# Patient Record
Sex: FEMALE | Race: BLACK OR AFRICAN AMERICAN | NOT HISPANIC OR LATINO | Employment: OTHER | URBAN - METROPOLITAN AREA
[De-identification: names, ages, dates, MRNs, and addresses within clinical notes are randomized per-mention and may not be internally consistent; named-entity substitution may affect disease eponyms.]

---

## 2017-01-30 ENCOUNTER — OFFICE VISIT (OUTPATIENT)
Dept: CARDIOLOGY | Facility: CLINIC | Age: 82
End: 2017-01-30
Payer: MEDICARE

## 2017-01-30 VITALS
SYSTOLIC BLOOD PRESSURE: 120 MMHG | BODY MASS INDEX: 25.34 KG/M2 | WEIGHT: 143 LBS | DIASTOLIC BLOOD PRESSURE: 51 MMHG | HEIGHT: 63 IN | HEART RATE: 45 BPM

## 2017-01-30 DIAGNOSIS — E78.2 MIXED HYPERLIPIDEMIA: Chronic | ICD-10-CM

## 2017-01-30 DIAGNOSIS — I50.42 CHRONIC COMBINED SYSTOLIC AND DIASTOLIC HEART FAILURE: Chronic | ICD-10-CM

## 2017-01-30 DIAGNOSIS — N18.9 CHRONIC KIDNEY DISEASE, UNSPECIFIED STAGE: Primary | ICD-10-CM

## 2017-01-30 DIAGNOSIS — I25.10 CORONARY ARTERY DISEASE, ANGINA PRESENCE UNSPECIFIED, UNSPECIFIED VESSEL OR LESION TYPE, UNSPECIFIED WHETHER NATIVE OR TRANSPLANTED HEART: Chronic | ICD-10-CM

## 2017-01-30 DIAGNOSIS — E11.21 CONTROLLED TYPE 2 DIABETES MELLITUS WITH DIABETIC NEPHROPATHY, WITHOUT LONG-TERM CURRENT USE OF INSULIN: Chronic | ICD-10-CM

## 2017-01-30 DIAGNOSIS — I10 ESSENTIAL HYPERTENSION: Chronic | ICD-10-CM

## 2017-01-30 PROCEDURE — 99999 PR PBB SHADOW E&M-EST. PATIENT-LVL IV: CPT | Mod: PBBFAC,GC,, | Performed by: STUDENT IN AN ORGANIZED HEALTH CARE EDUCATION/TRAINING PROGRAM

## 2017-01-30 PROCEDURE — 1160F RVW MEDS BY RX/DR IN RCRD: CPT | Mod: GC,S$GLB,, | Performed by: STUDENT IN AN ORGANIZED HEALTH CARE EDUCATION/TRAINING PROGRAM

## 2017-01-30 PROCEDURE — 99213 OFFICE O/P EST LOW 20 MIN: CPT | Mod: GC,S$GLB,, | Performed by: STUDENT IN AN ORGANIZED HEALTH CARE EDUCATION/TRAINING PROGRAM

## 2017-01-30 PROCEDURE — 3074F SYST BP LT 130 MM HG: CPT | Mod: GC,S$GLB,, | Performed by: STUDENT IN AN ORGANIZED HEALTH CARE EDUCATION/TRAINING PROGRAM

## 2017-01-30 PROCEDURE — 1157F ADVNC CARE PLAN IN RCRD: CPT | Mod: GC,S$GLB,, | Performed by: STUDENT IN AN ORGANIZED HEALTH CARE EDUCATION/TRAINING PROGRAM

## 2017-01-30 PROCEDURE — 3078F DIAST BP <80 MM HG: CPT | Mod: GC,S$GLB,, | Performed by: STUDENT IN AN ORGANIZED HEALTH CARE EDUCATION/TRAINING PROGRAM

## 2017-01-30 PROCEDURE — 1126F AMNT PAIN NOTED NONE PRSNT: CPT | Mod: GC,S$GLB,, | Performed by: STUDENT IN AN ORGANIZED HEALTH CARE EDUCATION/TRAINING PROGRAM

## 2017-01-30 PROCEDURE — 1159F MED LIST DOCD IN RCRD: CPT | Mod: GC,S$GLB,, | Performed by: STUDENT IN AN ORGANIZED HEALTH CARE EDUCATION/TRAINING PROGRAM

## 2017-01-30 PROCEDURE — 93000 ELECTROCARDIOGRAM COMPLETE: CPT | Mod: S$GLB,,, | Performed by: INTERNAL MEDICINE

## 2017-01-30 RX ORDER — ISOSORBIDE DINITRATE AND HYDRALAZINE HYDROCHLORIDE 37.5; 2 MG/1; MG/1
1 TABLET ORAL 3 TIMES DAILY
Qty: 90 TABLET | Refills: 11 | Status: ON HOLD | OUTPATIENT
Start: 2017-01-30 | End: 2018-07-25 | Stop reason: HOSPADM

## 2017-01-30 RX ORDER — AMLODIPINE BESYLATE 10 MG/1
10 TABLET ORAL DAILY
COMMUNITY

## 2017-01-30 RX ORDER — MIRTAZAPINE 7.5 MG/1
7.5 TABLET, FILM COATED ORAL NIGHTLY
COMMUNITY
End: 2018-04-30

## 2017-01-30 NOTE — PATIENT INSTRUCTIONS
Start Bidil 20-37.5 mg PO TID  C/w lasix, coreg at current doses  Check BMP to assess Cr level  Check daily weights  If Weight increases by 3lbs in 1 day or 5 lbs in 1 week, take double dose of lasix until baseline dry weight is achieved  RTC in 2-3 months

## 2017-01-30 NOTE — MR AVS SNAPSHOT
[facility] - Cardiology  [address]  [facility] [address]  Phone: [phone]                  Misty Yun   2017 1:00 PM   Office Visit    Description:  Female : 3/8/1934   Provider:  Cici Clemens MD   Department:  [facility]           Reason for Visit     Coronary Artery Disease           Diagnoses this Visit        Comments    Chronic kidney disease, unspecified stage    -  Primary            To Do List           Future Appointments        Provider Department Dept Phone    2017 2:00 PM Vashti Minor OD [facility] - Optometry [phone]    3/6/2017 1:00 PM STEVIE Patterson MD [facility] - Internal Medicine [phone]    2017 1:00 PM Cici Clemens MD [facility] - Cardiology [phone]      Goals (5 Years of Data)     None       These Medications        Disp Refills Start End    isosorbide-hydrALAZINE 20-37.5 mg (BIDIL) 20-37.5 mg Tab 90 tablet 11 2017    Take 1 tablet by mouth 3 (three) times daily. - Oral    Pharmacy: [facility]/pharmacy #[id number] - [place], [place] - [address] Ph #: [phone]         [facility]     [facility] - 24/7 Assistance  Registered nurses in the [facility] provide clinical advisement, health education, appointment booking, and other advisory services.  Call for this free service at [phone].             Medications           Message regarding Medications     Verify the changes and/or additions to your medication regime listed below are the same as discussed with your clinician today.  If any of these changes or additions are incorrect, please notify your healthcare provider.        START taking these NEW medications        Refills    isosorbide-hydrALAZINE 20-37.5 mg (BIDIL) 20-37.5 mg Tab 11    Sig: Take 1 tablet by mouth 3 (three) times daily.    Class: Normal    Route: Oral           Verify that the below list of medications is an accurate  representation of the medications you are currently taking.  If none reported, the list may be blank. If incorrect, please contact your healthcare provider. Carry this list with you in case of emergency.           Current Medications     alendronate (FOSAMAX) 70 MG tablet Take 70 mg by mouth every 7 days.    amlodipine (NORVASC) 10 MG tablet Take 10 mg by mouth once daily.    aspirin 81 MG Chew Take 1 tablet (81 mg total) by mouth once daily. RESUME ON 2/14/15    atorvastatin (LIPITOR) 40 MG tablet Take 1 tablet (40 mg total) by mouth once daily.    blood sugar diagnostic (BLOOD GLUCOSE TEST) Strp by Misc.(Non-Drug; Combo Route) route 3 (three) times a week.    calcium-vitamin D3 (CALCIUM 500 + D) 500 mg(1,250mg) -200 unit per tablet Take 1 tablet by mouth 2 (two) times daily with meals.    carvedilol (COREG) 3.125 MG tablet Take 2 tablets (6.25 mg total) by mouth 2 (two) times daily.    donepezil (ARICEPT) 5 MG tablet Take 5 mg by mouth every evening.    ergocalciferol (VITAMIN D2) 50,000 unit Cap Take 50,000 Units by mouth every 7 days.    furosemide (LASIX) 40 MG tablet Take 0.5 tablets (20 mg total) by mouth 2 (two) times daily.    insulin aspart (NOVOLOG) 100 unit/mL injection Inject into the skin as needed for High Blood Sugar.    LANCETS MISC by Misc.(Non-Drug; Combo Route) route 3 (three) times a week.    mirtazapine (REMERON) 7.5 MG Tab Take 7.5 mg by mouth every evening.    nitroGLYCERIN (NITROSTAT) 0.4 MG SL tablet Place 1 tablet (0.4 mg total) under the tongue every 5 (five) minutes as needed for Chest pain.    ofloxacin (OCUFLOX) 0.3 % ophthalmic solution Place 1 drop into the right eye 3 (three) times daily.    prednisoLONE acetate (PRED FORTE) 1 % DrpS Place 1 drop into the right eye 3 (three) times daily.    senna-docusate 8.6-50 mg (PERICOLACE) 8.6-50 mg per tablet Take 1 tablet by mouth 2 (two) times daily.    isosorbide-hydrALAZINE 20-37.5 mg (BIDIL) 20-37.5 mg Tab Take 1 tablet by mouth 3 (three)  "times daily.           Clinical Reference Information           Vital Signs - Last Recorded  Most recent update: 1/30/2017 12:46 PM by Miya Vaca MA    BP Pulse Ht Wt BMI    (!) 120/51 (BP Location: Left arm, Patient Position: Sitting, BP Method: Automatic) (!) 45 5' 3" (1.6 m) 64.9 kg (143 lb) 25.33 kg/m2      Blood Pressure          Most Recent Value    Right Arm BP - Sitting  114/56    Left Arm BP - Sitting  120/51    BP  (!)  120/51      Allergies as of 1/30/2017     No Known Allergies      Immunizations Administered on Date of Encounter - 1/30/2017     None      Orders Placed During Today's Visit     Future Labs/Procedures Expected by Expires    Basic metabolic panel  1/30/2017 3/31/2018      Instructions    Start Bidil 20-37.5 mg PO TID  C/w lasix, coreg at current doses  Check BMP to assess Cr level  Check daily weights  If Weight increases by 3lbs in 1 day or 5 lbs in 1 week, take double dose of lasix until baseline dry weight is achieved  RTC in 2-3 months       "

## 2017-01-30 NOTE — PROGRESS NOTES
I have personally taken the history and examined this patient and agree with the fellow's note as stated above. BUN up further .Will need to be cautious with any further diuresis.

## 2017-01-30 NOTE — PROGRESS NOTES
Cardiology Clinic Note  Reason for Visit: CHF    HPI:   Misty Yun is a 82 y.o. female with a PMH of CAD s/p CABG (2006), Dementia, T2DM, HTN, HLD who presented to the cardiology clinic for management of CHF.     She was admitted to the hospital for acute systolic decompensated CHF (10/31/16 to 11/07/16). EKG showed NSR with LVH, Tn peaked at 0.142. She was diuresed with lasix IVP/infusion and was unable to tolerate  infusion. Her course was complicated by LESLEY. Her TTE revealed an EF of 25% with inferior WMA (however there is no other TTE in our records to show if this is a new finding or old). During the course of the hospitalization it was discussed with the patient and her family the risks and benefits of LHC, however the family opted to not go for LHC given risk of renal failure and becoming HD dependent.      Since her discharge, she has been residing in a [facility] where she has been given a low salt diet and medications. Her weights have remained steady. Her weight on admission was 148 lbs, 139 lbs on the last office visit and currently it is 143 lbs in the office. She denies any chest pain, SOB, orthopnea, PND, abdominal or leg swelling. Feels comfortable, ambulates with assistance of SNF staff. On her last visit, I had recommended to increase her coreg to 6.25 mg BID, take lasix 20 mg BID. Her pulse on this visit was 45. EKG consistent with sinus bradycardia at 45 BPM with Q waves in the inferior leads. She denies any lightheadedness, LOC with ambulation.    ROS:    Constitution: Negative for fever, chills, weight loss or gain.   HENT: Negative for sore throat, rhinorrhea, or headache.  Eyes: Negative for blurred or double vision.   Cardiovascular: See above  Pulmonary: Negative for SOB   Gastrointestinal: Negative for abdominal pain, nausea, vomiting, or diarrhea.   : Negative for dysuria.   Neurological: Negative for focal weakness or sensory changes.  PMH:     Past Medical History    Diagnosis Date    CAD (coronary artery disease)     Cataract     Dementia     Fracture of humerus, proximal, left, closed 1/28/2015    Hyperlipidemia     Hypertension     Type 2 diabetes mellitus, controlled      Past Surgical History   Procedure Laterality Date    Coronary artery bypass graft  2006    Hysterectomy  2002    Orif humerus fracture Left 1/28/2015     Allergies:   Review of patient's allergies indicates:  No Known Allergies  Medications:     Current Outpatient Prescriptions on File Prior to Visit   Medication Sig Dispense Refill    alendronate (FOSAMAX) 70 MG tablet Take 70 mg by mouth every 7 days.      aspirin 81 MG Chew Take 1 tablet (81 mg total) by mouth once daily. RESUME ON 2/14/15      atorvastatin (LIPITOR) 40 MG tablet Take 1 tablet (40 mg total) by mouth once daily. 30 tablet 3    blood sugar diagnostic (BLOOD GLUCOSE TEST) Strp by Misc.(Non-Drug; Combo Route) route 3 (three) times a week.      calcium-vitamin D3 (CALCIUM 500 + D) 500 mg(1,250mg) -200 unit per tablet Take 1 tablet by mouth 2 (two) times daily with meals.      carvedilol (COREG) 3.125 MG tablet Take 2 tablets (6.25 mg total) by mouth 2 (two) times daily. 60 tablet 3    donepezil (ARICEPT) 5 MG tablet Take 5 mg by mouth every evening.      ergocalciferol (VITAMIN D2) 50,000 unit Cap Take 50,000 Units by mouth every 7 days.      furosemide (LASIX) 40 MG tablet Take 0.5 tablets (20 mg total) by mouth 2 (two) times daily. 30 tablet 3    insulin aspart (NOVOLOG) 100 unit/mL injection Inject into the skin as needed for High Blood Sugar.      LANCETS MISC by Misc.(Non-Drug; Combo Route) route 3 (three) times a week.      nitroGLYCERIN (NITROSTAT) 0.4 MG SL tablet Place 1 tablet (0.4 mg total) under the tongue every 5 (five) minutes as needed for Chest pain. 25 tablet 3    ofloxacin (OCUFLOX) 0.3 % ophthalmic solution Place 1 drop into the right eye 3 (three) times daily. 1 Bottle 4    prednisoLONE acetate  "(PRED FORTE) 1 % DrpS Place 1 drop into the right eye 3 (three) times daily. 10 mL 3    senna-docusate 8.6-50 mg (PERICOLACE) 8.6-50 mg per tablet Take 1 tablet by mouth 2 (two) times daily.       No current facility-administered medications on file prior to visit.      Social History:     Social History   Substance Use Topics    Smoking status: Former Smoker     Packs/day: 2.00     Years: 30.00     Quit date: 1/20/2007    Smokeless tobacco: Never Used    Alcohol use No     Family History:     Family History   Problem Relation Age of Onset    Adopted: Yes    Blindness Maternal Grandmother      Physical Exam:     Visit Vitals    BP (!) 120/51 (BP Location: Left arm, Patient Position: Sitting, BP Method: Automatic)    Pulse (!) 45    Ht 5' 3" (1.6 m)    Wt 64.9 kg (143 lb)    BMI 25.33 kg/m2        Wt Readings from Last 10 Encounters:   01/30/17 64.9 kg (143 lb)   12/05/16 63.5 kg (139 lb 15.9 oz)   11/21/16 63.3 kg (139 lb 8.8 oz)   11/07/16 59.8 kg (131 lb 13.4 oz)   05/04/15 72.1 kg (159 lb)   04/13/15 72.1 kg (159 lb)   03/16/15 72.1 kg (159 lb)   01/30/15 73 kg (161 lb)   01/28/15 61.7 kg (136 lb)   01/20/15 48.1 kg (106 lb)       Constitutional: NAD, conversant  HEENT: Sclera anicteric, PERRLA, EOMI  Neck: No JVD, no carotid bruits  CV: Regular rhythm, Bradycardic, no murmur, normal S1/S2, No Pericardial rub  Pulm: CTAB with no wheezes, rales, or rhonchi  GI: Abdomen soft, NTND, +BS  Extremities: No LE edema, warm and well perfused, No cyanosis, No clubbing  Skin: No ecchymosis, erythema, or ulcers  Psych: AOx3, appropriate affect  Neuro: CNII-XII intact, no focal deficits    Labs:     Lab Results   Component Value Date     01/30/2017    K 3.8 01/30/2017     01/30/2017    CO2 25 01/30/2017    BUN 57 (H) 01/30/2017    CREATININE 2.6 (H) 01/30/2017    ANIONGAP 15 01/30/2017     Lab Results   Component Value Date    HGBA1C 5.8 10/31/2016     Lab Results   Component Value Date     (H) " 11/23/2016    BNP 2475 (H) 10/31/2016    BNP 79 04/13/2015    Lab Results   Component Value Date    WBC 6.27 11/07/2016    HGB 10.1 (L) 11/07/2016    HCT 31.9 (L) 11/07/2016    HCT 28 (L) 01/28/2015     11/07/2016    GRAN 4.1 11/07/2016    GRAN 65.4 11/07/2016     Lab Results   Component Value Date    CHOL 142 11/01/2016    HDL 42 11/01/2016    LDLCALC 84.8 11/01/2016    TRIG 76 11/01/2016          Imaging:   TTE (11/23/16):  1 - Eccentric hypertrophy.     2 - Moderately depressed left ventricular systolic function (EF 30-35%).     3 - Mild left atrial enlargement.     4 - Left ventricular diastolic dysfunction.     5 - Mildly depressed right ventricular systolic function .     6 - The estimated PA systolic pressure is 28 mmHg.     7 - Mild mitral regurgitation.     8 - Mild tricuspid regurgitation.       EF   Date Value Ref Range Status   11/23/2016 30 (A) 55 - 65    10/31/2016 25 (A) 55 - 65        EKG: Sinus sumeet @ 45 BPM, Q waves in inferior leads  Assessment:   Misty Yun is a 82 y.o. female with a PMH of CAD s/p CABG (2006), Dementia, T2DM, HTN, HLD who presented to the cardiology clinic for management of CHF.     Plan:   1) Chronic systolic CHF/ Ischemic cardiomyopathy (EF 30-35%)/ CAD s/p CABG (2006)  - c/w ASA  - c/w coreg to 6.25mg TWICE DAILY; unable to increase due to bradycardia  - c/w lasix 20 mg TWICE DAILY   - start Bidil   - check BMP to assess baseline Cr  - check weight daily  - 2 gram sodium diet     2) Dementia - c/w aricept     3) T2DM - c/w insulin as needed; HbA1C: 5.8     4) HLD - c/w atorvastatin; LDL: 85; HDL: 42     5) CKD - check BMP.      RTC in 3 months. Case discussed with attending.    Signed:  iCci Clemens MD  Cardiology Fellow  Pager: [phone]  1/30/2017 4:43 PM

## 2017-01-31 ENCOUNTER — TELEPHONE (OUTPATIENT)
Dept: CARDIOLOGY | Facility: CLINIC | Age: 82
End: 2017-01-31

## 2017-01-31 DIAGNOSIS — I25.10 CORONARY ARTERY DISEASE, ANGINA PRESENCE UNSPECIFIED, UNSPECIFIED VESSEL OR LESION TYPE, UNSPECIFIED WHETHER NATIVE OR TRANSPLANTED HEART: Primary | ICD-10-CM

## 2017-02-06 ENCOUNTER — OFFICE VISIT (OUTPATIENT)
Dept: OPTOMETRY | Facility: CLINIC | Age: 82
End: 2017-02-06
Payer: MEDICARE

## 2017-02-06 DIAGNOSIS — H25.13 NUCLEAR SCLEROSIS, BILATERAL: ICD-10-CM

## 2017-02-06 DIAGNOSIS — E11.9 TYPE 2 DIABETES MELLITUS WITHOUT OPHTHALMIC MANIFESTATIONS: Primary | ICD-10-CM

## 2017-02-06 PROCEDURE — 99999 PR PBB SHADOW E&M-EST. PATIENT-LVL III: CPT | Mod: PBBFAC,,, | Performed by: OPTOMETRIST

## 2017-02-06 PROCEDURE — 92014 COMPRE OPH EXAM EST PT 1/>: CPT | Mod: S$GLB,,, | Performed by: OPTOMETRIST

## 2017-02-06 NOTE — PROGRESS NOTES
HPI     Last eye exam was 12/1/14 with Dr. Minor.  Patient states no vision changes since last exam. Wanted to get a new   glasses rx today. Was scheduled for cataract sx 2 years ago but cancelled   due to breaking arm.  Patient denies diplopia, headaches, flashes/floaters, and pain.    Hemoglobin A1C       Date                     Value               Ref Range             Status                10/31/2016               5.8                 4.5 - 6.2 %           Final                     Last edited by Leatha Kang on 2/6/2017  2:20 PM.     ROS     Negative for: Constitutional, Gastrointestinal, Neurological, Skin,   Genitourinary, Musculoskeletal, HENT, Endocrine, Cardiovascular, Eyes,   Respiratory, Psychiatric, Allergic/Imm, Heme/Lymph    Last edited by Vashti Minor, OD on 2/6/2017  3:31 PM. (History)        Assessment /Plan     For exam results, see Encounter Report.    Type 2 diabetes mellitus without ophthalmic manifestations    Nuclear sclerosis, bilateral            1.  No retinopathy--monitor yearly.  BS control.  2.  Educated on cataracts and affects on vision.  Consult Dr. Motta for cataract surgery.

## 2017-02-23 DIAGNOSIS — N18.9 CKD (CHRONIC KIDNEY DISEASE), UNSPECIFIED STAGE: Primary | ICD-10-CM

## 2017-02-23 NOTE — PROGRESS NOTES
Notified about abnormal result on BMP. Cr found to be 2.6 on 1/30/17 (up from 2.1 on 11/2016). Unclear whether this was an acute rise or a new baseline / progression of CKD 4.     Ordered another BMP to assess kidney function.  Currently scheduled to follow up with me in May. However, if BMP significantly abnormal, would arrange earlier follow up with me and a referral to nephrology.  Will notify patient.    Cici Clemens MD  Cardiology Fellow

## 2017-03-08 ENCOUNTER — OFFICE VISIT (OUTPATIENT)
Dept: OPHTHALMOLOGY | Facility: CLINIC | Age: 82
End: 2017-03-08
Payer: MEDICARE

## 2017-03-08 DIAGNOSIS — H25.13 NUCLEAR SCLEROSIS, BILATERAL: Primary | ICD-10-CM

## 2017-03-08 PROCEDURE — 92014 COMPRE OPH EXAM EST PT 1/>: CPT | Mod: S$GLB,,, | Performed by: OPHTHALMOLOGY

## 2017-03-08 PROCEDURE — 99999 PR PBB SHADOW E&M-EST. PATIENT-LVL II: CPT | Mod: PBBFAC,,, | Performed by: OPHTHALMOLOGY

## 2017-03-08 RX ORDER — MOXIFLOXACIN 5 MG/ML
1 SOLUTION/ DROPS OPHTHALMIC
Status: CANCELLED | OUTPATIENT
Start: 2017-03-08

## 2017-03-08 RX ORDER — PHENYLEPHRINE HYDROCHLORIDE 25 MG/ML
1 SOLUTION/ DROPS OPHTHALMIC
Status: CANCELLED | OUTPATIENT
Start: 2017-03-08

## 2017-03-08 RX ORDER — TETRACAINE HYDROCHLORIDE 5 MG/ML
1 SOLUTION OPHTHALMIC
Status: CANCELLED | OUTPATIENT
Start: 2017-03-08

## 2017-03-08 RX ORDER — TROPICAMIDE 10 MG/ML
1 SOLUTION/ DROPS OPHTHALMIC
Status: CANCELLED | OUTPATIENT
Start: 2017-03-08

## 2017-03-08 RX ORDER — LIDOCAINE HYDROCHLORIDE 10 MG/ML
1 INJECTION, SOLUTION EPIDURAL; INFILTRATION; INTRACAUDAL; PERINEURAL ONCE
Status: CANCELLED | OUTPATIENT
Start: 2017-03-08 | End: 2017-03-08

## 2017-03-08 NOTE — MR AVS SNAPSHOT
[facility] - Ophthalmology  [address]  [facility] [address]  Phone: [phone]  Fax: [phone]                  Misty Yun   3/8/2017 1:30 PM   Office Visit    Description:  Female : 3/8/1934   Provider:  Sidney Motta MD   Department:  [facility]           Reason for Visit     Cataract           Diagnoses this Visit        Comments    Nuclear sclerosis, bilateral    -  Primary            To Do List           Future Appointments        Provider Department Dept Phone    3/27/2017 4:00 PM MD [facility] Cardiology [phone]    2017 1:00 PM MD [facility] Cardiology [phone]      Goals (5 Years of Data)     None      [facility]     [facility] -  Assistance  Registered nurses in the [facility] provide clinical advisement, health education, appointment booking, and other advisory services.  Call for this free service at [phone].             Medications           Message regarding Medications     Verify the changes and/or additions to your medication regime listed below are the same as discussed with your clinician today.  If any of these changes or additions are incorrect, please notify your healthcare provider.             Verify that the below list of medications is an accurate representation of the medications you are currently taking.  If none reported, the list may be blank. If incorrect, please contact your healthcare provider. Carry this list with you in case of emergency.           Current Medications     alendronate (FOSAMAX) 70 MG tablet Take 70 mg by mouth every 7 days.    amlodipine (NORVASC) 10 MG tablet Take 10 mg by mouth once daily.    aspirin 81 MG Chew Take 1 tablet (81 mg total) by mouth once daily. RESUME ON 2/14/15    atorvastatin (LIPITOR) 40 MG tablet Take 1 tablet (40 mg total) by mouth once daily.    blood sugar diagnostic (BLOOD GLUCOSE TEST) Strp by  Misc.(Non-Drug; Combo Route) route 3 (three) times a week.    calcium-vitamin D3 (CALCIUM 500 + D) 500 mg(1,250mg) -200 unit per tablet Take 1 tablet by mouth 2 (two) times daily with meals.    carvedilol (COREG) 3.125 MG tablet Take 2 tablets (6.25 mg total) by mouth 2 (two) times daily.    donepezil (ARICEPT) 5 MG tablet Take 5 mg by mouth every evening.    ergocalciferol (VITAMIN D2) 50,000 unit Cap Take 50,000 Units by mouth every 7 days.    furosemide (LASIX) 40 MG tablet Take 0.5 tablets (20 mg total) by mouth 2 (two) times daily.    insulin aspart (NOVOLOG) 100 unit/mL injection Inject into the skin as needed for High Blood Sugar.    isosorbide-hydrALAZINE 20-37.5 mg (BIDIL) 20-37.5 mg Tab Take 1 tablet by mouth 3 (three) times daily.    LANCETS MISC by Misc.(Non-Drug; Combo Route) route 3 (three) times a week.    mirtazapine (REMERON) 7.5 MG Tab Take 7.5 mg by mouth every evening.    nitroGLYCERIN (NITROSTAT) 0.4 MG SL tablet Place 1 tablet (0.4 mg total) under the tongue every 5 (five) minutes as needed for Chest pain.    senna-docusate 8.6-50 mg (PERICOLACE) 8.6-50 mg per tablet Take 1 tablet by mouth 2 (two) times daily.           Clinical Reference Information           Allergies as of 3/8/2017     No Known Allergies      Immunizations Administered on Date of Encounter - 3/8/2017     None      Language Assistance Services     ATTENTION: Language assistance services are available, free of charge. Please call [phone].      ATENCIÓN: Si nidia bauer, tiene a loomis disposición servicios gratuitos de asistencia lingüística. Llcarroll al [phone].     NIGEL Ý: N?u b?n nói Ti?ng Vi?t, có các d?ch v? h? tr? ngôn ng? mi?n phí dành cho b?n. G?i s? [phone].         [facility] complies with applicable Federal civil rights laws and does not discriminate on the basis of race, color, national origin, age, disability, or sex.

## 2017-03-08 NOTE — PROGRESS NOTES
HPI     Referred by Dr. Minor    Patient here for cataract evaluation. Denies flashes, floaters, diplopia,   photophobia, glare, HA's, or pain.       Last edited by Sonia Ruiz on 3/8/2017  2:13 PM.         Assessment /Plan     For exam results, see Encounter Report.    Nuclear sclerosis, bilateral      Visually Significant Cataract: Patient reports decreased vision consistent with the clinical amount of lenticular opacity, which reaches the level of visual significance and affects activities of daily living. Risks, benefits, and alternatives to cataract surgery were discussed and the consent reviewed. IOL options were discussed, including ATIOLs and the associated side effects and additional patient cost associated with them.   IOL Selections:   Right eye  IOL: pcboo 19.0     Left eye  IOL: pcboo 19.0    Pt wishes to have left eye done first.

## 2017-03-11 ENCOUNTER — TELEPHONE (OUTPATIENT)
Dept: OPHTHALMOLOGY | Facility: CLINIC | Age: 82
End: 2017-03-11

## 2017-03-11 DIAGNOSIS — H25.12 NUCLEAR SCLEROTIC CATARACT OF LEFT EYE: Primary | ICD-10-CM

## 2017-03-16 NOTE — PROGRESS NOTES
[facility]   Findings similar to last BMP done in January. Cr is stable at 2.6. Likely the patient's new baseline.  Will have her follow up with me as scheduled in May.    Cici Clemens MD  Cardiology Fellow  Pager: [phone]  3/16/2017 5:55 PM

## 2017-03-27 ENCOUNTER — OFFICE VISIT (OUTPATIENT)
Dept: CARDIOLOGY | Facility: CLINIC | Age: 82
End: 2017-03-27
Payer: MEDICARE

## 2017-03-27 VITALS
DIASTOLIC BLOOD PRESSURE: 60 MMHG | HEIGHT: 63 IN | WEIGHT: 147.5 LBS | HEART RATE: 83 BPM | SYSTOLIC BLOOD PRESSURE: 113 MMHG | BODY MASS INDEX: 26.13 KG/M2

## 2017-03-27 DIAGNOSIS — E11.21 CONTROLLED TYPE 2 DIABETES MELLITUS WITH DIABETIC NEPHROPATHY, WITHOUT LONG-TERM CURRENT USE OF INSULIN: Chronic | ICD-10-CM

## 2017-03-27 DIAGNOSIS — E78.2 MIXED HYPERLIPIDEMIA: Chronic | ICD-10-CM

## 2017-03-27 DIAGNOSIS — N18.4 CHRONIC KIDNEY DISEASE, STAGE IV (SEVERE): Chronic | ICD-10-CM

## 2017-03-27 DIAGNOSIS — I50.42 CHRONIC COMBINED SYSTOLIC AND DIASTOLIC HEART FAILURE: Chronic | ICD-10-CM

## 2017-03-27 DIAGNOSIS — I50.42 CHRONIC COMBINED SYSTOLIC AND DIASTOLIC CONGESTIVE HEART FAILURE: Primary | ICD-10-CM

## 2017-03-27 DIAGNOSIS — I10 ESSENTIAL HYPERTENSION: Chronic | ICD-10-CM

## 2017-03-27 PROCEDURE — 3078F DIAST BP <80 MM HG: CPT | Mod: GC,S$GLB,, | Performed by: STUDENT IN AN ORGANIZED HEALTH CARE EDUCATION/TRAINING PROGRAM

## 2017-03-27 PROCEDURE — 1159F MED LIST DOCD IN RCRD: CPT | Mod: GC,S$GLB,, | Performed by: STUDENT IN AN ORGANIZED HEALTH CARE EDUCATION/TRAINING PROGRAM

## 2017-03-27 PROCEDURE — 1160F RVW MEDS BY RX/DR IN RCRD: CPT | Mod: GC,S$GLB,, | Performed by: STUDENT IN AN ORGANIZED HEALTH CARE EDUCATION/TRAINING PROGRAM

## 2017-03-27 PROCEDURE — 99999 PR PBB SHADOW E&M-EST. PATIENT-LVL IV: CPT | Mod: PBBFAC,GC,, | Performed by: STUDENT IN AN ORGANIZED HEALTH CARE EDUCATION/TRAINING PROGRAM

## 2017-03-27 PROCEDURE — 99214 OFFICE O/P EST MOD 30 MIN: CPT | Mod: GC,S$GLB,, | Performed by: STUDENT IN AN ORGANIZED HEALTH CARE EDUCATION/TRAINING PROGRAM

## 2017-03-27 PROCEDURE — 1126F AMNT PAIN NOTED NONE PRSNT: CPT | Mod: GC,S$GLB,, | Performed by: STUDENT IN AN ORGANIZED HEALTH CARE EDUCATION/TRAINING PROGRAM

## 2017-03-27 PROCEDURE — 1157F ADVNC CARE PLAN IN RCRD: CPT | Mod: GC,S$GLB,, | Performed by: STUDENT IN AN ORGANIZED HEALTH CARE EDUCATION/TRAINING PROGRAM

## 2017-03-27 PROCEDURE — 3074F SYST BP LT 130 MM HG: CPT | Mod: GC,S$GLB,, | Performed by: STUDENT IN AN ORGANIZED HEALTH CARE EDUCATION/TRAINING PROGRAM

## 2017-03-27 RX ORDER — FUROSEMIDE 20 MG/1
20 TABLET ORAL 2 TIMES DAILY
COMMUNITY
Start: 2017-03-13 | End: 2017-03-27 | Stop reason: SDUPTHER

## 2017-03-27 RX ORDER — FUROSEMIDE 20 MG/1
20 TABLET ORAL DAILY
Qty: 30 TABLET | Refills: 11 | Status: ON HOLD | OUTPATIENT
Start: 2017-03-27 | End: 2018-07-25 | Stop reason: HOSPADM

## 2017-03-27 NOTE — MR AVS SNAPSHOT
[facility]  [address]  [facility] [address]  Phone: [phone]                  Misty Yun   3/27/2017 4:00 PM   Office Visit    Description:  Female : 3/8/1934   Provider:  Cici Clemens MD   Department:  [facility]           Reason for Visit     Chronic Renal Failure           Diagnoses this Visit        Comments    Chronic combined systolic and diastolic congestive heart failure    -  Primary            To Do List           Your Future Surgeries/Procedures     2017   Surgery with Sidney Motta MD   [facility]    [address]  [facility] [address] [phone]              Goals (5 Years of Data)     None      Follow-Up and Disposition     Return in about 1 month (around 2017).       These Medications        Disp Refills Start End    furosemide (LASIX) 20 MG tablet 30 tablet 11 3/27/2017     Take 1 tablet (20 mg total) by mouth once daily. - Oral    Pharmacy: [facility] [id number] [place] [address] [facility] Ph #: [phone]         [facility]     [facility] -  Assistance  Registered nurses in the [facility] provide clinical advisement, health education, appointment booking, and other advisory services.  Call for this free service at [phone].             Medications           Message regarding Medications     Verify the changes and/or additions to your medication regime listed below are the same as discussed with your clinician today.  If any of these changes or additions are incorrect, please notify your healthcare provider.        START taking these NEW medications        Refills    furosemide (LASIX) 20 MG tablet 11    Sig: Take 1 tablet (20 mg total) by mouth once daily.    Class: Normal    Route: Oral           Verify that the below list of medications is an accurate representation of the medications you are  "currently taking.  If none reported, the list may be blank. If incorrect, please contact your healthcare provider. Carry this list with you in case of emergency.           Current Medications     alendronate (FOSAMAX) 70 MG tablet Take 70 mg by mouth every 7 days.    amlodipine (NORVASC) 10 MG tablet Take 10 mg by mouth once daily.    aspirin 81 MG Chew Take 1 tablet (81 mg total) by mouth once daily. RESUME ON 2/14/15    atorvastatin (LIPITOR) 40 MG tablet Take 1 tablet (40 mg total) by mouth once daily.    blood sugar diagnostic (BLOOD GLUCOSE TEST) Strp by Misc.(Non-Drug; Combo Route) route 3 (three) times a week.    calcium-vitamin D3 (CALCIUM 500 + D) 500 mg(1,250mg) -200 unit per tablet Take 1 tablet by mouth 2 (two) times daily with meals.    carvedilol (COREG) 3.125 MG tablet Take 2 tablets (6.25 mg total) by mouth 2 (two) times daily.    donepezil (ARICEPT) 5 MG tablet Take 5 mg by mouth every evening.    ergocalciferol (VITAMIN D2) 50,000 unit Cap Take 50,000 Units by mouth every 7 days.    furosemide (LASIX) 20 MG tablet Take 1 tablet (20 mg total) by mouth once daily.    insulin aspart (NOVOLOG) 100 unit/mL injection Inject into the skin as needed for High Blood Sugar.    isosorbide-hydrALAZINE 20-37.5 mg (BIDIL) 20-37.5 mg Tab Take 1 tablet by mouth 3 (three) times daily.    LANCETS MISC by Misc.(Non-Drug; Combo Route) route 3 (three) times a week.    mirtazapine (REMERON) 7.5 MG Tab Take 7.5 mg by mouth every evening.    nitroGLYCERIN (NITROSTAT) 0.4 MG SL tablet Place 1 tablet (0.4 mg total) under the tongue every 5 (five) minutes as needed for Chest pain.    senna-docusate 8.6-50 mg (PERICOLACE) 8.6-50 mg per tablet Take 1 tablet by mouth 2 (two) times daily.           Clinical Reference Information           Your Vitals Were     BP Pulse Height Weight BMI    113/60 83 5' 3" (1.6 m) 66.9 kg (147 lb 7.8 oz) 26.13 kg/m2      Blood Pressure          Most Recent Value    Right Arm BP - Sitting  119/58    " Left Arm BP - Sitting  113/60    BP  113/60      Allergies as of 3/27/2017     No Known Allergies      Immunizations Administered on Date of Encounter - 3/27/2017     None      Orders Placed During Today's Visit      Normal Orders This Visit    Ambulatory consult to Nephrology       Instructions    Decrease lasix to 20 mg once a day (from twice a day)  Take extra dose of lasix 20 mg if you notice weight gain > 3lbs in 1 day or 5 lbs in 1 week  Check CMP, Mg at the nursing home in 1 week  Nephrology evaluation  RTC in 1 month       Language Assistance Services     ATTENTION: Language assistance services are available, free of charge. Please call [phone].      ATENCIÓN: Si habla español, tiene a loomis disposición servicios gratuitos de asistencia lingüística. Llame al [phone].     CHÚ Ý: N?u b?n nói Ti?ng Vi?t, có các d?ch v? h? tr? ngôn ng? mi?n phí dành cho b?n. G?i s? [phone].         [facility] complies with applicable Federal civil rights laws and does not discriminate on the basis of race, color, national origin, age, disability, or sex.

## 2017-03-27 NOTE — PROGRESS NOTES
"    "Cardiology Clinic Note  Reason for Visit: CHF F/U    HPI:   Misty Yun is a 83 y.o. female with a PMH of CAD s/p CABG (2006), Dementia, T2DM, HTN, HLD who presented to the cardiology clinic for management of CHF.      She was admitted to the hospital for acute systolic decompensated CHF (10/31/16 to 11/07/16). EKG showed NSR with LVH, Tn peaked at 0.142. She was diuresed with lasix IVP/infusion and was unable to tolerate  infusion. Her course was complicated by LESLEY. Her TTE revealed an EF of 25% with inferior WMA (however there is no other TTE in our records to show if this is a new finding or old). During the course of the hospitalization it was discussed with the patient and her family the risks and benefits of LHC, however the family opted to not go for LHC given risk of renal failure and becoming HD dependent.       On her last office visit on 1/30/17, we had started her on bidil 20-37.5 mg TID and recommended to c/w her lasix 20 mg BID. Her blood work after her visit revealed a Cr bump from 2.1 on 11/2016 to 2.6 on 01/30/17. Due to this acute rise there was concern if this would be her new baseline vs an acute injury. I contact [facility] to redraw her BMP to assess if Cr has been getting worse or stable. At that time the nursing staff reported she was asymptomatic, maintaining good UOP. [facility] revealed BUN/Cr as follows: 61/2.2 (01/17/17) --> 63/2.62 (03/14/17) --> 58/2.98 (03/21/17). On the latest BMP K was 4.1, Na 144, GFR - 16, Ca 10.9, CO2 29. Other blood work obtained was a hemoglobin A1C of 6.2% (01/12/17).    Currently she feels well and denies any current complaints including chest pain, SOB, orthopnea, PND, nausea, lightheadedness, LOC, numbness, weakness. Her medication compliance is maintained by [facility] staff. However, her family has noted that she has not been drinking enough water. She needs to be told and coerced into drinking water. "     ROS:    Constitution: Negative for fever, chills, weight loss or gain.   HENT: Negative for sore throat, rhinorrhea, or headache.  Eyes: Negative for blurred or double vision.   Cardiovascular: See above  Pulmonary: Negative for SOB   Gastrointestinal: Negative for abdominal pain, nausea, vomiting, or diarrhea.   : Negative for dysuria.   Neurological: Negative for focal weakness or sensory changes.  PMH:     Past Medical History:   Diagnosis Date    CAD (coronary artery disease)     Cataract     Dementia     Fracture of humerus, proximal, left, closed 1/28/2015    Hyperlipidemia     Hypertension     Type 2 diabetes mellitus, controlled      Past Surgical History:   Procedure Laterality Date    CORONARY ARTERY BYPASS GRAFT  2006    HYSTERECTOMY  2002    ORIF HUMERUS FRACTURE Left 1/28/2015     Allergies:   Review of patient's allergies indicates:  No Known Allergies  Medications:     Current Outpatient Prescriptions on File Prior to Visit   Medication Sig Dispense Refill    alendronate (FOSAMAX) 70 MG tablet Take 70 mg by mouth every 7 days.      amlodipine (NORVASC) 10 MG tablet Take 10 mg by mouth once daily.      aspirin 81 MG Chew Take 1 tablet (81 mg total) by mouth once daily. RESUME ON 2/14/15      atorvastatin (LIPITOR) 40 MG tablet Take 1 tablet (40 mg total) by mouth once daily. 30 tablet 3    blood sugar diagnostic (BLOOD GLUCOSE TEST) Strp by Misc.(Non-Drug; Combo Route) route 3 (three) times a week.      calcium-vitamin D3 (CALCIUM 500 + D) 500 mg(1,250mg) -200 unit per tablet Take 1 tablet by mouth 2 (two) times daily with meals.      carvedilol (COREG) 3.125 MG tablet Take 2 tablets (6.25 mg total) by mouth 2 (two) times daily. 60 tablet 3    donepezil (ARICEPT) 5 MG tablet Take 5 mg by mouth every evening.      ergocalciferol (VITAMIN D2) 50,000 unit Cap Take 50,000 Units by mouth every 7 days.      insulin aspart (NOVOLOG) 100 unit/mL injection Inject into the skin as  "needed for High Blood Sugar.      isosorbide-hydrALAZINE 20-37.5 mg (BIDIL) 20-37.5 mg Tab Take 1 tablet by mouth 3 (three) times daily. 90 tablet 11    LANCETS MISC by Misc.(Non-Drug; Combo Route) route 3 (three) times a week.      mirtazapine (REMERON) 7.5 MG Tab Take 7.5 mg by mouth every evening.      nitroGLYCERIN (NITROSTAT) 0.4 MG SL tablet Place 1 tablet (0.4 mg total) under the tongue every 5 (five) minutes as needed for Chest pain. 25 tablet 3    senna-docusate 8.6-50 mg (PERICOLACE) 8.6-50 mg per tablet Take 1 tablet by mouth 2 (two) times daily.      [DISCONTINUED] furosemide (LASIX) 40 MG tablet Take 0.5 tablets (20 mg total) by mouth 2 (two) times daily. 30 tablet 3     No current facility-administered medications on file prior to visit.      Social History:     Social History   Substance Use Topics    Smoking status: Former Smoker     Packs/day: 2.00     Years: 30.00     Quit date: 1/20/2007    Smokeless tobacco: Never Used    Alcohol use No     Family History:     Family History   Problem Relation Age of Onset    Adopted: Yes    Blindness Maternal Grandmother      Physical Exam:   /60  Pulse 83  Ht 5' 3" (1.6 m)  Wt 66.9 kg (147 lb 7.8 oz)  BMI 26.13 kg/m2     Wt Readings from Last 10 Encounters:   03/27/17 66.9 kg (147 lb 7.8 oz)   01/30/17 64.9 kg (143 lb)   12/05/16 63.5 kg (139 lb 15.9 oz)   11/21/16 63.3 kg (139 lb 8.8 oz)   11/07/16 59.8 kg (131 lb 13.4 oz)   05/04/15 72.1 kg (159 lb)   04/13/15 72.1 kg (159 lb)   03/16/15 72.1 kg (159 lb)   01/30/15 73 kg (161 lb)   01/28/15 61.7 kg (136 lb)       Constitutional: NAD, conversant  HEENT: Sclera anicteric, PERRLA, EOMI  Neck: No JVD, no carotid bruits  CV: RRR, no murmur, normal S1/S2, No Pericardial rub  Pulm: CTAB with no wheezes, rales, or rhonchi  GI: Abdomen soft, NTND, +BS  Extremities: No LE edema, warm and well perfused, No cyanosis, No clubbing  Skin: No ecchymosis, erythema, or ulcers  Psych: AOx3, appropriate " affect  Neuro: CNII-XII intact, no focal deficits    Labs:     Lab Results   Component Value Date     01/30/2017    K 3.8 01/30/2017     01/30/2017    CO2 25 01/30/2017    BUN 57 (H) 01/30/2017    CREATININE 2.6 (H) 01/30/2017    ANIONGAP 15 01/30/2017     Lab Results   Component Value Date    HGBA1C 5.8 10/31/2016     Lab Results   Component Value Date     (H) 11/23/2016    BNP 2475 (H) 10/31/2016    BNP 79 04/13/2015    Lab Results   Component Value Date    WBC 6.27 11/07/2016    HGB 10.1 (L) 11/07/2016    HCT 31.9 (L) 11/07/2016    HCT 28 (L) 01/28/2015     11/07/2016    GRAN 4.1 11/07/2016    GRAN 65.4 11/07/2016     Lab Results   Component Value Date    CHOL 142 11/01/2016    HDL 42 11/01/2016    LDLCALC 84.8 11/01/2016    TRIG 76 11/01/2016          Imaging:   TTE (11/23/17):  1 - Eccentric hypertrophy.     2 - Moderately depressed left ventricular systolic function (EF 30-35%).     3 - Mild left atrial enlargement.     4 - Left ventricular diastolic dysfunction.     5 - Mildly depressed right ventricular systolic function .     6 - The estimated PA systolic pressure is 28 mmHg.     7 - Mild mitral regurgitation.     8 - Mild tricuspid regurgitation.       EF   Date Value Ref Range Status   11/23/2016 30 (A) 55 - 65    10/31/2016 25 (A) 55 - 65        Assessment:   Misty Yun is a 83 y.o. female with a PMH of CAD s/p CABG (2006), Dementia, T2DM, HTN, HLD who presented to the cardiology clinic for management of CHF.     Plan:   1) Chronic systolic CHF/ Ischemic cardiomyopathy (EF 30-35%)/ CAD s/p CABG (2006)  - c/w ASA  - c/w coreg to 6.25mg TWICE DAILY  - decrease lasix to 20 mg QD (from BID)   - c/w Bidil 20-37.5 TID  - check CMP, Mg in 1 week at NH and fax records over to clinic  - check weight dailys; if weight goes up by 3 lbs in 1 day or 5 lbs in 1 week can give extra dose of lasix until reaching baseline weight of 147lbs  - 2 gram sodium diet      2) Dementia - c/w  aricept      3) T2DM - c/w insulin as needed; HbA1C: 6.2 (01/12/17)      4) HLD - c/w atorvastatin; LDL: 85; HDL: 42      5) LESLEY on CKD 4 - suspect dehydration/overdiuresis  - Cr increased to 2.98 from 2.6  - decrease lasix to 20 mg QD (from BID)  - encourage PO fluid intake  - check CMP, Mg for 1 week   - nephrology evaluation      RTC in 1 month. Case discussed with attending.    Signed:  Cici Clemens MD  Cardiology Fellow  Pager: [phone]  3/27/2017 5:16 PM

## 2017-03-28 ENCOUNTER — TELEPHONE (OUTPATIENT)
Dept: CARDIOLOGY | Facility: CLINIC | Age: 82
End: 2017-03-28

## 2017-04-10 ENCOUNTER — OFFICE VISIT (OUTPATIENT)
Dept: NEPHROLOGY | Facility: CLINIC | Age: 82
DRG: 641 | End: 2017-04-10
Payer: MEDICARE

## 2017-04-10 ENCOUNTER — DOCUMENTATION ONLY (OUTPATIENT)
Dept: NEPHROLOGY | Facility: CLINIC | Age: 82
End: 2017-04-10

## 2017-04-10 VITALS
SYSTOLIC BLOOD PRESSURE: 148 MMHG | OXYGEN SATURATION: 94 % | DIASTOLIC BLOOD PRESSURE: 58 MMHG | HEART RATE: 56 BPM | WEIGHT: 148.56 LBS | HEIGHT: 63 IN | BODY MASS INDEX: 26.32 KG/M2

## 2017-04-10 DIAGNOSIS — R82.71 ASYMPTOMATIC BACTERIURIA: ICD-10-CM

## 2017-04-10 DIAGNOSIS — R80.9 PROTEINURIA, UNSPECIFIED TYPE: ICD-10-CM

## 2017-04-10 DIAGNOSIS — I10 ESSENTIAL HYPERTENSION: Chronic | ICD-10-CM

## 2017-04-10 DIAGNOSIS — N18.4 CHRONIC KIDNEY DISEASE, STAGE IV (SEVERE): Chronic | ICD-10-CM

## 2017-04-10 DIAGNOSIS — N25.81 SECONDARY HYPERPARATHYROIDISM: ICD-10-CM

## 2017-04-10 DIAGNOSIS — I25.10 CORONARY ARTERY DISEASE, ANGINA PRESENCE UNSPECIFIED, UNSPECIFIED VESSEL OR LESION TYPE, UNSPECIFIED WHETHER NATIVE OR TRANSPLANTED HEART: Chronic | ICD-10-CM

## 2017-04-10 DIAGNOSIS — D64.9 ANEMIA, UNSPECIFIED TYPE: ICD-10-CM

## 2017-04-10 DIAGNOSIS — I50.42 CHRONIC COMBINED SYSTOLIC AND DIASTOLIC HEART FAILURE: Chronic | ICD-10-CM

## 2017-04-10 DIAGNOSIS — E11.21 CONTROLLED TYPE 2 DIABETES MELLITUS WITH DIABETIC NEPHROPATHY, WITHOUT LONG-TERM CURRENT USE OF INSULIN: Chronic | ICD-10-CM

## 2017-04-10 DIAGNOSIS — E78.2 MIXED HYPERLIPIDEMIA: Chronic | ICD-10-CM

## 2017-04-10 DIAGNOSIS — F03.90 DEMENTIA WITHOUT BEHAVIORAL DISTURBANCE, UNSPECIFIED DEMENTIA TYPE: Primary | Chronic | ICD-10-CM

## 2017-04-10 PROCEDURE — 1126F AMNT PAIN NOTED NONE PRSNT: CPT | Mod: S$GLB,,, | Performed by: INTERNAL MEDICINE

## 2017-04-10 PROCEDURE — 1160F RVW MEDS BY RX/DR IN RCRD: CPT | Mod: S$GLB,,, | Performed by: INTERNAL MEDICINE

## 2017-04-10 PROCEDURE — 1159F MED LIST DOCD IN RCRD: CPT | Mod: S$GLB,,, | Performed by: INTERNAL MEDICINE

## 2017-04-10 PROCEDURE — 96361 HYDRATE IV INFUSION ADD-ON: CPT

## 2017-04-10 PROCEDURE — 99285 EMERGENCY DEPT VISIT HI MDM: CPT | Mod: ,,, | Performed by: EMERGENCY MEDICINE

## 2017-04-10 PROCEDURE — 93005 ELECTROCARDIOGRAM TRACING: CPT

## 2017-04-10 PROCEDURE — 99999 PR PBB SHADOW E&M-EST. PATIENT-LVL IV: CPT | Mod: PBBFAC,,, | Performed by: INTERNAL MEDICINE

## 2017-04-10 PROCEDURE — 3077F SYST BP >= 140 MM HG: CPT | Mod: S$GLB,,, | Performed by: INTERNAL MEDICINE

## 2017-04-10 PROCEDURE — 99285 EMERGENCY DEPT VISIT HI MDM: CPT | Mod: 25

## 2017-04-10 PROCEDURE — 96360 HYDRATION IV INFUSION INIT: CPT

## 2017-04-10 PROCEDURE — 99204 OFFICE O/P NEW MOD 45 MIN: CPT | Mod: S$GLB,,, | Performed by: INTERNAL MEDICINE

## 2017-04-10 PROCEDURE — 1157F ADVNC CARE PLAN IN RCRD: CPT | Mod: S$GLB,,, | Performed by: INTERNAL MEDICINE

## 2017-04-10 PROCEDURE — 3078F DIAST BP <80 MM HG: CPT | Mod: S$GLB,,, | Performed by: INTERNAL MEDICINE

## 2017-04-10 NOTE — MR AVS SNAPSHOT
[facility]  [address] [facility] [address]  Phone: [phone]  Fax: [phone]                  Misty Yun   4/10/2017 3:30 PM   Office Visit    Description:  Female : 3/8/1934   Provider:  Esmer Ahumada MD   Department:  [facility] - Nephrology           Diagnoses this Visit        Comments    Dementia without behavioral disturbance, unspecified dementia type    -  Primary     Chronic combined systolic and diastolic heart failure         Coronary artery disease, angina presence unspecified, unspecified vessel or lesion type, unspecified whether native or transplanted heart         Essential hypertension         Chronic kidney disease, stage IV (severe)         Asymptomatic bacteriuria         Controlled type 2 diabetes mellitus with diabetic nephropathy, without long-term current use of insulin         Mixed hyperlipidemia         Anemia, unspecified type         Proteinuria, unspecified type         Secondary hyperparathyroidism                To Do List           Future Appointments        Provider Department Dept Phone    2017 4:00 PM Cici Clemens MD [facility] Cardiology [phone]      Your Future Surgeries/Procedures     2017   Surgery with Sidney Motta MD   [facility]    [address] [facility] [address] [phone]              Goals (5 Years of Data)     None      Follow-Up and Disposition     Return in about 3 months (around 7/10/2017).      [facility]     [facility] Care [facility] - 24/ Assistance  Unless otherwise directed by your provider, please contact [facility], our nurse care line that is available for  assistance.     Registered nurses in the [facility] provide: appointment scheduling, clinical advisement, health education, and other advisory services.  Call: [phone] (toll free)               Medications           Message  regarding Medications     Verify the changes and/or additions to your medication regime listed below are the same as discussed with your clinician today.  If any of these changes or additions are incorrect, please notify your healthcare provider.             Verify that the below list of medications is an accurate representation of the medications you are currently taking.  If none reported, the list may be blank. If incorrect, please contact your healthcare provider. Carry this list with you in case of emergency.           Current Medications     alendronate (FOSAMAX) 70 MG tablet Take 70 mg by mouth every 7 days.    amlodipine (NORVASC) 10 MG tablet Take 10 mg by mouth once daily.    aspirin 81 MG Chew Take 1 tablet (81 mg total) by mouth once daily. RESUME ON 2/14/15    atorvastatin (LIPITOR) 40 MG tablet Take 1 tablet (40 mg total) by mouth once daily.    blood sugar diagnostic (BLOOD GLUCOSE TEST) Strp by Misc.(Non-Drug; Combo Route) route 3 (three) times a week.    calcium-vitamin D3 (CALCIUM 500 + D) 500 mg(1,250mg) -200 unit per tablet Take 1 tablet by mouth 2 (two) times daily with meals.    carvedilol (COREG) 3.125 MG tablet Take 2 tablets (6.25 mg total) by mouth 2 (two) times daily.    donepezil (ARICEPT) 5 MG tablet Take 5 mg by mouth every evening.    ergocalciferol (VITAMIN D2) 50,000 unit Cap Take 50,000 Units by mouth every 7 days.    furosemide (LASIX) 20 MG tablet Take 1 tablet (20 mg total) by mouth once daily.    insulin aspart (NOVOLOG) 100 unit/mL injection Inject into the skin as needed for High Blood Sugar.    isosorbide-hydrALAZINE 20-37.5 mg (BIDIL) 20-37.5 mg Tab Take 1 tablet by mouth 3 (three) times daily.    LANCETS MISC by Misc.(Non-Drug; Combo Route) route 3 (three) times a week.    mirtazapine (REMERON) 7.5 MG Tab Take 7.5 mg by mouth every evening.    nitroGLYCERIN (NITROSTAT) 0.4 MG SL tablet Place 1 tablet (0.4 mg total) under the tongue every 5 (five) minutes as needed for Chest  "pain.    senna-docusate 8.6-50 mg (PERICOLACE) 8.6-50 mg per tablet Take 1 tablet by mouth 2 (two) times daily.           Clinical Reference Information           Your Vitals Were     BP Pulse Height Weight SpO2 BMI    148/58 56 5' 3" (1.6 m) 67.4 kg (148 lb 9.4 oz) 94% 26.32 kg/m2      Blood Pressure          Most Recent Value    BP  (!)  148/58      Allergies as of 4/10/2017     No Known Allergies      Immunizations Administered on Date of Encounter - 4/10/2017     None      Orders Placed During Today's Visit     Future Labs/Procedures Expected by Expires    CBC auto differential  4/10/2017 6/9/2018    Ferritin  4/10/2017 6/9/2018    Hepatitis B surface antibody  4/10/2017 6/9/2018    Hepatitis B surface antigen  4/10/2017 6/9/2018    Hepatitis C antibody  4/10/2017 6/9/2018    Immunofixation electrophoresis  4/10/2017 6/9/2018    Iron and TIBC  4/10/2017 6/9/2018    Protein / creatinine ratio, urine  4/10/2017 4/10/2018    Protein electrophoresis, serum  4/10/2017 6/9/2018    PTH, intact  4/10/2017 6/9/2018    Renal function panel  4/10/2017 6/9/2018    Urinalysis  4/10/2017 4/10/2018    Vitamin D  4/10/2017 6/9/2018    Recurring Lab Work Interval Expires    CBC auto differential  Every 8 Weeks until 6/9/2018 6/9/2018    Renal function panel  Every 8 Weeks until 6/9/2018 6/9/2018      Instructions      1. Labs: rfpand iron studies and others today then rfp and cbc every 8 weeks, PTHAND VIT D EVERY 6 MONTHS    2.  Medications: reduce fosomax to once monthly,   reduce vit d 68664 u to once monthly    3. Referrals: IRON INFUSION IF IRONS ARE LOW TODAY I WILL ORDER THE INFUISON    4. Follow up with PCP regarding: ANEMIA W/U AS THEY FEEL INDICATED    5. BP:  Take BP and pulse  twice daily for one week, record              Bring results  to next visit.              Goal :   <140/90    6. Diet:  National Kidney Foundation:  www.kidney.org       Sodium: < 2000 milligrams daily including all food and drink      (one " teaspoon of table salt has 2300 milligrams of sodium)        Vaccines: please check with your PCP and be sure to have an annual flu vaccine and keep up to date with your pneumococcal vaccine for pneumonia      Please avoid or minimize all NSAIDS (ibuprofen, motrin, aleve, indocin, naprosyn, BC powder, mobic, relafen, alleve, and any others) to minimize the risk to your kidneys    Please avoid Proton pump inhibitors unless specifically necessary and speak with your PCP about alternatives such as H2 blockers     Return to clinic:  12 WEEKS, LABS EVERY 8 WEEKS AND TODAY       Language Assistance Services     ATTENTION: Language assistance services are available, free of charge. Please call [phone].      ATENCIÓN: Si habla juancarlos, tiene a loomis disposición servicios gratuitos de asistencia lingüística. Llame al [phone].     CHÚ Ý: N?u b?n nói Ti?ng Vi?t, có các d?ch v? h? tr? ngôn ng? mi?n phí dành cho b?n. G?i s? [phone].         [facility] complies with applicable Federal civil rights laws and does not discriminate on the basis of race, color, national origin, age, disability, or sex.

## 2017-04-10 NOTE — PROGRESS NOTES
Subjective:       Patient ID: Misty Yun is a 83 y.o. Black or  female who presents for new evaluation of renal fucntion. The pateitn is in a wheelchair, accompanied by her sister and an aide from the Nursing home where the patient resides.  HPI     84 yo AAF with IDDM type 2, HTN, HPL, serum crt 2.0-2.4 until recent episode of CHF complicated by LESLEY, with initial improvement in GFR but now at 2.6, with set gfr 16 cc/min, 2.7 gm protein on UPRCT, EF 30 % with diastolic dysfunction, no NSAIDs, nephrolithiasis, gout, SOB, Dysuria, hematuria, LUTS.         renal US 1/2016: RIGHT KIDNEY:  Normal in size measuring 10.4 cm. There is increased echogenicity and mild cortical thinning. There are no solid renal masses, nephrolithiasis, or hydronephrosis. Perfusion is decreased. Resistive index is elevated, measuring 0.85.   LEFT KIDNEY:  Normal in size measuring 10.6 cm.  There is increased echogenicity and cortical thinning. There are no solid renal masses, nephrolithiasis, or hydronephrosis. Perfusion is decreased. Resistive index is elevated, measuring 0.82.       Review of Systems   Constitutional: Negative for appetite change, chills, fatigue, fever and unexpected weight change.   HENT: Negative for congestion, facial swelling, hearing loss, nosebleeds and trouble swallowing.    Eyes: Negative for pain, discharge, redness and visual disturbance.   Respiratory: Negative for cough, chest tightness, shortness of breath and wheezing.    Cardiovascular: Negative for chest pain, palpitations and leg swelling.   Gastrointestinal: Negative for abdominal pain, constipation, diarrhea, nausea and vomiting.   Endocrine: Negative for cold intolerance, heat intolerance and polydipsia.   Genitourinary: Negative for decreased urine volume, difficulty urinating, dysuria, flank pain, hematuria and urgency.   Musculoskeletal: Negative for arthralgias, back pain, joint swelling and myalgias.   Skin: Negative for  "color change, pallor, rash and wound.   Neurological: Negative for dizziness, tremors, seizures, syncope, speech difficulty, weakness and headaches.   Hematological: Negative for adenopathy. Does not bruise/bleed easily.   Psychiatric/Behavioral: Negative for agitation, behavioral problems, dysphoric mood, self-injury and sleep disturbance.       Objective:     Blood pressure (!) 148/58, pulse (!) 56, height 5' 3" (1.6 m), weight 67.4 kg (148 lb 9.4 oz), SpO2 (!) 94 %.    Physical Exam   Constitutional: She appears well-developed and well-nourished. No distress.   Pleasant, low spoken, single words, at times appears to be comprehending discussion NAD   HENT:   Head: Normocephalic and atraumatic.   Mouth/Throat: No oropharyngeal exudate.   Eyes: Conjunctivae and EOM are normal. Pupils are equal, round, and reactive to light. Right eye exhibits no discharge. Left eye exhibits no discharge. No scleral icterus.   Neck: Normal range of motion. Neck supple. No JVD present. No tracheal deviation present. No thyromegaly present.   Cardiovascular: Normal rate, regular rhythm, normal heart sounds and intact distal pulses.  Exam reveals no gallop.    No murmur heard.  No murmur, no peripheral edema   Pulmonary/Chest: Effort normal and breath sounds normal. No stridor. No respiratory distress. She has no wheezes. She has no rales. She exhibits no tenderness.   Abdominal: Soft. Bowel sounds are normal. She exhibits no distension and no mass. There is no tenderness. There is no rebound and no guarding. No hernia.   Musculoskeletal: She exhibits no edema or tenderness.   Lymphadenopathy:     She has no cervical adenopathy.   Neurological: She is alert. She exhibits normal muscle tone.   uanble to assess orientation, not answering all questions   Skin: Skin is warm and dry. No rash noted. She is not diaphoretic. No erythema.   Psychiatric: She has a normal mood and affect. Judgment and thought content normal.   Nursing note and " vitals reviewed.      Assessment:       1. Dementia without behavioral disturbance, unspecified dementia type    2. Chronic combined systolic and diastolic heart failure    3. Coronary artery disease, angina presence unspecified, unspecified vessel or lesion type, unspecified whether native or transplanted heart    4. Essential hypertension    5. Chronic kidney disease, stage IV (severe)    6. Asymptomatic bacteriuria    7. Controlled type 2 diabetes mellitus with diabetic nephropathy, without long-term current use of insulin    8. Mixed hyperlipidemia    9. Anemia, unspecified type    10. Proteinuria, unspecified type    11. Secondary hyperparathyroidism        Plan:     82 yo AAF with IDDM type 2, HTN, HPL, serum crt 2.0-2.4 until recent episode of CHF complicated by LESLEY, with initial improvement in GFR but now at 2.6, with set gfr 16 cc/min, 2.7 gm protein on UPRCT, EF 30 % with diastolic dysfunction, etiology of CKd liekly related to nephrosclerosis, DM nephropathy,and cardiorenal syndrome.    Would complete a baseline evaluation of proteinuria with serologies.      1. CKD: serum crt now 2.6 est gfr 16cc/min stage 4 borderline stage 5. It appears to have fluctuated between 15-20 cc/min fro the last 6 months.  Currently she has no signs of fluid overload, metabolic acidosis, electrolyte  imbalance or uremic symptoms.    Repeat labs now  Would give iron infusion for anemia, and DANUTA as indicated after infusion.  I had a preliminary discussion with the sister and the patient about the option of RRT if and when necessary. I explained it may not prolong life and would be a change in lifestyle that may carry more burdens fort he patient. I have offered them the option of visiting a hemodialysis unit (patient would not be a PD nor a home hemo candidate), to learn more about dialysis. They will speak with the patients son and get back to me.  No immediate need for RRT at this time.      Lab Results   Component Value Date     CREATININE 2.6 (H) 01/30/2017         Proteinuria: serologies ordered, it will likely be difficult to collect 24 hr specimen, will check serologies and spot UPEP for now.  Prot/Creat Ratio, Ur   Date Value Ref Range Status   10/31/2016 2.79 (H) 0.00 - 0.20 Final     Peripheral edema: none     HTN: stable      Medication: no change      Monitor BP as directed in instructions    Metabolic acidosis:none       Hyponatremia:       Hyperkalemia:     Lab Results   Component Value Date     01/30/2017    K 3.8 01/30/2017    CO2 25 01/30/2017         Renal osteodystrophy secondary hyperparathyroidism:  On fosomax, would reduce to once monthly, vit d 70168w reduce to once monthly, check PTH and vit d level today  Lab Results   Component Value Date    CALCIUM 9.7 01/30/2017    PHOS 4.2 11/07/2016        Anemia: iron sat 8 ferrtin 83 2016, will; likeltyy need iron infusion, will check irons now and likely order iron infusion  Lab Results   Component Value Date    HGB 10.1 (L) 11/07/2016           DM:  Metformin max dose 1000mg daily with gfr <45, d/c for gfr < 30 ml/min               Avoid long acting sulfonylurea      Weight: Weight: 67.4 kg (148 lb 9.4 oz). she states that her daily weights are stable weighed at NH daily  Wt Readings from Last 3 Encounters:   04/10/17 67.4 kg (148 lb 9.4 oz)   03/27/17 66.9 kg (147 lb 7.8 oz)   01/30/17 64.9 kg (143 lb)          Hyperlipidemia: stable  Lab Results   Component Value Date    LDLCALC 84.8 11/01/2016         D iet:  Education/referral:       Sodium: 2gm      Potassium:      Phosphorus:      Protein:      Fluid:       ESRD planing: Family and patient want to discuss idea of RRT with her son  And then get back to me. i have expalined that it will not necessarily prolong life at her age and with her comorbidiites.       Education:       Access:     PCP followup:     Referrals: not at this tiem      Please avoid or minimize all NSAIDS (ibuprofen, motrin, aleve, indocin,  naprosyn) to minimize the risk to your kidneys.        Addie avoid and minimize use of all Proton Pump inhibitors (such as nexium prilosec, omeprazole, pantroprazole, protonix)and Please speak with your PCP about switching to H2 blocker such as Pepcid        Follow up in: 12 weeks standing labs for rfp an d cbc q 8 weeks,a dn labs today

## 2017-04-10 NOTE — LETTER
April 10, 2017      Cici Clemens MD  [address]  [place] [id number]           [facility]  [address]  [place] [address]  Phone: [phone]  Fax: [phone]          Patient: Misty Yun   MR Number: [id number]   YOB: 1934   Date of Visit: 4/10/2017       Dear Dr. Cici Clemens:    Thank you for referring Misty Yun to me for evaluation. Attached you will find relevant portions of my assessment and plan of care.    If you have questions, please do not hesitate to call me. I look forward to following Misty Yun along with you.    Sincerely,    Esmer Ahumada MD    Enclosure  CC:  No Recipients    If you would like to receive this communication electronically, please contact [email or web address] or [phone] to request more information on [email or web address] Link access.    For providers and/or their staff who would like to refer a patient to [facility], please contact us through our one-stop-shop provider referral line, [facility], at [phone].    If you feel you have received this communication in error or would no longer like to receive these types of communications, please e-mail [email or web address]

## 2017-04-10 NOTE — PATIENT INSTRUCTIONS
1. Labs: rfpand iron studies and others today then rfp and cbc every 8 weeks, PTHAND VIT D EVERY 6 MONTHS    2.  Medications: reduce fosomax to once monthly,   reduce vit d 36585 u to once monthly    3. Referrals: IRON INFUSION IF IRONS ARE LOW TODAY I WILL ORDER THE INFUISON    4. Follow up with PCP regarding: ANEMIA W/U AS THEY FEEL INDICATED    5. BP:  Take BP and pulse  twice daily for one week, record              Bring results  to next visit.              Goal :   <140/90    6. Diet:  [employer]:  www.kidney.org       Sodium: < 2000 milligrams daily including all food and drink      (one teaspoon of table salt has 2300 milligrams of sodium)        Vaccines: please check with your PCP and be sure to have an annual flu vaccine and keep up to date with your pneumococcal vaccine for pneumonia      Please avoid or minimize all NSAIDS (ibuprofen, motrin, aleve, indocin, naprosyn, BC powder, mobic, relafen, alleve, and any others) to minimize the risk to your kidneys    Please avoid Proton pump inhibitors unless specifically necessary and speak with your PCP about alternatives such as H2 blockers     Return to clinic:  12 WEEKS, LABS EVERY 8 WEEKS AND TODAY

## 2017-04-11 ENCOUNTER — PATIENT MESSAGE (OUTPATIENT)
Dept: OPHTHALMOLOGY | Facility: CLINIC | Age: 82
End: 2017-04-11

## 2017-04-11 ENCOUNTER — HOSPITAL ENCOUNTER (INPATIENT)
Facility: HOSPITAL | Age: 82
LOS: 3 days | Discharge: HOME | DRG: 641 | End: 2017-04-14
Attending: EMERGENCY MEDICINE | Admitting: HOSPITALIST
Payer: MEDICARE

## 2017-04-11 DIAGNOSIS — I50.9 CHF (CONGESTIVE HEART FAILURE): ICD-10-CM

## 2017-04-11 DIAGNOSIS — I50.42 CHRONIC COMBINED SYSTOLIC AND DIASTOLIC HEART FAILURE: Chronic | ICD-10-CM

## 2017-04-11 DIAGNOSIS — N18.4 CHRONIC KIDNEY DISEASE, STAGE IV (SEVERE): Chronic | ICD-10-CM

## 2017-04-11 DIAGNOSIS — R82.71 BACTERIA IN URINE: ICD-10-CM

## 2017-04-11 DIAGNOSIS — M81.8 OTHER OSTEOPOROSIS WITHOUT CURRENT PATHOLOGICAL FRACTURE: Chronic | ICD-10-CM

## 2017-04-11 DIAGNOSIS — R89.9 ABNORMAL LABORATORY TEST: ICD-10-CM

## 2017-04-11 DIAGNOSIS — I21.4 NSTEMI (NON-ST ELEVATED MYOCARDIAL INFARCTION): ICD-10-CM

## 2017-04-11 DIAGNOSIS — R80.9 PROTEINURIA, UNSPECIFIED TYPE: ICD-10-CM

## 2017-04-11 DIAGNOSIS — E83.52 HYPERCALCEMIA: Primary | ICD-10-CM

## 2017-04-11 DIAGNOSIS — R53.81 DEBILITY: ICD-10-CM

## 2017-04-11 DIAGNOSIS — F03.90 DEMENTIA WITHOUT BEHAVIORAL DISTURBANCE, UNSPECIFIED DEMENTIA TYPE: Chronic | ICD-10-CM

## 2017-04-11 PROBLEM — N39.0 UTI (URINARY TRACT INFECTION): Status: ACTIVE | Noted: 2017-04-11

## 2017-04-11 LAB
ALBUMIN SERPL BCP-MCNC: 2.9 G/DL
ALP SERPL-CCNC: 97 U/L
ALT SERPL W/O P-5'-P-CCNC: 11 U/L
ANION GAP SERPL CALC-SCNC: 11 MMOL/L
AST SERPL-CCNC: 17 U/L
BASOPHILS # BLD AUTO: 0.03 K/UL
BASOPHILS NFR BLD: 0.4 %
BILIRUB SERPL-MCNC: 0.3 MG/DL
BUN SERPL-MCNC: 48 MG/DL
CALCIUM SERPL-MCNC: 12.6 MG/DL
CHLORIDE SERPL-SCNC: 107 MMOL/L
CO2 SERPL-SCNC: 24 MMOL/L
CREAT SERPL-MCNC: 2.6 MG/DL
DIFFERENTIAL METHOD: ABNORMAL
EOSINOPHIL # BLD AUTO: 0.3 K/UL
EOSINOPHIL NFR BLD: 4 %
ERYTHROCYTE [DISTWIDTH] IN BLOOD BY AUTOMATED COUNT: 13.9 %
EST. GFR  (AFRICAN AMERICAN): 19 ML/MIN/1.73 M^2
EST. GFR  (NON AFRICAN AMERICAN): 16.5 ML/MIN/1.73 M^2
GLUCOSE SERPL-MCNC: 79 MG/DL
HCT VFR BLD AUTO: 31.2 %
HGB BLD-MCNC: 10.2 G/DL
LYMPHOCYTES # BLD AUTO: 1.4 K/UL
LYMPHOCYTES NFR BLD: 20.7 %
MCH RBC QN AUTO: 28.8 PG
MCHC RBC AUTO-ENTMCNC: 32.7 %
MCV RBC AUTO: 88 FL
MONOCYTES # BLD AUTO: 0.6 K/UL
MONOCYTES NFR BLD: 8.5 %
NEUTROPHILS # BLD AUTO: 4.5 K/UL
NEUTROPHILS NFR BLD: 66.1 %
PLATELET # BLD AUTO: 207 K/UL
PMV BLD AUTO: 9.5 FL
POCT GLUCOSE: 118 MG/DL (ref 70–110)
POCT GLUCOSE: 80 MG/DL (ref 70–110)
POCT GLUCOSE: 84 MG/DL (ref 70–110)
POTASSIUM SERPL-SCNC: 4 MMOL/L
PROT SERPL-MCNC: 7.2 G/DL
RBC # BLD AUTO: 3.54 M/UL
SODIUM SERPL-SCNC: 142 MMOL/L
WBC # BLD AUTO: 6.8 K/UL

## 2017-04-11 PROCEDURE — 82652 VIT D 1 25-DIHYDROXY: CPT

## 2017-04-11 PROCEDURE — 93010 ELECTROCARDIOGRAM REPORT: CPT | Mod: 76,,, | Performed by: INTERNAL MEDICINE

## 2017-04-11 PROCEDURE — 36415 COLL VENOUS BLD VENIPUNCTURE: CPT

## 2017-04-11 PROCEDURE — 99223 1ST HOSP IP/OBS HIGH 75: CPT | Mod: GC,,, | Performed by: INTERNAL MEDICINE

## 2017-04-11 PROCEDURE — 93005 ELECTROCARDIOGRAM TRACING: CPT

## 2017-04-11 PROCEDURE — 83520 IMMUNOASSAY QUANT NOS NONAB: CPT

## 2017-04-11 PROCEDURE — 25000003 PHARM REV CODE 250: Performed by: EMERGENCY MEDICINE

## 2017-04-11 PROCEDURE — 85025 COMPLETE CBC W/AUTO DIFF WBC: CPT

## 2017-04-11 PROCEDURE — 25000003 PHARM REV CODE 250: Performed by: HOSPITALIST

## 2017-04-11 PROCEDURE — 99223 1ST HOSP IP/OBS HIGH 75: CPT | Mod: ,,, | Performed by: HOSPITALIST

## 2017-04-11 PROCEDURE — 82397 CHEMILUMINESCENT ASSAY: CPT

## 2017-04-11 PROCEDURE — 80053 COMPREHEN METABOLIC PANEL: CPT

## 2017-04-11 PROCEDURE — 20600001 HC STEP DOWN PRIVATE ROOM

## 2017-04-11 PROCEDURE — 87040 BLOOD CULTURE FOR BACTERIA: CPT

## 2017-04-11 PROCEDURE — 93010 ELECTROCARDIOGRAM REPORT: CPT | Mod: ,,, | Performed by: INTERNAL MEDICINE

## 2017-04-11 PROCEDURE — 63600175 PHARM REV CODE 636 W HCPCS: Performed by: HOSPITALIST

## 2017-04-11 RX ORDER — SODIUM CHLORIDE 9 MG/ML
500 INJECTION, SOLUTION INTRAVENOUS
Status: COMPLETED | OUTPATIENT
Start: 2017-04-11 | End: 2017-04-11

## 2017-04-11 RX ORDER — CALCITONIN SALMON 200 [USP'U]/ML
50 INJECTION, SOLUTION INTRAMUSCULAR; SUBCUTANEOUS
Status: DISCONTINUED | OUTPATIENT
Start: 2017-04-11 | End: 2017-04-11 | Stop reason: SDUPTHER

## 2017-04-11 RX ORDER — AMOXICILLIN AND CLAVULANATE POTASSIUM 875; 125 MG/1; MG/1
1 TABLET, FILM COATED ORAL
Status: COMPLETED | OUTPATIENT
Start: 2017-04-11 | End: 2017-04-11

## 2017-04-11 RX ORDER — NITROGLYCERIN 0.4 MG/1
0.4 TABLET SUBLINGUAL EVERY 5 MIN PRN
Status: DISCONTINUED | OUTPATIENT
Start: 2017-04-11 | End: 2017-04-14 | Stop reason: HOSPADM

## 2017-04-11 RX ORDER — ATORVASTATIN CALCIUM 20 MG/1
40 TABLET, FILM COATED ORAL DAILY
Status: DISCONTINUED | OUTPATIENT
Start: 2017-04-11 | End: 2017-04-14 | Stop reason: HOSPADM

## 2017-04-11 RX ORDER — FUROSEMIDE 20 MG/1
20 TABLET ORAL DAILY
Status: DISCONTINUED | OUTPATIENT
Start: 2017-04-11 | End: 2017-04-14 | Stop reason: HOSPADM

## 2017-04-11 RX ORDER — AMOXICILLIN 250 MG
1 CAPSULE ORAL 2 TIMES DAILY
Status: DISCONTINUED | OUTPATIENT
Start: 2017-04-11 | End: 2017-04-14 | Stop reason: HOSPADM

## 2017-04-11 RX ORDER — ISOSORBIDE DINITRATE AND HYDRALAZINE HYDROCHLORIDE 37.5; 2 MG/1; MG/1
1 TABLET ORAL 3 TIMES DAILY
Status: DISCONTINUED | OUTPATIENT
Start: 2017-04-11 | End: 2017-04-14 | Stop reason: HOSPADM

## 2017-04-11 RX ORDER — CARVEDILOL 3.12 MG/1
6.25 TABLET ORAL 2 TIMES DAILY
Status: DISCONTINUED | OUTPATIENT
Start: 2017-04-11 | End: 2017-04-14 | Stop reason: HOSPADM

## 2017-04-11 RX ORDER — CALCITONIN SALMON 200 [USP'U]/ML
4 INJECTION, SOLUTION INTRAMUSCULAR; SUBCUTANEOUS ONCE
Status: COMPLETED | OUTPATIENT
Start: 2017-04-11 | End: 2017-04-11

## 2017-04-11 RX ORDER — IBUPROFEN 200 MG
16 TABLET ORAL
Status: DISCONTINUED | OUTPATIENT
Start: 2017-04-11 | End: 2017-04-14 | Stop reason: HOSPADM

## 2017-04-11 RX ORDER — GLUCAGON 1 MG
1 KIT INJECTION
Status: DISCONTINUED | OUTPATIENT
Start: 2017-04-11 | End: 2017-04-14 | Stop reason: HOSPADM

## 2017-04-11 RX ORDER — NAPROXEN SODIUM 220 MG/1
81 TABLET, FILM COATED ORAL DAILY
Status: DISCONTINUED | OUTPATIENT
Start: 2017-04-11 | End: 2017-04-14 | Stop reason: HOSPADM

## 2017-04-11 RX ORDER — MIRTAZAPINE 7.5 MG/1
7.5 TABLET, FILM COATED ORAL NIGHTLY
Status: DISCONTINUED | OUTPATIENT
Start: 2017-04-11 | End: 2017-04-14 | Stop reason: HOSPADM

## 2017-04-11 RX ORDER — IBUPROFEN 200 MG
24 TABLET ORAL
Status: DISCONTINUED | OUTPATIENT
Start: 2017-04-11 | End: 2017-04-14 | Stop reason: HOSPADM

## 2017-04-11 RX ORDER — SODIUM CHLORIDE 9 MG/ML
INJECTION, SOLUTION INTRAVENOUS CONTINUOUS
Status: DISCONTINUED | OUTPATIENT
Start: 2017-04-11 | End: 2017-04-11

## 2017-04-11 RX ORDER — DONEPEZIL HYDROCHLORIDE 5 MG/1
5 TABLET, FILM COATED ORAL NIGHTLY
Status: DISCONTINUED | OUTPATIENT
Start: 2017-04-11 | End: 2017-04-14 | Stop reason: HOSPADM

## 2017-04-11 RX ORDER — ACETAMINOPHEN 325 MG/1
650 TABLET ORAL EVERY 6 HOURS PRN
Status: DISCONTINUED | OUTPATIENT
Start: 2017-04-11 | End: 2017-04-14 | Stop reason: HOSPADM

## 2017-04-11 RX ORDER — ONDANSETRON 2 MG/ML
4 INJECTION INTRAMUSCULAR; INTRAVENOUS EVERY 12 HOURS PRN
Status: DISCONTINUED | OUTPATIENT
Start: 2017-04-11 | End: 2017-04-14 | Stop reason: HOSPADM

## 2017-04-11 RX ADMIN — MIRTAZAPINE 7.5 MG: 7.5 TABLET ORAL at 08:04

## 2017-04-11 RX ADMIN — CARVEDILOL 6.25 MG: 3.12 TABLET, FILM COATED ORAL at 09:04

## 2017-04-11 RX ADMIN — SODIUM CHLORIDE 500 ML: 0.9 INJECTION, SOLUTION INTRAVENOUS at 06:04

## 2017-04-11 RX ADMIN — ATORVASTATIN CALCIUM 40 MG: 20 TABLET, FILM COATED ORAL at 09:04

## 2017-04-11 RX ADMIN — DONEPEZIL HYDROCHLORIDE 5 MG: 5 TABLET, FILM COATED ORAL at 08:04

## 2017-04-11 RX ADMIN — AMOXICILLIN AND CLAVULANATE POTASSIUM 1 TABLET: 875; 125 TABLET, FILM COATED ORAL at 02:04

## 2017-04-11 RX ADMIN — HYDRALAZINE HYDROCHLORIDE AND ISOSORBIDE DINITRATE 1 TABLET: 37.5; 2 TABLET, FILM COATED ORAL at 04:04

## 2017-04-11 RX ADMIN — HYDRALAZINE HYDROCHLORIDE AND ISOSORBIDE DINITRATE 1 TABLET: 37.5; 2 TABLET, FILM COATED ORAL at 09:04

## 2017-04-11 RX ADMIN — ASPIRIN 81 MG CHEWABLE TABLET 81 MG: 81 TABLET CHEWABLE at 09:04

## 2017-04-11 RX ADMIN — CEFTRIAXONE 1 G: 1 INJECTION, SOLUTION INTRAVENOUS at 11:04

## 2017-04-11 RX ADMIN — FUROSEMIDE 20 MG: 20 TABLET ORAL at 09:04

## 2017-04-11 RX ADMIN — STANDARDIZED SENNA CONCENTRATE AND DOCUSATE SODIUM 1 TABLET: 8.6; 5 TABLET, FILM COATED ORAL at 09:04

## 2017-04-11 RX ADMIN — SODIUM CHLORIDE 250 ML: 0.9 INJECTION, SOLUTION INTRAVENOUS at 02:04

## 2017-04-11 RX ADMIN — CALCITONIN SALMON 288 UNITS: 200 INJECTION, SOLUTION INTRAMUSCULAR; SUBCUTANEOUS at 06:04

## 2017-04-11 RX ADMIN — STANDARDIZED SENNA CONCENTRATE AND DOCUSATE SODIUM 1 TABLET: 8.6; 5 TABLET, FILM COATED ORAL at 08:04

## 2017-04-11 RX ADMIN — CARVEDILOL 6.25 MG: 3.12 TABLET, FILM COATED ORAL at 08:04

## 2017-04-11 RX ADMIN — SODIUM CHLORIDE: 0.9 INJECTION, SOLUTION INTRAVENOUS at 12:04

## 2017-04-11 NOTE — IP AVS SNAPSHOT
[facility]  [address]  [facility] [address]  Phone: [phone]           Patient Discharge Instructions   Our goal is to set you up for success. This packet includes information on your condition, medications, and your home care.  It will help you care for yourself to prevent having to return to the hospital.     Please ask your nurse if you have any questions.      There are many details to remember when preparing to leave the hospital. Here is what you will need to do:    1. Take your medicine. If you are prescribed medications, review your Medication List on the following pages. You may have new medications to  at the pharmacy and others that you'll need to stop taking. Review the instructions for how and when to take your medications. Talk with your doctor or nurses if you are unsure of what to do.     2. Go to your follow-up appointments. Specific follow-up information is listed in the following pages. Your may be contacted by a nurse or clinical provider about future appointments. Be sure we have all of the phone numbers to reach you. Please contact your provider's office if you are unable to make an appointment.     3. Watch for warning signs. Your doctor or nurse will give you detailed warning signs to watch for and when to call for assistance. These instructions may also include educational information about your condition. If you experience any of warning signs to your health, call your doctor.           [facility]  Unless otherwise directed by your provider, please   contact [facility], our nurse care line   that is available for 24/7 assistance.     [phone] (toll-free)     Registered nurses in the [facility]   provide: appointment scheduling, clinical advisement, health education, and other advisory services.                  ** Verify the list of medication(s) below is accurate and up to date. Carry this with you in case of  emergency. If your medications have changed, please notify your healthcare provider.             Medication List      START taking these medications        Additional Info                      ascorbic acid (vitamin C) 250 MG tablet   Commonly known as:  VITAMIN C   Quantity:  30 tablet   Refills:  0   Dose:  250 mg    Instructions:  Take 1 tablet (250 mg total) by mouth every evening. Take with iron.     Begin Date    AM    Noon    PM    Bedtime       cholecalciferol (vitamin D3) 2,000 unit Cap   Commonly known as:  VITAMIN D3   Refills:  0   Dose:  1 capsule    Instructions:  Take 1 capsule (2,000 Units total) by mouth once daily.     Begin Date    AM    Noon    PM    Bedtime       ferrous gluconate 325 MG Tab   Commonly known as:  FERGON   Refills:  0   Dose:  325 mg    Instructions:  Take 1 tablet (325 mg total) by mouth every evening.     Begin Date    AM    Noon    PM    Bedtime       potassium chloride 10 MEQ Cpsr   Commonly known as:  MICRO-K   Refills:  0   Dose:  10 mEq    Instructions:  Take 1 capsule (10 mEq total) by mouth once daily.     Begin Date    AM    Noon    PM    Bedtime         CONTINUE taking these medications        Additional Info                      alendronate 70 MG tablet   Commonly known as:  FOSAMAX   Refills:  0   Dose:  70 mg    Instructions:  Take 70 mg by mouth every 30 days.     Begin Date    AM    Noon    PM    Bedtime       amlodipine 10 MG tablet   Commonly known as:  NORVASC   Refills:  0   Dose:  10 mg    Instructions:  Take 10 mg by mouth once daily.     Begin Date    AM    Noon    PM    Bedtime       aspirin 81 MG Chew   Refills:  0   Dose:  81 mg    Last time this was given:  81 mg on 4/14/2017  8:13 AM   Instructions:  Take 1 tablet (81 mg total) by mouth once daily. RESUME ON 2/14/15     Begin Date    AM    Noon    PM    Bedtime       atorvastatin 40 MG tablet   Commonly known as:  LIPITOR   Quantity:  30 tablet   Refills:  3   Dose:  40 mg    Last time this was given:   40 mg on 4/14/2017  8:12 AM   Instructions:  Take 1 tablet (40 mg total) by mouth once daily.     Begin Date    AM    Noon    PM    Bedtime       carvedilol 3.125 MG tablet   Commonly known as:  COREG   Quantity:  60 tablet   Refills:  3   Dose:  6.25 mg    Last time this was given:  6.25 mg on 4/14/2017  8:11 AM   Instructions:  Take 2 tablets (6.25 mg total) by mouth 2 (two) times daily.     Begin Date    AM    Noon    PM    Bedtime       donepezil 5 MG tablet   Commonly known as:  ARICEPT   Refills:  0   Dose:  5 mg    Last time this was given:  5 mg on 4/13/2017  8:57 PM   Instructions:  Take 5 mg by mouth every evening.     Begin Date    AM    Noon    PM    Bedtime       furosemide 20 MG tablet   Commonly known as:  LASIX   Quantity:  30 tablet   Refills:  11   Dose:  20 mg    Last time this was given:  20 mg on 4/14/2017  8:12 AM   Instructions:  Take 1 tablet (20 mg total) by mouth once daily.     Begin Date    AM    Noon    PM    Bedtime       isosorbide-hydrALAZINE 20-37.5 mg 20-37.5 mg Tab   Commonly known as:  BIDIL   Quantity:  90 tablet   Refills:  11   Dose:  1 tablet    Last time this was given:  1 tablet on 4/14/2017  5:23 AM   Instructions:  Take 1 tablet by mouth 3 (three) times daily.     Begin Date    AM    Noon    PM    Bedtime       mirtazapine 7.5 MG Tab   Commonly known as:  REMERON   Refills:  0   Dose:  7.5 mg    Last time this was given:  7.5 mg on 4/13/2017  8:57 PM   Instructions:  Take 7.5 mg by mouth every evening.     Begin Date    AM    Noon    PM    Bedtime       nitroGLYCERIN 0.4 MG SL tablet   Commonly known as:  NITROSTAT   Quantity:  25 tablet   Refills:  3   Dose:  0.4 mg    Instructions:  Place 1 tablet (0.4 mg total) under the tongue every 5 (five) minutes as needed for Chest pain.     Begin Date    AM    Noon    PM    Bedtime       senna-docusate 8.6-50 mg 8.6-50 mg per tablet   Commonly known as:  PERICOLACE   Refills:  0   Dose:  1 tablet    Last time this was given:  1  tablet on 4/14/2017  8:12 AM   Instructions:  Take 1 tablet by mouth 2 (two) times daily.     Begin Date    AM    Noon    PM    Bedtime         STOP taking these medications     BLOOD GLUCOSE TEST Strp   Generic drug:  blood sugar diagnostic       CALCIUM 500 + D 500 mg(1,250mg) -200 unit per tablet   Generic drug:  calcium-vitamin D3       LANCETS MISC       NOVOLOG 100 unit/mL injection   Generic drug:  insulin aspart       VITAMIN D2 50,000 unit Cap   Generic drug:  ergocalciferol            Where to Get Your Medications      You can get these medications from any pharmacy     You don't need a prescription for these medications     cholecalciferol (vitamin D3) 2,000 unit Cap    ferrous gluconate 325 MG Tab         Information about where to get these medications is not yet available     ! Ask your nurse or doctor about these medications     ascorbic acid (vitamin C) 250 MG tablet    potassium chloride 10 MEQ Cpsr                  Please bring to all follow up appointments:    1. A copy of your discharge instructions.  2. All medicines you are currently taking in their original bottles.  3. Identification and insurance card.    Please arrive 15 minutes ahead of scheduled appointment time.    Please call 24 hours in advance if you must reschedule your appointment and/or time.        Your Scheduled Appointments     May 08, 2017  4:00 PM CDT   Established Patient Visit with MD Gal Magdaleno [facility] ([facility] )    [address]  [place] [address] [phone]              Your Future Surgeries/Procedures     Apr 17, 2017   Surgery with Sidney Motta MD   [facility] ([facility])    [address]  [facility] [id number] [phone]                Discharge Instructions     Future Orders    Activity as tolerated     Call MD for:  difficulty breathing or increased cough     Call MD for:  increased confusion or weakness     Call  "MD for:  persistent dizziness, light-headedness, or visual disturbances     Call MD for:  persistent nausea and vomiting or diarrhea     Call MD for:  redness, tenderness, or signs of infection (pain, swelling, redness, odor or green/yellow discharge around incision site)     Call MD for:  severe persistent headache     Call MD for:  severe uncontrolled pain     Call MD for:  temperature >100.4     Call MD for:  worsening rash     Diet general     Questions:    Total calories:      Fat restriction, if any:      Protein restriction, if any:      Na restriction, if any:      Fluid restriction:      Additional restrictions:          Primary Diagnosis     Your primary diagnosis was:  Serum Calcium Elevated      Admission Information     Date & Time Provider Department CSN    4/11/2017  2:13 AM Ruchi Acuña MD [facility] [id number]      Care Providers     Provider Role Specialty Primary office phone    Ruchi Acuña MD Attending Provider Hospitalist [phone]    Ruchi Acuña MD Team Attending  Hospitalist [phone]    Flip Britt MD Team Attending  Hospitalist [phone]    Aubrie Burton DO Consulting Physician  Nephrology [phone]    Allyson Sarmiento MD Consulting Physician  Infectious Diseases [phone]    Manfred Forte MD Consulting Physician  Infectious Diseases [phone]    Katherine L. Baumgarten, MD Consulting Physician  Infectious Diseases [phone]    Laz Au MD Consulting Physician  Infectious Diseases [phone]    Rola Baker MD Consulting Physician  Infectious Diseases [phone]    Tramaine Beck MD Consulting Physician  Infectious Diseases [phone]    Moraima Marie MD Consulting Physician  Infectious Diseases [phone]      Your Vitals Were     BP Pulse Temp Resp Height Weight    138/63 (BP Location: Left arm, Patient Position: Lying, BP Method: Automatic) 59 98.1 °F (36.7 °C) (Oral) 20 5' 3" (1.6 m) " 73.3 kg (161 lb 9.6 oz)    SpO2 BMI             96% 28.63 kg/m2         Recent Lab Values        6/4/2010 2/2/2015 10/31/2016                    12:30 PM  6:31 AM 12:35 PM         A1C 5.4 6.5 (H) 5.8         Comment for A1C at 12:35 PM on 10/31/2016:  According to ADA guidelines, hemoglobin A1C <7.0% represents  optimal control in non-pregnant diabetic patients.  Different  metrics may apply to specific populations.   Standards of Medical Care in Diabetes - 2016.  For the purpose of screening for the presence of diabetes:  <5.7%     Consistent with the absence of diabetes  5.7-6.4%  Consistent with increasing risk for diabetes   (prediabetes)  >or=6.5%  Consistent with diabetes  Currently no consensus exists for use of hemoglobin A1C  for diagnosis of diabetes for children.        Pending Labs     Order Current Status    Comprehensive metabolic panel In process    Blood culture (site 1) Preliminary result    Blood culture (site 2) Preliminary result      Allergies as of 4/14/2017     No Known Allergies      Advance Directives     An advance directive is a document which, in the event you are no longer able to make decisions for yourself, tells your healthcare team what kind of treatment you do or do not want to receive, or who you would like to make those decisions for you.  If you do not currently have an advance directive, [facility] encourages you to create one.  For more information call:  [phone]), [phone] ([phone]),  or log on to [email or web address].        Smoking Cessation     If you would like to quit smoking:   You may be eligible for free services if you are a [place] resident and started smoking cigarettes before September 1, 1988.  Call the Smoking Cessation Trust (SCT) toll free at (427) 628-1204 or (601) 161-1453.   Call 1-800-QUIT-NOW if you do not meet the above criteria.   Contact us via email: [email or web address]   View our website for more information:  [email or web address]        Language Assistance Services     ATTENTION: Language assistance services are available, free of charge. Please call [phone].      ATENCIÓN: Si nidia bauer, tiene a loomis disposición servicios gratuitos de asistencia lingüística. Nav al [phone].     CHÚ Ý: N?u b?n nói Ti?ng Vi?t, có các d?ch v? h? tr? ngôn ng? mi?n phí dành cho b?n. G?i s? [phone].        Heart Failure Education       Heart Failure: Being Active  You have a condition called heart failure. Being active doesnt mean that you have to wear yourself out. Even a little movement each day helps to strengthen your heart. If you cant get out to exercise, you can do simple stretching and strengthening exercises at home. These are good ways to keep you well-conditioned and prevent you and your heart from becoming excessively weak.    Ideas to get you started  · Add a little movement to things you do now. Walk to mail letters. Park your car at the far end of the parking lot and walk to the store. Walk up a flight of stairs instead of taking the elevator.  · Choose activities you enjoy. You might walk, swim, or ride an exercise bike. Things like gardening and washing the car count, too. Other possibilities include: washing dishes, walking the dog, walking around the mall, and doing aerobic activities with friends.  · Join a group exercise program at a [facility] or [facility], a senior center, or a community center. Or look into a hospital cardiac rehabilitation program. Ask your doctor if you qualify.  Tips to keep you going  · Get up and get dressed each day. Go to a coffee shop and read a newspaper or go somewhere that you'll be in the presence of other active people. Youll feel more like being active.  · Make a plan. Choose one or more activities that you enjoy and that you can easily do. Then plan to do at least one each day. You might write your plan on a calendar.  · Go with a friend or a group if you like  company. This can help you feel supported and stay motivated, too.  · Plan social events that you enjoy. This will keep you mentally engaged as well as physically motivated to do things you find pleasure in.  For your safety  · Talk with your healthcare provider before starting an exercise program.  · Exercise indoors when its too hot or too cold outside, or when the air quality is poor. Try walking at a shopping mall.  · Wear socks and sturdy shoes to maintain your balance and prevent falls.  · Start slowly. Do a few minutes several times a day at first. Increase your time and speed little by little.  · Stop and rest whenever you feel tired or get short of breath.  · Dont push yourself on days when you dont feel well.  Date Last Reviewed: 3/20/2016  © 3589-2794 [employer] [address] [place] [address]. All rights reserved. This information is not intended as a substitute for professional medical care. Always follow your healthcare professional's instructions.              Heart Failure: Evaluating Your Heart  You have a condition called heart failure. To evaluate your condition, your doctor will examine you, ask questions, and do some tests. Along with looking for signs of heart failure, the doctor looks for any other health problems that may have led to heart failure. The results of your evaluation will help your doctor form a treatment plan.  Health history and physical exam  Your visit will start with a health history. Tell the doctor about any symptoms youve noticed and about all medicines you take. Then youll have a physical exam. This includes listening to your heartbeat and breathing. Youll also be checked for swelling (edema) in your legs and neck. When you have fluid buildup or fluid in the lungs, it may be called congestive heart failure.  Diagnosing heart failure     During an echocardiogram, sound waves bounce off the heart. These are converted into a picture on the  screen.   The following may be done to help your doctor form a diagnosis:  · X-rays show the size and shape of your heart. These pictures can also show fluid in your lungs.  · An electrocardiogram (ECG or EKG) shows the pattern of your heartbeat. Small pads (electrodes) are placed on your chest, arms, and legs. Wires connect the pads to the ECG machine, which records your hearts electrical signals. This can give the doctor information about heart function.  · An echocardiogram uses ultrasound waves to show the structure and movement of your heart muscle. This shows how well the heart pumps. It also shows the thickness of the heart walls, and if the heart is enlarged. It is one of the most useful, non-invasive tests as it provides information about the heart's general function. This helps your doctor make treatment decisions.  · Lab tests evaluate small amounts of blood or urine for signs of problems. A BNP lab test can help diagnose and evaluate heart failure. BNP stands for B-type natriuretic peptide. The ventricles secrete more BNP when heart failure worsens. Lab tests can also provide information about metabolic dysfunction or heart dysfunction.  Your treatment plan  Based on the results of your evaluation and tests, your doctor will develop a treatment plan. This plan is designed to relieve some of your heart failure symptoms and help make you more comfortable. Your treatment plan may include:  · Medicine to help your heart work better and improve your quality of life  · Changes in what you eat and drink to help prevent fluid from backing up in your body  · Daily monitoring of your weight and heart failure symptoms to see how well your treatment plan is working  · Exercise to help you stay healthy  · Help with quitting smoking  · Emotional and psychological support to help adjust to the changes  · Referrals to other specialists to make sure you are being treated comprehensively  Date Last Reviewed: 3/21/2016  ©  3203-4662 [employer]. [address] [place] [address], [place] [address]. All rights reserved. This information is not intended as a substitute for professional medical care. Always follow your healthcare professional's instructions.              Heart Failure: Making Changes to Your Diet  You have a condition called heart failure. When you have heart failure, excess fluid is more likely to build up in your body because your heart isn't working well. This makes the heart work harder to pump blood. Fluid buildup causes symptoms such as shortness of breath and swelling (edema). This is often referred to as congestive heart failure or CHF. Controlling the amount of salt (sodium) you eat may help stop fluid from building up. Your doctor may also tell you to reduce the amount of fluid you drink.  Reading food labels    Your healthcare provider will tell you how much sodium you can eat each day. Read food labels to keep track. Keep in mind that certain foods are high in salt. These include canned, frozen, and processed foods. Check the amount of sodium in each serving. Watch out for high-sodium ingredients. These include MSG (monosodium glutamate), baking soda, and sodium phosphate.   Eating less salt  Give yourself time to get used to eating less salt. It may take a little while. Here are some tips to help:  · Take the saltshaker off the table. Replace it with salt-free herb mixes and spices.  · Eat fresh or plain frozen vegetables. These have much less salt than canned vegetables.  · Choose low-sodium snacks like sodium-free pretzels, crackers, or air-popped popcorn.  · Dont add salt to your food when youre cooking. Instead, season your foods with pepper, lemon, garlic, or onion.  · When you eat out, ask that your food be cooked without added salt.  · Avoid eating fried foods as these often have a great deal of salt.  If youre told to limit fluids  You may need to limit how much fluid you have to help prevent  swelling. This includes anything that is liquid at room temperature, such as ice cream and soup. If your doctor tells you to limit fluid, try these tips:  · Measure drinks in a measuring cup before you drink them. This will help you meet daily goals.  · Chill drinks to make them more refreshing.  · Suck on frozen lemon wedges to quench thirst.  · Only drink when youre thirsty.  · Chew sugarless gum or suck on hard candy to keep your mouth moist.  · Weigh yourself daily to know if your body's fluid content is rising.  My sodium goal  Your healthcare provider may give you a sodium goal to meet each day. This includes sodium found in food as well as salt that you add. My goal is to eat no more than ___________ mg of sodium per day.     When to call your doctor  Call your doctor right away if you have any symptoms of worsening heart failure. These can include:  · Sudden weight gain  · Increased swelling of your legs or ankles  · Trouble breathing when youre resting or at night  · Increase in the number of pillows you have to sleep on  · Chest pain, pressure, discomfort, or pain in the jaw, neck, or back   Date Last Reviewed: 3/21/2016  © 0425-7659 [employer]. [address] [place] [address], [place] [address]. All rights reserved. This information is not intended as a substitute for professional medical care. Always follow your healthcare professional's instructions.              Heart Failure: Medicines to Help Your Heart    You have a condition called heart failure (also known as congestive heart failure, or CHF). Your doctor will likely prescribe medicines for heart failure and any underlying health problems you have. Most heart failure patients take one or more types of medicinen. Your healthcare provider will work to find the combination of medicines that works best for you.  Heart failure medicines  Here are the most common heart failure medicines:  · ACE inhibitors lower blood pressure and decrease strain  on the heart. This makes it easier for the heart to pump. Angiotensin receptor blockers have similar effects. These are prescribed for some patients instead of ACE inhibitors.  · Beta-blockers relieve stress on the heart. They also improve symptoms. They may also improve the heart's pumping action over time.  · Diuretics (also called water pills) help rid your body of excess water. This can help rid your body of swelling (edema). Having less fluid to pump means your heart doesnt have to work as hard. Some diuretics make your body lose a mineral called potassium. Your doctor will tell you if you need to take supplements or eat more foods high in potassium.  · Digoxin helps your heart pump with more strength. This helps your heart pump more blood with each beat. So, more oxygen-rich blood travels to the rest of the body.  · Aldosterone antagonists help alter hormones and decrease strain on the heart.  · Hydralazine and nitrates are two separate medicines used together to treat heart failure. They may come in one combination pill. They lower blood pressure and decrease how hard the heart has to pump.  Medicines for related conditions  Controlling other heart problems helps keep heart failure under control, too. Depending on other heart problems you have, medicines may be prescribed to:  · Lower blood pressure (antihypertensives).  · Lower cholesterol levels (statins).  · Prevent blood clots (anticoagulants or aspirin).  · Keep the heartbeat steady (antiarrhythmics).  Date Last Reviewed: 3/5/2016  © 5773-4822 [employer]. [address] [place] [address], [place] [address]. All rights reserved. This information is not intended as a substitute for professional medical care. Always follow your healthcare professional's instructions.              Heart Failure: Procedures That May Help    The heart is a muscle that pumps oxygen-rich blood to all parts of the body. When you have heart failure, the heart is not able  to pump as well as it should. Blood and fluid may back up into the lungs (congestive heart failure), and some parts of the body dont get enough oxygen-rich blood to work normally. These problems lead to the symptoms of heart failure.     Certain procedures may help the heart pump better in some cases of heart failure. Some procedures are done to treat health problems that may have caused the heart failure such as coronary artery disease or heart rhythm problems. For more serious heart failure, other options are available.  Treating artery and valve problems  If you have coronary artery disease or valve disease, procedures may be done to improve blood flow. This helps the heart pump better, which can improve heart failure symptoms. First, your doctor may do a cardiac catheterization to help detect clogged blood vessels or valve damage. During this procedure, a  thin tube (catheter) in inserted into a blood vessel and guided to the heart. There a dye is injected and a special type of X-ray (angiogram) is taken of the blood vessels. Procedures to open a blocked artery or fix damaged valves can also be done using catheterization.  · Angioplasty uses a balloon-tipped instrument at the end of the catheter. The balloon is inflated to widen the narrowed artery. In many cases, a stent is expanded to further support the narrowed artery. A stent is a metal mesh tube.  · Valve surgery repairs or replacement of faulty valves can also be done during catheterization so blood can flow properly through the chambers of the heart.  Bypass surgery is another option to help treat blocked arteries. It uses a healthy blood vessel from elsewhere in the body. The healthy blood vessel is attached above and below the blocked area so that blood can flow around the blocked artery.  Treating heart rhythm problems  A device may be placed in the chest to help a weak heart maintain a healthy, heartbeat so the heart can pump more  effectively:  · Pacemaker. A pacemaker is an implanted device that regulates your heartbeat electronically. It monitors your heart's rhythm and generates a painless electric impulse that helps the heart beat in a regular rhythm. A pacemaker is programmed to meet your specific heart rhythm needs.  · Biventricular pacing/cardiac resynchronization therapy. A type of pacemaker that paces both pumping chambers of the heart at the same time to coordinate contractions and to improve the heart's function. Some people with heart failure are candidates for this therapy.  · Implantable cardioverter defibrillator. A device similar to a pacemaker that senses when the heart is beating too fast and delivers an electrical shock to convert the fast rhythm to a normal rhythm. This can be a life saving device.  In severe cases  In more serious cases of heart failure when other treatments no longer work, other options may include:  · Ventricular assist devices (VADs). These are mechanical devices used to take over the pumping function for one or both of the heart's ventricles, or pumping chambers. A VAD may be necessary when heart failure progresses to the point that medicines and other treatments no longer help. In some cases, a VAD may be used as a bridge to transplant.  · Heart transplant. This is replacing the diseased heart with a healthy one from a donor. This is an option for a few people who are very sick. A heart transplant is very serious and not an option for all patients. Your doctor can tell you more.  Date Last Reviewed: 3/20/2016  © 2159-6914 [employer]. [address] [place] [address]. All rights reserved. This information is not intended as a substitute for professional medical care. Always follow your healthcare professional's instructions.              Heart Failure: Tracking Your Weight  You have a condition called heart failure. When you have heart failure, a sudden weight gain or a steady  rise in weight is a warning sign that your body is retaining too much water and salt. This could mean your heart failure is getting worse. If left untreated, it can cause problems for your lungs and result in shortness of breath. Weighing yourself each day is the best way to know if youre retaining water. If your weight goes up quickly, call your doctor. You will be given instructions on how to get rid of the excess water. You will likely need medicines and to avoid salt. This will help your heart work better.  Call your doctor if you gain more than 2 pounds in 1 day, more than 5 pounds in 1 week, or whatever weight gain you were told to report by your doctor. This is often a sign of worsening heart failure and needs to be evaluated and treated. Your doctor will tell you what to do next.   Tips for weighing yourself    · Weigh yourself at the same time each morning, wearing the same clothes. Weigh yourself after urinating and before eating.  · Use the same scale each day. Make sure the numbers are easy to read. Put the scale on a flat, hard surface -- not on a rug or carpet.  · Do not stop weighing yourself. If you forget one day, weigh again the next morning.  How to use your weight chart  · Keep your weight chart near the scale. Write your weight on the chart as soon as you get off the scale.  · Fill in the month and the start date on the chart. Then write down your weight each day. Your chart will look like this:    · If you miss a day, leave the space blank. Weigh yourself the next day and write your weight in the next space.  · Take your weight chart with you when you go to see your doctor.  Date Last Reviewed: 3/20/2016  © 8335-2701 [employer]. [address] [place] [address], [place] [address]. All rights reserved. This information is not intended as a substitute for professional medical care. Always follow your healthcare professional's instructions.              Heart Failure: Warning Signs of a  Flare-Up  You have a condition called heart failure. Once you have heart failure, flare-ups can happen. Below are signs that can mean your heart failure is getting worse. If you notice any of these warning signs, call your healthcare provider.  Swelling    · Your feet, ankles, or lower legs get puffier.  · You notice skin changes on your lower legs.  · Your shoes feel too tight.  · Your clothes are tighter in the waist.  · You have trouble getting rings on or off your fingers.  Shortness of breath  · You have to breathe harder even when youre doing your normal activities or when youre resting.  · You are short of breath walking up stairs or even short distances.  · You wake up at night short of breath or coughing.  · You need to use more pillows or sit up to sleep.  · You wake up tired or restless.  Other warning signs  · You feel weaker, dizzy, or more tired.  · You have chest pain or changes in your heartbeat.  · You have a cough that wont go away.  · You cant remember things or dont feel like eating.  Tracking your weight  Gaining weight is often the first warning sign that heart failure is getting worse. Gaining even a few pounds can be a sign that your body is retaining excess water and salt. Weighing yourself each day in the morning after you urinate and before you eat, is the best way to know if you're retaining water. Get a scale that is easy to read and make sure you wear the same clothes and use the same scale every time you weigh. Your healthcare provider will show you how to track your weight. Call your doctor if you gain more than 2 pounds in 1 day, 5 pounds in 1 week, or whatever weight gain you were told to report by your doctor. This is often a sign of worsening heart failure and needs to be evaluated and treated before it compromises your breathing. Your doctor will tell you what to do next.    Date Last Reviewed: 3/15/2016  © 8216-0018 [employer]. [address] [place] [address],  [place] [address]. All rights reserved. This information is not intended as a substitute for professional medical care. Always follow your healthcare professional's instructions.              Chronic Kind[place] Disease Education             Diabetes Discharge Instructions                                    [facility] complies with applicable Federal civil rights laws and does not discriminate on the basis of race, color, national origin, age, disability, or sex.

## 2017-04-11 NOTE — NURSING
Pt arrived to floor at this time via stretcher. Pt disoriented to time. Sister at bedside, Carlota Tejeda.

## 2017-04-11 NOTE — PRE ADMISSION SCREENING
Notified Jordy that patient was admitted to [facility] on 4/11 for UTI & hypercalcemia, per pt's sister.  No new orders.

## 2017-04-11 NOTE — PROGRESS NOTES
Notified about panic value of elevated calcium levels (13.2). Patient was called to the number provided on the record. Rossana Tejeda who identified as her sister was notified about patient laboratories and my recommendation to go to the nearest ER for further management with IV fluid hydration. Patient sister notified me that patient lives on a nursing home and she will notify them so that she could present to the ER in [facility].

## 2017-04-11 NOTE — H&P
[facility] Hospital Medicine  History & Physical    Patient Name: Misty Yun  MRN: [id number]  Admission Date: 4/11/2017  Attending Physician: Flip Britt MD  Primary Care Provider: Primary Doctor [facility] Medicine Team: [facility] MED D Flip Britt MD     Patient information was obtained from patient, past medical records and ER records.     Subjective:     Principal Problem:Hypercalcemia    Chief Complaint:   Chief Complaint   Patient presents with    Abnormal Lab     Pt sent from [facility] for elevated calcium.        HPI: Misty Torres is 83-year-old female with mentioned comorbidities admitted from [facility] for evaluation - Past medical history of CKD (not on dialysis), ovarian cancer (no treatment), DM type II, HLD, HTN, dementia, CAD, MI, CHF, and anemia and a PSHx of CABG who was sent to the ED by Nephrologist  for abnormal labs drawn this morning  Patient was seen in nephrology clinic on 04/10/17 a.m. and Blood work revealed hypercalcemia 13.2. Patient was called back to the emergency department for further evaluation.  Denies symptoms, complaints of constipation for the last one week.  Patient was evaluated by nephrology, initially started on IV hydration subsequently discontinued.  She was given calcitonin IM one dose    Currently lives in nursing home.  Ambulates with wheelchair, follows up with PCP, cardiology, nephrology at [facility]    Past Medical History:   Diagnosis Date    Anemia     CAD (coronary artery disease)     Cataract     CHF (congestive heart failure)     Dementia     Fracture of humerus, proximal, left, closed 1/28/2015    Hyperlipidemia     Hyperparathyroidism     Hypertension     MI (myocardial infarction) 10/2016    Osteoporosis     Ovarian cancer     in the past    Type 2 diabetes mellitus, controlled        Past Surgical History:   Procedure Laterality Date    CORONARY ARTERY BYPASS  GRAFT  2006    HYSTERECTOMY  2002    ORIF HUMERUS FRACTURE Left 1/28/2015       Review of patient's allergies indicates:  No Known Allergies    No current facility-administered medications on file prior to encounter.      Current Outpatient Prescriptions on File Prior to Encounter   Medication Sig    alendronate (FOSAMAX) 70 MG tablet Take 70 mg by mouth every 7 days.    amlodipine (NORVASC) 10 MG tablet Take 10 mg by mouth once daily.    aspirin 81 MG Chew Take 1 tablet (81 mg total) by mouth once daily. RESUME ON 2/14/15    atorvastatin (LIPITOR) 40 MG tablet Take 1 tablet (40 mg total) by mouth once daily.    blood sugar diagnostic (BLOOD GLUCOSE TEST) Strp by Misc.(Non-Drug; Combo Route) route 3 (three) times a week.    calcium-vitamin D3 (CALCIUM 500 + D) 500 mg(1,250mg) -200 unit per tablet Take 1 tablet by mouth 2 (two) times daily with meals.    carvedilol (COREG) 3.125 MG tablet Take 2 tablets (6.25 mg total) by mouth 2 (two) times daily.    donepezil (ARICEPT) 5 MG tablet Take 5 mg by mouth every evening.    ergocalciferol (VITAMIN D2) 50,000 unit Cap Take 50,000 Units by mouth every 7 days.    furosemide (LASIX) 20 MG tablet Take 1 tablet (20 mg total) by mouth once daily.    insulin aspart (NOVOLOG) 100 unit/mL injection Inject into the skin as needed for High Blood Sugar.    isosorbide-hydrALAZINE 20-37.5 mg (BIDIL) 20-37.5 mg Tab Take 1 tablet by mouth 3 (three) times daily.    LANCETS MISC by Misc.(Non-Drug; Combo Route) route 3 (three) times a week.    mirtazapine (REMERON) 7.5 MG Tab Take 7.5 mg by mouth every evening.    nitroGLYCERIN (NITROSTAT) 0.4 MG SL tablet Place 1 tablet (0.4 mg total) under the tongue every 5 (five) minutes as needed for Chest pain.    senna-docusate 8.6-50 mg (PERICOLACE) 8.6-50 mg per tablet Take 1 tablet by mouth 2 (two) times daily.     Family History     Problem Relation (Age of Onset)    Blindness Maternal Grandmother        Social History Main  Topics    Smoking status: Former Smoker     Packs/day: 2.00     Years: 30.00     Quit date: 1/20/2007    Smokeless tobacco: Never Used    Alcohol use No    Drug use: No    Sexual activity: Not on file     Review of Systems   Constitutional: Negative for activity change, appetite change, chills, diaphoresis, fatigue and unexpected weight change.   HENT: Negative for ear discharge, ear pain, facial swelling and sore throat.    Eyes: Negative for pain, discharge, redness and itching.   Respiratory: Negative for apnea, cough, choking, shortness of breath and wheezing.    Cardiovascular: Negative for chest pain, palpitations and leg swelling.   Gastrointestinal: Positive for constipation (for the last one week). Negative for abdominal distention, abdominal pain, anal bleeding, blood in stool, diarrhea, nausea, rectal pain and vomiting.   Endocrine: Negative for cold intolerance.   Genitourinary: Negative for difficulty urinating, dysuria, enuresis, flank pain, frequency, hematuria and urgency.   Musculoskeletal: Negative for arthralgias, back pain, gait problem, joint swelling, myalgias, neck pain and neck stiffness.   Skin: Negative for color change, pallor and rash.   Allergic/Immunologic: Negative for environmental allergies.   Neurological: Negative for dizziness, syncope, facial asymmetry, weakness, light-headedness, numbness and headaches.   Hematological: Negative for adenopathy.   Psychiatric/Behavioral: Negative for agitation and behavioral problems.     Objective:     Vital Signs (Most Recent):  Temp: 98.2 °F (36.8 °C) (04/10/17 2258)  Pulse: 67 (04/11/17 0547)  Resp: 18 (04/11/17 0547)  BP: (!) 150/68 (04/11/17 0547)  SpO2: 99 % (04/11/17 0547) Vital Signs (24h Range):  Temp:  [98.2 °F (36.8 °C)] 98.2 °F (36.8 °C)  Pulse:  [56-69] 67  Resp:  [15-19] 18  SpO2:  [94 %-99 %] 99 %  BP: (148-156)/(58-68) 150/68     Weight: 72.6 kg (160 lb)  Body mass index is 28.34 kg/(m^2).    Physical Exam   Constitutional:  She appears well-developed and well-nourished.   HENT:   Head: Normocephalic and atraumatic.   Mouth/Throat: Oropharynx is clear and moist. No oropharyngeal exudate.   Eyes: Conjunctivae and EOM are normal. Pupils are equal, round, and reactive to light. Right eye exhibits no discharge. Left eye exhibits no discharge. No scleral icterus.   bilateral cataracts   Neck: Normal range of motion. Neck supple. No JVD present. No tracheal deviation present. No thyromegaly present.   Cardiovascular: Normal rate, regular rhythm and normal heart sounds.  Exam reveals no gallop and no friction rub.    No murmur heard.  Pulmonary/Chest: Effort normal and breath sounds normal. No respiratory distress. She has no wheezes. She has no rales. She exhibits no tenderness.   Abdominal: Soft. Bowel sounds are normal. She exhibits no distension. There is no tenderness. There is no rebound and no guarding.   Musculoskeletal: Normal range of motion. She exhibits no edema.   Lymphadenopathy:     She has no cervical adenopathy.   Neurological:   AAO x 2 - orineted to person and place   able to  upper and lower extremities wihtout limitation   Skin: Skin is warm and dry. No rash noted. No erythema. No pallor.   Psychiatric: She has a normal mood and affect. Her behavior is normal.   Nursing note and vitals reviewed.      Significant Labs:   A1C:   Recent Labs  Lab 10/31/16  1235   HGBA1C 5.8     ABGs: No results for input(s): PH, PCO2, HCO3, POCSATURATED, BE in the last 48 hours.  Bilirubin:   Recent Labs  Lab 04/11/17  0427   BILITOT 0.3     Blood Culture: No results for input(s): LABBLOO in the last 48 hours.  BMP:   Recent Labs  Lab 04/11/17  0427   GLU 79      K 4.0      CO2 24   BUN 48*   CREATININE 2.6*   CALCIUM 12.6*     CBC:   Recent Labs  Lab 04/10/17  1646 04/11/17  1046   WBC 8.37 6.80   HGB 10.8* 10.2*   HCT 31.9* 31.2*    207     CMP:   Recent Labs  Lab 04/10/17  1646 04/11/17  0427    142   K 4.1  4.0    107   CO2 26 24   * 79   BUN 52* 48*   CREATININE 2.9* 2.6*   CALCIUM 13.2* 12.6*   PROT  --  7.2   ALBUMIN 3.2* 2.9*   BILITOT  --  0.3   ALKPHOS  --  97   AST  --  17   ALT  --  11   ANIONGAP 12 11   EGFRNONAA 14.4* 16.5*     Cardiac Markers: No results for input(s): CKMB, MYOGLOBIN, BNP, TROPISTAT in the last 48 hours.  Coagulation: No results for input(s): INR, APTT in the last 48 hours.    Invalid input(s): PT  Lactic Acid: No results for input(s): LACTATE in the last 48 hours.  Lipase: No results for input(s): LIPASE in the last 48 hours.  Lipid Panel: No results for input(s): CHOL, HDL, LDLCALC, TRIG, CHOLHDL in the last 48 hours.  POCT Glucose:   Recent Labs  Lab 04/11/17  1116   POCTGLUCOSE 84     Prealbumin: No results for input(s): PREALBUMIN in the last 48 hours.  Respiratory Culture: No results for input(s): GSRESP, RESPIRATORYC in the last 48 hours.  Troponin: No results for input(s): TROPONINI in the last 48 hours.  TSH:   Recent Labs  Lab 10/31/16  0846   TSH 1.643     Urine Culture: No results for input(s): LABURIN in the last 48 hours.  Urine Studies:   Recent Labs  Lab 04/10/17  1631   COLORU Yellow   APPEARANCEUA Cloudy*   PHUR 6.0   SPECGRAV 1.010   PROTEINUA 1+*   GLUCUA Negative   KETONESU Negative   BILIRUBINUA Negative   OCCULTUA Negative   NITRITE Positive*   UROBILINOGEN Negative   LEUKOCYTESUR 3+*   RBCUA 3   WBCUA >100*   BACTERIA Many*   HYALINECASTS 0     All pertinent labs within the past 24 hours have been reviewed.    Significant Imaging:   Imaging Results     None        EKG - Normal sinus rhythm @ 63bpm    Assessment/Plan:     Active Diagnoses:    Diagnosis Date Noted POA    PRINCIPAL PROBLEM:  Hypercalcemia [E83.52]Rule out multiple myeloma/metastases from prior ovarian carcinoma/exogenous supplementation.  One dose of Calcitonin IM, follow-up levels in AM. Vitamin D and PTH normal. bone scan requested  to rule out metastasis 04/11/2017 Yes    Abnormal  laboratory test [R89.9]as above 04/11/2017 Yes    UTI (urinary tract infection) [N39.0]Positive urinalysis.,  Denies urinary symptoms.  Started on IV ceftriaxone 04/11/2017 Yes    Anemia [D64.9]10.2.  Normocytic monitor 04/10/2017 Yes    Secondary hyperparathyroidism [N25.81] 04/10/2017 Yes    Proteinuria [R80.9]1+Work up as per nephrology 04/10/2017 Yes    Osteoporosis [M81.0]On calcium and vitamin D supplementation 11/21/2016 Yes     Chronic    Chronic kidney disease, stage IV (severe) [N18.4]Seems to be at baseline 11/04/2016 Yes     Chronic    Chronic combined systolic and diastolic heart failure [I50.42]Echocardiogram with ejection fraction of 30% and diastolic dysfunction four months back.   input-output monitor, daily weights.  Continued carvedilol 11/01/2016 Yes     Chronic    NSTEMI (non-ST elevated myocardial infarction)Continue with aspirin [I21.4] 10/31/2016 Yes    Cataract [H26.9] 01/31/2015 Yes    Debility [R53.81]Mostly wheelchair-bound supportive care 01/31/2015 Yes    Controlled type 2 diabetes mellitus with diabetic nephropathy, without long-term current use of insulin [E11.21]Blood sugars controlled on current regimen  Yes     Chronic    Coronary artery disease [I25.10]Status post CABG  Yes     Chronic    Dementia [F03.90]On donepezil.  Patient is oriented to person, place a ble to give relevant medical history  Yes     Chronic    Essential hypertension [I10]Blood pressure controlled on current regimen  Yes     Chronic    Hyperlipidemia [E78.5]On atorvastatin  Yes     Chronic      Problems Resolved During this Admission:    Diagnosis Date Noted Date Resolved POA     VTE Risk Mitigation         Ordered     Medium Risk of VTE  Once      04/11/17 0827     Place sequential compression device  Until discontinued      04/11/17 0827        Flip Britt MD  Department of Hospital Medicine   [facility]

## 2017-04-11 NOTE — CONSULTS
Consult Note  Nephrology    Consult Requested By: Flip Britt MD  Reason for Consult: LESLEY with hypercalcemia     SUBJECTIVE:     History of Present Illness:  Patient is a 83 y.o. female with a PMHx of CKD (not on dialysis), ovarian cancer, DM type II, HLD, HTN, dementia, CAD, MI, CHF, and anemia who was sent by me to ED for further evaluation of LESLEY and worsening renal function. Patient was called after receiving notification of a panic value for calcium levels of 13.2. Creatinine was mildly elevated compared to baseline. She was also found to have a UTI on the ED. Patient states she feels at baseline and is asymptomatic. She specifically denies abdominal pain, nausea, vomiting, decreased PO intake, and pain. Was started on IV fluids management.     Past Medical History:   Diagnosis Date    Anemia     CAD (coronary artery disease)     Cataract     CHF (congestive heart failure)     Dementia     Fracture of humerus, proximal, left, closed 1/28/2015    Hyperlipidemia     Hyperparathyroidism     Hypertension     MI (myocardial infarction) 10/2016    Osteoporosis     Ovarian cancer     in the past    Type 2 diabetes mellitus, controlled      Past Surgical History:   Procedure Laterality Date    CORONARY ARTERY BYPASS GRAFT  2006    HYSTERECTOMY  2002    ORIF HUMERUS FRACTURE Left 1/28/2015     Family History   Problem Relation Age of Onset    Adopted: Yes    Blindness Maternal Grandmother      Social History   Substance Use Topics    Smoking status: Former Smoker     Packs/day: 2.00     Years: 30.00     Quit date: 1/20/2007    Smokeless tobacco: Never Used    Alcohol use No       Review of patient's allergies indicates:  No Known Allergies     Review of Systems:  Constitutional: Negative for fever, appetite change and fatigue.  HENT: Negative for hearing loss, sore throat and mouth sores.  Eyes: Negative for photophobia, pain and visual disturbance.  Respiratory: Negative for cough, chest  tightness, shortness of breath and wheezing.  Cardiovascular: Negative for chest pain, palpitations and leg swelling.  Gastrointestinal: Negative for nausea, vomiting, abdominal pain, diarrhea, constipation, blood in stool and abdominal distention.  Genitourinary: Negative for dysuria, urgency, frequency, hematuria, decreased urine volume, difficulty urinating  Musculoskeletal: Negative for back pain, joint swelling, arthralgias and gait problem.  Skin: Negative for pallor, rash and wound.  Neurological: Negative for dizziness, tremors, syncope, weakness, light-headedness and headaches.  Hematological: Negative for adenopathy. Does not bruise/bleed easily.  Psychiatric/Behavioral: Negative for confusion, sleep disturbance and dysphoric mood. The patient is not nervous/anxious.    OBJECTIVE:     Vital Signs (Most Recent)  Temp: 98.7 °F (37.1 °C) (04/11/17 1115)  Pulse: 63 (04/11/17 1115)  Resp: 16 (04/11/17 1115)  BP: (!) 142/64 (04/11/17 1115)  SpO2: (!) 90 % (04/11/17 1115)    Vital Signs Range (Last 24H):  Temp:  [98.2 °F (36.8 °C)-98.7 °F (37.1 °C)]   Pulse:  [56-69]   Resp:  [15-19]   BP: (123-156)/(58-68)   SpO2:  [90 %-99 %]     Physical Exam:  General: No acute distress, well groomed, alert and oriented x 3  HEENT: Normocephalic, atraumatic, EOM's intact bilaterally, external inspection of ears and nose normal, moist mucous membranes, no oral ulcerations/lesions  Neck: Supple, symmetrical, trachea midline, no thyromegaly, no JVD  Respiratory: Clear to auscultation bilaterally, respirations unlabored, no rales/rhonchi/wheezing  Cardiovacular: Regular rate and rhythm, S1, S2 normal, no murmurs, rubs or gallops  Gastrointestinal: Soft, non-tender, bowel sounds normal, no hepatosplenomegaly  Musculoskeletal: No knee or ankle joint tenderness or swelling.   Extremities: No clubbing or cyanosis of bilateral upper extremities; no lower extremity edema bilaterally, radial pulses 2+ bilaterally, symmetric  Skin: warm  and dry; no rash on exposed skin  Neurologic: CN grossly intact    Laboratory:  CBC:   Recent Labs  Lab 04/11/17  1046   WBC 6.80   RBC 3.54*   HGB 10.2*   HCT 31.2*      MCV 88   MCH 28.8   MCHC 32.7     CMP:   Recent Labs  Lab 04/11/17  0427   GLU 79   CALCIUM 12.6*   ALBUMIN 2.9*   PROT 7.2      K 4.0   CO2 24      BUN 48*   CREATININE 2.6*   ALKPHOS 97   ALT 11   AST 17   BILITOT 0.3       Diagnostic Results:  Labs: Reviewed  X-Ray: Reviewed    ASSESSMENT/PLAN:     A 83 y.o. female with a PMHx of CKD (not on dialysis), ovarian cancer, DM type II, HLD, HTN, dementia, CAD, MI, CHF, and anemia who was sent by me to ED for further evaluation of LESLEY and worsening renal function. Patient was called after receiving notification of a panic value for calcium levels of 13.2.    1. Hypercalcemia. Diff diagnosis: 1. lytic lesions (remote ovarian Ca) / paraneoplas. 2. MM. 3. Exogenous vitD/Ca supplem 4. Immobilization.     Recs / Plan:    Therapeutic  - Discontinue IV fluids given LVEF 30% and unknown course of LESLEY  - Give 1 dose of calcitonin today (4 units/ kg)     Diagnostic  - Will order PTH- rp   - Will order 1,25 vitamin D levels   - Awaiting UPEP, SPEP, ANDREW and free light chains   - Based on patient history of previous malignancy (Ovarian cancer) consider skeletal survey to evaluate for possible lytic lesions   - Monitor strict I/Os, daily weights  - Will continue to monitor.    Arthur Ca   Nephrology fellow PGY- 5    ATTENDING PHYSICIAN ATTESTATION  I have personally interviewed and examined the patient. I thoroughly reviewed the demographic, clinical, laboratorial and imaging information available in medical records. I agree with the assessment and recommendations provided by the subspecialty resident. Dr. Ca was under my supervision.

## 2017-04-11 NOTE — PLAN OF CARE
Problem: Patient Care Overview  Goal: Plan of Care Review  Outcome: Ongoing (interventions implemented as appropriate)   Patient denies pain. Patient acknowledges understanding with pain scale. Appropriate pain medication can be dispensed. Non slip footwear in place. Bed lowered. Rails up x 2. Call bell in easy reach. Pt is afebrile. Hand washing per nursing guidelines. Pt is disoriented to time. Pt is assisted in turning every two hours, no pressure sore detected on this admit. Kurt hose are on. SCDs are on. Blood glucose is monitored, currently npo.

## 2017-04-11 NOTE — ED NOTES
Pt identifiers Misty Yun checked and correct  LOC: The patient is awake, alert, aware of environment with an appropriate affect. +dementia  APPEARANCE: Pt resting comfortably, in no acute distress, pt is clean and well groomed, clothing properly fastened  SKIN: Skin warm, dry and intact, normal skin turgor, moist mucus membranes  RESPIRATORY: Airway is open and patent, respirations are spontaneous, even and unlabored, normal effort and rate  CARDIAC: Normal rate and rhythm, no peripheral edema noted, capillary refill < 3 seconds, bilateral radial pulses 2+  ABDOMEN: Soft, nontender, nondistended. Bowel sounds present   NEUROLOGIC: PERRL, facial expression is symmetrical, patient moving all extremities spontaneously, normal sensation in all extremities when touched with a finger.  Follows all commands appropriately. Generalized weakness.   MUSCULOSKELETAL: No obvious deformities.

## 2017-04-11 NOTE — MEDICAL/APP STUDENT
[facility] Hospital Medicine  Consult Note    Patient Name: Misty Yun  MRN: [id number]  Admission Date: 4/11/2017  Hospital Length of Stay: 0 days  Attending Physician:   Primary Care Provider: [facility] Medicine Team: [facility] MED JESSE Ortega      Patient information was obtained from patient, relative(s) and ER records.     Consults  Subjective:     Principal Problem:Hypercalcemia    Chief Complaint:   Chief Complaint   Patient presents with    Abnormal Lab     Pt sent from [facility] for elevated calcium.        HPI: Patient is a 83 F with PMHx of DMII, HTN, HLD, CHF (last echo 11/23/16 EF 30% with DD), CKD IV, Dementia, CAD s/p CABG (2016) who presents from [facility] yesterday after labs from nephrology clinic showed elevated Ca of 13.2. She has no history of hypercalcemia noted on any previous labs. Patient feels at baseline currently. She denies abdominal pain, N/V, and SOB. She has not had an numbness or parestesia. She is eating well and has not changed her po intake. She has not noted a change in urine output or weight. She has felt constipated x 1 week, producing one BM per day when she states she normally has 2-3. Patient has no family history of calcium disorders ([facility]) or kidney disease. She does use a wheelchair for ambulation outside her nursing home but is able to walk and is relatively active while at NH.  She has a 60 pack-year smoking history and quit in 2007.      On admission to ED, Pt was noted to have an LESLEY with Cr of 2.9. Her baseline appears to be 2.0-2.4 for the past 6 months, with the decline first appearing following an NSTEMI in 11/2016. She has a history of proteinuria on previous urine studies. She also had a UTI on admission (3+ Leuks, Nitrite +, many bacteria). She was given Augmentin x 1 for UTI and 750 mL NS. She was started on  mL/hr NS this morning with small improvement in her calcium (down to 12.6  this AM, corrected to 13.5 with Alb of 2.9)     Past Medical History:   Diagnosis Date    Anemia     Anemia     CAD (coronary artery disease)     Cataract     CHF (congestive heart failure)     Chronic kidney disease (CKD), stage IV (severe) 03/27/2017    Sees  at [facility], no dialysis    Dementia     Encounter for blood transfusion 2006    at time of CABG    Fracture of humerus, proximal, left, closed 1/28/2015    Hypercalcemia 04/10/2017    Hyperlipidemia     Hyperparathyroidism     Hypertension     MI (myocardial infarction) 10/2016    Osteoporosis     Ovarian cancer     in the past    Type 2 diabetes mellitus, controlled     UTI (urinary tract infection) 04/11/2017       Past Surgical History:   Procedure Laterality Date    CORONARY ARTERY BYPASS GRAFT  2006    HYSTERECTOMY  2002    ORIF HUMERUS FRACTURE Left 1/28/2015       Review of patient's allergies indicates:  No Known Allergies    No current facility-administered medications on file prior to encounter.      Current Outpatient Prescriptions on File Prior to Encounter   Medication Sig    alendronate (FOSAMAX) 70 MG tablet Take 70 mg by mouth every 30 days.     amlodipine (NORVASC) 10 MG tablet Take 10 mg by mouth once daily.    aspirin 81 MG Chew Take 1 tablet (81 mg total) by mouth once daily. RESUME ON 2/14/15    atorvastatin (LIPITOR) 40 MG tablet Take 1 tablet (40 mg total) by mouth once daily.    blood sugar diagnostic (BLOOD GLUCOSE TEST) Strp by Misc.(Non-Drug; Combo Route) route 3 (three) times a week.    calcium-vitamin D3 (CALCIUM 500 + D) 500 mg(1,250mg) -200 unit per tablet Take 1 tablet by mouth 2 (two) times daily with meals.    carvedilol (COREG) 3.125 MG tablet Take 2 tablets (6.25 mg total) by mouth 2 (two) times daily.    donepezil (ARICEPT) 5 MG tablet Take 5 mg by mouth every evening.    ergocalciferol (VITAMIN D2) 50,000 unit Cap Take 50,000 Units by mouth every 7 days.    furosemide (LASIX) 20 MG tablet Take 1 tablet  (20 mg total) by mouth once daily.    insulin aspart (NOVOLOG) 100 unit/mL injection Inject into the skin as needed for High Blood Sugar.    isosorbide-hydrALAZINE 20-37.5 mg (BIDIL) 20-37.5 mg Tab Take 1 tablet by mouth 3 (three) times daily.    LANCETS MISC by Misc.(Non-Drug; Combo Route) route 3 (three) times a week.    mirtazapine (REMERON) 7.5 MG Tab Take 7.5 mg by mouth every evening.    nitroGLYCERIN (NITROSTAT) 0.4 MG SL tablet Place 1 tablet (0.4 mg total) under the tongue every 5 (five) minutes as needed for Chest pain.    senna-docusate 8.6-50 mg (PERICOLACE) 8.6-50 mg per tablet Take 1 tablet by mouth 2 (two) times daily.     Family History       Problem Relation (Age of Onset)      Blindness Maternal Grandmother          Social History Main Topics    Smoking status: Former Smoker     Packs/day: 2.00     Years: 30.00     Quit date: 1/20/2007    Smokeless tobacco: Never Used    Alcohol use No    Drug use: No    Sexual activity: Not on file     Review of Systems   Constitutional: Negative.  Negative for activity change, appetite change, chills, fatigue and unexpected weight change.   Eyes: Negative for pain and redness.   Respiratory: Negative for cough, chest tightness and shortness of breath.    Cardiovascular: Negative for chest pain, palpitations and leg swelling.   Gastrointestinal: Positive for constipation. Negative for abdominal distention, abdominal pain, diarrhea, nausea and vomiting.   Endocrine: Negative for polyuria.   Genitourinary: Negative for dysuria, frequency and urgency.   Skin: Negative for color change and pallor.     Objective:     Vital Signs (Most Recent):  Temp: 98.7 °F (37.1 °C) (04/11/17 1115)  Pulse: 63 (04/11/17 1115)  Resp: 16 (04/11/17 1115)  BP: (!) 142/64 (04/11/17 1115)  SpO2: (!) 90 % (04/11/17 1115) Vital Signs (24h Range):  Temp:  [98.2 °F (36.8 °C)-98.7 °F (37.1 °C)] 98.7 °F (37.1 °C)  Pulse:  [56-69] 63  Resp:  [15-19] 16  SpO2:  [90 %-99 %] 90 %  BP:  (123-156)/(58-68) 142/64     Weight: 72.2 kg (159 lb 2.8 oz)  Body mass index is 28.2 kg/(m^2).    Physical Exam   Constitutional: She appears well-developed and well-nourished. No distress.   HENT:   Head: Normocephalic and atraumatic.   Eyes: Conjunctivae are normal.   Neck: No JVD present.   Cardiovascular: Normal rate, regular rhythm and normal heart sounds.  Exam reveals no gallop and no friction rub.    No murmur heard.  Pulmonary/Chest: Effort normal and breath sounds normal. No stridor. No respiratory distress. She has no wheezes. She has no rales.   Abdominal: Soft. Bowel sounds are normal. She exhibits no distension. There is no tenderness.   Skin: Skin is warm and dry. No pallor.       Significant Labs:   CBC:   Recent Labs  Lab 04/10/17  1646 04/11/17  1046   WBC 8.37 6.80   HGB 10.8* 10.2*   HCT 31.9* 31.2*    207     CMP:   Recent Labs  Lab 04/10/17  1646 04/11/17  0427    142   K 4.1 4.0    107   CO2 26 24   * 79   BUN 52* 48*   CREATININE 2.9* 2.6*   CALCIUM 13.2* 12.6*   PROT  --  7.2   ALBUMIN 3.2* 2.9*   BILITOT  --  0.3   ALKPHOS  --  97   AST  --  17   ALT  --  11   ANIONGAP 12 11   EGFRNONAA 14.4* 16.5*     Urine Culture: No results for input(s): LABURIN in the last 48 hours.  Urine Studies:   Recent Labs  Lab 04/10/17  1631   COLORU Yellow   APPEARANCEUA Cloudy*   PHUR 6.0   SPECGRAV 1.010   PROTEINUA 1+*   GLUCUA Negative   KETONESU Negative   BILIRUBINUA Negative   OCCULTUA Negative   NITRITE Positive*   UROBILINOGEN Negative   LEUKOCYTESUR 3+*   RBCUA 3   WBCUA >100*   BACTERIA Many*   HYALINECASTS 0       Significant Imaging: I have reviewed all pertinent imaging results/findings within the past 24 hours.    Assessment/Plan:     Active Diagnoses:    Diagnosis Date Noted POA    PRINCIPAL PROBLEM:  Hypercalcemia [E83.52] 04/11/2017 Yes    Abnormal laboratory test [R89.9] 04/11/2017 Yes    UTI (urinary tract infection) [N39.0] 04/11/2017 Yes    Anemia [D64.9]  04/10/2017 Yes    Secondary hyperparathyroidism [N25.81] 04/10/2017 Yes    Proteinuria [R80.9] 04/10/2017 Yes    Osteoporosis [M81.0] 11/21/2016 Yes     Chronic    Chronic kidney disease, stage IV (severe) [N18.4] 11/04/2016 Yes     Chronic    Chronic combined systolic and diastolic heart failure [I50.42] 11/01/2016 Yes     Chronic    NSTEMI (non-ST elevated myocardial infarction) [I21.4] 10/31/2016 Yes    Cataract [H26.9] 01/31/2015 Yes    Debility [R53.81] 01/31/2015 Yes    Controlled type 2 diabetes mellitus with diabetic nephropathy, without long-term current use of insulin [E11.21]  Yes     Chronic    Coronary artery disease [I25.10]  Yes     Chronic    Dementia [F03.90]  Yes     Chronic    Essential hypertension [I10]  Yes     Chronic    Hyperlipidemia [E78.5]  Yes     Chronic      Problems Resolved During this Admission:    Diagnosis Date Noted Date Resolved POA     VTE Risk Mitigation           Ordered       Medium Risk of VTE  Once      04/11/17 0827     Place sequential compression device  Until discontinued      04/11/17 0827          Assessment: Patient is a 82 yo F with PMHx of CKD IV, CHF with systolic and diastolic dysfunction, HTN, HLD presents with asymptomatic hypercalcemia.      #Hypercalcemia  - Ca on presentation 13.2 (corrected to 13.9). Down to 12.6 (corrected to 13.2) this morning.  -Could be due to relative immobility vs. Calcium supplements vs. hyperparathyroidism  -Patient records state she is on Ca/Vit D supplements at NH but patient denies supplement use.   -Vit D levels obtained in ED were normal (23), less likely to be due to Vit D toxicosis.  -PTH levels were normal (35), less likely to be hyperparathyroidism.  -Could be due to malignancy vs MM vs. Granuloma / sarcoid.   -Malignancy: Patient has a significant smoking history, but quit >10 years ago. Will obtain PTHrP to r/o paraneoplastic hypercalcemia, and CXR/CT.  -Sarcoid: Will obtain activated Vit D level (1,25 OH2) to r/o  granuloma  -MM: Patient has protein gap of 4.3, anemia (Hb 10.8 this AM, baseline appears to be ~10), renal dysfunction. She has a history of humerus fracture in 2015. Do not know circumstances of fracture but not uncommon in her patient population. Will obtain SPEP, UPEP, ANDREW, potentially skeletal surveys.     #LESLEY  -Cr 2.9 on baseline 2.0-2.4  -Could be 2/2 hypercalcemia vs. Cardiorenal syndrome  -Patient is euvolemic on exam. Will not diurese. Will stop gentle hydration for now given CHF history and  Elevated Ca is asymptomatic.    -Will give 1 dose Calcitonin. Discontinue IVF. Monitor I/Os.        Thank you for your consult. I will follow-up with patient. Please contact us if you have any additional questions.    Nils Ortega  Department of Hospital Medicine   [facility]

## 2017-04-11 NOTE — PLAN OF CARE
04/11/17 1015   Discharge Assessment   Assessment Type Discharge Planning Assessment   Confirmed/corrected address and phone number on facesheet? Yes   Assessment information obtained from? Patient   Expected Length of Stay (days) 2   Communicated expected length of stay with patient/caregiver yes   Prior to hospitilization cognitive status: Alert/Oriented   Prior to hospitalization functional status: Assistive Equipment   Current cognitive status: Alert/Oriented   Current Functional Status: Needs Assistance   Arrived From nursing home  ([facility])   Lives With facility resident   Able to Return to Prior Arrangements yes   Is patient able to care for self after discharge? No   How many people do you have in your home that can help with your care after discharge? other (see comments)   Patient's perception of discharge disposition nursing home   Readmission Within The Last 30 Days no previous admission in last 30 days   Patient currently being followed by outpatient case management? No   Patient currently receives home health services? No   Does the patient currently use HME? Yes   Patient currently receives private duty nursing? No   Patient currently receives any other outside agency services? No   Equipment Currently Used at Home cane, quad;rollator;wheelchair   Do you have any problems affording any of your prescribed medications? No   Is the patient taking medications as prescribed? yes   Do you have any financial concerns preventing you from receiving the healthcare you need? No   Does the patient have transportation to healthcare appointments? No   On Dialysis? No   Does the patient receive services at the Coumadin Clinic? No   Are there any open cases? No   Discharge Plan A Return to nursing home   Discharge Plan B New Nursing Home placement - nursing home care facility   Patient/Family In Agreement With Plan yes     Patient resting quietly in bed when CM rounded. No family present. Patient was admitted with  hypercalemia (Ca 13.2, bun 51, creat 2.9 at admit). Orders noted for IVF & neph consult. Patient is a resident at [facility] & has equipment to assist with ambulation. Plan to discharge patient back to [facility] or new NH placement when medically stable. Patient will need  van transportation at time of discharge. Will continue to follow.

## 2017-04-11 NOTE — ED PROVIDER NOTES
Encounter Date: 4/10/2017    SCRIBE #1 NOTE: IAlda, am scribing for, and in the presence of, Dr. Wallis.       History     Chief Complaint   Patient presents with    Abnormal Lab     Pt sent from [facility] for elevated calcium.     Review of patient's allergies indicates:  No Known Allergies  HPI Comments:   Patient is a 83 year old female with a PMHx of CKD (not on dialysis), ovarian cancer (no treatment), DM type II, HLD, HTN, dementia, CAD, MI, CHF, and anemia and a PSHx of CABG who was sent to the ED by Dr. Ca (Nephrologist) for abnormal labs drawn this morning. Review of labs done today shows Calcium of 13.2. Creatinine was mildly elevated compared to baseline. She was also found to have a UTI. Patient states she feels at baseline and is asymptomatic. She specifically denies abdominal pain, nausea, vomiting, decreased PO intake, and pain. The relative at bedside denies a recent change in medication regimen. She reports this has not happened before.      The history is provided by the patient and a relative.     Past Medical History:   Diagnosis Date    Anemia     CAD (coronary artery disease)     Cataract     CHF (congestive heart failure)     Dementia     Fracture of humerus, proximal, left, closed 1/28/2015    Hyperlipidemia     Hyperparathyroidism     Hypertension     MI (myocardial infarction) 10/2016    Osteoporosis     Ovarian cancer     in the past    Type 2 diabetes mellitus, controlled      Past Surgical History:   Procedure Laterality Date    CORONARY ARTERY BYPASS GRAFT  2006    HYSTERECTOMY  2002    ORIF HUMERUS FRACTURE Left 1/28/2015     Family History   Problem Relation Age of Onset    Adopted: Yes    Blindness Maternal Grandmother      Social History   Substance Use Topics    Smoking status: Former Smoker     Packs/day: 2.00     Years: 30.00     Quit date: 1/20/2007    Smokeless tobacco: Never Used    Alcohol use No     Review of Systems    Constitutional: Negative for appetite change and fever.   HENT: Negative for nosebleeds.    Eyes: Negative for redness.   Respiratory: Negative for shortness of breath.    Cardiovascular: Negative for chest pain.   Gastrointestinal: Negative for abdominal pain, nausea and vomiting.   Genitourinary: Negative for hematuria.   Musculoskeletal: Negative for neck pain.   Skin: Negative for rash.   Neurological: Negative for speech difficulty.       Physical Exam   Initial Vitals   BP Pulse Resp Temp SpO2   04/10/17 2258 04/10/17 2258 04/10/17 2258 04/10/17 2258 04/10/17 2258   156/64 61 18 98.2 °F (36.8 °C) 96 %     Physical Exam    Nursing note and vitals reviewed.  Constitutional: She appears well-developed and well-nourished. She is not diaphoretic. No distress.   HENT:   Head: Normocephalic and atraumatic.   Eyes: EOM are normal.   Neck: Normal range of motion. Neck supple.   Cardiovascular: Normal rate, regular rhythm, normal heart sounds and intact distal pulses.   Pulmonary/Chest: Breath sounds normal. No stridor. No respiratory distress. She has no wheezes. She has no rhonchi. She has no rales.   Abdominal: Soft. There is no tenderness. There is no rebound and no guarding.   Musculoskeletal: Normal range of motion.   Neurological: She is alert and oriented to person, place, and time.   Skin: Skin is warm and dry.   Psychiatric: She has a normal mood and affect. Her behavior is normal.         ED Course   Procedures  Labs Reviewed   COMPREHENSIVE METABOLIC PANEL - Abnormal; Notable for the following:        Result Value    BUN, Bld 48 (*)     Creatinine 2.6 (*)     Calcium 12.6 (*)     Albumin 2.9 (*)     eGFR if  19.0 (*)     eGFR if non  16.5 (*)     All other components within normal limits   CULTURE, URINE   COMPREHENSIVE METABOLIC PANEL     EKG Readings: (Independently Interpreted)   Sinus rhythm at 63. No ST elevation or depression. QT and QRS intervals normal            Medical Decision Making:   History:   Old Medical Records: I decided to obtain old medical records.  Old Records Summarized: records from clinic visits.       <> Summary of Records: See HPI  Initial Assessment:   83 year old female presents with hypercalcemia. Despite this, she is asymptomatic. This diagnosis was made based off labs that were obtained in clinic today. She was also incidentally found to have a UTI from urinalysis at clinic. I will provide with IV fluids, recheck her Calcium, obtain urine culture, treat her UTI, and reassess.   Independently Interpreted Test(s):   I have ordered and independently interpreted EKG Reading(s) - see prior notes  Clinical Tests:   Lab Tests: Ordered and Reviewed  Medical Tests: Ordered and Reviewed  ED Management:  Patient's labs returned with persistent elevation in the Calcium level despite fluids. I will give her additional fluid bolus. I will treat with calcitonin per Medicine recommendations. I will admit. Again, patient has UTI based on labs obtained from clinic. I will have started initial treatment for this but will defer to Medicine team for continued treatment.            Scribe Attestation:   Scribe #1: I performed the above scribed service and the documentation accurately describes the services I performed. I attest to the accuracy of the note.    Attending Attestation:           Physician Attestation for Scribe:  Physician Attestation Statement for Scribe #1: I, Dr. Wallis, reviewed documentation, as scribed by Alda Norton in my presence, and it is both accurate and complete.                 ED Course     Clinical Impression:   The primary encounter diagnosis was Hypercalcemia. A diagnosis of Abnormal laboratory test was also pertinent to this visit.    Disposition:   Disposition: Admitted       Noe Wallis MD  04/11/17 0614

## 2017-04-11 NOTE — ED NOTES
Misty ARIAS Washington, a 83 y.o. female presents to the ED intake 4      Chief Complaint   Patient presents with    Abnormal Lab     Pt sent from [facility] for elevated calcium.     Review of patient's allergies indicates:  No Known Allergies  Past Medical History:   Diagnosis Date    CAD (coronary artery disease)     Cataract     Dementia     Fracture of humerus, proximal, left, closed 1/28/2015    Hyperlipidemia     Hypertension     MI (myocardial infarction) 10/2016    Type 2 diabetes mellitus, controlled

## 2017-04-12 LAB
ALBUMIN SERPL BCP-MCNC: 2.9 G/DL
ALP SERPL-CCNC: 101 U/L
ALT SERPL W/O P-5'-P-CCNC: 9 U/L
ANION GAP SERPL CALC-SCNC: 9 MMOL/L
AST SERPL-CCNC: 15 U/L
BASOPHILS # BLD AUTO: 0.03 K/UL
BASOPHILS NFR BLD: 0.3 %
BILIRUB SERPL-MCNC: 0.2 MG/DL
BUN SERPL-MCNC: 46 MG/DL
CALCIUM SERPL-MCNC: 11.3 MG/DL
CHLORIDE SERPL-SCNC: 107 MMOL/L
CO2 SERPL-SCNC: 25 MMOL/L
CREAT SERPL-MCNC: 2.4 MG/DL
DIFFERENTIAL METHOD: ABNORMAL
EOSINOPHIL # BLD AUTO: 0.2 K/UL
EOSINOPHIL NFR BLD: 2.3 %
ERYTHROCYTE [DISTWIDTH] IN BLOOD BY AUTOMATED COUNT: 13.8 %
EST. GFR  (AFRICAN AMERICAN): 20.9 ML/MIN/1.73 M^2
EST. GFR  (NON AFRICAN AMERICAN): 18.1 ML/MIN/1.73 M^2
GLUCOSE SERPL-MCNC: 79 MG/DL
HCT VFR BLD AUTO: 29.7 %
HGB BLD-MCNC: 9.6 G/DL
KAPPA LC SER QL IA: 8.81 MG/DL
KAPPA LC/LAMBDA SER IA: 2.41
LAMBDA LC SER QL IA: 3.66 MG/DL
LYMPHOCYTES # BLD AUTO: 1.3 K/UL
LYMPHOCYTES NFR BLD: 14.3 %
MAGNESIUM SERPL-MCNC: 2.1 MG/DL
MCH RBC QN AUTO: 28.2 PG
MCHC RBC AUTO-ENTMCNC: 32.3 %
MCV RBC AUTO: 87 FL
MONOCYTES # BLD AUTO: 0.8 K/UL
MONOCYTES NFR BLD: 8.8 %
NEUTROPHILS # BLD AUTO: 6.7 K/UL
NEUTROPHILS NFR BLD: 74 %
PHOSPHATE SERPL-MCNC: 3.9 MG/DL
PLATELET # BLD AUTO: 237 K/UL
PMV BLD AUTO: 9.8 FL
POTASSIUM SERPL-SCNC: 3.7 MMOL/L
PROT SERPL-MCNC: 7.1 G/DL
RBC # BLD AUTO: 3.4 M/UL
SODIUM SERPL-SCNC: 141 MMOL/L
TROPONIN I SERPL DL<=0.01 NG/ML-MCNC: 0.05 NG/ML
WBC # BLD AUTO: 9.01 K/UL

## 2017-04-12 PROCEDURE — 20600001 HC STEP DOWN PRIVATE ROOM

## 2017-04-12 PROCEDURE — 36415 COLL VENOUS BLD VENIPUNCTURE: CPT

## 2017-04-12 PROCEDURE — 99232 SBSQ HOSP IP/OBS MODERATE 35: CPT | Mod: GC,,, | Performed by: INTERNAL MEDICINE

## 2017-04-12 PROCEDURE — 25000003 PHARM REV CODE 250: Performed by: HOSPITALIST

## 2017-04-12 PROCEDURE — 84100 ASSAY OF PHOSPHORUS: CPT

## 2017-04-12 PROCEDURE — 99232 SBSQ HOSP IP/OBS MODERATE 35: CPT | Mod: ,,, | Performed by: INTERNAL MEDICINE

## 2017-04-12 PROCEDURE — 83735 ASSAY OF MAGNESIUM: CPT

## 2017-04-12 PROCEDURE — 63600175 PHARM REV CODE 636 W HCPCS: Performed by: HOSPITALIST

## 2017-04-12 PROCEDURE — 85025 COMPLETE CBC W/AUTO DIFF WBC: CPT

## 2017-04-12 PROCEDURE — 84484 ASSAY OF TROPONIN QUANT: CPT

## 2017-04-12 PROCEDURE — 80053 COMPREHEN METABOLIC PANEL: CPT

## 2017-04-12 RX ADMIN — ASPIRIN 81 MG CHEWABLE TABLET 81 MG: 81 TABLET CHEWABLE at 08:04

## 2017-04-12 RX ADMIN — DONEPEZIL HYDROCHLORIDE 5 MG: 5 TABLET, FILM COATED ORAL at 08:04

## 2017-04-12 RX ADMIN — ATORVASTATIN CALCIUM 40 MG: 20 TABLET, FILM COATED ORAL at 08:04

## 2017-04-12 RX ADMIN — STANDARDIZED SENNA CONCENTRATE AND DOCUSATE SODIUM 1 TABLET: 8.6; 5 TABLET, FILM COATED ORAL at 08:04

## 2017-04-12 RX ADMIN — HYDRALAZINE HYDROCHLORIDE AND ISOSORBIDE DINITRATE 1 TABLET: 37.5; 2 TABLET, FILM COATED ORAL at 02:04

## 2017-04-12 RX ADMIN — HYDRALAZINE HYDROCHLORIDE AND ISOSORBIDE DINITRATE 1 TABLET: 37.5; 2 TABLET, FILM COATED ORAL at 08:04

## 2017-04-12 RX ADMIN — MIRTAZAPINE 7.5 MG: 7.5 TABLET ORAL at 08:04

## 2017-04-12 RX ADMIN — CARVEDILOL 6.25 MG: 3.12 TABLET, FILM COATED ORAL at 08:04

## 2017-04-12 RX ADMIN — CEFTRIAXONE 1 G: 1 INJECTION, SOLUTION INTRAVENOUS at 11:04

## 2017-04-12 RX ADMIN — FUROSEMIDE 20 MG: 20 TABLET ORAL at 08:04

## 2017-04-12 NOTE — PLAN OF CARE
Problem: Patient Care Overview  Goal: Plan of Care Review  Outcome: Ongoing (interventions implemented as appropriate)  Safety:  call light in reach, patient oriented to room & instructed how to notify nurse if assistance is needed, questions & concerns addressed, bed in lowest position with wheels locked & side rails up X 2, fall precautions followed, patient free from fall & injury thus far this shift, bed alarm on at all times;  VTE/bleeding precautions maintained.  Activity:  patient up with assistance, weight shifted at least every other hour.  Neurological:  patient A&O X self only, follows commands, equal  strength & dorsi/plantarflexion, neuro checks performed as ordered.  Respiratory:  patient tolerates room air without distress.  Cardiac:  BP stable.  HR stable.  NSR on telemetry. Afebrile this shift.  GI:  Patient tolerates PO intake, encouraged PO fluids, denies nausea,  LBM 4/9/2017.  :  patient incontinent of yellow urine without foul odor, adequate occurrences for shift.  Skin:  CDI.  Devices:  PIV CDI, negative for s/sx of infection & infiltration.  Pain:  patient denies pain.  Will continue to monitor patient.

## 2017-04-12 NOTE — PLAN OF CARE
Problem: Patient Care Overview  Goal: Plan of Care Review  Outcome: Ongoing (interventions implemented as appropriate)  Patient disoriented to time and situation currently. Patient VSS. Patient calm and cooperative. Bone scan done today. Patient currently resting. Patient has bed alarm set in lowest position. Patient plan of care reviewed with the patient.

## 2017-04-13 ENCOUNTER — TELEPHONE (OUTPATIENT)
Dept: OPHTHALMOLOGY | Facility: CLINIC | Age: 82
End: 2017-04-13

## 2017-04-13 PROBLEM — I12.9 TYPE 2 DIABETES MELLITUS WITH STAGE 4 CHRONIC KIDNEY DISEASE AND HYPERTENSION: Status: ACTIVE | Noted: 2017-04-13

## 2017-04-13 PROBLEM — N18.4 TYPE 2 DIABETES MELLITUS WITH STAGE 4 CHRONIC KIDNEY DISEASE AND HYPERTENSION: Status: ACTIVE | Noted: 2017-04-13

## 2017-04-13 PROBLEM — E11.22 TYPE 2 DIABETES MELLITUS WITH STAGE 4 CHRONIC KIDNEY DISEASE AND HYPERTENSION: Status: ACTIVE | Noted: 2017-04-13

## 2017-04-13 PROBLEM — E11.69 TYPE 2 DIABETES MELLITUS WITH HYPERLIPIDEMIA: Status: ACTIVE | Noted: 2017-04-13

## 2017-04-13 PROBLEM — N39.0 UTI DUE TO EXTENDED-SPECTRUM BETA LACTAMASE (ESBL) PRODUCING ESCHERICHIA COLI: Status: ACTIVE | Noted: 2017-04-13

## 2017-04-13 PROBLEM — Z16.12 UTI DUE TO EXTENDED-SPECTRUM BETA LACTAMASE (ESBL) PRODUCING ESCHERICHIA COLI: Status: ACTIVE | Noted: 2017-04-13

## 2017-04-13 PROBLEM — B96.29 UTI DUE TO EXTENDED-SPECTRUM BETA LACTAMASE (ESBL) PRODUCING ESCHERICHIA COLI: Status: ACTIVE | Noted: 2017-04-13

## 2017-04-13 PROBLEM — N18.4 ANEMIA OF CHRONIC RENAL FAILURE, STAGE 4 (SEVERE): Status: ACTIVE | Noted: 2017-04-13

## 2017-04-13 PROBLEM — E78.5 TYPE 2 DIABETES MELLITUS WITH HYPERLIPIDEMIA: Status: ACTIVE | Noted: 2017-04-13

## 2017-04-13 PROBLEM — D63.1 ANEMIA OF CHRONIC RENAL FAILURE, STAGE 4 (SEVERE): Status: ACTIVE | Noted: 2017-04-13

## 2017-04-13 PROBLEM — D50.9 IRON DEFICIENCY ANEMIA: Status: ACTIVE | Noted: 2017-04-13

## 2017-04-13 LAB
1,25(OH)2D3 SERPL-MCNC: 70 PG/ML
ALBUMIN SERPL BCP-MCNC: 2.7 G/DL
ANION GAP SERPL CALC-SCNC: 9 MMOL/L
BASOPHILS # BLD AUTO: 0.04 K/UL
BASOPHILS NFR BLD: 0.6 %
BUN SERPL-MCNC: 43 MG/DL
CALCIUM SERPL-MCNC: 10.5 MG/DL
CHLORIDE SERPL-SCNC: 108 MMOL/L
CO2 SERPL-SCNC: 25 MMOL/L
CREAT SERPL-MCNC: 2.5 MG/DL
DIFFERENTIAL METHOD: ABNORMAL
EOSINOPHIL # BLD AUTO: 0.2 K/UL
EOSINOPHIL NFR BLD: 2.8 %
ERYTHROCYTE [DISTWIDTH] IN BLOOD BY AUTOMATED COUNT: 13.9 %
EST. GFR  (AFRICAN AMERICAN): 19.9 ML/MIN/1.73 M^2
EST. GFR  (NON AFRICAN AMERICAN): 17.2 ML/MIN/1.73 M^2
GLUCOSE SERPL-MCNC: 79 MG/DL
HCT VFR BLD AUTO: 26.6 %
HGB BLD-MCNC: 9.2 G/DL
LYMPHOCYTES # BLD AUTO: 1.3 K/UL
LYMPHOCYTES NFR BLD: 19.4 %
MAGNESIUM SERPL-MCNC: 2.1 MG/DL
MCH RBC QN AUTO: 29.3 PG
MCHC RBC AUTO-ENTMCNC: 34.6 %
MCV RBC AUTO: 85 FL
MONOCYTES # BLD AUTO: 0.7 K/UL
MONOCYTES NFR BLD: 10.3 %
NEUTROPHILS # BLD AUTO: 4.6 K/UL
NEUTROPHILS NFR BLD: 66.8 %
PHOSPHATE SERPL-MCNC: 3.2 MG/DL
PLATELET # BLD AUTO: 222 K/UL
PMV BLD AUTO: 9.8 FL
POTASSIUM SERPL-SCNC: 3.4 MMOL/L
PTH RELATED PROT SERPL-SCNC: 0.8 PMOL/L
RBC # BLD AUTO: 3.14 M/UL
SODIUM SERPL-SCNC: 142 MMOL/L
WBC # BLD AUTO: 6.9 K/UL

## 2017-04-13 PROCEDURE — 99232 SBSQ HOSP IP/OBS MODERATE 35: CPT | Mod: GC,,, | Performed by: INTERNAL MEDICINE

## 2017-04-13 PROCEDURE — 97161 PT EVAL LOW COMPLEX 20 MIN: CPT

## 2017-04-13 PROCEDURE — 83735 ASSAY OF MAGNESIUM: CPT

## 2017-04-13 PROCEDURE — 82652 VIT D 1 25-DIHYDROXY: CPT

## 2017-04-13 PROCEDURE — 20600001 HC STEP DOWN PRIVATE ROOM

## 2017-04-13 PROCEDURE — 99232 SBSQ HOSP IP/OBS MODERATE 35: CPT | Mod: ,,, | Performed by: INTERNAL MEDICINE

## 2017-04-13 PROCEDURE — 63600175 PHARM REV CODE 636 W HCPCS: Performed by: INTERNAL MEDICINE

## 2017-04-13 PROCEDURE — 25000003 PHARM REV CODE 250: Performed by: HOSPITALIST

## 2017-04-13 PROCEDURE — 85025 COMPLETE CBC W/AUTO DIFF WBC: CPT

## 2017-04-13 PROCEDURE — 97165 OT EVAL LOW COMPLEX 30 MIN: CPT

## 2017-04-13 PROCEDURE — 80069 RENAL FUNCTION PANEL: CPT

## 2017-04-13 RX ORDER — ENOXAPARIN SODIUM 100 MG/ML
30 INJECTION SUBCUTANEOUS EVERY 24 HOURS
Status: DISCONTINUED | OUTPATIENT
Start: 2017-04-13 | End: 2017-04-14 | Stop reason: HOSPADM

## 2017-04-13 RX ADMIN — HYDRALAZINE HYDROCHLORIDE AND ISOSORBIDE DINITRATE 1 TABLET: 37.5; 2 TABLET, FILM COATED ORAL at 08:04

## 2017-04-13 RX ADMIN — HYDRALAZINE HYDROCHLORIDE AND ISOSORBIDE DINITRATE 1 TABLET: 37.5; 2 TABLET, FILM COATED ORAL at 01:04

## 2017-04-13 RX ADMIN — MIRTAZAPINE 7.5 MG: 7.5 TABLET ORAL at 08:04

## 2017-04-13 RX ADMIN — ENOXAPARIN SODIUM 30 MG: 100 INJECTION SUBCUTANEOUS at 06:04

## 2017-04-13 RX ADMIN — DONEPEZIL HYDROCHLORIDE 5 MG: 5 TABLET, FILM COATED ORAL at 08:04

## 2017-04-13 RX ADMIN — ASPIRIN 81 MG CHEWABLE TABLET 81 MG: 81 TABLET CHEWABLE at 09:04

## 2017-04-13 RX ADMIN — CARVEDILOL 6.25 MG: 3.12 TABLET, FILM COATED ORAL at 08:04

## 2017-04-13 RX ADMIN — STANDARDIZED SENNA CONCENTRATE AND DOCUSATE SODIUM 1 TABLET: 8.6; 5 TABLET, FILM COATED ORAL at 08:04

## 2017-04-13 RX ADMIN — STANDARDIZED SENNA CONCENTRATE AND DOCUSATE SODIUM 1 TABLET: 8.6; 5 TABLET, FILM COATED ORAL at 09:04

## 2017-04-13 RX ADMIN — CARVEDILOL 6.25 MG: 3.12 TABLET, FILM COATED ORAL at 09:04

## 2017-04-13 RX ADMIN — HYDRALAZINE HYDROCHLORIDE AND ISOSORBIDE DINITRATE 1 TABLET: 37.5; 2 TABLET, FILM COATED ORAL at 05:04

## 2017-04-13 RX ADMIN — ATORVASTATIN CALCIUM 40 MG: 20 TABLET, FILM COATED ORAL at 09:04

## 2017-04-13 RX ADMIN — FUROSEMIDE 20 MG: 20 TABLET ORAL at 09:04

## 2017-04-13 NOTE — PLAN OF CARE
Problem: Fall Risk (Adult)  Goal: Identify Related Risk Factors and Signs and Symptoms  Related risk factors and signs and symptoms are identified upon initiation of Human Response Clinical Practice Guideline (CPG)   Outcome: Ongoing (interventions implemented as appropriate)  Bed in lowest position, side rails up x3, call light within reach. Pt instructed on call prior to getting up.    Problem: Patient Care Overview  Goal: Plan of Care Review  Outcome: Ongoing (interventions implemented as appropriate)  Patient is AAO to self and place, vital signs have been stable. Antibiotic given as ordered. Plan of care reviewed with patient, all questions and concerns were addressed. Will continue to monitor.    Problem: Pressure Ulcer Risk (Ovidio Scale) (Adult,Obstetrics,Pediatric)  Goal: Identify Related Risk Factors and Signs and Symptoms  Related risk factors and signs and symptoms are identified upon initiation of Human Response Clinical Practice Guideline (CPG)   Outcome: Ongoing (interventions implemented as appropriate)  Pt is able to position self in bed.

## 2017-04-13 NOTE — TELEPHONE ENCOUNTER
I spoke to kp.  Patient is in the hospital but will likely be discharged today. Does not wish to cancel surgery will play it by ear and call Monday should they need to cancel.

## 2017-04-13 NOTE — PROGRESS NOTES
Progress Note  Hospital Medicine      Admit Date: 4/11/2017    SUBJECTIVE:     Follow-up For:  Hypercalcemia    HPI/Interval history: No new complaints.     Review of Systems: Gen: no fever, no chills, Heart: no chest pain, palpitations; Resp: no SOB, no cough    OBJECTIVE:     Vital Signs Range (Last 24H):  Temp:  [97.9 °F (36.6 °C)-99 °F (37.2 °C)]   Pulse:  [62-78]   Resp:  [14-17]   BP: (106-151)/(53-72)   SpO2:  [95 %-100 %]     Physical Exam:  General appearance: NAD, conversant  Neck: FROM, supple   Lungs: Clear to auscultation, no accessory muscle use  CV: RRR, no heave  Abdomen: Soft, non-tender; no masses or HSM  Extremities: No peripheral edema or digital cyanosis  Skin: no rash, lesions or ulcers  Psych: Alert and oriented to person, place and time      Recent Labs  Lab 04/10/17  1646 04/11/17  0427 04/12/17  0701    142 141   K 4.1 4.0 3.7    107 107   CO2 26 24 25   BUN 52* 48* 46*   CREATININE 2.9* 2.6* 2.4*   * 79 79   CALCIUM 13.2* 12.6* 11.3*   MG  --   --  2.1   PHOS 5.2*  --  3.9       Recent Labs  Lab 04/10/17  1646 04/11/17  0427 04/12/17  0701   ALKPHOS  --  97 101   ALT  --  11 9*   AST  --  17 15   ALBUMIN 3.2* 2.9* 2.9*   PROT  --  7.2 7.1   BILITOT  --  0.3 0.2         Recent Labs  Lab 04/10/17  1646 04/11/17  1046 04/12/17  0701   WBC 8.37 6.80 9.01   HGB 10.8* 10.2* 9.6*   HCT 31.9* 31.2* 29.7*    207 237   GRAN 68.8  5.8 66.1  4.5 74.0*  6.7   LYMPH 19.0  1.6 20.7  1.4 14.3*  1.3   MONO 8.4  0.7 8.5  0.6 8.8  0.8         Recent Labs  Lab 04/11/17  1116 04/11/17  1635 04/11/17  2237   POCTGLUCOSE 84 80 118*     Imaging Results         NM Bone Scan Whole Body (Final result) Result time:  04/12/17 13:44:10    Final result by Douglas Carvajal MD (04/12/17 13:44:10)    Impression:       1.No evidence of metastatic disease to the skeleton.  2.Degenerative and benign changes in the shoulders, knees, and thoracic spine.        Electronically signed  by: Douglas Carvajal MD  Date:     04/12/17  Time:    13:44     Narrative:        Technique: Following intravenous administration of 26.3 mCi technetium 99m MDP, images of the whole body,  pelvis, and head  were obtained in the anterior and posterior projections.  Selected static images were also obtained as needed.    Result: Degenerative changes in the shoulders, knees, and lower thoracic spine. Injection sites in right hand. Urinary contamination is seen at the level of the ankles. The kidneys demonstrate normal excretion. Sinusitis type changes in the maxillary sinuses.            ASSESSMENT/PLAN:      Diagnosis Date Noted POA    PRINCIPAL PROBLEM: Hypercalcemia [E83.52]Rule out multiple myeloma/metastases from prior ovarian carcinoma/exogenous supplementation. One dose of Calcitonin 4/11/2017. Vitamin D and PTH Normal. Bone scan negative. Parathyroidism related peptide pending. Probable over supplementation + increased immobility 04/11/2017 Yes    E coli UTI (urinary tract infection) [N39.0]Positive urinalysis., Denies urinary symptoms. Started on IV ceftriaxone. Awaiting sensitivites 04/11/2017 Yes    Anemia [D64.9]10.2. Normocytic monitor 04/10/2017 Yes    Proteinuria [R80.9]1+Work up as per nephrology 04/10/2017 Yes    Osteoporosis [M81.0]On calcium and vitamin D supplementation 11/21/2016 Yes       Chronic    Chronic kidney disease, stage IV (severe) [N18.4]Seems to be at baseline 11/04/2016 Yes       Chronic    Chronic combined systolic and diastolic heart failure [I50.42]Echocardiogram with ejection fraction of 30% and diastolic dysfunction four months back. input-output monitor, daily weights. Continued carvedilol. Added bidil in lieu of ACEi due to LESLEY/CKD. 11/01/2016 Yes       Chronic    NSTEMI (non-ST elevated myocardial infarction)Continue with aspirin [I21.4] 10/31/2016 Yes    Cataract [H26.9] 01/31/2015 Yes    Debility [R53.81]Mostly wheelchair-bound supportive care 01/31/2015 Yes     Controlled type 2 diabetes mellitus with diabetic nephropathy, without long-term current use of insulin [E11.21]Blood sugars controlled on current regimen   Yes       Chronic    Coronary artery disease [I25.10]Status post CABG   Yes       Chronic    Dementia [F03.90]On donepezil. Patient is oriented to person, place a ble to give relevant medical history   Yes       Chronic    Essential hypertension [I10]Continue carvedilol. Started on bidil TID   Yes       Chronic    Hyperlipidemia [E78.5]On atorvastatin   Yes       Chronic       Problems Resolved During this Admission:     Diagnosis Date Noted Date Resolved POA      VTE Risk Mitigation           Ordered       Medium Risk of VTE Once      04/11/17 0827       Place sequential compression device Until discontinued      04/11/17 0827

## 2017-04-13 NOTE — PROGRESS NOTES
Consult Note  Nephrology    Consult Requested By: Ruchi Acuña MD  Reason for Consult: LESLEY with hypercalcemia     SUBJECTIVE:     History of Present Illness:  Patient is a 83 y.o. female with a PMHx of CKD (not on dialysis), ovarian cancer, DM type II, HLD, HTN, dementia, CAD, MI, CHF, and anemia who was sent by me to ED for further evaluation of LESLEY and worsening renal function. Patient was called after receiving notification of a panic value for calcium levels of 13.2. Creatinine was mildly elevated compared to baseline. She was also found to have a UTI on the ED. Patient states she feels at baseline and is asymptomatic. She specifically denies abdominal pain, nausea, vomiting, decreased PO intake, and pain. Was started on IV fluids management.     Interval History:  NAEON. Bone scan completed. Not offering complaints.    Past Medical History:   Diagnosis Date    Anemia     Anemia     CAD (coronary artery disease)     Cataract     CHF (congestive heart failure)     Chronic kidney disease (CKD), stage IV (severe) 03/27/2017    Sees  at [facility], no dialysis    Dementia     Encounter for blood transfusion 2006    at time of CABG    Fracture of humerus, proximal, left, closed 1/28/2015    Hypercalcemia 04/10/2017    Hyperlipidemia     Hyperparathyroidism     Hypertension     MI (myocardial infarction) 10/2016    Osteoporosis     Ovarian cancer     in the past    Type 2 diabetes mellitus, controlled     UTI (urinary tract infection) 04/11/2017     Past Surgical History:   Procedure Laterality Date    CORONARY ARTERY BYPASS GRAFT  2006    HYSTERECTOMY  2002    ORIF HUMERUS FRACTURE Left 1/28/2015     Family History   Problem Relation Age of Onset    Adopted: Yes    Blindness Maternal Grandmother      Social History   Substance Use Topics    Smoking status: Former Smoker     Packs/day: 2.00     Years: 30.00     Quit date: 1/20/2007    Smokeless tobacco: Never Used    Alcohol  use No       Review of patient's allergies indicates:  No Known Allergies     Review of Systems:  Constitutional: Negative for fever, appetite change and fatigue.  HENT: Negative for hearing loss, sore throat and mouth sores.  Eyes: Negative for photophobia, pain and visual disturbance.  Respiratory: Negative for cough, chest tightness, shortness of breath and wheezing.  Cardiovascular: Negative for chest pain, palpitations and leg swelling.  Gastrointestinal: Negative for nausea, vomiting, abdominal pain, diarrhea, constipation, blood in stool and abdominal distention.  Genitourinary: Negative for dysuria, urgency, frequency, hematuria, decreased urine volume, difficulty urinating  Musculoskeletal: Negative for back pain, joint swelling, arthralgias and gait problem.  Skin: Negative for pallor, rash and wound.  Neurological: Negative for dizziness, tremors, syncope, weakness, light-headedness and headaches.  Hematological: Negative for adenopathy. Does not bruise/bleed easily.  Psychiatric/Behavioral: Negative for confusion, sleep disturbance and dysphoric mood. The patient is not nervous/anxious.    OBJECTIVE:     Vital Signs (Most Recent)  Temp: 97.9 °F (36.6 °C) (04/12/17 1915)  Pulse: 74 (04/12/17 1915)  Resp: 16 (04/12/17 1915)  BP: 135/67 (04/12/17 1915)  SpO2: 100 % (04/12/17 1915)    Vital Signs Range (Last 24H):  Temp:  [97.9 °F (36.6 °C)-99 °F (37.2 °C)]   Pulse:  [62-79]   Resp:  [14-16]   BP: (106-157)/(53-72)   SpO2:  [93 %-100 %]     Physical Exam:  General: No acute distress, well groomed, alert and oriented x 3  HEENT: Normocephalic, atraumatic, EOM's intact bilaterally, external inspection of ears and nose normal, moist mucous membranes, no oral ulcerations/lesions  Neck: Supple, symmetrical, trachea midline, no thyromegaly, no JVD  Respiratory: Clear to auscultation bilaterally, respirations unlabored, no rales/rhonchi/wheezing  Cardiovacular: Regular rate and rhythm, S1, S2 normal, no murmurs,  rubs or gallops  Gastrointestinal: Soft, non-tender, bowel sounds normal, no hepatosplenomegaly  Musculoskeletal: No knee or ankle joint tenderness or swelling.   Extremities: No clubbing or cyanosis of bilateral upper extremities; no lower extremity edema bilaterally, radial pulses 2+ bilaterally, symmetric  Skin: warm and dry; no rash on exposed skin  Neurologic: CN grossly intact    Laboratory:  CBC:     Recent Labs  Lab 04/12/17  0701   WBC 9.01   RBC 3.40*   HGB 9.6*   HCT 29.7*      MCV 87   MCH 28.2   MCHC 32.3     CMP:     Recent Labs  Lab 04/12/17  0701   GLU 79   CALCIUM 11.3*   ALBUMIN 2.9*   PROT 7.1      K 3.7   CO2 25      BUN 46*   CREATININE 2.4*   ALKPHOS 101   ALT 9*   AST 15   BILITOT 0.2       Diagnostic Results:  Labs: Reviewed  X-Ray: Reviewed    ASSESSMENT/PLAN:     A 83 y.o. female with a PMHx of CKD (not on dialysis), ovarian cancer, DM type II, HLD, HTN, dementia, CAD, MI, CHF, and anemia who was sent by me to ED for further evaluation of LESLEY and worsening renal function. Patient was called after receiving notification of a panic value for calcium levels of 13.2.    1. Hypercalcemia. Diff diagnosis: 1. lytic lesions (remote ovarian Ca) / paraneoplas. 2. MM. 3. Exogenous vitD/Ca supplem 4. Immobilization.     Recs / Plan:    Therapeutic  - Give one additional dose of calcitonin today (4 units/ kg)     Diagnostic  - PTH- rp: in process  - 1,25 vitamin D levels: in process  - Awaiting UPEP, SPEP, ANDREW and free light chains   - Based on patient history of previous malignancy (Ovarian cancer) consider skeletal survey to evaluate for possible lytic lesions   - Monitor strict I/Os, daily weights  - Will continue to monitor.    Nel Botello MD    Nephrology PGY- 2    ATTENDING PHYSICIAN ATTESTATION  I have personally interviewed and examined the patient. I thoroughly reviewed the demographic, clinical, laboratorial and imaging information available in medical records. I agree  with the assessment and recommendations provided by the subspecialty resident. Dr. Botello and Dr. Ca were under my supervision.

## 2017-04-13 NOTE — TELEPHONE ENCOUNTER
----- Message from Mary Madden sent at 4/13/2017  2:03 PM CDT -----  Contact: Sister - Carlota Tejeda  Patient's sister states the patient is currently in the hospital and doesn't have a date for discharge.

## 2017-04-13 NOTE — PLAN OF CARE
Problem: Occupational Therapy Goal  Goal: Occupational Therapy Goal  Goals to be met by: 4/23/2017     Patient will increase functional independence with ADLs by performing:    Feeding with Supervision.  UE Dressing with Minimal Assistance.  Grooming while standing with Minimal Assistance.  Supine to sit with Minimal Assistance.  Stand pivot transfers with Stand-by Assistance.  Wheelchair transfers with Minimal Assistance.  Self propel wheelchair ~2 minutes with no RBs.  Outcome: Ongoing (interventions implemented as appropriate)  OT evaluation completed. OT goals and POC established.     CRYSTAL Williamson  4/13/2017

## 2017-04-13 NOTE — PLAN OF CARE
Problem: Physical Therapy Goal  Goal: Physical Therapy Goal  Goals to be met by: 2017     Patient will increase functional independence with mobility by performin. Supine to sit with Contact Guard Assistance  2. Sit to supine with Contact Guard Assistance  3. Sit to stand transfer with Stand-by Assistance  4. Bed to chair transfer with Stand-by Assistance using Rolling Walker  5. Gait x 20 feet with Stand-by Assistance using Rolling Walker.   6. Wheelchair propulsion x100 feet with Stand-by Assistance using bilateral uppper extremities  Outcome: Ongoing (interventions implemented as appropriate)  Pt evaluation complete.      PRINCE CARRASCO, PT  2017

## 2017-04-13 NOTE — PLAN OF CARE
Problem: Patient Care Overview  Goal: Plan of Care Review  Outcome: Ongoing (interventions implemented as appropriate)  Patient VSS. Patient calm and cooperative. Patient seen by PT today. Patient currently resting. Bed alarm set. No acute changes during shift.

## 2017-04-13 NOTE — PT/OT/SLP EVAL
Physical Therapy  Evaluation    Misty Yun   MRN: [id number]   Admitting Diagnosis: Hypercalcemia    PT Received On: 04/13/17  PT Start Time: 0800     PT Stop Time: 0819    PT Total Time (min): 19 min       Billable Minutes:  Evaluation 19    Diagnosis: Hypercalcemia      Past Medical History:   Diagnosis Date    Anemia     Anemia     CAD (coronary artery disease)     Cataract     CHF (congestive heart failure)     Chronic kidney disease (CKD), stage IV (severe) 03/27/2017    Sees  at [facility], no dialysis    Dementia     Encounter for blood transfusion 2006    at time of CABG    Fracture of humerus, proximal, left, closed 1/28/2015    Hypercalcemia 04/10/2017    Hyperlipidemia     Hyperparathyroidism     Hypertension     MI (myocardial infarction) 10/2016    Osteoporosis     Ovarian cancer     in the past    Type 2 diabetes mellitus, controlled     UTI (urinary tract infection) 04/11/2017      Past Surgical History:   Procedure Laterality Date    CORONARY ARTERY BYPASS GRAFT  2006    HYSTERECTOMY  2002    ORIF HUMERUS FRACTURE Left 1/28/2015       Referring physician: TAY Acuña  Date referred to PT: 04/12/2017    General Precautions: Standard, fall, aspiration  Orthopedic Precautions: N/A   Braces: N/A            Patient History:  Lives With: facility resident  Living Arrangements: residential facility  Home Layout: Able to live on 1st floor  Living Environment Comment: Pt sister present and able to assist with PLOF. Pt lives at [facility]. Pt reports assist with ADLs and short amb with w/c. Pt reports recieving therapy at NH.   Equipment Currently Used at Home: wheelchair, rollator  DME owned (not currently used): none    Previous Level of Function:  Ambulation Skills: needs device  Transfer Skills: needs assist  ADL Skills: needs assist    Subjective:  Communicated with RN prior to session.  Pt agreeable to therapy session.   Chief Complaint: NA  Patient goals: return  home    Pain Ratin/10               Pain Rating Post-Intervention: 0/10    Objective:   Patient found with: telemetry, peripheral IV     Cognitive Exam:  Oriented to: Person    Follows Commands/attention: Follows multistep  commands  Communication: clear/fluent  Safety awareness/insight to disability: impaired    Physical Exam:  Postural examination/scapula alignment: Rounded shoulder    Skin integrity: Visible skin intact  Edema: None noted B LE    Sensation:   Intact  light/touch B LE    Lower Extremity Range of Motion:  Right Lower Extremity: WFL  Left Lower Extremity: WFL    Lower Extremity Strength:  Right Lower Extremity: gross 3+/5  Left Lower Extremity: gross 3+/5     Gross motor coordination: WFL    Functional Mobility:  Bed Mobility:  Supine to Sit: Moderate Assistance    Transfers:  Sit <> Stand Assistance: Contact Guard Assistance (x2 trials)  Sit <> Stand Assistive Device: Rolling Walker    Gait:   Gait Distance: 3 side steps to bedside chair; no LOB and mild SOB  Assistance 1: Contact Guard Assistance  Gait Assistive Device: Rolling walker  Gait Pattern: swing-through gait  Gait Deviation(s): decreased maureen, decreased step length, decreased stride length    Balance:   Static Sit: GOOD-: Takes MODERATE challenges from all directions but inconsistently  Dynamic Sit: GOOD-: Maintains balance through MODERATE excursions of active trunk movement,     Static Stand: FAIR+: Takes MINIMAL challenges from all directions  Dynamic stand: FAIR: Needs CONTACT GUARD during gait    Therapeutic Activities and Exercises:  Pt educated on role of PT/POC.   Pt safe to perform transfers with RN staff.     AM-PAC 6 CLICK MOBILITY  How much help from another person does this patient currently need?   1 = Unable, Total/Dependent Assistance  2 = A lot, Maximum/Moderate Assistance  3 = A little, Minimum/Contact Guard/Supervision  4 = None, Modified Chicken/Independent    Turning over in bed (including adjusting  bedclothes, sheets and blankets)?: 4  Sitting down on and standing up from a chair with arms (e.g., wheelchair, bedside commode, etc.): 3  Moving from lying on back to sitting on the side of the bed?: 3  Moving to and from a bed to a chair (including a wheelchair)?: 3  Need to walk in hospital room?: 3  Climbing 3-5 steps with a railing?: 3  Total Score: 19     AM-PAC Raw Score CMS G-Code Modifier Level of Impairment Assistance   6 % Total / Unable   7 - 9 CM 80 - 100% Maximal Assist   10 - 14 CL 60 - 80% Moderate Assist   15 - 19 CK 40 - 60% Moderate Assist   20 - 22 CJ 20 - 40% Minimal Assist   23 CI 1-20% SBA / CGA   24 CH 0% Independent/ Mod I     Patient left up in chair with all lines intact, call button in reach, RN notified and sister present.    Assessment:   Misty Yun is a 83 y.o. female with a medical diagnosis of Hypercalcemia and presents with decreased strength, endurance, balance and overall functional mobility. Pt performed bed mobility mod A and transfers CGA. Pt took 3 side steps to bedside chair CGA with RW; no LOB and mild SOB.     Rehab identified problem list/impairments: Rehab identified problem list/impairments: impaired endurance, weakness, impaired functional mobilty, impaired balance    Rehab potential is good.    Activity tolerance: Good    Discharge recommendations: Discharge Facility/Level Of Care Needs: nursing facility, skilled (return to NH )     Barriers to discharge: Barriers to Discharge: None    Equipment recommendations: Equipment Needed After Discharge: none     GOALS:   Physical Therapy Goals        Problem: Physical Therapy Goal    Goal Priority Disciplines Outcome Goal Variances Interventions   Physical Therapy Goal     PT/OT, PT Ongoing (interventions implemented as appropriate)     Description:  Goals to be met by: 2017     Patient will increase functional independence with mobility by performin. Supine to sit with Contact Guard  Assistance  2. Sit to supine with Contact Guard Assistance  3. Sit to stand transfer with Stand-by Assistance  4. Bed to chair transfer with Stand-by Assistance using Rolling Walker  5. Gait  x 20 feet with Stand-by Assistance using Rolling Walker.   6. Wheelchair propulsion x100 feet with Stand-by Assistance using bilateral uppper extremities                PLAN:    Patient to be seen 3 x/week to address the above listed problems via gait training, therapeutic activities, therapeutic exercises, wheelchair management/training  Plan of Care expires: 05/13/17  Plan of Care reviewed with: patient, sibling          PRINCE CARRASCO, PT  04/13/2017

## 2017-04-13 NOTE — PT/OT/SLP EVAL
Occupational Therapy  Evaluation    Misty Yun   MRN: [id number]   Admitting Diagnosis: Hypercalcemia    OT Date of Treatment: 04/13/17   OT Start Time: 0801  OT Stop Time: 0819  OT Total Time (min): 18 min    Billable Minutes:  Evaluation 18    Diagnosis: Hypercalcemia       Past Medical History:   Diagnosis Date    Anemia     Anemia     CAD (coronary artery disease)     Cataract     CHF (congestive heart failure)     Chronic kidney disease (CKD), stage IV (severe) 03/27/2017    Sees  at [facility], no dialysis    Dementia     Encounter for blood transfusion 2006    at time of CABG    Fracture of humerus, proximal, left, closed 1/28/2015    Hypercalcemia 04/10/2017    Hyperlipidemia     Hyperparathyroidism     Hypertension     MI (myocardial infarction) 10/2016    Osteoporosis     Ovarian cancer     in the past    Type 2 diabetes mellitus, controlled     UTI (urinary tract infection) 04/11/2017      Past Surgical History:   Procedure Laterality Date    CORONARY ARTERY BYPASS GRAFT  2006    HYSTERECTOMY  2002    ORIF HUMERUS FRACTURE Left 1/28/2015       Referring physician: Ruchi Acuña MD  Date referred to OT: 4/12/2017    General Precautions: Standard, fall, aspiration  Orthopedic Precautions: N/A  Braces: N/A    Do you have any cultural, spiritual, Catholic conflicts, given your current situation?: none stated     Patient History:  Living Environment  Lives With: facility resident  Living Arrangements:  (nursing home)  Living Environment Comment: Pt's sister present during evaluation and able to provide PLOF. Pt lives in a NH. Stated that pt required assistance for bathing, dressing, and grooming but was incontinent with toileting. Pt's primary mobility is self-propelling a wheelchair. Requires assistance with bed mobility and wheelchair transfers.   Equipment Currently Used at Home: wheelchair, rollator    Prior level of function:   Bed Mobility/Transfers: needs  device and assist  Grooming: needs assist  Bathing: needs assist  Upper Body Dressing: needs assist  Lower Body Dressing: needs assist  Toileting: unable to perform        Dominant hand: right    Subjective:  Communicated with RN prior to session.    Pt agreeable to OT/PT evaluation    Chief Complaint: tired   Patient/Family stated goals: increase (I) in ADLs    Pain Ratin/10  Pain Rating Post-Intervention: 0/10    Objective:  Patient found with: peripheral IV, telemetry    Cognitive Exam:  Oriented to: Person  Follows Commands/attention: Easily distracted and Follows two-step commands  Communication: mumbled, soft speech  Memory:  Impaired STM, Impaired LTM and Poor immediate recall  Safety awareness/insight to disability: impaired  Coping skills/emotional control: Appropriate to situation    Visual/perceptual:  Intact    Physical Exam:  Postural examination/scapula alignment: Rounded shoulder, Head forward and Kyphosis  Skin integrity: Visible skin intact  Edema: None noted in BUE    Sensation:   Intact in BUE    Upper Extremity Range of Motion:  Right Upper Extremity: WFL  Left Upper Extremity: WFL except limited shoulder flexion, abduction due to previous injury    Upper Extremity Strength:  Right Upper Extremity: 3-/5 grossly in all generalized muscle groups  Left Upper Extremity: 3-/5 grossly in all generalized muscle groups   Strength: fair      Fine motor coordination:   Intact    Gross motor coordination: WFL    Functional Mobility:  Bed Mobility: HOB elevated  Scooting/Bridging: Maximum Assistance (to EOB)  Supine to Sit: Moderate Assistance, WIth side rail    Transfers: verbal cues for safety  Sit <> Stand Assistance: Contact Guard Assistance (2 trials)  Sit <> Stand Assistive Device: Rolling Walker  Bed <> Chair Technique: Stand Pivot  Bed <> Chair Transfer Assistance: Contact Guard Assistance  Bed <> Chair Assistive Device: Rolling Walker    Functional Ambulation: ~3 side steps towards HOB and  "~5 steps to chair with CGA using RW    Activities of Daily Living:    UE Dressing Level of Assistance: Moderate assistance (for donning gown like jacket from EOB)    Balance: Tolerated EOB ~5-8 minutes with ranging assistance of minimum assistance to SBA. Demonstrated L lateral lean.    Therapeutic Activities and Exercises:  Pt educated on OT role/POC, whiteboard updated, re-orientation to call light and safety UIC using call light. Verbalized understanding.       AM-PAC 6 CLICK ADL  How much help from another person does this patient currently need?  1 = Unable, Total/Dependent Assistance  2 = A lot, Maximum/Moderate Assistance  3 = A little, Minimum/Contact Guard/Supervision  4 = None, Modified Luce/Independent    Putting on and taking off regular lower body clothing? : 2  Bathing (including washing, rinsing, drying)?: 2  Toileting, which includes using toilet, bedpan, or urinal? : 1  Putting on and taking off regular upper body clothing?: 2  Taking care of personal grooming such as brushing teeth?: 2  Eating meals?: 3  Total Score: 12    AM-PAC Raw Score CMS "G-Code Modifier Level of Impairment Assistance   6 % Total / Unable   7 - 9 CM 80 - 100% Maximal Assist   10 - 14 CL 60 - 80% Moderate Assist   15 - 19 CK 40 - 60% Moderate Assist   20 - 22 CJ 20 - 40% Minimal Assist   23 CI 1-20% SBA / CGA   24 CH 0% Independent/ Mod I       Patient left up in chair with all lines intact, call button in reach, RN notified and sister present    Assessment:  Misty Yun is a 83 y.o. female with a medical diagnosis of Hypercalcemia. Pt presents with impaired cognition, decreased safety awareness and generalized weakness impacting performance on ADLs. Session tolerated well with no c/o pain. PTA, pt required assistance for all ADLs. Required moderate assistance for donning gown due to difficulty with sequencing task. Additionally, required assistance for bed mobility due to decreased BUE strength. " Overall, pt is presenting close to his PLOF.OT will continue to follow to assess remaining ADLs and wheelchair transfers. OT is recommending pt to be discharged to SNF in [place]. She will continue to benefit from skilled OT services to maximize safety and increase (I) in ADLs.     Pt presented with a low complexity OT evaluation.  Pt required an expanded an expanded review of medical history and occupational profile.  Pt demod 5+ performance deficits (physical, cognitive, or psychosocial) resulting in limitations and engagement restrictions.  Clinical decision making required low analytical complexity with limited treatment options.  Pt with possible comorbidities and required minimal to moderate modification of task/assistance with assessment.      Physical- skills refer to impairments of body structure or functions, balance, mobility; strength, endurance, FMC, GMC, sensation, dexterity, and posture.  Cognitive- skills refer to ability to attend, communicate, perceive, think, understand, problem solve, mentally sequence, learn, and remember resulting in ability to organize occupational performance in timely and safe manner.    Psychosocial- skills refer to interpersonal interactions, habits, routines, and behaviors, active use of coping strategies, and environmental adaptations to appropriately participate in everyday tasks and situations.       Rehab identified problem list/impairments: Rehab identified problem list/impairments: weakness, impaired endurance, impaired self care skills, impaired functional mobilty, impaired balance, impaired cognition, decreased ROM, decreased upper extremity function    Rehab potential is good.    Activity tolerance: Fair    Discharge recommendations: Discharge Facility/Level Of Care Needs: nursing facility, skilled (in NH)     Barriers to discharge: Barriers to Discharge: None    Equipment recommendations: none (pt has alll needed DME)     GOALS:   Occupational Therapy Goals         Problem: Occupational Therapy Goal    Goal Priority Disciplines Outcome Interventions   Occupational Therapy Goal     OT, PT/OT Ongoing (interventions implemented as appropriate)    Description:  Goals to be met by: 4/23/2017     Patient will increase functional independence with ADLs by performing:    Feeding with Supervision.  UE Dressing with Minimal Assistance.  Grooming while standing with Minimal Assistance.  Supine to sit with Minimal Assistance.  Stand pivot transfers with Stand-by Assistance.  Wheelchair transfers with Minimal Assistance.  Self propel wheelchair ~2 minutes with no  RBs.                PLAN:  Patient to be seen 3 x/week to address the above listed problems via self-care/home management, therapeutic exercises, therapeutic activities, neuromuscular re-education  Plan of Care expires: 05/13/17  Plan of Care reviewed with: patient         CRYSTAL Lopez  04/13/2017

## 2017-04-13 NOTE — PROGRESS NOTES
Progress Note  Hospital Medicine      Admit Date: 4/11/2017    SUBJECTIVE:     Follow-up For:  Hypercalcemia    HPI/Interval history: No new complaints.     Review of Systems: Gen: no fever, no chills, Heart: no chest pain, palpitations; Resp: no SOB, no cough    OBJECTIVE:     Vital Signs Range (Last 24H):  Temp:  [97.5 °F (36.4 °C)-98.4 °F (36.9 °C)]   Pulse:  [61-74]   Resp:  [15-18]   BP: (114-135)/(52-67)   SpO2:  [94 %-100 %]     Physical Exam:  General appearance: NAD, conversant  Neck: FROM, supple   Lungs: Clear to auscultation, no accessory muscle use  CV: RRR, no heave  Abdomen: Soft, non-tender; no masses or HSM  Extremities: No peripheral edema or digital cyanosis  Skin: no rash, lesions or ulcers  Psych: Alert and oriented to person, place and time      Recent Labs  Lab 04/10/17  1646 04/11/17  0427 04/12/17  0701 04/13/17  0443    142 141 142   K 4.1 4.0 3.7 3.4*    107 107 108   CO2 26 24 25 25   BUN 52* 48* 46* 43*   CREATININE 2.9* 2.6* 2.4* 2.5*   * 79 79 79   CALCIUM 13.2* 12.6* 11.3* 10.5   MG  --   --  2.1 2.1   PHOS 5.2*  --  3.9 3.2       Recent Labs  Lab 04/11/17  0427 04/12/17  0701 04/13/17  0443   ALKPHOS 97 101  --    ALT 11 9*  --    AST 17 15  --    ALBUMIN 2.9* 2.9* 2.7*   PROT 7.2 7.1  --    BILITOT 0.3 0.2  --          Recent Labs  Lab 04/11/17  1046 04/12/17  0701 04/13/17  0443   WBC 6.80 9.01 6.90   HGB 10.2* 9.6* 9.2*   HCT 31.2* 29.7* 26.6*    237 222   GRAN 66.1  4.5 74.0*  6.7 66.8  4.6   LYMPH 20.7  1.4 14.3*  1.3 19.4  1.3   MONO 8.5  0.6 8.8  0.8 10.3  0.7         Recent Labs  Lab 04/11/17  1116 04/11/17  1635 04/11/17  2237   POCTGLUCOSE 84 80 118*     Urine Culture, Routine ESCHERICHIA COLI ESBL  >100,000 cfu/ml    Resulting Agency [facility]   Susceptibility       Escherichia coli esbl       CULTURE, URINE       Amikacin 32  Intermediate       Amox/K Clav'ate 16/8  Intermediate       Amp/Sulbactam 16/8  Intermediate       Ampicillin >16   Resistant       Cefazolin >16  Resistant       Cefepime <=8  Resistant       Ceftriaxone >32  Resistant       Ciprofloxacin >2  Resistant       Ertapenem <=2  Sensitive       Gentamicin <=4  Sensitive       Meropenem <=4  Sensitive       Nitrofurantoin <=32  Sensitive       Piperacillin/Tazo 64  Intermediate       Tetracycline >8  Resistant       Tobramycin >8  Resistant       Trimeth/Sulfa >2/38  Resistant                 Narrative      urine culture add on per karlie higgins md 2 07:47 on 04/11/2017;   order# [id number]          Specimen Collected: 04/10/17  4:31 PM Last Resulted: 04/13/17 10:08 AM            ASSESSMENT/PLAN:     Hypercalcemia, resolving  Secondary hyperparathyroidism   Given one dose of calcitonin 4/11.2017. Vitamin D, calcitriol, PTH-related peptide and PTH normal levels. Bone scan negative. Likely due to over supplementation and increased mobility. Follow up with nephrology.    ESBL E coli UTI - given ceftriaxone, but was resistant. Patient improved despite in-correct treatment. ID consulted for second opinion as the need to be treated.     Anemia of CKD  Mild iron deficiency anemia  Will start iron sulfate + vitamin C 250 mg daily  Hgb drifting down since admission, may be hemodilution with correction of volume status - stool occult    Osteoporosis  Can stop calcium supplementation  Continue vitamin D 2000 unit once daily  Continue fosamax once weekly after discharge    CKD IV - SCr at baseline    NSTEMI  Chronic combined systolic and diastolic heart failure   EF 30%. Strict Is and Os, daily weights. Continue atrovastatin, ASA, carvedilol 6.25 mg BID and lasix 20 mg daily. Added bidil in lieu of ACEi/ARB due to LESLEY./CKD. Refer patient to cardiology as outpatient.    Debility - wheel-chair bound and NH resident. PT/OT eval.    DM II CKD IV and HTN  DM II with hyperlipidemia  Diet controlled  HgbA1c 5.8% in face of anemia  Continue SSI  Continue carvedilol and bidil  Continue  atorvastatin    Dementia. Continued donepezil. At baseline oriented to person and place. Ability to give relevant recent history is in question.

## 2017-04-14 VITALS
HEIGHT: 63 IN | RESPIRATION RATE: 20 BRPM | WEIGHT: 161.63 LBS | BODY MASS INDEX: 28.64 KG/M2 | OXYGEN SATURATION: 96 % | TEMPERATURE: 98 F | DIASTOLIC BLOOD PRESSURE: 63 MMHG | SYSTOLIC BLOOD PRESSURE: 138 MMHG | HEART RATE: 59 BPM

## 2017-04-14 PROBLEM — R82.71 BACTERIA IN URINE: Status: ACTIVE | Noted: 2017-04-13

## 2017-04-14 LAB
1,25(OH)2D3 SERPL-MCNC: 59 PG/ML
ALBUMIN SERPL BCP-MCNC: 2.6 G/DL
ANION GAP SERPL CALC-SCNC: 10 MMOL/L
BASOPHILS # BLD AUTO: 0.03 K/UL
BASOPHILS NFR BLD: 0.5 %
BUN SERPL-MCNC: 49 MG/DL
CALCIUM SERPL-MCNC: 10.1 MG/DL
CHLORIDE SERPL-SCNC: 107 MMOL/L
CO2 SERPL-SCNC: 23 MMOL/L
CREAT SERPL-MCNC: 2.8 MG/DL
DIFFERENTIAL METHOD: ABNORMAL
EOSINOPHIL # BLD AUTO: 0.2 K/UL
EOSINOPHIL NFR BLD: 3.3 %
ERYTHROCYTE [DISTWIDTH] IN BLOOD BY AUTOMATED COUNT: 13.8 %
EST. GFR  (AFRICAN AMERICAN): 17.3 ML/MIN/1.73 M^2
EST. GFR  (NON AFRICAN AMERICAN): 15 ML/MIN/1.73 M^2
GLUCOSE SERPL-MCNC: 106 MG/DL
HCT VFR BLD AUTO: 27.1 %
HGB BLD-MCNC: 9 G/DL
LYMPHOCYTES # BLD AUTO: 1.8 K/UL
LYMPHOCYTES NFR BLD: 26.4 %
MAGNESIUM SERPL-MCNC: 1.9 MG/DL
MCH RBC QN AUTO: 29 PG
MCHC RBC AUTO-ENTMCNC: 33.2 %
MCV RBC AUTO: 87 FL
MONOCYTES # BLD AUTO: 0.8 K/UL
MONOCYTES NFR BLD: 12.5 %
NEUTROPHILS # BLD AUTO: 3.8 K/UL
NEUTROPHILS NFR BLD: 56.8 %
PHOSPHATE SERPL-MCNC: 3.7 MG/DL
PLATELET # BLD AUTO: 224 K/UL
PMV BLD AUTO: 9.8 FL
POTASSIUM SERPL-SCNC: 3.4 MMOL/L
RBC # BLD AUTO: 3.1 M/UL
SODIUM SERPL-SCNC: 140 MMOL/L
WBC # BLD AUTO: 6.62 K/UL

## 2017-04-14 PROCEDURE — 99239 HOSP IP/OBS DSCHRG MGMT >30: CPT | Mod: ,,, | Performed by: INTERNAL MEDICINE

## 2017-04-14 PROCEDURE — 85025 COMPLETE CBC W/AUTO DIFF WBC: CPT

## 2017-04-14 PROCEDURE — 80069 RENAL FUNCTION PANEL: CPT

## 2017-04-14 PROCEDURE — 36415 COLL VENOUS BLD VENIPUNCTURE: CPT

## 2017-04-14 PROCEDURE — 25000003 PHARM REV CODE 250: Performed by: HOSPITALIST

## 2017-04-14 PROCEDURE — 83735 ASSAY OF MAGNESIUM: CPT

## 2017-04-14 PROCEDURE — 99223 1ST HOSP IP/OBS HIGH 75: CPT | Mod: GC,,, | Performed by: INTERNAL MEDICINE

## 2017-04-14 RX ORDER — ACETAMINOPHEN 500 MG
1 TABLET ORAL DAILY
Status: ON HOLD | COMMUNITY
Start: 2017-04-14 | End: 2017-04-17

## 2017-04-14 RX ORDER — POTASSIUM CHLORIDE 750 MG/1
10 CAPSULE, EXTENDED RELEASE ORAL DAILY
Status: ON HOLD
Start: 2017-04-14 | End: 2017-04-17

## 2017-04-14 RX ORDER — ASCORBIC ACID 250 MG
250 TABLET ORAL NIGHTLY
Qty: 30 TABLET | Refills: 0 | Status: ON HOLD
Start: 2017-04-14 | End: 2017-04-17

## 2017-04-14 RX ORDER — POTASSIUM CHLORIDE 750 MG/1
10 CAPSULE, EXTENDED RELEASE ORAL 2 TIMES DAILY WITH MEALS
Status: DISCONTINUED | OUTPATIENT
Start: 2017-04-14 | End: 2017-04-14 | Stop reason: HOSPADM

## 2017-04-14 RX ADMIN — ATORVASTATIN CALCIUM 40 MG: 20 TABLET, FILM COATED ORAL at 08:04

## 2017-04-14 RX ADMIN — HYDRALAZINE HYDROCHLORIDE AND ISOSORBIDE DINITRATE 1 TABLET: 37.5; 2 TABLET, FILM COATED ORAL at 05:04

## 2017-04-14 RX ADMIN — CARVEDILOL 6.25 MG: 3.12 TABLET, FILM COATED ORAL at 08:04

## 2017-04-14 RX ADMIN — ASPIRIN 81 MG CHEWABLE TABLET 81 MG: 81 TABLET CHEWABLE at 08:04

## 2017-04-14 RX ADMIN — FUROSEMIDE 20 MG: 20 TABLET ORAL at 08:04

## 2017-04-14 RX ADMIN — STANDARDIZED SENNA CONCENTRATE AND DOCUSATE SODIUM 1 TABLET: 8.6; 5 TABLET, FILM COATED ORAL at 08:04

## 2017-04-14 NOTE — SUBJECTIVE & OBJECTIVE
Past Medical History:   Diagnosis Date    Anemia     Anemia     CAD (coronary artery disease)     Cataract     CHF (congestive heart failure)     Chronic kidney disease (CKD), stage IV (severe) 03/27/2017    Sees  at [facility], no dialysis    Dementia     Encounter for blood transfusion 2006    at time of CABG    Fracture of humerus, proximal, left, closed 1/28/2015    Hypercalcemia 04/10/2017    Hyperlipidemia     Hyperparathyroidism     Hypertension     MI (myocardial infarction) 10/2016    Osteoporosis     Ovarian cancer     in the past    Type 2 diabetes mellitus, controlled     UTI (urinary tract infection) 04/11/2017       Past Surgical History:   Procedure Laterality Date    CORONARY ARTERY BYPASS GRAFT  2006    HYSTERECTOMY  2002    ORIF HUMERUS FRACTURE Left 1/28/2015       Review of patient's allergies indicates:  No Known Allergies    Medications:  Prescriptions Prior to Admission   Medication Sig    alendronate (FOSAMAX) 70 MG tablet Take 70 mg by mouth every 30 days.     amlodipine (NORVASC) 10 MG tablet Take 10 mg by mouth once daily.    aspirin 81 MG Chew Take 1 tablet (81 mg total) by mouth once daily. RESUME ON 2/14/15    atorvastatin (LIPITOR) 40 MG tablet Take 1 tablet (40 mg total) by mouth once daily.    carvedilol (COREG) 3.125 MG tablet Take 2 tablets (6.25 mg total) by mouth 2 (two) times daily.    donepezil (ARICEPT) 5 MG tablet Take 5 mg by mouth every evening.    ergocalciferol (VITAMIN D2) 50,000 unit Cap Take 50,000 Units by mouth every 7 days.    furosemide (LASIX) 20 MG tablet Take 1 tablet (20 mg total) by mouth once daily.    insulin aspart (NOVOLOG) 100 unit/mL injection Inject into the skin as needed for High Blood Sugar.    isosorbide-hydrALAZINE 20-37.5 mg (BIDIL) 20-37.5 mg Tab Take 1 tablet by mouth 3 (three) times daily.    LANCETS MISC by Misc.(Non-Drug; Combo Route) route 3 (three) times a week.    [DISCONTINUED] blood sugar  diagnostic (BLOOD GLUCOSE TEST) Strp by Misc.(Non-Drug; Combo Route) route 3 (three) times a week.    [DISCONTINUED] calcium-vitamin D3 (CALCIUM 500 + D) 500 mg(1,250mg) -200 unit per tablet Take 1 tablet by mouth 2 (two) times daily with meals.    mirtazapine (REMERON) 7.5 MG Tab Take 7.5 mg by mouth every evening.    nitroGLYCERIN (NITROSTAT) 0.4 MG SL tablet Place 1 tablet (0.4 mg total) under the tongue every 5 (five) minutes as needed for Chest pain.    senna-docusate 8.6-50 mg (PERICOLACE) 8.6-50 mg per tablet Take 1 tablet by mouth 2 (two) times daily.     Antibiotics     None        Antifungals     None        Antivirals     None           Immunization History   Administered Date(s) Administered    Influenza 10/06/2016    Pneumococcal Conjugate - 13 Valent 04/01/2016       Family History     Problem Relation (Age of Onset)    Blindness Maternal Grandmother        Social History     Social History    Marital status:      Spouse name: N/A    Number of children: N/A    Years of education: N/A     Social History Main Topics    Smoking status: Former Smoker     Packs/day: 2.00     Years: 30.00     Quit date: 1/20/2007    Smokeless tobacco: Never Used    Alcohol use No    Drug use: No    Sexual activity: Not Asked     Other Topics Concern    None     Social History Narrative     Review of Systems   Constitutional: Negative for chills and fever.   HENT: Negative for sore throat.    Respiratory: Negative for cough, chest tightness and shortness of breath.    Cardiovascular: Negative for chest pain, palpitations and leg swelling.   Gastrointestinal: Negative for abdominal distention, abdominal pain, diarrhea, nausea and vomiting.   Genitourinary: Negative for dysuria, flank pain, frequency, hematuria, pelvic pain and urgency.   Skin: Negative for rash.   Neurological: Negative for dizziness, light-headedness and numbness.   Psychiatric/Behavioral: Negative for confusion.     Objective:     Vital  Signs (Most Recent):  Temp: 98.1 °F (36.7 °C) (04/14/17 1140)  Pulse: (!) 59 (04/14/17 1140)  Resp: 20 (04/14/17 1140)  BP: 138/63 (04/14/17 1140)  SpO2: 96 % (04/14/17 1140) Vital Signs (24h Range):  Temp:  [98.1 °F (36.7 °C)-98.6 °F (37 °C)] 98.1 °F (36.7 °C)  Pulse:  [59-65] 59  Resp:  [15-20] 20  SpO2:  [96 %-98 %] 96 %  BP: (103-138)/(51-63) 138/63     Weight: 73.3 kg (161 lb 9.6 oz)  Body mass index is 28.63 kg/(m^2).    Estimated Creatinine Clearance: 14.6 mL/min (based on Cr of 2.8).    Physical Exam   Constitutional: She is oriented to person, place, and time. No distress.   HENT:   Head: Normocephalic.   Mouth/Throat: No oropharyngeal exudate.   Eyes: No scleral icterus.   Cardiovascular: Normal rate and regular rhythm.    Pulmonary/Chest: Effort normal. No respiratory distress. She has no wheezes. She has no rales.   Abdominal: Soft. Bowel sounds are normal. She exhibits no distension. There is no tenderness. There is no rebound and no guarding.   No pelvic pain   Musculoskeletal: Normal range of motion.   Lymphadenopathy:     She has no cervical adenopathy.   Neurological: She is alert and oriented to person, place, and time.   Skin: She is not diaphoretic.   Vitals reviewed.      Significant Labs:   Blood Culture:   Recent Labs  Lab 04/11/17  1357 04/11/17  1423   LABBLOO No Growth to date  No Growth to date  No Growth to date No Growth to date  No Growth to date  No Growth to date     BMP:   Recent Labs  Lab 04/14/17  0537         K 3.4*      CO2 23   BUN 49*   CREATININE 2.8*   CALCIUM 10.1   MG 1.9     CBC:   Recent Labs  Lab 04/13/17  0443 04/14/17  0537   WBC 6.90 6.62   HGB 9.2* 9.0*   HCT 26.6* 27.1*    224     CMP:   Recent Labs  Lab 04/13/17  0443 04/14/17  0537    140   K 3.4* 3.4*    107   CO2 25 23   GLU 79 106   BUN 43* 49*   CREATININE 2.5* 2.8*   CALCIUM 10.5 10.1   ALBUMIN 2.7* 2.6*   ANIONGAP 9 10   EGFRNONAA 17.2* 15.0*     Urine Culture:    Recent Labs  Lab 11/01/16  1905 04/10/17  1631   LABURIN ESCHERICHIA COLI ESBL>100,000 cfu/ml ESCHERICHIA COLI ESBL>100,000 cfu/ml     Urine Studies:   Recent Labs  Lab 11/01/16  1906 04/10/17  1631   COLORU Straw Yellow   APPEARANCEUA Clear Cloudy*   PHUR 7.0 6.0   SPECGRAV 1.005 1.010   PROTEINUA Trace* 1+*   GLUCUA Negative Negative   KETONESU Negative Negative   BILIRUBINUA Negative Negative   OCCULTUA Trace* Negative   NITRITE Negative Positive*   UROBILINOGEN Negative Negative   LEUKOCYTESUR 3+* 3+*   RBCUA 5* 3   WBCUA 39* >100*   BACTERIA Occasional Many*   SQUAMEPITHEL 1  --    HYALINECASTS  --  0       Significant Imaging: I have reviewed all pertinent imaging results/findings within the past 24 hours.

## 2017-04-14 NOTE — CONSULTS
[facility]  Infectious Disease  Consult Note    Patient Name: Misty Yun  MRN: [id number]  Admission Date: 4/11/2017  Hospital Length of Stay: 3 days  Attending Physician: Nichol Rosenberg MD  Primary Care Provider: [facility]     Isolation Status: No active isolations    Patient information was obtained from patient, past medical records and ER records.      Inpatient consult to Infectious Diseases  Consult performed by: NETO SMITH  Consult ordered by: NICHOL ROSENBERG  Reason for consult: Positive Urine Culture        Assessment/Plan:     Bacteriuria, asymptomatic  83-year-old female with history of CKD (not on dialysis), ovarian cancer (no treatment), DM type II, HLD, HTN, dementia, CAD, MI, CHF, and anemia and a PSHx of CABG admitted 4/11/17 after outpatient lab showed Calcium of 13.2.    ID called for positive U/C.  - U/C 4/10/17 positive for E coli ESBL  - patient with no dysuria, flank pain, hematuria, urgency, pelvic pain  - would not give antibiotic therapy at this moment as this represent asymptomatic bacteriuria    Thank you for your consult. I will sign off. Please contact us if you have any additional questions.    Neto Walls MD  Infectious Disease Fellow, PGY-4  Pager: [phone]  [facility]    Subjective:     Principal Problem: Hypercalcemia    HPI: 83-year-old female with mentioned comorbidities admitted from [facility] for evaluation - Past medical history of CKD (not on dialysis), ovarian cancer (no treatment), DM type II, HLD, HTN, dementia, CAD, MI, CHF, and anemia and a PSHx of CABG who was sent to the ED by Nephrologist for abnormal labs drawn this morning Patient was seen in nephrology clinic on 04/10/17 a.m. and Blood work revealed hypercalcemia 13.2. Patient was called back to the emergency department for further evaluation. Denies symptoms, complaints of constipation for the last one week.  Patient was evaluated by nephrology, initially started on IV hydration subsequently discontinued. She was given calcitonin IM one dose     Currently lives in nursing home. Ambulates with wheelchair, follows up with PCP, cardiology, nephrology at [facility]      Past Medical History:   Diagnosis Date    Anemia     Anemia     CAD (coronary artery disease)     Cataract     CHF (congestive heart failure)     Chronic kidney disease (CKD), stage IV (severe) 03/27/2017    Sees  at [facility], no dialysis    Dementia     Encounter for blood transfusion 2006    at time of CABG    Fracture of humerus, proximal, left, closed 1/28/2015    Hypercalcemia 04/10/2017    Hyperlipidemia     Hyperparathyroidism     Hypertension     MI (myocardial infarction) 10/2016    Osteoporosis     Ovarian cancer     in the past    Type 2 diabetes mellitus, controlled     UTI (urinary tract infection) 04/11/2017       Past Surgical History:   Procedure Laterality Date    CORONARY ARTERY BYPASS GRAFT  2006    HYSTERECTOMY  2002    ORIF HUMERUS FRACTURE Left 1/28/2015       Review of patient's allergies indicates:  No Known Allergies    Medications:  Prescriptions Prior to Admission   Medication Sig    alendronate (FOSAMAX) 70 MG tablet Take 70 mg by mouth every 30 days.     amlodipine (NORVASC) 10 MG tablet Take 10 mg by mouth once daily.    aspirin 81 MG Chew Take 1 tablet (81 mg total) by mouth once daily. RESUME ON 2/14/15    atorvastatin (LIPITOR) 40 MG tablet Take 1 tablet (40 mg total) by mouth once daily.    carvedilol (COREG) 3.125 MG tablet Take 2 tablets (6.25 mg total) by mouth 2 (two) times daily.    donepezil (ARICEPT) 5 MG tablet Take 5 mg by mouth every evening.    ergocalciferol (VITAMIN D2) 50,000 unit Cap Take 50,000 Units by mouth every 7 days.    furosemide (LASIX) 20 MG tablet Take 1 tablet (20 mg total) by mouth once daily.    insulin aspart (NOVOLOG) 100 unit/mL  injection Inject into the skin as needed for High Blood Sugar.    isosorbide-hydrALAZINE 20-37.5 mg (BIDIL) 20-37.5 mg Tab Take 1 tablet by mouth 3 (three) times daily.    LANCETS MISC by Misc.(Non-Drug; Combo Route) route 3 (three) times a week.    [DISCONTINUED] blood sugar diagnostic (BLOOD GLUCOSE TEST) Strp by Misc.(Non-Drug; Combo Route) route 3 (three) times a week.    [DISCONTINUED] calcium-vitamin D3 (CALCIUM 500 + D) 500 mg(1,250mg) -200 unit per tablet Take 1 tablet by mouth 2 (two) times daily with meals.    mirtazapine (REMERON) 7.5 MG Tab Take 7.5 mg by mouth every evening.    nitroGLYCERIN (NITROSTAT) 0.4 MG SL tablet Place 1 tablet (0.4 mg total) under the tongue every 5 (five) minutes as needed for Chest pain.    senna-docusate 8.6-50 mg (PERICOLACE) 8.6-50 mg per tablet Take 1 tablet by mouth 2 (two) times daily.     Antibiotics     None        Antifungals     None        Antivirals     None           Immunization History   Administered Date(s) Administered    Influenza 10/06/2016    Pneumococcal Conjugate - 13 Valent 04/01/2016       Family History     Problem Relation (Age of Onset)    Blindness Maternal Grandmother        Social History     Social History    Marital status:      Spouse name: N/A    Number of children: N/A    Years of education: N/A     Social History Main Topics    Smoking status: Former Smoker     Packs/day: 2.00     Years: 30.00     Quit date: 1/20/2007    Smokeless tobacco: Never Used    Alcohol use No    Drug use: No    Sexual activity: Not Asked     Other Topics Concern    None     Social History Narrative     Review of Systems   Constitutional: Negative for chills and fever.   HENT: Negative for sore throat.    Respiratory: Negative for cough, chest tightness and shortness of breath.    Cardiovascular: Negative for chest pain, palpitations and leg swelling.   Gastrointestinal: Negative for abdominal distention, abdominal pain, diarrhea, nausea and  vomiting.   Genitourinary: Negative for dysuria, flank pain, frequency, hematuria, pelvic pain and urgency.   Skin: Negative for rash.   Neurological: Negative for dizziness, light-headedness and numbness.   Psychiatric/Behavioral: Negative for confusion.     Objective:     Vital Signs (Most Recent):  Temp: 98.1 °F (36.7 °C) (04/14/17 1140)  Pulse: (!) 59 (04/14/17 1140)  Resp: 20 (04/14/17 1140)  BP: 138/63 (04/14/17 1140)  SpO2: 96 % (04/14/17 1140) Vital Signs (24h Range):  Temp:  [98.1 °F (36.7 °C)-98.6 °F (37 °C)] 98.1 °F (36.7 °C)  Pulse:  [59-65] 59  Resp:  [15-20] 20  SpO2:  [96 %-98 %] 96 %  BP: (103-138)/(51-63) 138/63     Weight: 73.3 kg (161 lb 9.6 oz)  Body mass index is 28.63 kg/(m^2).    Estimated Creatinine Clearance: 14.6 mL/min (based on Cr of 2.8).    Physical Exam   Constitutional: She is oriented to person, place, and time. No distress.   HENT:   Head: Normocephalic.   Mouth/Throat: No oropharyngeal exudate.   Eyes: No scleral icterus.   Cardiovascular: Normal rate and regular rhythm.    Pulmonary/Chest: Effort normal. No respiratory distress. She has no wheezes. She has no rales.   Abdominal: Soft. Bowel sounds are normal. She exhibits no distension. There is no tenderness. There is no rebound and no guarding.   No pelvic pain   Musculoskeletal: Normal range of motion.   Lymphadenopathy:     She has no cervical adenopathy.   Neurological: She is alert and oriented to person, place, and time.   Skin: She is not diaphoretic.   Vitals reviewed.      Significant Labs:   Blood Culture:   Recent Labs  Lab 04/11/17  1357 04/11/17  1423   LABBLOO No Growth to date  No Growth to date  No Growth to date No Growth to date  No Growth to date  No Growth to date     BMP:   Recent Labs  Lab 04/14/17  0537         K 3.4*      CO2 23   BUN 49*   CREATININE 2.8*   CALCIUM 10.1   MG 1.9     CBC:   Recent Labs  Lab 04/13/17  0443 04/14/17  0537   WBC 6.90 6.62   HGB 9.2* 9.0*   HCT 26.6*  27.1*    224     CMP:   Recent Labs  Lab 04/13/17  0443 04/14/17  0537    140   K 3.4* 3.4*    107   CO2 25 23   GLU 79 106   BUN 43* 49*   CREATININE 2.5* 2.8*   CALCIUM 10.5 10.1   ALBUMIN 2.7* 2.6*   ANIONGAP 9 10   EGFRNONAA 17.2* 15.0*     Urine Culture:   Recent Labs  Lab 11/01/16  1905 04/10/17  1631   LABURIN ESCHERICHIA COLI ESBL>100,000 cfu/ml ESCHERICHIA COLI ESBL>100,000 cfu/ml     Urine Studies:   Recent Labs  Lab 11/01/16  1906 04/10/17  1631   COLORU Straw Yellow   APPEARANCEUA Clear Cloudy*   PHUR 7.0 6.0   SPECGRAV 1.005 1.010   PROTEINUA Trace* 1+*   GLUCUA Negative Negative   KETONESU Negative Negative   BILIRUBINUA Negative Negative   OCCULTUA Trace* Negative   NITRITE Negative Positive*   UROBILINOGEN Negative Negative   LEUKOCYTESUR 3+* 3+*   RBCUA 5* 3   WBCUA 39* >100*   BACTERIA Occasional Many*   SQUAMEPITHEL 1  --    HYALINECASTS  --  0       Significant Imaging: I have reviewed all pertinent imaging results/findings within the past 24 hours.

## 2017-04-14 NOTE — ASSESSMENT & PLAN NOTE
83-year-old female with history of CKD (not on dialysis), ovarian cancer (no treatment), DM type II, HLD, HTN, dementia, CAD, MI, CHF, and anemia and a PSHx of CABG admitted 4/11/17 after outpatient lab showed Calcium of 13.2.    ID called for positive U/C.  - U/C 4/10/17 positive for E coli ESBL  - patient with no dysuria, flank pain, hematuria, urgency, pelvic pain  - would not give antibiotic therapy at this moment as this represent asymptomatic bacteriuria

## 2017-04-14 NOTE — PROGRESS NOTES
Consult Note  Nephrology    Consult Requested By: Ruchi Acuña MD  Reason for Consult: LESLEY with hypercalcemia     SUBJECTIVE:     History of Present Illness:  Patient is a 83 y.o. female with a PMHx of CKD (not on dialysis), ovarian cancer, DM type II, HLD, HTN, dementia, CAD, MI, CHF, and anemia who was sent by me to ED for further evaluation of LESLEY and worsening renal function. Patient was called after receiving notification of a panic value for calcium levels of 13.2. Creatinine was mildly elevated compared to baseline. She was also found to have a UTI on the ED. Patient states she feels at baseline and is asymptomatic. She specifically denies abdominal pain, nausea, vomiting, decreased PO intake, and pain. Was started on IV fluids management.     Interval History:  NAEON. Not offering complaints. Corrected Ca 12.2 --> 11.5    Past Medical History:   Diagnosis Date    Anemia     Anemia     CAD (coronary artery disease)     Cataract     CHF (congestive heart failure)     Chronic kidney disease (CKD), stage IV (severe) 03/27/2017    Sees  at [facility], no dialysis    Dementia     Encounter for blood transfusion 2006    at time of CABG    Fracture of humerus, proximal, left, closed 1/28/2015    Hypercalcemia 04/10/2017    Hyperlipidemia     Hyperparathyroidism     Hypertension     MI (myocardial infarction) 10/2016    Osteoporosis     Ovarian cancer     in the past    Type 2 diabetes mellitus, controlled     UTI (urinary tract infection) 04/11/2017     Past Surgical History:   Procedure Laterality Date    CORONARY ARTERY BYPASS GRAFT  2006    HYSTERECTOMY  2002    ORIF HUMERUS FRACTURE Left 1/28/2015     Family History   Problem Relation Age of Onset    Adopted: Yes    Blindness Maternal Grandmother      Social History   Substance Use Topics    Smoking status: Former Smoker     Packs/day: 2.00     Years: 30.00     Quit date: 1/20/2007    Smokeless tobacco: Never Used     Alcohol use No       Review of patient's allergies indicates:  No Known Allergies     Review of Systems:  Constitutional: Negative for fever, appetite change and fatigue.  HENT: Negative for hearing loss, sore throat and mouth sores.  Eyes: Negative for photophobia, pain and visual disturbance.  Respiratory: Negative for cough, chest tightness, shortness of breath and wheezing.  Cardiovascular: Negative for chest pain, palpitations and leg swelling.  Gastrointestinal: Negative for nausea, vomiting, abdominal pain, diarrhea, constipation, blood in stool and abdominal distention.  Genitourinary: Negative for dysuria, urgency, frequency, hematuria, decreased urine volume, difficulty urinating  Musculoskeletal: Negative for back pain, joint swelling, arthralgias and gait problem.  Skin: Negative for pallor, rash and wound.  Neurological: Negative for dizziness, tremors, syncope, weakness, light-headedness and headaches.  Hematological: Negative for adenopathy. Does not bruise/bleed easily.  Psychiatric/Behavioral: Negative for confusion, sleep disturbance and dysphoric mood. The patient is not nervous/anxious.    OBJECTIVE:     Vital Signs (Most Recent)  Temp: 98.6 °F (37 °C) (04/14/17 0328)  Pulse: 60 (04/14/17 0328)  Resp: 16 (04/14/17 0328)  BP: (!) 103/51 (04/14/17 0328)  SpO2: 96 % (04/14/17 0328)    Vital Signs Range (Last 24H):  Temp:  [97.5 °F (36.4 °C)-98.6 °F (37 °C)]   Pulse:  [59-71]   Resp:  [15-18]   BP: (103-134)/(51-61)   SpO2:  [95 %-99 %]     Physical Exam:  General: No acute distress, well groomed, alert and oriented x 3  HEENT: Normocephalic, atraumatic, EOM's intact bilaterally, external inspection of ears and nose normal, moist mucous membranes, no oral ulcerations/lesions  Neck: Supple, symmetrical, trachea midline, no thyromegaly, no JVD  Respiratory: Clear to auscultation bilaterally, respirations unlabored, no rales/rhonchi/wheezing  Cardiovacular: Regular rate and rhythm, S1, S2 normal, no  murmurs, rubs or gallops  Gastrointestinal: Soft, non-tender, bowel sounds normal, no hepatosplenomegaly  Musculoskeletal: No knee or ankle joint tenderness or swelling.   Extremities: No clubbing or cyanosis of bilateral upper extremities; no lower extremity edema bilaterally, radial pulses 2+ bilaterally, symmetric  Skin: warm and dry; no rash on exposed skin  Neurologic: CN grossly intact    Laboratory:  CBC:     Recent Labs  Lab 04/13/17  0443   WBC 6.90   RBC 3.14*   HGB 9.2*   HCT 26.6*      MCV 85   MCH 29.3   MCHC 34.6     CMP:     Recent Labs  Lab 04/12/17  0701 04/13/17  0443   GLU 79 79   CALCIUM 11.3* 10.5   ALBUMIN 2.9* 2.7*   PROT 7.1  --     142   K 3.7 3.4*   CO2 25 25    108   BUN 46* 43*   CREATININE 2.4* 2.5*   ALKPHOS 101  --    ALT 9*  --    AST 15  --    BILITOT 0.2  --        Diagnostic Results:  Labs: Reviewed  X-Ray: Reviewed    ASSESSMENT/PLAN:     A 83 y.o. female with a PMHx of CKD (not on dialysis), ovarian cancer, DM type II, HLD, HTN, dementia, CAD, MI, CHF, and anemia who was sent by me to ED for further evaluation of LESLEY and worsening renal function. Patient was called after receiving notification of a panic value for calcium levels of 13.2.    1. Hypercalcemia. Diff diagnosis: 1. lytic lesions (remote ovarian Ca) / paraneoplas. 2. MM. 3. Exogenous vitD/Ca supplem 4. Immobilization.     Recs / Plan:    Therapeutic  - Continue to monitor Ca levels. Hold further calcitonin.    Diagnostic  - PTH- rp: WNL  - 1,25 vitamin D levels: WNL  - UPEP, SPEP, ANDREW and free light chains: No monoclonal peaks identified  - Bone scan: no evidence of metastatic disease to the skeleton.  - Monitor strict I/Os, daily weights  - Will continue to monitor.    Nel Botello MD    Nephrology PGY- 2    ATTENDING PHYSICIAN ATTESTATION  I have personally interviewed and examined the patient. I thoroughly reviewed the demographic, clinical, laboratorial and imaging information available in  medical records. I agree with the assessment and recommendations provided by the subspecialty resident. Dr. Botello was under my supervision.

## 2017-04-14 NOTE — PLAN OF CARE
Pt is medically stable to return to [facility] today. SW informed admissions and faxed orders to [phone]. Mindy will continue to follow.    Divya Schaffer LMSW  [id number]

## 2017-04-14 NOTE — PLAN OF CARE
SW informed pt's nurse to call report to [phone]. LORRAINE arranged transportation with [facility] [phone] to come within the hour. Family informed.    Divya Schaffer LMSW

## 2017-04-14 NOTE — PLAN OF CARE
[facility]     Department of Hospital Medicine     [address] [place] [address]     [phone] after hours  [phone] fax       NURSING HOME ORDERS    04/14/2017    Admit to Nursing Home:  Regular Bed      Diagnoses:  Active Hospital Problems    Diagnosis  POA    *Hypercalcemia [E83.52]  Yes     Priority: 1 - High    Secondary hyperparathyroidism [N25.81]  Yes     Priority: 2     Bacteriuria, asymptomatic [R82.71]  Yes     Priority: 3     Iron deficiency anemia [D50.9]  Yes     Priority: 3     Anemia of chronic renal failure, stage 4 (severe) [N18.4, D63.1]  Yes     Priority: 4     Osteoporosis [M81.0]  Yes     Priority: 5      Chronic    Chronic kidney disease, stage IV (severe) [N18.4]  Yes     Priority: 6      Chronic    Chronic combined systolic and diastolic heart failure [I50.42]  Yes     Priority: 7      Chronic    NSTEMI (non-ST elevated myocardial infarction) [I21.4]  Yes     Priority: 7     Debility [R53.81]  Yes     Priority: 8     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       Patient is homebound due to:  Hypercalcemia    Allergies:Review of patient's allergies indicates:  No Known Allergies    Vitals:  Per routine    Diet: Diabetic 2200 kcal diet  Patient MUST drink at least 6 cups of fluid per day (1500 mL) and no more than 8 cups of fluid per day (2000 mL)    Acitivities:   - Up in A CHAIR three times a day with meals   - Ambulate with assistance to bathroom  - May use walker, cane, or self-propelled wheelchair    LABS:  Per facility protocol    Nursing Precautions:    - Aspiration precautions:             -  Total assistance with meals            -  Upright 90 degrees before during and after meals             -  Suction at bedside          - Fall precautions per nursing home protocol     - Decubitus precautions:        -  for positioning   - Pressure reducing foam mattress   - Turn patient every two hours. Use wedge pillows to anchor patient    MISCELLANEOUS CARE:     Routine Skin for Bedridden Patients:  Apply moisture barrier cream to all    skin folds and wet areas in perineal area daily and after baths and                           all bowel movements.           DIABETES CARE:      Check blood sugar:    Fingerstick blood sugar a.m and p.m.  Report CBG < 60 or > 400 to physician.      Medications: Discontinue all previous medication orders, if any. See new list below.     Misty Yun   Home Medication Instructions TRACEY:[id number]    Printed on:04/14/17 6163   Medication Information                      alendronate (FOSAMAX) 70 MG tablet  Take 70 mg by mouth every 30 days.    osteoporosis   amlodipine (NORVASC) 10 MG tablet  Take 10 mg by mouth once daily.   HTN   aspirin 81 MG Chew  Take 1 tablet (81 mg total) by mouth once daily.    CAD   atorvastatin (LIPITOR) 40 MG tablet  Take 1 tablet (40 mg total) by mouth once daily.   hyperlipidemia   carvedilol (COREG) 6.25 MG tablet  Take 1 tablets (6.25 mg total) by mouth 2 (two) times daily.   HTN   donepezil (ARICEPT) 5 MG tablet  Take 5 mg by mouth every evening.   dementia   furosemide (LASIX) 20 MG tablet  Take 1 tablet (20 mg total) by mouth once daily.   Ischemic cardiomyopathy   isosorbide-hydrALAZINE 20-37.5 mg (BIDIL) 20-37.5 mg Tab  Take 1 tablet by mouth 3 (three) times daily.   HTN   mirtazapine (REMERON) 7.5 MG Tab  Take 7.5 mg by mouth every evening.   depression   nitroGLYCERIN (NITROSTAT) 0.4 MG SL tablet  Place 1 tablet (0.4 mg total) under the tongue every 5 (five) minutes as needed for Chest pain.      potassium chloride (MICRO-K) 10  MEQ CpSR  Take 1 capsule (10 mEq total) by mouth once daily.   Hypokalemia with diuretic therapy   senna-docusate 8.6-50 mg (PERICOLACE) 8.6-50 mg per tablet  Take 1 tablet by mouth 2 (two) times daily.   Bowel regimen   Iron sulfate 325 mg orally qhs - mild iron deficiency anemia  Ascorbic acid 250 mg qhs orally - iron absorption  Vitamin D 2000 units orally daily - vitamin D insufficiency          _________________________________  Ruchi Acuña MD  04/14/2017

## 2017-04-15 NOTE — NURSING
Pt discharged and transported back to nursing home via wheelchair van. Pt AO to self and place. Report called to receiving nurse. VSS. All belongings accounted for. PIV removed.

## 2017-04-16 LAB
BACTERIA BLD CULT: NORMAL
BACTERIA BLD CULT: NORMAL

## 2017-04-17 ENCOUNTER — HOSPITAL ENCOUNTER (OUTPATIENT)
Facility: OTHER | Age: 82
Discharge: HOME | End: 2017-04-17
Attending: OPHTHALMOLOGY | Admitting: OPHTHALMOLOGY
Payer: MEDICARE

## 2017-04-17 ENCOUNTER — ANESTHESIA (OUTPATIENT)
Dept: SURGERY | Facility: OTHER | Age: 82
End: 2017-04-17
Payer: MEDICARE

## 2017-04-17 ENCOUNTER — ANESTHESIA EVENT (OUTPATIENT)
Dept: SURGERY | Facility: OTHER | Age: 82
End: 2017-04-17
Payer: MEDICARE

## 2017-04-17 VITALS
HEIGHT: 63 IN | SYSTOLIC BLOOD PRESSURE: 129 MMHG | RESPIRATION RATE: 18 BRPM | DIASTOLIC BLOOD PRESSURE: 63 MMHG | HEART RATE: 71 BPM | WEIGHT: 146 LBS | TEMPERATURE: 98 F | BODY MASS INDEX: 25.87 KG/M2 | OXYGEN SATURATION: 95 %

## 2017-04-17 DIAGNOSIS — H25.13 NUCLEAR SCLEROSIS, BILATERAL: ICD-10-CM

## 2017-04-17 DIAGNOSIS — H25.11 NUCLEAR SCLEROSIS, RIGHT: Primary | ICD-10-CM

## 2017-04-17 PROBLEM — H25.10 NUCLEAR SCLEROSIS: Status: ACTIVE | Noted: 2017-04-17

## 2017-04-17 LAB — POCT GLUCOSE: 150 MG/DL (ref 70–110)

## 2017-04-17 PROCEDURE — 25000003 PHARM REV CODE 250: Performed by: NURSE ANESTHETIST, CERTIFIED REGISTERED

## 2017-04-17 PROCEDURE — 36000707: Performed by: OPHTHALMOLOGY

## 2017-04-17 PROCEDURE — 66984 XCAPSL CTRC RMVL W/O ECP: CPT | Mod: LT,,, | Performed by: OPHTHALMOLOGY

## 2017-04-17 PROCEDURE — 25000003 PHARM REV CODE 250: Performed by: OPHTHALMOLOGY

## 2017-04-17 PROCEDURE — 37000009 HC ANESTHESIA EA ADD 15 MINS: Performed by: OPHTHALMOLOGY

## 2017-04-17 PROCEDURE — 71000015 HC POSTOP RECOV 1ST HR: Performed by: OPHTHALMOLOGY

## 2017-04-17 PROCEDURE — 63600175 PHARM REV CODE 636 W HCPCS: Performed by: NURSE ANESTHETIST, CERTIFIED REGISTERED

## 2017-04-17 PROCEDURE — 82962 GLUCOSE BLOOD TEST: CPT | Performed by: OPHTHALMOLOGY

## 2017-04-17 PROCEDURE — 36000706: Performed by: OPHTHALMOLOGY

## 2017-04-17 PROCEDURE — V2632 POST CHMBR INTRAOCULAR LENS: HCPCS | Performed by: OPHTHALMOLOGY

## 2017-04-17 PROCEDURE — 37000008 HC ANESTHESIA 1ST 15 MINUTES: Performed by: OPHTHALMOLOGY

## 2017-04-17 DEVICE — IMPLANTABLE DEVICE: Type: IMPLANTABLE DEVICE | Site: EYE | Status: FUNCTIONAL

## 2017-04-17 RX ORDER — MOXIFLOXACIN 5 MG/ML
SOLUTION/ DROPS OPHTHALMIC
Status: DISCONTINUED | OUTPATIENT
Start: 2017-04-17 | End: 2017-04-17 | Stop reason: HOSPADM

## 2017-04-17 RX ORDER — MOXIFLOXACIN 5 MG/ML
1 SOLUTION/ DROPS OPHTHALMIC
Status: COMPLETED | OUTPATIENT
Start: 2017-04-17 | End: 2017-04-17

## 2017-04-17 RX ORDER — MIDAZOLAM HYDROCHLORIDE 1 MG/ML
INJECTION INTRAMUSCULAR; INTRAVENOUS
Status: DISCONTINUED | OUTPATIENT
Start: 2017-04-17 | End: 2017-04-17

## 2017-04-17 RX ORDER — PHENYLEPHRINE HYDROCHLORIDE 25 MG/ML
1 SOLUTION/ DROPS OPHTHALMIC
Status: COMPLETED | OUTPATIENT
Start: 2017-04-17 | End: 2017-04-17

## 2017-04-17 RX ORDER — SODIUM CHLORIDE 9 MG/ML
INJECTION, SOLUTION INTRAVENOUS CONTINUOUS PRN
Status: DISCONTINUED | OUTPATIENT
Start: 2017-04-17 | End: 2017-04-17

## 2017-04-17 RX ORDER — PROPARACAINE HYDROCHLORIDE 5 MG/ML
1 SOLUTION/ DROPS OPHTHALMIC
Status: DISCONTINUED | OUTPATIENT
Start: 2017-04-17 | End: 2017-04-17 | Stop reason: HOSPADM

## 2017-04-17 RX ORDER — LIDOCAINE HYDROCHLORIDE 40 MG/ML
INJECTION, SOLUTION RETROBULBAR
Status: DISCONTINUED | OUTPATIENT
Start: 2017-04-17 | End: 2017-04-17 | Stop reason: HOSPADM

## 2017-04-17 RX ORDER — TETRACAINE HYDROCHLORIDE 5 MG/ML
SOLUTION OPHTHALMIC
Status: DISCONTINUED | OUTPATIENT
Start: 2017-04-17 | End: 2017-04-17 | Stop reason: HOSPADM

## 2017-04-17 RX ORDER — TROPICAMIDE 10 MG/ML
1 SOLUTION/ DROPS OPHTHALMIC
Status: COMPLETED | OUTPATIENT
Start: 2017-04-17 | End: 2017-04-17

## 2017-04-17 RX ORDER — TETRACAINE HYDROCHLORIDE 5 MG/ML
1 SOLUTION OPHTHALMIC
Status: COMPLETED | OUTPATIENT
Start: 2017-04-17 | End: 2017-04-17

## 2017-04-17 RX ORDER — LIDOCAINE HYDROCHLORIDE 10 MG/ML
1 INJECTION, SOLUTION EPIDURAL; INFILTRATION; INTRACAUDAL; PERINEURAL ONCE
Status: DISCONTINUED | OUTPATIENT
Start: 2017-04-17 | End: 2017-04-17 | Stop reason: HOSPADM

## 2017-04-17 RX ADMIN — MOXIFLOXACIN HYDROCHLORIDE 1 DROP: 5 SOLUTION/ DROPS OPHTHALMIC at 11:04

## 2017-04-17 RX ADMIN — SODIUM CHLORIDE: 0.9 INJECTION, SOLUTION INTRAVENOUS at 12:04

## 2017-04-17 RX ADMIN — TROPICAMIDE 1 DROP: 10 SOLUTION/ DROPS OPHTHALMIC at 11:04

## 2017-04-17 RX ADMIN — MOXIFLOXACIN HYDROCHLORIDE 1 DROP: 5 SOLUTION/ DROPS OPHTHALMIC at 12:04

## 2017-04-17 RX ADMIN — MOXIFLOXACIN HYDROCHLORIDE 1 DROP: 5 SOLUTION/ DROPS OPHTHALMIC at 01:04

## 2017-04-17 RX ADMIN — TETRACAINE HYDROCHLORIDE 1 DROP: 5 SOLUTION OPHTHALMIC at 11:04

## 2017-04-17 RX ADMIN — TROPICAMIDE 1 DROP: 10 SOLUTION/ DROPS OPHTHALMIC at 12:04

## 2017-04-17 RX ADMIN — PHENYLEPHRINE HYDROCHLORIDE 1 DROP: 25 SOLUTION/ DROPS OPHTHALMIC at 12:04

## 2017-04-17 RX ADMIN — PHENYLEPHRINE HYDROCHLORIDE 1 DROP: 25 SOLUTION/ DROPS OPHTHALMIC at 11:04

## 2017-04-17 RX ADMIN — TETRACAINE HYDROCHLORIDE 1 DROP: 5 SOLUTION OPHTHALMIC at 12:04

## 2017-04-17 RX ADMIN — MIDAZOLAM HYDROCHLORIDE 0.5 MG: 1 INJECTION, SOLUTION INTRAMUSCULAR; INTRAVENOUS at 01:04

## 2017-04-17 NOTE — PLAN OF CARE
Patient prefers to have Carlota (sister) present for discharge teaching. Please contact them [phone].

## 2017-04-17 NOTE — ANESTHESIA POSTPROCEDURE EVALUATION
"Anesthesia Post Evaluation    Patient: Misty Yun    Procedure(s) Performed: Procedure(s) (LRB):  PHACOEMULSIFICATION-ASPIRATION-CATARACT (Left)  INSERTION-INTRAOCULAR LENS (IOL) (Left)    Final Anesthesia Type: MAC  Patient location during evaluation: OPS  Patient participation: Yes- Able to Participate  Level of consciousness: awake and alert and oriented  Post-procedure vital signs: reviewed and stable  Pain management: adequate  Airway patency: patent  PONV status at discharge: No PONV  Anesthetic complications: no      Cardiovascular status: stable  Respiratory status: unassisted, spontaneous ventilation and room air  Hydration status: euvolemic  Follow-up not needed.        Visit Vitals    BP (!) 145/64 (BP Location: Left arm, Patient Position: Lying, BP Method: Automatic)    Pulse 68    Temp 36.5 °C (97.7 °F) (Oral)    Resp 16    Ht 5' 3" (1.6 m)    Wt 66.2 kg (146 lb)    SpO2 100%    BMI 28.63 kg/m2       Pain/Torsten Score: Pain Assessment Performed: Yes (4/17/2017 11:50 AM)  Presence of Pain: denies (4/17/2017 11:50 AM)      "

## 2017-04-17 NOTE — IP AVS SNAPSHOT
[facility] Location ([facility])  [address] [place], [place] [address]  Phone: [phone]           Patient Discharge Instructions   Our goal is to set you up for success. This packet includes information on your condition, medications, and your home care.  It will help you care for yourself to prevent having to return to the hospital.     Please ask your nurse if you have any questions.      There are many details to remember when preparing to leave the hospital. Here is what you will need to do:    1. Take your medicine. If you are prescribed medications, review your Medication List on the following pages. You may have new medications to  at the pharmacy and others that you'll need to stop taking. Review the instructions for how and when to take your medications. Talk with your doctor or nurses if you are unsure of what to do.     2. Go to your follow-up appointments. Specific follow-up information is listed in the following pages. Your may be contacted by a nurse or clinical provider about future appointments. Be sure we have all of the phone numbers to reach you. Please contact your provider's office if you are unable to make an appointment.     3. Watch for warning signs. Your doctor or nurse will give you detailed warning signs to watch for and when to call for assistance. These instructions may also include educational information about your condition. If you experience any of warning signs to your health, call your doctor.           [facility]  Unless otherwise directed by your provider, please   contact [facility], our nurse care line   that is available for 24/7 assistance.     [phone] (toll-free)     Registered nurses in the [facility]   provide: appointment scheduling, clinical advisement, health education, and other advisory services.                  ** Verify the list of medication(s) below is accurate and up to date. Carry this with you in case of  emergency. If your medications have changed, please notify your healthcare provider.             Medication List      ASK your doctor about these medications        Additional Info                      amlodipine 10 MG tablet   Commonly known as:  NORVASC   Refills:  0   Dose:  10 mg    Instructions:  Take 10 mg by mouth once daily.     Begin Date    AM    Noon    PM    Bedtime       aspirin 81 MG Chew   Refills:  0   Dose:  81 mg    Instructions:  Take 1 tablet (81 mg total) by mouth once daily. RESUME ON 2/14/15     Begin Date    AM    Noon    PM    Bedtime       atorvastatin 40 MG tablet   Commonly known as:  LIPITOR   Quantity:  30 tablet   Refills:  3   Dose:  40 mg    Instructions:  Take 1 tablet (40 mg total) by mouth once daily.     Begin Date    AM    Noon    PM    Bedtime       carvedilol 3.125 MG tablet   Commonly known as:  COREG   Quantity:  60 tablet   Refills:  3   Dose:  6.25 mg    Instructions:  Take 2 tablets (6.25 mg total) by mouth 2 (two) times daily.     Begin Date    AM    Noon    PM    Bedtime       donepezil 5 MG tablet   Commonly known as:  ARICEPT   Refills:  0   Dose:  5 mg    Instructions:  Take 5 mg by mouth every evening.     Begin Date    AM    Noon    PM    Bedtime       furosemide 20 MG tablet   Commonly known as:  LASIX   Quantity:  30 tablet   Refills:  11   Dose:  20 mg    Instructions:  Take 1 tablet (20 mg total) by mouth once daily.     Begin Date    AM    Noon    PM    Bedtime       isosorbide-hydrALAZINE 20-37.5 mg 20-37.5 mg Tab   Commonly known as:  BIDIL   Quantity:  90 tablet   Refills:  11   Dose:  1 tablet    Instructions:  Take 1 tablet by mouth 3 (three) times daily.     Begin Date    AM    Noon    PM    Bedtime       mirtazapine 7.5 MG Tab   Commonly known as:  REMERON   Refills:  0   Dose:  7.5 mg    Instructions:  Take 7.5 mg by mouth every evening.     Begin Date    AM    Noon    PM    Bedtime       nitroGLYCERIN 0.4 MG SL tablet   Commonly known as:  NITROSTAT    Quantity:  25 tablet   Refills:  3   Dose:  0.4 mg    Instructions:  Place 1 tablet (0.4 mg total) under the tongue every 5 (five) minutes as needed for Chest pain.     Begin Date    AM    Noon    PM    Bedtime       senna-docusate 8.6-50 mg 8.6-50 mg per tablet   Commonly known as:  PERICOLACE   Refills:  0   Dose:  1 tablet    Instructions:  Take 1 tablet by mouth 2 (two) times daily.     Begin Date    AM    Noon    PM    Bedtime                  Please bring to all follow up appointments:    1. A copy of your discharge instructions.  2. All medicines you are currently taking in their original bottles.  3. Identification and insurance card.    Please arrive 15 minutes ahead of scheduled appointment time.    Please call 24 hours in advance if you must reschedule your appointment and/or time.        Your Scheduled Appointments     May 08, 2017  4:00 PM CDT   Established Patient Visit with MD Gal Magdaleno [facility] ([facility] )    [address]  [place] [address] [phone]                  Discharge Instructions       Sidney Motta MD  [facility]  Department of Ophthalmology      AFTER: Cataract Surgery:  Relax at home and DO NOT exert yourself for the rest of the day.  Plan to see Dr. Motta tomorrow at the eye clinic:   [facility] [address] [address]    Refer to attached eye drop instruction sheet     Precautions:  DO NOT rub your eye.  You may resume moderate activity the day after surgery.  Wear protective sunglasses during the day and a shield at night for 1(one) week.  If you have pain, redness and decreased vision, call Dr. Motta (or the on-call doctor after hours) @[phone].    Home Care Instructions for Eye Surgeries    1. ACTIVITY:  Limit your activity today. Relax at home and DO NOT exert yourself for the rest of the day. Increase activity gradually. You may return to work or school as directed by your physician.    2.  "DIET:  Drink plenty of fluids. Resume your normal diet unless instructed otherwise.    3. PAIN:  Expect a moderate amount of pain. If a prescription for pain is not sent home with you, you may take your commonly used pain reliever as directed. If this is not sufficient, call your physician. You may resume any other prescription medication unless otherwise directed by your physician.     Discuss any problem with your physician as soon as it arises. Do not Delay.      EMERGENCY- If you are unable to contact your physician, please go to the nearest Emergency Room.       Anesthesia: Monitored Anesthesia Care (MAC)    Anesthesia Safety  · Have an adult family member or friend drive you home after the procedure.  · For the first 24 hours after your surgery:  · Do not drive or use heavy equipment.  · Do not make important decisions or sign documents.  · Avoid alcohol.  · Have someone stay with you, if possible. They can watch for problems and help keep you safe.      Admission Information     Date & Time Provider Department CSN    4/17/2017 10:52 AM Sidney Motta MD [facility] [id number]      Care Providers     Provider Role Specialty Primary office phone    Sidney Motta MD Attending Provider Ophthalmology [phone]      Your Vitals Were     Height Weight BMI          5' 3" (1.6 m) 66.2 kg (146 lb) 25.86 kg/m2        Recent Lab Values        6/4/2010 2/2/2015 10/31/2016                    12:30 PM  6:31 AM 12:35 PM         A1C 5.4 6.5 (H) 5.8         Comment for A1C at 12:35 PM on 10/31/2016:  According to ADA guidelines, hemoglobin A1C <7.0% represents  optimal control in non-pregnant diabetic patients.  Different  metrics may apply to specific populations.   Standards of Medical Care in Diabetes - 2016.  For the purpose of screening for the presence of diabetes:  <5.7%     Consistent with the absence of diabetes  5.7-6.4%  Consistent with increasing risk for diabetes   (prediabetes)  >or=6.5%  " Consistent with diabetes  Currently no consensus exists for use of hemoglobin A1C  for diagnosis of diabetes for children.        Allergies as of 4/17/2017     No Known Allergies      Advance Directives     An advance directive is a document which, in the event you are no longer able to make decisions for yourself, tells your healthcare team what kind of treatment you do or do not want to receive, or who you would like to make those decisions for you.  If you do not currently have an advance directive, [facility] encourages you to create one.  For more information call:  [phone]), [phone] ([phone]),  or log on to [email or web address].        Smoking Cessation     If you would like to quit smoking:   You may be eligible for free services if you are a [place] resident and started smoking cigarettes before September 1, 1988.  Call the Smoking Cessation Trust ([facility]) toll free at [phone] or [phone].   Call 6-730-QUIT-NOW if you do not meet the above criteria.   Contact us via email: [email or web address]   View our website for more information: [email or web address]        Language Assistance Services     ATTENTION: Language assistance services are available, free of charge. Please call [phone].      ATENCIÓN: Si habla español, tiene a loomis disposición servicios gratuitos de asistencia lingüística. Llame al [phone].     CHÚ Ý: N?u b?n nói Ti?ng Vi?t, có các d?ch v? h? tr? ngôn ng? mi?n phí dành cho b?n. G?i s? [phone].        Heart Failure Education       Heart Failure: Being Active  You have a condition called heart failure. Being active doesnt mean that you have to wear yourself out. Even a little movement each day helps to strengthen your heart. If you cant get out to exercise, you can do simple stretching and strengthening exercises at home. These are good ways to keep you well-conditioned and prevent you and your heart from becoming  excessively weak.    Ideas to get you started  · Add a little movement to things you do now. Walk to mail letters. Park your car at the far end of the parking lot and walk to the store. Walk up a flight of stairs instead of taking the elevator.  · Choose activities you enjoy. You might walk, swim, or ride an exercise bike. Things like gardening and washing the car count, too. Other possibilities include: washing dishes, walking the dog, walking around the mall, and doing aerobic activities with friends.  · Join a group exercise program at a [facility] or [facility], a senior center, or a community center. Or look into a hospital cardiac rehabilitation program. Ask your doctor if you qualify.  Tips to keep you going  · Get up and get dressed each day. Go to a coffee shop and read a newspaper or go somewhere that you'll be in the presence of other active people. Youll feel more like being active.  · Make a plan. Choose one or more activities that you enjoy and that you can easily do. Then plan to do at least one each day. You might write your plan on a calendar.  · Go with a friend or a group if you like company. This can help you feel supported and stay motivated, too.  · Plan social events that you enjoy. This will keep you mentally engaged as well as physically motivated to do things you find pleasure in.  For your safety  · Talk with your healthcare provider before starting an exercise program.  · Exercise indoors when its too hot or too cold outside, or when the air quality is poor. Try walking at a shopping mall.  · Wear socks and sturdy shoes to maintain your balance and prevent falls.  · Start slowly. Do a few minutes several times a day at first. Increase your time and speed little by little.  · Stop and rest whenever you feel tired or get short of breath.  · Dont push yourself on days when you dont feel well.  Date Last Reviewed: 3/20/2016  © 2097-8364 [employer]. [address] [place] [address], [facility] [place]  [address]. All rights reserved. This information is not intended as a substitute for professional medical care. Always follow your healthcare professional's instructions.              Heart Failure: Evaluating Your Heart  You have a condition called heart failure. To evaluate your condition, your doctor will examine you, ask questions, and do some tests. Along with looking for signs of heart failure, the doctor looks for any other health problems that may have led to heart failure. The results of your evaluation will help your doctor form a treatment plan.  Health history and physical exam  Your visit will start with a health history. Tell the doctor about any symptoms youve noticed and about all medicines you take. Then youll have a physical exam. This includes listening to your heartbeat and breathing. Youll also be checked for swelling (edema) in your legs and neck. When you have fluid buildup or fluid in the lungs, it may be called congestive heart failure.  Diagnosing heart failure     During an echocardiogram, sound waves bounce off the heart. These are converted into a picture on the screen.   The following may be done to help your doctor form a diagnosis:  · X-rays show the size and shape of your heart. These pictures can also show fluid in your lungs.  · An electrocardiogram (ECG or EKG) shows the pattern of your heartbeat. Small pads (electrodes) are placed on your chest, arms, and legs. Wires connect the pads to the ECG machine, which records your hearts electrical signals. This can give the doctor information about heart function.  · An echocardiogram uses ultrasound waves to show the structure and movement of your heart muscle. This shows how well the heart pumps. It also shows the thickness of the heart walls, and if the heart is enlarged. It is one of the most useful, non-invasive tests as it provides information about the heart's general function. This helps your doctor make treatment  decisions.  · Lab tests evaluate small amounts of blood or urine for signs of problems. A BNP lab test can help diagnose and evaluate heart failure. BNP stands for B-type natriuretic peptide. The ventricles secrete more BNP when heart failure worsens. Lab tests can also provide information about metabolic dysfunction or heart dysfunction.  Your treatment plan  Based on the results of your evaluation and tests, your doctor will develop a treatment plan. This plan is designed to relieve some of your heart failure symptoms and help make you more comfortable. Your treatment plan may include:  · Medicine to help your heart work better and improve your quality of life  · Changes in what you eat and drink to help prevent fluid from backing up in your body  · Daily monitoring of your weight and heart failure symptoms to see how well your treatment plan is working  · Exercise to help you stay healthy  · Help with quitting smoking  · Emotional and psychological support to help adjust to the changes  · Referrals to other specialists to make sure you are being treated comprehensively  Date Last Reviewed: 3/21/2016  © 9382-5949 [employer]. [address] [place] [address], [place] [address]. All rights reserved. This information is not intended as a substitute for professional medical care. Always follow your healthcare professional's instructions.              Heart Failure: Making Changes to Your Diet  You have a condition called heart failure. When you have heart failure, excess fluid is more likely to build up in your body because your heart isn't working well. This makes the heart work harder to pump blood. Fluid buildup causes symptoms such as shortness of breath and swelling (edema). This is often referred to as congestive heart failure or CHF. Controlling the amount of salt (sodium) you eat may help stop fluid from building up. Your doctor may also tell you to reduce the amount of fluid you drink.  Reading food  labels    Your healthcare provider will tell you how much sodium you can eat each day. Read food labels to keep track. Keep in mind that certain foods are high in salt. These include canned, frozen, and processed foods. Check the amount of sodium in each serving. Watch out for high-sodium ingredients. These include MSG (monosodium glutamate), baking soda, and sodium phosphate.   Eating less salt  Give yourself time to get used to eating less salt. It may take a little while. Here are some tips to help:  · Take the saltshaker off the table. Replace it with salt-free herb mixes and spices.  · Eat fresh or plain frozen vegetables. These have much less salt than canned vegetables.  · Choose low-sodium snacks like sodium-free pretzels, crackers, or air-popped popcorn.  · Dont add salt to your food when youre cooking. Instead, season your foods with pepper, lemon, garlic, or onion.  · When you eat out, ask that your food be cooked without added salt.  · Avoid eating fried foods as these often have a great deal of salt.  If youre told to limit fluids  You may need to limit how much fluid you have to help prevent swelling. This includes anything that is liquid at room temperature, such as ice cream and soup. If your doctor tells you to limit fluid, try these tips:  · Measure drinks in a measuring cup before you drink them. This will help you meet daily goals.  · Chill drinks to make them more refreshing.  · Suck on frozen lemon wedges to quench thirst.  · Only drink when youre thirsty.  · Chew sugarless gum or suck on hard candy to keep your mouth moist.  · Weigh yourself daily to know if your body's fluid content is rising.  My sodium goal  Your healthcare provider may give you a sodium goal to meet each day. This includes sodium found in food as well as salt that you add. My goal is to eat no more than ___________ mg of sodium per day.     When to call your doctor  Call your doctor right away if you have any symptoms  of worsening heart failure. These can include:  · Sudden weight gain  · Increased swelling of your legs or ankles  · Trouble breathing when youre resting or at night  · Increase in the number of pillows you have to sleep on  · Chest pain, pressure, discomfort, or pain in the jaw, neck, or back   Date Last Reviewed: 3/21/2016  © 9054-3168 "[employer] [address] [place] [address], [place] [address]. All rights reserved. This information is not intended as a substitute for professional medical care. Always follow your healthcare professional's instructions.              Heart Failure: Medicines to Help Your Heart    You have a condition called heart failure (also known as congestive heart failure, or CHF). Your doctor will likely prescribe medicines for heart failure and any underlying health problems you have. Most heart failure patients take one or more types of medicinen. Your healthcare provider will work to find the combination of medicines that works best for you.  Heart failure medicines  Here are the most common heart failure medicines:  · ACE inhibitors lower blood pressure and decrease strain on the heart. This makes it easier for the heart to pump. Angiotensin receptor blockers have similar effects. These are prescribed for some patients instead of ACE inhibitors.  · Beta-blockers relieve stress on the heart. They also improve symptoms. They may also improve the heart's pumping action over time.  · Diuretics (also called water pills) help rid your body of excess water. This can help rid your body of swelling (edema). Having less fluid to pump means your heart doesnt have to work as hard. Some diuretics make your body lose a mineral called potassium. Your doctor will tell you if you need to take supplements or eat more foods high in potassium.  · Digoxin helps your heart pump with more strength. This helps your heart pump more blood with each beat. So, more oxygen-rich blood travels to the rest  of the body.  · Aldosterone antagonists help alter hormones and decrease strain on the heart.  · Hydralazine and nitrates are two separate medicines used together to treat heart failure. They may come in one combination pill. They lower blood pressure and decrease how hard the heart has to pump.  Medicines for related conditions  Controlling other heart problems helps keep heart failure under control, too. Depending on other heart problems you have, medicines may be prescribed to:  · Lower blood pressure (antihypertensives).  · Lower cholesterol levels (statins).  · Prevent blood clots (anticoagulants or aspirin).  · Keep the heartbeat steady (antiarrhythmics).  Date Last Reviewed: 3/5/2016  © 5254-6735 [employer]. [address] [place] [address], [place] [address]. All rights reserved. This information is not intended as a substitute for professional medical care. Always follow your healthcare professional's instructions.              Heart Failure: Procedures That May Help    The heart is a muscle that pumps oxygen-rich blood to all parts of the body. When you have heart failure, the heart is not able to pump as well as it should. Blood and fluid may back up into the lungs (congestive heart failure), and some parts of the body dont get enough oxygen-rich blood to work normally. These problems lead to the symptoms of heart failure.     Certain procedures may help the heart pump better in some cases of heart failure. Some procedures are done to treat health problems that may have caused the heart failure such as coronary artery disease or heart rhythm problems. For more serious heart failure, other options are available.  Treating artery and valve problems  If you have coronary artery disease or valve disease, procedures may be done to improve blood flow. This helps the heart pump better, which can improve heart failure symptoms. First, your doctor may do a cardiac catheterization to help detect clogged  blood vessels or valve damage. During this procedure, a  thin tube (catheter) in inserted into a blood vessel and guided to the heart. There a dye is injected and a special type of X-ray (angiogram) is taken of the blood vessels. Procedures to open a blocked artery or fix damaged valves can also be done using catheterization.  · Angioplasty uses a balloon-tipped instrument at the end of the catheter. The balloon is inflated to widen the narrowed artery. In many cases, a stent is expanded to further support the narrowed artery. A stent is a metal mesh tube.  · Valve surgery repairs or replacement of faulty valves can also be done during catheterization so blood can flow properly through the chambers of the heart.  Bypass surgery is another option to help treat blocked arteries. It uses a healthy blood vessel from elsewhere in the body. The healthy blood vessel is attached above and below the blocked area so that blood can flow around the blocked artery.  Treating heart rhythm problems  A device may be placed in the chest to help a weak heart maintain a healthy, heartbeat so the heart can pump more effectively:  · Pacemaker. A pacemaker is an implanted device that regulates your heartbeat electronically. It monitors your heart's rhythm and generates a painless electric impulse that helps the heart beat in a regular rhythm. A pacemaker is programmed to meet your specific heart rhythm needs.  · Biventricular pacing/cardiac resynchronization therapy. A type of pacemaker that paces both pumping chambers of the heart at the same time to coordinate contractions and to improve the heart's function. Some people with heart failure are candidates for this therapy.  · Implantable cardioverter defibrillator. A device similar to a pacemaker that senses when the heart is beating too fast and delivers an electrical shock to convert the fast rhythm to a normal rhythm. This can be a life saving device.  In severe cases  In more  serious cases of heart failure when other treatments no longer work, other options may include:  · Ventricular assist devices (VADs). These are mechanical devices used to take over the pumping function for one or both of the heart's ventricles, or pumping chambers. A VAD may be necessary when heart failure progresses to the point that medicines and other treatments no longer help. In some cases, a VAD may be used as a bridge to transplant.  · Heart transplant. This is replacing the diseased heart with a healthy one from a donor. This is an option for a few people who are very sick. A heart transplant is very serious and not an option for all patients. Your doctor can tell you more.  Date Last Reviewed: 3/20/2016  © 5109-5099 [employer]. [address] [place] [address], [place] [address]. All rights reserved. This information is not intended as a substitute for professional medical care. Always follow your healthcare professional's instructions.              Heart Failure: Tracking Your Weight  You have a condition called heart failure. When you have heart failure, a sudden weight gain or a steady rise in weight is a warning sign that your body is retaining too much water and salt. This could mean your heart failure is getting worse. If left untreated, it can cause problems for your lungs and result in shortness of breath. Weighing yourself each day is the best way to know if youre retaining water. If your weight goes up quickly, call your doctor. You will be given instructions on how to get rid of the excess water. You will likely need medicines and to avoid salt. This will help your heart work better.  Call your doctor if you gain more than 2 pounds in 1 day, more than 5 pounds in 1 week, or whatever weight gain you were told to report by your doctor. This is often a sign of worsening heart failure and needs to be evaluated and treated. Your doctor will tell you what to do next.   Tips for weighing  yourself    · Weigh yourself at the same time each morning, wearing the same clothes. Weigh yourself after urinating and before eating.  · Use the same scale each day. Make sure the numbers are easy to read. Put the scale on a flat, hard surface -- not on a rug or carpet.  · Do not stop weighing yourself. If you forget one day, weigh again the next morning.  How to use your weight chart  · Keep your weight chart near the scale. Write your weight on the chart as soon as you get off the scale.  · Fill in the month and the start date on the chart. Then write down your weight each day. Your chart will look like this:    · If you miss a day, leave the space blank. Weigh yourself the next day and write your weight in the next space.  · Take your weight chart with you when you go to see your doctor.  Date Last Reviewed: 3/20/2016  © 8439-7634 [employer]. [address] [place] [address], [place] [address]. All rights reserved. This information is not intended as a substitute for professional medical care. Always follow your healthcare professional's instructions.              Heart Failure: Warning Signs of a Flare-Up  You have a condition called heart failure. Once you have heart failure, flare-ups can happen. Below are signs that can mean your heart failure is getting worse. If you notice any of these warning signs, call your healthcare provider.  Swelling    · Your feet, ankles, or lower legs get puffier.  · You notice skin changes on your lower legs.  · Your shoes feel too tight.  · Your clothes are tighter in the waist.  · You have trouble getting rings on or off your fingers.  Shortness of breath  · You have to breathe harder even when youre doing your normal activities or when youre resting.  · You are short of breath walking up stairs or even short distances.  · You wake up at night short of breath or coughing.  · You need to use more pillows or sit up to sleep.  · You wake up tired or restless.  Other  warning signs  · You feel weaker, dizzy, or more tired.  · You have chest pain or changes in your heartbeat.  · You have a cough that wont go away.  · You cant remember things or dont feel like eating.  Tracking your weight  Gaining weight is often the first warning sign that heart failure is getting worse. Gaining even a few pounds can be a sign that your body is retaining excess water and salt. Weighing yourself each day in the morning after you urinate and before you eat, is the best way to know if you're retaining water. Get a scale that is easy to read and make sure you wear the same clothes and use the same scale every time you weigh. Your healthcare provider will show you how to track your weight. Call your doctor if you gain more than 2 pounds in 1 day, 5 pounds in 1 week, or whatever weight gain you were told to report by your doctor. This is often a sign of worsening heart failure and needs to be evaluated and treated before it compromises your breathing. Your doctor will tell you what to do next.    Date Last Reviewed: 3/15/2016  © 8109-1548 [employer]. [address] [place] [address], [place] [address]. All rights reserved. This information is not intended as a substitute for professional medical care. Always follow your healthcare professional's instructions.              Chronic [place] Disease Education             Diabetes Discharge Instructions                                    [facility] complies with applicable Federal civil rights laws and does not discriminate on the basis of race, color, national origin, age, disability, or sex.

## 2017-04-17 NOTE — H&P
Pre-op Eye Surgery H&P    CC: Painless, progressive loss of vision  Present Illness: Nuclear sclerotic cataract  Allergies/Current Meds: see meds  Mental Status: A&O x3  Pertinent Medical History: n/a    Physical Exam  General: NAD  HEENT: Eye white/quiet  Lungs: Adequate respirations  Heart: + pulses  Abdomen: soft  Rectal/GI/: deferred    Impression: Cataract  Plan: Phacoemulsification with lens implant

## 2017-04-17 NOTE — DISCHARGE INSTRUCTIONS
Sidney Motta MD  [facility]  Department of Ophthalmology      AFTER: Cataract Surgery:  Relax at home and DO NOT exert yourself for the rest of the day.  Plan to see Dr. Motta tomorrow at the eye clinic:   [facility] [address]  [place] [address]    Refer to attached eye drop instruction sheet     Precautions:  DO NOT rub your eye.  You may resume moderate activity the day after surgery.  Wear protective sunglasses during the day and a shield at night for 1(one) week.  If you have pain, redness and decreased vision, call Dr. Motta (or the on-call doctor after hours) @[phone].    Home Care Instructions for Eye Surgeries    1. ACTIVITY:  Limit your activity today. Relax at home and DO NOT exert yourself for the rest of the day. Increase activity gradually. You may return to work or school as directed by your physician.    2. DIET:  Drink plenty of fluids. Resume your normal diet unless instructed otherwise.    3. PAIN:  Expect a moderate amount of pain. If a prescription for pain is not sent home with you, you may take your commonly used pain reliever as directed. If this is not sufficient, call your physician. You may resume any other prescription medication unless otherwise directed by your physician.     Discuss any problem with your physician as soon as it arises. Do not Delay.      EMERGENCY- If you are unable to contact your physician, please go to the nearest Emergency Room.       Anesthesia: Monitored Anesthesia Care (MAC)    Anesthesia Safety  · Have an adult family member or friend drive you home after the procedure.  · For the first 24 hours after your surgery:  · Do not drive or use heavy equipment.  · Do not make important decisions or sign documents.  · Avoid alcohol.  · Have someone stay with you, if possible. They can watch for problems and help keep you safe.

## 2017-04-17 NOTE — ANESTHESIA PREPROCEDURE EVALUATION
04/17/2017  Misty Yun is a 83 y.o., female.    Anesthesia Evaluation    I have reviewed the Patient Summary Reports.    I have reviewed the Nursing Notes.   I have reviewed the Medications.     Review of Systems  Anesthesia Hx:  Denies Family Hx of Anesthesia complications.   Denies Personal Hx of Anesthesia complications.   Hematology/Oncology:         -- Anemia: --  Cancer in past history (ovarian):    Cardiovascular:   Hypertension Past MI CAD asymptomatic CABG/stent (2007)  CHF (compensated)    Renal/:   Chronic Renal Disease (stage IV), CRI    Neurological:  Dementia moderate    Endocrine:   Diabetes, type 2        Physical Exam  General:  Well nourished      Dental:  Dental Findings: Upper Dentures, Lower Dentures        Mental Status:  Mental Status Findings:  Cooperative, Alert and Oriented         Anesthesia Plan  Type of Anesthesia, risks & benefits discussed:  Anesthesia Type:  MAC  Patient's Preference:   Intra-op Monitoring Plan: standard ASA monitors  Intra-op Monitoring Plan Comments:   Post Op Pain Control Plan:   Post Op Pain Control Plan Comments:   Induction:    Beta Blocker:         Informed Consent: Patient understands risks and agrees with Anesthesia plan.  Questions answered. Anesthesia consent signed with patient.  ASA Score: 3     Day of Surgery Review of History & Physical:    H&P update referred to the surgeon.         Ready For Surgery From Anesthesia Perspective.

## 2017-04-18 ENCOUNTER — OFFICE VISIT (OUTPATIENT)
Dept: OPHTHALMOLOGY | Facility: CLINIC | Age: 82
End: 2017-04-18
Attending: OPHTHALMOLOGY
Payer: MEDICARE

## 2017-04-18 DIAGNOSIS — H25.12 NUCLEAR SCLEROTIC CATARACT OF LEFT EYE: ICD-10-CM

## 2017-04-18 DIAGNOSIS — Z98.890 POST-OPERATIVE STATE: Primary | ICD-10-CM

## 2017-04-18 PROCEDURE — 99999 PR PBB SHADOW E&M-EST. PATIENT-LVL I: CPT | Mod: PBBFAC,,, | Performed by: OPHTHALMOLOGY

## 2017-04-18 PROCEDURE — 99024 POSTOP FOLLOW-UP VISIT: CPT | Mod: S$GLB,,, | Performed by: OPHTHALMOLOGY

## 2017-04-18 NOTE — PROGRESS NOTES
HPI     S/P Phaco w/ IOL OS 4/17/17 Dr. Motta    Pt states she is doing well. No pain.  VA seems clearer    Eye meds:  P/G/B  TID       Last edited by Abeba Calle on 4/18/2017  8:49 AM.         Assessment /Plan     For exam results, see Encounter Report.    Post-operative state    Nuclear sclerotic cataract of left eye      Slit lamp exam:  L/L: nl  K: clear, wound sealed  AC: 1+ cell  Lens: IOL centered and stable    POD1 s/p Phaco/IOL  Appropriate precautions and post op medications reviewed.  Patient instructed to call or come in if symptoms of redness, decreased vision, or pain are experienced.

## 2017-04-18 NOTE — MR AVS SNAPSHOT
[facility]  [address]  [place] [address]  Phone: [phone]  Fax: [phone]                  Misty Yun   2017 9:45 AM   Office Visit    Description:  Female : 3/8/1934   Provider:  Sidney Motta MD   Department:  [facility]           Reason for Visit     Post-op Evaluation           Diagnoses this Visit        Comments    Post-operative state    -  Primary     Nuclear sclerotic cataract of left eye                To Do List           Future Appointments        Provider Department Dept Phone    2017 9:45 AM Sidney Motta MD [facility] [phone]    2017 4:00 PM Cici Clemens MD [facility] - Cardiology [phone]      Goals (5 Years of Data)     None      [facility]     [facility] Nurse Care Line -  Assistance  Unless otherwise directed by your provider, please contact [facility], our nurse care line that is available for  assistance.     Registered nurses in the [facility] provide: appointment scheduling, clinical advisement, health education, and other advisory services.  Call: [phone] (toll free)               Medications           Message regarding Medications     Verify the changes and/or additions to your medication regime listed below are the same as discussed with your clinician today.  If any of these changes or additions are incorrect, please notify your healthcare provider.             Verify that the below list of medications is an accurate representation of the medications you are currently taking.  If none reported, the list may be blank. If incorrect, please contact your healthcare provider. Carry this list with you in case of emergency.           Current Medications     amlodipine (NORVASC) 10 MG tablet Take 10 mg by mouth once daily.    aspirin 81 MG Chew Take 1 tablet (81 mg total) by mouth once daily. RESUME ON 2/14/15    atorvastatin (LIPITOR) 40 MG tablet  Take 1 tablet (40 mg total) by mouth once daily.    carvedilol (COREG) 3.125 MG tablet Take 2 tablets (6.25 mg total) by mouth 2 (two) times daily.    donepezil (ARICEPT) 5 MG tablet Take 5 mg by mouth every evening.    furosemide (LASIX) 20 MG tablet Take 1 tablet (20 mg total) by mouth once daily.    isosorbide-hydrALAZINE 20-37.5 mg (BIDIL) 20-37.5 mg Tab Take 1 tablet by mouth 3 (three) times daily.    mirtazapine (REMERON) 7.5 MG Tab Take 7.5 mg by mouth every evening.    nitroGLYCERIN (NITROSTAT) 0.4 MG SL tablet Place 1 tablet (0.4 mg total) under the tongue every 5 (five) minutes as needed for Chest pain.    senna-docusate 8.6-50 mg (PERICOLACE) 8.6-50 mg per tablet Take 1 tablet by mouth 2 (two) times daily.           Clinical Reference Information           Allergies as of 4/18/2017     No Known Allergies      Immunizations Administered on Date of Encounter - 4/18/2017     None      Language Assistance Services     ATTENTION: Language assistance services are available, free of charge. Please call [phone].      ATENCIÓN: Si nidia bauer, tiene a loomis disposición servicios gratuitos de asistencia lingüística. Llame al [phone].     [facility] Ý: N?u b?n nói Ti?ng Vi?t, có các d?ch v? h? tr? ngôn ng? mi?n phí dành cho b?n. G?i s? [phone].         [facility] complies with applicable Federal civil rights laws and does not discriminate on the basis of race, color, national origin, age, disability, or sex.

## 2017-04-19 ENCOUNTER — PATIENT MESSAGE (OUTPATIENT)
Dept: NEPHROLOGY | Facility: CLINIC | Age: 82
End: 2017-04-19

## 2017-04-19 NOTE — DISCHARGE SUMMARY
[facility]  Discharge Summary  Hospital Medicine      Admit Date: 4/11/2017    Discharge Date and Time: 4/14/2017  3:45 PM    Discharge Provider: Ruchi Acuña    Reason for Admission: hypercalcemia    Hospital Course (synopsis of major diagnoses, care, treatment, and services provided during the course of the hospital stay):     Hypercalcemia, resolving  Secondary hyperparathyroidism   Given one dose of calcitonin 4/11.2017. Vitamin D, calcitriol, PTH-related peptide and PTH normal levels. Bone scan negative. Likely due to over supplementation and increased mobility. Follow up with nephrology. Her calcium levels drifted down over the following three days without further intervention. Patient asked to drink at least 6 cups a day.      ESBL E coli UTI - given ceftriaxone, but was resistant. Patient improved despite in-correct treatment. ID consulted and agreed that no further treatment was required.     Anemia of CKD  Mild iron deficiency anemia  Will start iron sulfate + vitamin C 250 mg daily  She would benefit from referral to nephrology for epogen injections.     Osteoporosis  Can stop calcium supplementation due to hypercalcemia  Continue vitamin D 2000 unit once daily  Continue fosamax once weekly after discharge     CKD IV - SCr at baseline. Patient will need to follow up with nephrology as outpatient.     NSTEMI  Chronic combined systolic and diastolic heart failure   EF 30%. Strict Is and Os, daily weights. Continue atrovastatin, ASA, carvedilol 6.25 mg BID and lasix 20 mg daily. Added bidil in lieu of ACEi/ARB due to LESLEY./CKD. Refer patient to cardiology as outpatient.     Debility - wheel-chair bound and NH resident. PT/OT eval.     DM II CKD IV and HTN  DM II with hyperlipidemia  Diet controlled  HgbA1c 5.8% in face of anemia  Continue SSI  Continue carvedilol and bidil  Continue atorvastatin     Dementia. Continued donepezil. At baseline oriented to person and place. Ability to give relevant  recent history is in question.    Consults: Nephrology and infectious disease    Final Diagnoses:    Principal Problem: Hypercalcemia   Secondary Diagnoses:   Active Hospital Problems    Diagnosis  POA    *Hypercalcemia [E83.52]  Yes     Priority: 1 - High    Secondary hyperparathyroidism [N25.81]  Yes     Priority: 2     Bacteria in urine [R82.71]  Yes     Priority: 3     Iron deficiency anemia [D50.9]  Yes     Priority: 3     Anemia of chronic renal failure, stage 4 (severe) [N18.4, D63.1]  Yes     Priority: 4     Osteoporosis [M81.0]  Yes     Priority: 5      Chronic    Chronic kidney disease, stage IV (severe) [N18.4]  Yes     Priority: 6      Chronic    Chronic combined systolic and diastolic heart failure [I50.42]  Yes     Priority: 7      Chronic    NSTEMI (non-ST elevated myocardial infarction) [I21.4]  Yes     Priority: 7     Debility [R53.81]  Yes     Priority: 8     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       Discharged Condition: stable    Disposition: Home or Self Care    Follow Up/Patient Instructions:     Medications:  Reconciled Home Medications:   Discharge Medication List as of 4/14/2017  1:53 PM      START taking these medications    Details   ascorbic acid, vitamin C, (VITAMIN C) 250 MG tablet Take 1 tablet (250 mg total) by mouth every evening. Take with iron., Starting 4/14/2017, Until Discontinued, No Print      cholecalciferol, vitamin D3, (VITAMIN D3) 2,000 unit Cap Take 1 capsule (2,000 Units total) by mouth once daily., Starting 4/14/2017, Until Discontinued, OTC      ferrous gluconate (FERGON) 325 MG Tab Take 1 tablet (325 mg total) by mouth every evening.,  Starting 4/14/2017, Until Discontinued, OTC      potassium chloride (MICRO-K) 10 MEQ CpSR Take 1 capsule (10 mEq total) by mouth once daily., Starting 4/14/2017, Until Discontinued, No Print         CONTINUE these medications which have NOT CHANGED    Details   amlodipine (NORVASC) 10 MG tablet Take 10 mg by mouth once daily., Until Discontinued, Historical Med      aspirin 81 MG Chew Take 1 tablet (81 mg total) by mouth once daily. RESUME ON 2/14/15, Starting 2/26/2015, Until Discontinued, OTC      atorvastatin (LIPITOR) 40 MG tablet Take 1 tablet (40 mg total) by mouth once daily., Starting 11/5/2016, Until Discontinued, Normal      carvedilol (COREG) 3.125 MG tablet Take 2 tablets (6.25 mg total) by mouth 2 (two) times daily., Starting 12/5/2016, Until Tue 12/5/17, Print      donepezil (ARICEPT) 5 MG tablet Take 5 mg by mouth every evening., Until Discontinued, Historical Med      furosemide (LASIX) 20 MG tablet Take 1 tablet (20 mg total) by mouth once daily., Starting 3/27/2017, Until Discontinued, Normal      isosorbide-hydrALAZINE 20-37.5 mg (BIDIL) 20-37.5 mg Tab Take 1 tablet by mouth 3 (three) times daily., Starting 1/30/2017, Until Tue 1/30/18, Normal      mirtazapine (REMERON) 7.5 MG Tab Take 7.5 mg by mouth every evening., Until Discontinued, Historical Med      nitroGLYCERIN (NITROSTAT) 0.4 MG SL tablet Place 1 tablet (0.4 mg total) under the tongue every 5 (five) minutes as needed for Chest pain., Starting 11/5/2016, Until Sun 11/5/17, Normal      senna-docusate 8.6-50 mg (PERICOLACE) 8.6-50 mg per tablet Take 1 tablet by mouth 2 (two) times daily., Starting 2/26/2015, Until Discontinued, OTC      alendronate (FOSAMAX) 70 MG tablet Take 70 mg by mouth every 30 days. , Until Discontinued, Historical Med         STOP taking these medications       blood sugar diagnostic (BLOOD GLUCOSE TEST) Strp Comments:   Reason for Stopping:         calcium-vitamin D3 (CALCIUM 500 + D) 500 mg(1,250mg) -200 unit per  tablet Comments:   Reason for Stopping:         ergocalciferol (VITAMIN D2) 50,000 unit Cap Comments:   Reason for Stopping:         insulin aspart (NOVOLOG) 100 unit/mL injection Comments:   Reason for Stopping:         LANCETS MISC Comments:   Reason for Stopping:               Discharge Procedure Orders  Diet general     Activity as tolerated     Call MD for:  temperature >100.4     Call MD for:  persistent nausea and vomiting or diarrhea     Call MD for:  severe uncontrolled pain     Call MD for:  redness, tenderness, or signs of infection (pain, swelling, redness, odor or green/yellow discharge around incision site)     Call MD for:  difficulty breathing or increased cough     Call MD for:  severe persistent headache     Call MD for:  worsening rash     Call MD for:  persistent dizziness, light-headedness, or visual disturbances     Call MD for:  increased confusion or weakness           I spent more than 30 minutes total on this discharge including seeing and examining patient, note writing, reconcilling medications, and discussion with other health providers on team.

## 2017-04-25 ENCOUNTER — OFFICE VISIT (OUTPATIENT)
Dept: OPHTHALMOLOGY | Facility: CLINIC | Age: 82
End: 2017-04-25
Attending: OPHTHALMOLOGY
Payer: MEDICARE

## 2017-04-25 DIAGNOSIS — Z98.890 POST-OPERATIVE STATE: Primary | ICD-10-CM

## 2017-04-25 DIAGNOSIS — H25.12 NUCLEAR SCLEROTIC CATARACT OF LEFT EYE: ICD-10-CM

## 2017-04-25 DIAGNOSIS — H25.11 NUCLEAR SCLEROSIS, RIGHT: ICD-10-CM

## 2017-04-25 PROCEDURE — 99999 PR PBB SHADOW E&M-EST. PATIENT-LVL II: CPT | Mod: PBBFAC,,, | Performed by: OPHTHALMOLOGY

## 2017-04-25 PROCEDURE — 99024 POSTOP FOLLOW-UP VISIT: CPT | Mod: S$GLB,,, | Performed by: OPHTHALMOLOGY

## 2017-04-25 RX ORDER — MOXIFLOXACIN 5 MG/ML
1 SOLUTION/ DROPS OPHTHALMIC
Status: CANCELLED | OUTPATIENT
Start: 2017-04-25

## 2017-04-25 RX ORDER — LIDOCAINE HYDROCHLORIDE 10 MG/ML
1 INJECTION, SOLUTION EPIDURAL; INFILTRATION; INTRACAUDAL; PERINEURAL ONCE
Status: CANCELLED | OUTPATIENT
Start: 2017-04-25 | End: 2017-04-25

## 2017-04-25 RX ORDER — TETRACAINE HYDROCHLORIDE 5 MG/ML
1 SOLUTION OPHTHALMIC
Status: CANCELLED | OUTPATIENT
Start: 2017-04-25

## 2017-04-25 RX ORDER — TROPICAMIDE 10 MG/ML
1 SOLUTION/ DROPS OPHTHALMIC
Status: CANCELLED | OUTPATIENT
Start: 2017-04-25

## 2017-04-25 RX ORDER — PHENYLEPHRINE HYDROCHLORIDE 25 MG/ML
1 SOLUTION/ DROPS OPHTHALMIC
Status: CANCELLED | OUTPATIENT
Start: 2017-04-25

## 2017-04-25 NOTE — OP NOTE
SURGEON:  Sidney Motta M.D.    PREOPERATIVE DIAGNOSIS:    Nuclear Sclerotic Cataract Left Eye    POSTOPERATIVE DIAGNOSIS:    Nuclear Sclerotic Cataract Left Eye    PROCEDURES:    Phacoemulsification with  intraocular lens, Left eye (62452)    DATE OF SURGERY: 04/25/2017    IMPLANT: [email or web address]    ANESTHESIA:  MAC with topical Lidocaine    COMPLICATIONS:  None    ESTIMATED BLOOD LOSS: None    SPECIMENS: None    INDICATIONS:    The patient has a history of painless progressive visual loss and  difficulty with activities of daily living secondary to cataract formation.  After a thorough discussion of the risks, benefits, and alternatives to cataract surgery, including, but not limited to, the rare risks of infection, retinal detachment, hemorrhage, need for additional surgery, loss of vision, and even loss of the eye, the patient voices understanding and desires to proceed.    DESCRIPTION OF PROCEDURE:    The patients IOL calculations were reviewed, and the lens selection confirmed.   After verification and marking of the proper eye in the preop holding area, the patient was brought to the operating room in supine position where the eye was prepped and draped in standard sterile fashion with 5% Betadine and a lid speculum placed in the eye.   Topical 4% Lidocaine was used in addition to the preoperative anesthesia and the procedure was begun by the creation of a paracentesis incision through which viscoelastic was used to fill the anterior chamber.  Next, a keratome blade was used to create a triplanar temporal clear corneal incision and a cystotome and Utrata forceps used to fashion a continuous curvilinear capsulorrhexis.  Hydrodissection was carried out using the Voss hydrodissection cannula and the nucleus was found to be mobile.  Phacoemulsification of the nucleus was carried out using a quick chop technique, and all remaining epinuclear and cortical material was removed.  The eye was then reformed with Viscoelastic  and the  intraocular lens was implanted into the capsular bag.  All remaining viscoelastics were removed from the eye and at the end of the case the pupil was round, the lens was well-centered within the capsular bag and all wounds were found to be water tight.  Drops of Vigamox and Pred Forte were instilled and a shield was placed over the eye. The patient will follow up with Dr. Motta in the morning.

## 2017-04-25 NOTE — MR AVS SNAPSHOT
[facility]  [address]  [place] [address]  Phone: [phone]  Fax: [phone]                  Misty Yun   2017 9:45 AM   Office Visit    Description:  Female : 3/8/1934   Provider:  Sidney Motta MD   Department:  [facility]           Reason for Visit     Post-op Evaluation           Diagnoses this Visit        Comments    Post-operative state    -  Primary     Nuclear sclerotic cataract of left eye                To Do List           Future Appointments        Provider Department Dept Phone    2017 4:00 PM Cici Clemens MD [facility] - Cardiology [phone]      Goals (5 Years of Data)     None      [facility]     [facility] On Call Nurse Care Line -  Assistance  Unless otherwise directed by your provider, please contact [facility], our nurse care line that is available for  assistance.     Registered nurses in the [facility] On Call Center provide: appointment scheduling, clinical advisement, health education, and other advisory services.  Call: [phone] (toll free)               Medications           Message regarding Medications     Verify the changes and/or additions to your medication regime listed below are the same as discussed with your clinician today.  If any of these changes or additions are incorrect, please notify your healthcare provider.             Verify that the below list of medications is an accurate representation of the medications you are currently taking.  If none reported, the list may be blank. If incorrect, please contact your healthcare provider. Carry this list with you in case of emergency.           Current Medications     amlodipine (NORVASC) 10 MG tablet Take 10 mg by mouth once daily.    aspirin 81 MG Chew Take 1 tablet (81 mg total) by mouth once daily. RESUME ON 2/14/15    atorvastatin (LIPITOR) 40 MG tablet Take 1 tablet (40 mg total) by mouth once daily.    carvedilol (COREG) 3.125  MG tablet Take 2 tablets (6.25 mg total) by mouth 2 (two) times daily.    donepezil (ARICEPT) 5 MG tablet Take 5 mg by mouth every evening.    furosemide (LASIX) 20 MG tablet Take 1 tablet (20 mg total) by mouth once daily.    isosorbide-hydrALAZINE 20-37.5 mg (BIDIL) 20-37.5 mg Tab Take 1 tablet by mouth 3 (three) times daily.    mirtazapine (REMERON) 7.5 MG Tab Take 7.5 mg by mouth every evening.    nitroGLYCERIN (NITROSTAT) 0.4 MG SL tablet Place 1 tablet (0.4 mg total) under the tongue every 5 (five) minutes as needed for Chest pain.    senna-docusate 8.6-50 mg (PERICOLACE) 8.6-50 mg per tablet Take 1 tablet by mouth 2 (two) times daily.           Clinical Reference Information           Allergies as of 4/25/2017     No Known Allergies      Immunizations Administered on Date of Encounter - 4/25/2017     None      Language Assistance Services     ATTENTION: Language assistance services are available, free of charge. Please call [phone].      ATENCIÓN: Si nidia bauer, tiene a loomis disposición servicios gratuitos de asistencia lingüística. Llame al [phone].     [facility] Ý: N?u b?n nói Ti?ng Vi?t, có các d?ch v? h? tr? ngôn ng? mi?n phí dành cho b?n. G?i s? [phone].         [facility] complies with applicable Federal civil rights laws and does not discriminate on the basis of race, color, national origin, age, disability, or sex.

## 2017-04-25 NOTE — PROGRESS NOTES
HPI     S/P Phaco w/ IOL OS 4/17/17 Dr. Motta    Pt states she is doing well. No pain.  Denies any vision probs    Eye meds:  P/G/B  TID       Last edited by Abeba Calle on 4/25/2017  9:42 AM.         Assessment /Plan     For exam results, see Encounter Report.    Post-operative state    Nuclear sclerotic cataract of left eye      Slit lamp exam:  L/L: nl  K: clear, wound sealed  AC: trace cell  Iris/Lens: IOL centered and stable    POW1 s/p phaco: Surgery healing well with no signs of infection or abnormal inflammation.    Patient wishes to proceed with surgery in the second eye. Risks, benefits, alternatives reviewed. IOL selection reviewed.     Right eye  IOL: pcboo 19.0

## 2017-05-08 ENCOUNTER — TELEPHONE (OUTPATIENT)
Dept: OPHTHALMOLOGY | Facility: CLINIC | Age: 82
End: 2017-05-08

## 2017-05-08 DIAGNOSIS — H25.11 NUCLEAR SCLEROTIC CATARACT OF RIGHT EYE: Primary | ICD-10-CM

## 2017-05-12 NOTE — PT/OT/SLP DISCHARGE
Occupational Therapy Discharge Summary    Misty Yun  MRN: [id number]   Hypercalcemia   Patient Discharged from acute Occupational Therapy on 4/14/2017.  Please refer to prior OT note dated on 4/13/2017 for functional status.     Assessment:   Patient appropriate for care in another setting. Patient has not met goals.  GOALS:   Occupational Therapy Goals     Not on file      Multidisciplinary Problems (Resolved)        Problem: Occupational Therapy Goal    Goal Priority Disciplines Outcome Interventions   Occupational Therapy Goal   (Resolved)     OT, PT/OT Outcome(s) achieved    Description:  Goals to be met by: 4/23/2017     Patient will increase functional independence with ADLs by performing:    Feeding with Supervision.  UE Dressing with Minimal Assistance.  Grooming while standing with Minimal Assistance.  Supine to sit with Minimal Assistance.  Stand pivot transfers with Stand-by Assistance.  Wheelchair transfers with Minimal Assistance.  Self propel wheelchair ~2 minutes with no  RBs.              Reasons for Discontinuation of Therapy Services  Transfer to alternate level of care.      Plan:  Patient Discharged to: [facility] .    CRYSTAL Williamson  5/12/2017

## 2017-05-22 ENCOUNTER — OFFICE VISIT (OUTPATIENT)
Dept: CARDIOLOGY | Facility: CLINIC | Age: 82
End: 2017-05-22
Payer: MEDICARE

## 2017-05-22 VITALS
WEIGHT: 153.25 LBS | HEIGHT: 63 IN | SYSTOLIC BLOOD PRESSURE: 130 MMHG | HEART RATE: 60 BPM | BODY MASS INDEX: 27.15 KG/M2 | DIASTOLIC BLOOD PRESSURE: 68 MMHG

## 2017-05-22 DIAGNOSIS — E11.22 TYPE 2 DIABETES MELLITUS WITH STAGE 4 CHRONIC KIDNEY DISEASE AND HYPERTENSION: ICD-10-CM

## 2017-05-22 DIAGNOSIS — N18.4 CHRONIC KIDNEY DISEASE, STAGE IV (SEVERE): Chronic | ICD-10-CM

## 2017-05-22 DIAGNOSIS — N18.4 TYPE 2 DIABETES MELLITUS WITH STAGE 4 CHRONIC KIDNEY DISEASE AND HYPERTENSION: ICD-10-CM

## 2017-05-22 DIAGNOSIS — E78.5 HYPERLIPIDEMIA, UNSPECIFIED HYPERLIPIDEMIA TYPE: Chronic | ICD-10-CM

## 2017-05-22 DIAGNOSIS — I12.9 TYPE 2 DIABETES MELLITUS WITH STAGE 4 CHRONIC KIDNEY DISEASE AND HYPERTENSION: ICD-10-CM

## 2017-05-22 DIAGNOSIS — I50.42 CHRONIC COMBINED SYSTOLIC AND DIASTOLIC HEART FAILURE: Chronic | ICD-10-CM

## 2017-05-22 DIAGNOSIS — I25.10 CORONARY ARTERY DISEASE INVOLVING NATIVE CORONARY ARTERY OF NATIVE HEART WITHOUT ANGINA PECTORIS: Chronic | ICD-10-CM

## 2017-05-22 DIAGNOSIS — I10 ESSENTIAL HYPERTENSION: Primary | Chronic | ICD-10-CM

## 2017-05-22 PROCEDURE — 1126F AMNT PAIN NOTED NONE PRSNT: CPT | Mod: GC,S$GLB,, | Performed by: STUDENT IN AN ORGANIZED HEALTH CARE EDUCATION/TRAINING PROGRAM

## 2017-05-22 PROCEDURE — 3075F SYST BP GE 130 - 139MM HG: CPT | Mod: GC,S$GLB,, | Performed by: STUDENT IN AN ORGANIZED HEALTH CARE EDUCATION/TRAINING PROGRAM

## 2017-05-22 PROCEDURE — 99999 PR PBB SHADOW E&M-EST. PATIENT-LVL IV: CPT | Mod: PBBFAC,GC,, | Performed by: STUDENT IN AN ORGANIZED HEALTH CARE EDUCATION/TRAINING PROGRAM

## 2017-05-22 PROCEDURE — 3078F DIAST BP <80 MM HG: CPT | Mod: GC,S$GLB,, | Performed by: STUDENT IN AN ORGANIZED HEALTH CARE EDUCATION/TRAINING PROGRAM

## 2017-05-22 PROCEDURE — 1157F ADVNC CARE PLAN IN RCRD: CPT | Mod: GC,S$GLB,, | Performed by: STUDENT IN AN ORGANIZED HEALTH CARE EDUCATION/TRAINING PROGRAM

## 2017-05-22 PROCEDURE — 99213 OFFICE O/P EST LOW 20 MIN: CPT | Mod: GC,S$GLB,, | Performed by: STUDENT IN AN ORGANIZED HEALTH CARE EDUCATION/TRAINING PROGRAM

## 2017-05-22 PROCEDURE — 1160F RVW MEDS BY RX/DR IN RCRD: CPT | Mod: GC,S$GLB,, | Performed by: STUDENT IN AN ORGANIZED HEALTH CARE EDUCATION/TRAINING PROGRAM

## 2017-05-22 PROCEDURE — 1159F MED LIST DOCD IN RCRD: CPT | Mod: GC,S$GLB,, | Performed by: STUDENT IN AN ORGANIZED HEALTH CARE EDUCATION/TRAINING PROGRAM

## 2017-05-22 NOTE — PROGRESS NOTES
Cardiology Clinic Note  Reason for Visit: CHF F/U    HPI:   Misty Yun is a 83 y.o. female with a PMH of CAD s/p CABG (2006), ICM (EF 30-35%), Dementia, CKD 4-5, T2DM, HTN, HLD who presented to the cardiology clinic for management of CHF.     On her last visit with me on 03/27/17, we had decreased her lasix to 20 mg QD from BID due to LESLEY/dehydration. Encouraged her to drink more water and repeat CMP/Mg levels. Her lab work over the last suggested progression of her CKD (BUN/Cr - 61/2.2 (01/17) --> 63/2.6 (03/14) --> 58/2.98 (3/21). She saw Dr. Ahumada (nephrology) who did not feel she was a candidate at this point for RRT. Also recommended IV iron therapy, decrease fosamax to once monthly. She set her up with some lab work which revealed evidence of hypercalcemia (13.2) so recommended for Ms. Yun to go to the ED. She was admitted from 4/11 to 4/14. She did not display any neurologic symptoms, palpitations, lightheadedness, abdominal pain but did not have constipation. She was transiently on IV fluids but then was given a calcitonin injection. Prior to discharge her Ca was 10.1, Phos 3.7,. Vit D 35, PTH 23, PTHrP 0.8 (WNLs). BMP revealed a Cr level 2.8, K 3.4 (4/14).       Since being discharged, she has undergone left eye cataract surgery w/o any complaints. She had repeat blood work done at [facility] which revealed Ca 9.6, Cr 2.423, BUN 48, K 3.6. She feels well and denies any symptoms of SOB, orthopnea, PND, chest pain, palpitations, numbness, weakness, cough, fever, chills, dysuria. Takes all medications as prescribed.     ROS:    Constitution: Negative for fever, chills, weight loss or gain.   HENT: Negative for sore throat, rhinorrhea, or headache.  Eyes: Negative for blurred or double vision.   Cardiovascular: See above  Pulmonary: Negative for SOB   Gastrointestinal: Negative for abdominal pain, nausea, vomiting, or diarrhea.   : Negative for dysuria.   Neurological: Negative for  focal weakness or sensory changes.  PMH:     Past Medical History:   Diagnosis Date    Anemia     Anemia     CAD (coronary artery disease)     Cataract     CHF (congestive heart failure)     Chronic kidney disease (CKD), stage IV (severe) 03/27/2017    Sees  at [facility], no dialysis    Dementia     Encounter for blood transfusion 2006    at time of CABG    Fracture of humerus, proximal, left, closed 1/28/2015    Hypercalcemia 04/10/2017    Hyperlipidemia     Hyperparathyroidism     Hypertension     MI (myocardial infarction) 10/2016    Osteoporosis     Ovarian cancer     in the past    Type 2 diabetes mellitus, controlled     UTI (urinary tract infection) 04/11/2017     Past Surgical History:   Procedure Laterality Date    CATARACT EXTRACTION W/  INTRAOCULAR LENS IMPLANT Left 04/17/2017    Dr. Motta    CORONARY ARTERY BYPASS GRAFT  2006    HYSTERECTOMY  2002    ORIF HUMERUS FRACTURE Left 1/28/2015     Allergies:   Review of patient's allergies indicates:  No Known Allergies  Medications:     Current Outpatient Prescriptions on File Prior to Visit   Medication Sig Dispense Refill    amlodipine (NORVASC) 10 MG tablet Take 10 mg by mouth once daily.      aspirin 81 MG Chew Take 1 tablet (81 mg total) by mouth once daily. RESUME ON 2/14/15      atorvastatin (LIPITOR) 40 MG tablet Take 1 tablet (40 mg total) by mouth once daily. 30 tablet 3    carvedilol (COREG) 3.125 MG tablet Take 2 tablets (6.25 mg total) by mouth 2 (two) times daily. 60 tablet 3    donepezil (ARICEPT) 5 MG tablet Take 5 mg by mouth every evening.      furosemide (LASIX) 20 MG tablet Take 1 tablet (20 mg total) by mouth once daily. 30 tablet 11    isosorbide-hydrALAZINE 20-37.5 mg (BIDIL) 20-37.5 mg Tab Take 1 tablet by mouth 3 (three) times daily. 90 tablet 11    mirtazapine (REMERON) 7.5 MG Tab Take 7.5 mg by mouth every evening.      nitroGLYCERIN (NITROSTAT) 0.4 MG SL tablet Place 1 tablet (0.4 mg  "total) under the tongue every 5 (five) minutes as needed for Chest pain. 25 tablet 3    senna-docusate 8.6-50 mg (PERICOLACE) 8.6-50 mg per tablet Take 1 tablet by mouth 2 (two) times daily.       No current facility-administered medications on file prior to visit.      Social History:     Social History   Substance Use Topics    Smoking status: Former Smoker     Packs/day: 2.00     Years: 30.00     Quit date: 1/20/2007    Smokeless tobacco: Never Used    Alcohol use No     Family History:     Family History   Problem Relation Age of Onset    Adopted: Yes    Blindness Maternal Grandmother      Physical Exam:   /68   Pulse 60   Ht 5' 3" (1.6 m)   Wt 69.5 kg (153 lb 3.5 oz)   BMI 27.14 kg/m²      Wt Readings from Last 10 Encounters:   05/22/17 69.5 kg (153 lb 3.5 oz)   04/11/17 66.2 kg (146 lb)   04/14/17 73.3 kg (161 lb 9.6 oz)   04/10/17 67.4 kg (148 lb 9.4 oz)   03/27/17 66.9 kg (147 lb 7.8 oz)   01/30/17 64.9 kg (143 lb)   12/05/16 63.5 kg (139 lb 15.9 oz)   11/21/16 63.3 kg (139 lb 8.8 oz)   11/07/16 59.8 kg (131 lb 13.4 oz)   05/04/15 72.1 kg (159 lb)       Constitutional: NAD, conversant  HEENT: Sclera anicteric, PERRLA, EOMI  Neck: No JVD, no carotid bruits  CV: RRR, no murmur, normal S1/S2, No Pericardial rub  Pulm: CTAB with no wheezes, rales, or rhonchi  GI: Abdomen soft, NTND, +BS  Extremities: No LE edema, warm and well perfused, No cyanosis, No clubbing  Skin: No ecchymosis, erythema, or ulcers  Psych: AOx3, appropriate affect  Neuro: CNII-XII intact, no focal deficits    Labs:     Lab Results   Component Value Date     04/14/2017    K 3.4 (L) 04/14/2017     04/14/2017    CO2 23 04/14/2017    BUN 49 (H) 04/14/2017    CREATININE 2.8 (H) 04/14/2017    ANIONGAP 10 04/14/2017     Lab Results   Component Value Date    HGBA1C 5.8 10/31/2016     Lab Results   Component Value Date     (H) 11/23/2016    BNP 2,475 (H) 10/31/2016    BNP 79 04/13/2015    Lab Results   Component " Value Date    WBC 6.62 04/14/2017    HGB 9.0 (L) 04/14/2017    HCT 27.1 (L) 04/14/2017    HCT 28 (L) 01/28/2015     04/14/2017    GRAN 3.8 04/14/2017    GRAN 56.8 04/14/2017     Lab Results   Component Value Date    CHOL 142 11/01/2016    HDL 42 11/01/2016    LDLCALC 84.8 11/01/2016    TRIG 76 11/01/2016          Imaging:   TTE (11/23/17):  1 - Eccentric hypertrophy.     2 - Moderately depressed left ventricular systolic function (EF 30-35%).     3 - Mild left atrial enlargement.     4 - Left ventricular diastolic dysfunction.     5 - Mildly depressed right ventricular systolic function .     6 - The estimated PA systolic pressure is 28 mmHg.     7 - Mild mitral regurgitation.     8 - Mild tricuspid regurgitation.         EF   Date Value Ref Range Status   11/23/2016 30 (A) 55 - 65    10/31/2016 25 (A) 55 - 65        Assessment:   Misty Yun is a 83 y.o. female with a PMH of CAD s/p CABG (2006), Dementia, T2DM, HTN, HLD who presented to the cardiology clinic for management of CHF.     Plan:   1) Chronic systolic CHF/ Ischemic cardiomyopathy (EF 30-35%)/ CAD s/p CABG (2006)  - c/w ASA  - c/w coreg to 6.25mg TWICE DAILY  - c/w lasix 20 mg QD  - c/w Bidil 20-37.5 TID  - check CMP, Mg in 3 months at NH prior to next clinic visit  - check weight dailys; if weight goes up by 3 lbs in 1 day or 5 lbs in 1 week can give extra dose of lasix  - 2 gram sodium diet     2) Dementia - c/w aricept     3) T2DM - c/w insulin as needed; HbA1C: 6.2 (01/12/17)      4) HLD - c/w atorvastatin; LDL: 85; HDL: 42     5) CKD 4-5  - Cr stable at 2.43 (5/16/17 at NH)  - c/w lasix 20 mg QD  - encourage PO fluid intake  - check CMP, Mg in 3 months prior to next clinic visit  - nephrology evaluation appreciated     RTC in 3 months. Case discussed with attending.    Signed:  Cici Clemens MD  Cardiology Fellow  Pager: [phone]  5/22/2017 4:51 PM

## 2017-05-22 NOTE — PATIENT INSTRUCTIONS
C/w current management  CMP/Mg in 3 months prior to next visit (can be done at [facility])  RTC in 3 months

## 2017-06-01 ENCOUNTER — TELEPHONE (OUTPATIENT)
Dept: OPTOMETRY | Facility: CLINIC | Age: 82
End: 2017-06-01

## 2017-06-01 ENCOUNTER — TELEPHONE (OUTPATIENT)
Dept: OPHTHALMOLOGY | Facility: CLINIC | Age: 82
End: 2017-06-01

## 2017-06-01 NOTE — TELEPHONE ENCOUNTER
----- Message from Clarissa Mcarthur sent at 6/1/2017 11:57 AM CDT -----  Contact: Misa Yun   Ms Davidsheela @ the nurse home will like to speak with  nurse about the pt eye drops she can be reached at [phone] please thanks.

## 2017-06-01 NOTE — TELEPHONE ENCOUNTER
I spoke to nurse's aid at nursing [place].  She is unaware of what drops to use preoperatively.  Informed her that they have a prescription on file at [facility] pharmacy for patient. That she should begin using Saturday.  Just as when patient had her left eye done.  She stated she will have patients daughter call and if they are unable to have drops shipped can have the three separate Rx's filled locally.

## 2017-06-05 ENCOUNTER — SURGERY (OUTPATIENT)
Age: 82
End: 2017-06-05

## 2017-06-05 ENCOUNTER — ANESTHESIA (OUTPATIENT)
Dept: SURGERY | Facility: OTHER | Age: 82
End: 2017-06-05
Payer: MEDICARE

## 2017-06-05 ENCOUNTER — ANESTHESIA EVENT (OUTPATIENT)
Dept: SURGERY | Facility: OTHER | Age: 82
End: 2017-06-05
Payer: MEDICARE

## 2017-06-05 ENCOUNTER — HOSPITAL ENCOUNTER (OUTPATIENT)
Facility: OTHER | Age: 82
Discharge: HOME | End: 2017-06-05
Attending: OPHTHALMOLOGY | Admitting: OPHTHALMOLOGY
Payer: MEDICARE

## 2017-06-05 VITALS
RESPIRATION RATE: 18 BRPM | HEIGHT: 63 IN | DIASTOLIC BLOOD PRESSURE: 58 MMHG | TEMPERATURE: 98 F | SYSTOLIC BLOOD PRESSURE: 117 MMHG | BODY MASS INDEX: 28.88 KG/M2 | HEART RATE: 64 BPM | OXYGEN SATURATION: 97 % | WEIGHT: 163 LBS

## 2017-06-05 DIAGNOSIS — H25.13 NUCLEAR SCLEROSIS, BILATERAL: ICD-10-CM

## 2017-06-05 DIAGNOSIS — H25.11 NUCLEAR SCLEROSIS, RIGHT: Primary | ICD-10-CM

## 2017-06-05 LAB — POCT GLUCOSE: 73 MG/DL (ref 70–110)

## 2017-06-05 PROCEDURE — 82962 GLUCOSE BLOOD TEST: CPT | Performed by: OPHTHALMOLOGY

## 2017-06-05 PROCEDURE — 25000003 PHARM REV CODE 250: Performed by: OPHTHALMOLOGY

## 2017-06-05 PROCEDURE — 36000706: Performed by: OPHTHALMOLOGY

## 2017-06-05 PROCEDURE — 71000015 HC POSTOP RECOV 1ST HR: Performed by: OPHTHALMOLOGY

## 2017-06-05 PROCEDURE — 37000009 HC ANESTHESIA EA ADD 15 MINS: Performed by: OPHTHALMOLOGY

## 2017-06-05 PROCEDURE — V2632 POST CHMBR INTRAOCULAR LENS: HCPCS | Performed by: OPHTHALMOLOGY

## 2017-06-05 PROCEDURE — 37000008 HC ANESTHESIA 1ST 15 MINUTES: Performed by: OPHTHALMOLOGY

## 2017-06-05 PROCEDURE — 36000707: Performed by: OPHTHALMOLOGY

## 2017-06-05 PROCEDURE — 63600175 PHARM REV CODE 636 W HCPCS: Performed by: NURSE ANESTHETIST, CERTIFIED REGISTERED

## 2017-06-05 PROCEDURE — 66984 XCAPSL CTRC RMVL W/O ECP: CPT | Mod: 79,RT,, | Performed by: OPHTHALMOLOGY

## 2017-06-05 DEVICE — IMPLANTABLE DEVICE: Type: IMPLANTABLE DEVICE | Site: EYE | Status: FUNCTIONAL

## 2017-06-05 RX ORDER — SODIUM CHLORIDE 9 MG/ML
INJECTION, SOLUTION INTRAVENOUS CONTINUOUS
Status: DISCONTINUED | OUTPATIENT
Start: 2017-06-05 | End: 2017-06-05 | Stop reason: HOSPADM

## 2017-06-05 RX ORDER — PHENYLEPHRINE HYDROCHLORIDE 25 MG/ML
1 SOLUTION/ DROPS OPHTHALMIC
Status: DISCONTINUED | OUTPATIENT
Start: 2017-06-05 | End: 2017-06-05 | Stop reason: HOSPADM

## 2017-06-05 RX ORDER — TROPICAMIDE 10 MG/ML
1 SOLUTION/ DROPS OPHTHALMIC
Status: DISCONTINUED | OUTPATIENT
Start: 2017-06-05 | End: 2017-06-05 | Stop reason: HOSPADM

## 2017-06-05 RX ORDER — MOXIFLOXACIN 5 MG/ML
1 SOLUTION/ DROPS OPHTHALMIC
Status: DISCONTINUED | OUTPATIENT
Start: 2017-06-05 | End: 2017-06-05 | Stop reason: HOSPADM

## 2017-06-05 RX ORDER — MOXIFLOXACIN 5 MG/ML
1 SOLUTION/ DROPS OPHTHALMIC
Status: COMPLETED | OUTPATIENT
Start: 2017-06-05 | End: 2017-06-05

## 2017-06-05 RX ORDER — ACETAMINOPHEN 325 MG/1
650 TABLET ORAL EVERY 4 HOURS PRN
Status: DISCONTINUED | OUTPATIENT
Start: 2017-06-05 | End: 2017-06-05 | Stop reason: HOSPADM

## 2017-06-05 RX ORDER — MIDAZOLAM HYDROCHLORIDE 1 MG/ML
INJECTION INTRAMUSCULAR; INTRAVENOUS
Status: DISCONTINUED | OUTPATIENT
Start: 2017-06-05 | End: 2017-06-05

## 2017-06-05 RX ORDER — MOXIFLOXACIN 5 MG/ML
SOLUTION/ DROPS OPHTHALMIC
Status: DISCONTINUED | OUTPATIENT
Start: 2017-06-05 | End: 2017-06-05 | Stop reason: HOSPADM

## 2017-06-05 RX ORDER — LIDOCAINE HYDROCHLORIDE 40 MG/ML
INJECTION, SOLUTION RETROBULBAR
Status: DISCONTINUED | OUTPATIENT
Start: 2017-06-05 | End: 2017-06-05 | Stop reason: HOSPADM

## 2017-06-05 RX ORDER — TETRACAINE HYDROCHLORIDE 5 MG/ML
1 SOLUTION OPHTHALMIC
Status: COMPLETED | OUTPATIENT
Start: 2017-06-05 | End: 2017-06-05

## 2017-06-05 RX ORDER — PROPARACAINE HYDROCHLORIDE 5 MG/ML
1 SOLUTION/ DROPS OPHTHALMIC
Status: DISCONTINUED | OUTPATIENT
Start: 2017-06-05 | End: 2017-06-05 | Stop reason: HOSPADM

## 2017-06-05 RX ORDER — TETRACAINE HYDROCHLORIDE 5 MG/ML
SOLUTION OPHTHALMIC
Status: DISCONTINUED | OUTPATIENT
Start: 2017-06-05 | End: 2017-06-05 | Stop reason: HOSPADM

## 2017-06-05 RX ADMIN — LIDOCAINE HYDROCHLORIDE 3 DROP: 40 INJECTION, SOLUTION RETROBULBAR; TOPICAL at 08:06

## 2017-06-05 RX ADMIN — MOXIFLOXACIN HYDROCHLORIDE 1 DROP: 5 SOLUTION/ DROPS OPHTHALMIC at 09:06

## 2017-06-05 RX ADMIN — TETRACAINE HYDROCHLORIDE 1 DROP: 5 SOLUTION OPHTHALMIC at 06:06

## 2017-06-05 RX ADMIN — TETRACAINE HYDROCHLORIDE 2 DROP: 5 SOLUTION OPHTHALMIC at 08:06

## 2017-06-05 RX ADMIN — MOXIFLOXACIN HYDROCHLORIDE 1 DROP: 5 SOLUTION/ DROPS OPHTHALMIC at 08:06

## 2017-06-05 RX ADMIN — SODIUM CHONDROITIN SULFATE / SODIUM HYALURONATE 2 ML: 0.55-0.5 INJECTION INTRAOCULAR at 08:06

## 2017-06-05 RX ADMIN — PHENYLEPHRINE HYDROCHLORIDE 1 DROP: 25 SOLUTION/ DROPS OPHTHALMIC at 06:06

## 2017-06-05 RX ADMIN — MOXIFLOXACIN HYDROCHLORIDE 1 DROP: 5 SOLUTION/ DROPS OPHTHALMIC at 06:06

## 2017-06-05 RX ADMIN — TROPICAMIDE 1 DROP: 10 SOLUTION/ DROPS OPHTHALMIC at 06:06

## 2017-06-05 RX ADMIN — MIDAZOLAM HYDROCHLORIDE 1 MG: 1 INJECTION, SOLUTION INTRAMUSCULAR; INTRAVENOUS at 08:06

## 2017-06-05 RX ADMIN — BALANCED SALT SOLUTION ENRICHED WITH BICARBONATE, DEXTROSE, AND GLUTATHIONE 515 ML: KIT at 08:06

## 2017-06-05 NOTE — ANESTHESIA POSTPROCEDURE EVALUATION
"Anesthesia Post Evaluation    Patient: Misty Yun    Procedure(s) Performed: Procedure(s) (LRB):  PHACOEMULSIFICATION-ASPIRATION-CATARACT (Right)  INSERTION-INTRAOCULAR LENS (IOL) (Right)    Final Anesthesia Type: MAC  Patient location during evaluation: [facility]  Patient participation: Yes- Able to Participate  Level of consciousness: awake and alert  Post-procedure vital signs: reviewed and stable  Pain management: adequate  Airway patency: patent  PONV status at discharge: No PONV  Anesthetic complications: no      Cardiovascular status: hemodynamically stable  Respiratory status: spontaneous ventilation and unassisted  Hydration status: euvolemic  Follow-up not needed.        Visit Vitals  BP (!) 154/66 (BP Location: Left arm, Patient Position: Lying, BP Method: Automatic)   Pulse 63   Temp 36.6 °C (97.9 °F) (Oral)   Resp 18   Ht 5' 3" (1.6 m)   Wt 73.9 kg (163 lb)   SpO2 99%   Breastfeeding? No   BMI 28.87 kg/m²       Pain/Torsten Score: Pain Assessment Performed: Yes (6/5/2017  6:07 AM)  Presence of Pain: denies (6/5/2017  6:07 AM)      "

## 2017-06-05 NOTE — DISCHARGE INSTRUCTIONS
Sidney Motta MD  [facility]  Department of Ophthalmology      AFTER: Cataract Surgery:  Relax at home and DO NOT exert yourself for the rest of the day.  Plan to see Dr. Motta tomorrow at the eye clinic:   [employer] [facility] [address]  [place] [address]    Refer to attached eye drop instruction sheet     Precautions:  DO NOT rub your eye.  You may resume moderate activity the day after surgery.  Wear protective sunglasses during the day and a shield at night for 1(one) week.  If you have pain, redness and decreased vision, call Dr. Motta (or the on-call doctor after hours) @[phone].            Home Care Instructions for Eye Surgeries    1. ACTIVITY:  Limit your activity today. Relax at home and DO NOT exert yourself for the rest of the day. Increase activity gradually. You may return to work or school as directed by your physician.    2. DIET:  Drink plenty of fluids. Resume your normal diet unless instructed otherwise.    3. PAIN:  Expect a moderate amount of pain. If a prescription for pain is not sent home with you, you may take your commonly used pain reliever as directed. If this is not sufficient, call your physician. You may resume any other prescription medication unless otherwise directed by your physician.     Discuss any problem with your physician as soon as it arises. Do not Delay.      EMERGENCY- If you are unable to contact your physician, please go to the nearest Emergency Room.       Anesthesia: Monitored Anesthesia Care (MAC)    Anesthesia Safety  · Have an adult family member or friend drive you home after the procedure.  · For the first 24 hours after your surgery:  · Do not drive or use heavy equipment.  · Do not make important decisions or sign documents.  · Avoid alcohol.  · Have someone stay with you, if possible. They can watch for problems and help keep you safe.

## 2017-06-05 NOTE — OR NURSING
The patient verbalized understanding of the surgical procedure as explained and documented on the consent form  by the physician and  has no further questions at this time. The surgical consent was signed by the patient and the patient's signature was witnessed by the RN.

## 2017-06-05 NOTE — OP NOTE
SURGEON:  Sidney Motta M.D.    PREOPERATIVE DIAGNOSIS:    Nuclear Sclerotic Cataract Right Eye    POSTOPERATIVE DIAGNOSIS:    Nuclear Sclerotic Cataract Right Eye    PROCEDURES:    Phacoemulsification with  intraocular lens, Right eye (77433)    DATE OF SURGERY: 06/05/2017    IMPLANT: pcboo 19.0    ANESTHESIA:  MAC with topical Lidocaine    COMPLICATIONS:  None    ESTIMATED BLOOD LOSS: None    SPECIMENS: None    INDICATIONS:    The patient has a history of painless progressive visual loss and  difficulty with activities of daily living secondary to cataract formation.  After a thorough discussion of the risks, benefits, and alternatives to cataract surgery, including, but not limited to, the rare risks of infection, retinal detachment, hemorrhage, need for additional surgery, loss of vision, and even loss of the eye, the patient voices understanding and desires to proceed.    DESCRIPTION OF PROCEDURE:    The patients IOL calculations were reviewed, and the lens selection confirmed.   After verification and marking of the proper eye in the preop holding area, the patient was brought to the operating room in supine position where the eye was prepped and draped in standard sterile fashion with 5% Betadine and a lid speculum placed in the eye.   Topical 4% Lidocaine was used in addition to the preoperative anesthesia and the procedure was begun by the creation of a paracentesis incision through which viscoelastic was used to fill the anterior chamber.  Next, a keratome blade was used to create a triplanar temporal clear corneal incision and a cystotome and Utrata forceps used to fashion a continuous curvilinear capsulorrhexis.  Hydrodissection was carried out using the Voss hydrodissection cannula and the nucleus was found to be mobile.  Phacoemulsification of the nucleus was carried out using a quick chop technique, and all remaining epinuclear and cortical material was removed.  The eye was then reformed with  Viscoelastic and the  intraocular lens was implanted into the capsular bag.  All remaining viscoelastics were removed from the eye and at the end of the case the pupil was round, the lens was well-centered within the capsular bag and all wounds were found to be water tight.  Drops of Vigamox and Pred Forte were instilled and a shield was placed over the eye. The patient will follow up with Dr. Motta in the morning.

## 2017-06-05 NOTE — ANESTHESIA PREPROCEDURE EVALUATION
06/05/2017  Misty Yun is a 83 y.o., female.    Pre-op Assessment    I have reviewed the Patient Summary Reports.     I have reviewed the Nursing Notes.   I have reviewed the Medications.     Review of Systems  Anesthesia Hx:  Denies Family Hx of Anesthesia complications.   Denies Personal Hx of Anesthesia complications.   Hematology/Oncology:         -- Anemia: Hematology Comments: Chronic anemia  --  Cancer in past history (ovarian):    Cardiovascular:   Hypertension Past MI CAD asymptomatic CABG/stent (2007)  CHF (compensated)    Renal/:   Chronic Renal Disease (stage IV), CRI    Neurological:  Dementia moderate    Endocrine:   Diabetes, type 2        Physical Exam  General:  Well nourished      Dental:  Dental Findings: Upper Dentures, Lower Dentures        Mental Status:  Mental Status Findings:  Cooperative, Alert and Oriented         Anesthesia Plan  Type of Anesthesia, risks & benefits discussed:  Anesthesia Type:  MAC  Patient's Preference:   Intra-op Monitoring Plan: standard ASA monitors  Intra-op Monitoring Plan Comments:   Post Op Pain Control Plan:   Post Op Pain Control Plan Comments:   Induction:    Beta Blocker:         Informed Consent: Patient understands risks and agrees with Anesthesia plan.  Questions answered. Anesthesia consent signed with patient.  ASA Score: 3     Day of Surgery Review of History & Physical:    H&P update referred to the surgeon.     Anesthesia Plan Notes: Tolerated previous cat ext last month well        Ready For Surgery From Anesthesia Perspective.

## 2017-06-05 NOTE — PLAN OF CARE
Patient prefers to have Carlota (sister) present for discharge teaching. Please contact them @[phone].

## 2017-06-05 NOTE — DISCHARGE SUMMARY
Outcome: Successful outpatient ophthalmic surgical procedure  Preprinted Instructions given to patient.  Regular diet.  Activity: No restrictions  Meds: see Med Rec  Condition: stable  Follow up: 1 day with Dr Motta  Disposition: Home  Diagnosis: s/p eye surgery

## 2017-06-06 ENCOUNTER — OFFICE VISIT (OUTPATIENT)
Dept: OPHTHALMOLOGY | Facility: CLINIC | Age: 82
End: 2017-06-06
Attending: OPHTHALMOLOGY
Payer: MEDICARE

## 2017-06-06 DIAGNOSIS — H25.13 NUCLEAR SCLEROSIS, BILATERAL: ICD-10-CM

## 2017-06-06 DIAGNOSIS — Z98.890 POST-OPERATIVE STATE: Primary | ICD-10-CM

## 2017-06-06 PROCEDURE — 99024 POSTOP FOLLOW-UP VISIT: CPT | Mod: S$GLB,,, | Performed by: OPHTHALMOLOGY

## 2017-06-06 PROCEDURE — 99999 PR PBB SHADOW E&M-EST. PATIENT-LVL II: CPT | Mod: PBBFAC,,, | Performed by: OPHTHALMOLOGY

## 2017-06-06 NOTE — PROGRESS NOTES
HPI     Post-op Evaluation    Additional comments: 1 day check.            Comments   POD 1 CE with IOL OD    Pt states doing well, no complaints.  Using drops as directed.    PMB tid OD        Last edited by Carina Urena on 6/6/2017  9:32 AM. (History)            Assessment /Plan     For exam results, see Encounter Report.    Post-operative state    Nuclear sclerosis, bilateral      Slit lamp exam:  L/L: nl  K: clear, wound sealed  AC: 1+ cell  Lens: IOL centered and stable    POD1 s/p Phaco/IOL  Appropriate precautions and post op medications reviewed.  Patient instructed to call or come in if symptoms of redness, decreased vision, or pain are experienced.

## 2017-06-13 ENCOUNTER — TELEPHONE (OUTPATIENT)
Dept: OPHTHALMOLOGY | Facility: CLINIC | Age: 82
End: 2017-06-13

## 2017-06-13 NOTE — TELEPHONE ENCOUNTER
I spoke to patient's sister.  They are out of Ms Yun's drops.  Advised her to contact [facility] to arrange payment/delivery of refill.  Use drops three times a day until 07/05/2017.  She voiced understanding and will call back should she have any further concern.

## 2017-06-13 NOTE — TELEPHONE ENCOUNTER
----- Message from Giovana Braga sent at 6/13/2017  2:28 PM CDT -----  Contact: Carlota Nikhil (sister)  Pt sister calling to find out whether or not she should still be using the post surgery drops.  Please contact her at [phone].

## 2017-06-21 ENCOUNTER — OFFICE VISIT (OUTPATIENT)
Dept: OPHTHALMOLOGY | Facility: CLINIC | Age: 82
End: 2017-06-21
Payer: MEDICARE

## 2017-06-21 DIAGNOSIS — B02.30 HERPES ZOSTER OPHTHALMICUS: Primary | ICD-10-CM

## 2017-06-21 PROCEDURE — 99999 PR PBB SHADOW E&M-EST. PATIENT-LVL II: CPT | Mod: PBBFAC,,, | Performed by: OPHTHALMOLOGY

## 2017-06-21 PROCEDURE — 92014 COMPRE OPH EXAM EST PT 1/>: CPT | Mod: 24,S$GLB,, | Performed by: OPHTHALMOLOGY

## 2017-06-21 NOTE — PROGRESS NOTES
HPI     Patient is accompanied by her sister, Carlota Tejeda. Ms. Van states the   nursing home noticed a rash in her face on Friday afternoon. It began to   spread on Sunday. The nursing home wants to make sure there is no eye   involvement. Patient had cataract surgery in the right eye 06/05/2017.   Patient states she had some swelling on the right side of the face. She   says she has pain in the right eye. 10/10    Eye meds:  PMB TID OD          Last edited by Sonia Ruiz on 6/21/2017  1:54 PM. (History)            Assessment /Plan     For exam results, see Encounter Report.    Herpes zoster ophthalmicus      Normal eye exam.  No ocular involvement at this time.

## 2017-07-12 ENCOUNTER — OFFICE VISIT (OUTPATIENT)
Dept: OPTOMETRY | Facility: CLINIC | Age: 82
End: 2017-07-12
Payer: MEDICARE

## 2017-07-12 DIAGNOSIS — B02.22 TRIGEMINAL HERPES ZOSTER: Primary | ICD-10-CM

## 2017-07-12 PROCEDURE — 99999 PR PBB SHADOW E&M-EST. PATIENT-LVL I: CPT | Mod: PBBFAC,,, | Performed by: OPTOMETRIST

## 2017-07-12 PROCEDURE — 92012 INTRM OPH EXAM EST PATIENT: CPT | Mod: S$GLB,,, | Performed by: OPTOMETRIST

## 2017-07-12 RX ORDER — INSULIN ASPART 100 [IU]/ML
100 INJECTION, SOLUTION INTRAVENOUS; SUBCUTANEOUS
Status: ON HOLD | COMMUNITY
End: 2018-07-16

## 2017-07-12 RX ORDER — DIPHENHYDRAMINE HCL 25 MG
25 TABLET ORAL NIGHTLY PRN
Status: ON HOLD | COMMUNITY
End: 2018-07-25 | Stop reason: HOSPADM

## 2017-07-12 RX ORDER — TRAMADOL HYDROCHLORIDE 50 MG/1
50 TABLET ORAL EVERY 6 HOURS PRN
Status: ON HOLD | COMMUNITY
End: 2018-07-25 | Stop reason: HOSPADM

## 2017-07-12 RX ORDER — ERGOCALCIFEROL 1.25 MG/1
50000 CAPSULE ORAL
COMMUNITY

## 2017-07-12 RX ORDER — ALENDRONATE SODIUM 70 MG/1
70 TABLET ORAL
COMMUNITY
End: 2018-04-30

## 2017-07-12 NOTE — PROGRESS NOTES
HPI     Last eye exam was 6/21/17 with Dr. Motta.  Patient's sister, Rossana, states the patient told her that her eye was red   and painful a few days ago. Patient states no complaints today. Saw Dr. Motta for shingles outbreak and was told everything was fine.    Tdex q6h OD (last used this am)      Last edited by Leatha Kang on 7/12/2017  2:02 PM. (History)        ROS     Positive for: Skin, Eyes    Negative for: Constitutional, Gastrointestinal, Neurological,   Genitourinary, Musculoskeletal, HENT, Endocrine, Cardiovascular,   Respiratory, Psychiatric, Allergic/Imm, Heme/Lymph    Last edited by Vashti Minor, OD on 7/12/2017  3:26 PM. (History)        Assessment /Plan     For exam results, see Encounter Report.    Trigeminal herpes zoster          1.  No signs of zoster in the right eye.  Educated sister on symptoms of HZO.  RTC 1 week for 1 month post-op.

## 2017-07-18 ENCOUNTER — OFFICE VISIT (OUTPATIENT)
Dept: OPTOMETRY | Facility: CLINIC | Age: 82
End: 2017-07-18
Payer: MEDICARE

## 2017-07-18 DIAGNOSIS — Z98.41 S/P BILATERAL CATARACT EXTRACTION: Primary | ICD-10-CM

## 2017-07-18 DIAGNOSIS — Z98.42 S/P BILATERAL CATARACT EXTRACTION: Primary | ICD-10-CM

## 2017-07-18 PROCEDURE — 99024 POSTOP FOLLOW-UP VISIT: CPT | Mod: S$GLB,,, | Performed by: OPTOMETRIST

## 2017-07-18 PROCEDURE — 99999 PR PBB SHADOW E&M-EST. PATIENT-LVL II: CPT | Mod: PBBFAC,,, | Performed by: OPTOMETRIST

## 2017-07-19 NOTE — PROGRESS NOTES
HPI     Post-op Evaluation    Additional comments: 6 wk phaco OD           Comments   Last eye exam was 7/12/17 with Dr. Minor.  Patient states vision is better since seen last week.   Patient denies diplopia, headaches, flashes/floaters, and pain.    06/05/2017 IMPLANT: pcboo 19.0 OD  04/25/2017 IMPLANT: pcboo OS       Last edited by Leatha Kang on 7/18/2017  2:08 PM. (History)            Assessment /Plan     For exam results, see Encounter Report.    S/P bilateral cataract extraction  -     OCT- Retina            1.  Pt doing well.  Bifocal rx given.  Eye health normal OU. RTC 1 year for dm exam.

## 2017-07-31 DIAGNOSIS — R05.8 NON-PRODUCTIVE COUGH: Primary | ICD-10-CM

## 2017-08-08 ENCOUNTER — HOSPITAL ENCOUNTER (OUTPATIENT)
Dept: RADIOLOGY | Facility: HOSPITAL | Age: 82
Discharge: HOME | End: 2017-08-08
Attending: FAMILY MEDICINE
Payer: MEDICARE

## 2017-08-08 DIAGNOSIS — R05.8 NON-PRODUCTIVE COUGH: ICD-10-CM

## 2017-08-08 PROCEDURE — 71250 CT THORAX DX C-: CPT | Mod: 26,,, | Performed by: RADIOLOGY

## 2017-08-08 PROCEDURE — 71250 CT THORAX DX C-: CPT | Mod: TC

## 2017-08-15 ENCOUNTER — PATIENT MESSAGE (OUTPATIENT)
Dept: RESEARCH | Facility: HOSPITAL | Age: 82
End: 2017-08-15

## 2017-08-18 ENCOUNTER — PATIENT MESSAGE (OUTPATIENT)
Dept: INTERNAL MEDICINE | Facility: CLINIC | Age: 82
End: 2017-08-18

## 2017-08-28 ENCOUNTER — TELEPHONE (OUTPATIENT)
Dept: PULMONOLOGY | Facility: CLINIC | Age: 82
End: 2017-08-28

## 2017-08-28 ENCOUNTER — DOCUMENTATION ONLY (OUTPATIENT)
Dept: PULMONOLOGY | Facility: CLINIC | Age: 82
End: 2017-08-28

## 2017-08-28 ENCOUNTER — OFFICE VISIT (OUTPATIENT)
Dept: NEPHROLOGY | Facility: CLINIC | Age: 82
End: 2017-08-28
Payer: MEDICARE

## 2017-08-28 ENCOUNTER — OFFICE VISIT (OUTPATIENT)
Dept: PULMONOLOGY | Facility: CLINIC | Age: 82
End: 2017-08-28
Payer: MEDICARE

## 2017-08-28 VITALS
DIASTOLIC BLOOD PRESSURE: 80 MMHG | OXYGEN SATURATION: 98 % | WEIGHT: 152.31 LBS | SYSTOLIC BLOOD PRESSURE: 140 MMHG | HEIGHT: 63 IN | HEART RATE: 59 BPM | BODY MASS INDEX: 26.99 KG/M2

## 2017-08-28 VITALS
WEIGHT: 153.25 LBS | SYSTOLIC BLOOD PRESSURE: 126 MMHG | OXYGEN SATURATION: 97 % | DIASTOLIC BLOOD PRESSURE: 72 MMHG | HEIGHT: 63 IN | BODY MASS INDEX: 27.15 KG/M2 | HEART RATE: 58 BPM

## 2017-08-28 DIAGNOSIS — N18.4 ANEMIA OF CHRONIC RENAL FAILURE, STAGE 4 (SEVERE): ICD-10-CM

## 2017-08-28 DIAGNOSIS — I10 ESSENTIAL HYPERTENSION: Chronic | ICD-10-CM

## 2017-08-28 DIAGNOSIS — R91.8 PULMONARY NODULES/LESIONS, MULTIPLE: Primary | ICD-10-CM

## 2017-08-28 DIAGNOSIS — E83.52 HYPERCALCEMIA: ICD-10-CM

## 2017-08-28 DIAGNOSIS — I50.42 CHRONIC COMBINED SYSTOLIC AND DIASTOLIC HEART FAILURE: Chronic | ICD-10-CM

## 2017-08-28 DIAGNOSIS — D63.1 ANEMIA OF CHRONIC RENAL FAILURE, STAGE 4 (SEVERE): ICD-10-CM

## 2017-08-28 DIAGNOSIS — N18.4 CHRONIC KIDNEY DISEASE, STAGE IV (SEVERE): Chronic | ICD-10-CM

## 2017-08-28 DIAGNOSIS — E11.22 TYPE 2 DIABETES MELLITUS WITH STAGE 4 CHRONIC KIDNEY DISEASE AND HYPERTENSION: Primary | ICD-10-CM

## 2017-08-28 DIAGNOSIS — R59.0 MEDIASTINAL ADENOPATHY: ICD-10-CM

## 2017-08-28 DIAGNOSIS — F03.90 DEMENTIA WITHOUT BEHAVIORAL DISTURBANCE, UNSPECIFIED DEMENTIA TYPE: Chronic | ICD-10-CM

## 2017-08-28 DIAGNOSIS — N18.4 TYPE 2 DIABETES MELLITUS WITH STAGE 4 CHRONIC KIDNEY DISEASE AND HYPERTENSION: Primary | ICD-10-CM

## 2017-08-28 DIAGNOSIS — D64.9 ANEMIA, UNSPECIFIED TYPE: ICD-10-CM

## 2017-08-28 DIAGNOSIS — I25.2 HISTORY OF MYOCARDIAL INFARCTION: ICD-10-CM

## 2017-08-28 DIAGNOSIS — I12.9 TYPE 2 DIABETES MELLITUS WITH STAGE 4 CHRONIC KIDNEY DISEASE AND HYPERTENSION: Primary | ICD-10-CM

## 2017-08-28 PROCEDURE — 3008F BODY MASS INDEX DOCD: CPT | Mod: S$GLB,,, | Performed by: NURSE PRACTITIONER

## 2017-08-28 PROCEDURE — 3074F SYST BP LT 130 MM HG: CPT | Mod: S$GLB,,, | Performed by: NURSE PRACTITIONER

## 2017-08-28 PROCEDURE — 1157F ADVNC CARE PLAN IN RCRD: CPT | Mod: S$GLB,,, | Performed by: NURSE PRACTITIONER

## 2017-08-28 PROCEDURE — 99204 OFFICE O/P NEW MOD 45 MIN: CPT | Mod: S$GLB,,, | Performed by: NURSE PRACTITIONER

## 2017-08-28 PROCEDURE — 3078F DIAST BP <80 MM HG: CPT | Mod: S$GLB,,, | Performed by: NURSE PRACTITIONER

## 2017-08-28 PROCEDURE — 1159F MED LIST DOCD IN RCRD: CPT | Mod: S$GLB,,, | Performed by: NURSE PRACTITIONER

## 2017-08-28 PROCEDURE — 3074F SYST BP LT 130 MM HG: CPT | Mod: S$GLB,,, | Performed by: INTERNAL MEDICINE

## 2017-08-28 PROCEDURE — 1126F AMNT PAIN NOTED NONE PRSNT: CPT | Mod: S$GLB,,, | Performed by: INTERNAL MEDICINE

## 2017-08-28 PROCEDURE — 99213 OFFICE O/P EST LOW 20 MIN: CPT | Mod: S$GLB,,, | Performed by: INTERNAL MEDICINE

## 2017-08-28 PROCEDURE — 1157F ADVNC CARE PLAN IN RCRD: CPT | Mod: S$GLB,,, | Performed by: INTERNAL MEDICINE

## 2017-08-28 PROCEDURE — 3079F DIAST BP 80-89 MM HG: CPT | Mod: S$GLB,,, | Performed by: INTERNAL MEDICINE

## 2017-08-28 PROCEDURE — 99999 PR PBB SHADOW E&M-EST. PATIENT-LVL IV: CPT | Mod: PBBFAC,,, | Performed by: INTERNAL MEDICINE

## 2017-08-28 PROCEDURE — 1159F MED LIST DOCD IN RCRD: CPT | Mod: S$GLB,,, | Performed by: INTERNAL MEDICINE

## 2017-08-28 PROCEDURE — 3008F BODY MASS INDEX DOCD: CPT | Mod: S$GLB,,, | Performed by: INTERNAL MEDICINE

## 2017-08-28 PROCEDURE — 99999 PR PBB SHADOW E&M-EST. PATIENT-LVL III: CPT | Mod: PBBFAC,,, | Performed by: NURSE PRACTITIONER

## 2017-08-28 NOTE — PATIENT INSTRUCTIONS
Labs in 4-6 weeks    Stop fosamax, vit d supplements and calcium supplements    Consider reducing lasix to 20 mg every other day for 1-2 weeks as serum creatinine has risen 1.9-->2.5 and she appears dry.    RT 3 months

## 2017-08-28 NOTE — PROGRESS NOTES
Subjective:       Patient ID: Misa Yun is a 83 y.o. Black or  female who presents for new evaluation of renal function. The pateitn is in a wheelchair, accompanied by her sister and an aide from the Nursing home where the patient resides.  HPI     83 o AAF with IDDM type 2, HTN, HPL, serum crt 2.0-2.4 until recent episode of CHF complicated by LESLEY, with initial improvement in GFR but now at 2.6, with set gfr 16 cc/min, 2.7 gm protein on UPRCT, EF 30 % with diastolic dysfunction, no NSAIDs, nephrolithiasis, gout, SOB, Dysuria, hematuria, LUTS.  renal US 1/2016: RIGHT KIDNEY:  Normal in size measuring 10.4 cm. There is increased echogenicity and mild cortical thinning. There are no solid renal masses, nephrolithiasis, or hydronephrosis. Perfusion is decreased. Resistive index is elevated, measuring 0.85.   LEFT KIDNEY:  Normal in size measuring 10.6 cm.  There is increased echogenicity and cortical thinning. There are no solid renal masses, nephrolithiasis, or hydronephrosis. Perfusion is decreased. Resistive index is elevated, measuring 0.82.     Interval Hx:    Admitted 4/2017 for hypercalcemia PTH 28, rPTH <0.8, Vit d 35, 1,25 vit d 70-59, calcium down from13.2  To 8.5 now. She is still on vit d and caclium supplements in NH and fosomax weekly. Serum crt was 1.9 las t montha nbd now 2.5. She ahs poor po intake, no n&V, dysuria.  New lung mass being evaluated by pulmonary.  Review of Systems   Constitutional: Negative for appetite change, chills, fatigue, fever and unexpected weight change.   HENT: Negative for congestion, facial swelling, hearing loss, nosebleeds and trouble swallowing.    Eyes: Negative for pain, discharge, redness and visual disturbance.   Respiratory: Negative for cough, chest tightness, shortness of breath and wheezing.    Cardiovascular: Negative for chest pain, palpitations and leg swelling.   Gastrointestinal: Negative for abdominal pain, constipation, diarrhea,  "nausea and vomiting.   Endocrine: Negative for cold intolerance, heat intolerance and polydipsia.   Genitourinary: Negative for decreased urine volume, difficulty urinating, dysuria, flank pain, hematuria and urgency.   Musculoskeletal: Negative for arthralgias, back pain, joint swelling and myalgias.   Skin: Negative for color change, pallor, rash and wound.   Neurological: Negative for dizziness, tremors, seizures, syncope, speech difficulty, weakness and headaches.   Hematological: Negative for adenopathy. Does not bruise/bleed easily.   Psychiatric/Behavioral: Negative for agitation, behavioral problems, dysphoric mood, self-injury and sleep disturbance.       Objective:     Blood pressure (!) 140/80, pulse (!) 59, height 5' 3" (1.6 m), weight 69.1 kg (152 lb 5.4 oz), SpO2 98 %.    Physical Exam   Constitutional: She appears well-developed and well-nourished. No distress.   Pleasant, low spoken, single words, at times appears to be comprehending discussion NAD   HENT:   Head: Normocephalic and atraumatic.   Mouth/Throat: No oropharyngeal exudate.   Eyes: Conjunctivae and EOM are normal. Pupils are equal, round, and reactive to light. Right eye exhibits no discharge. Left eye exhibits no discharge. No scleral icterus.   Neck: Normal range of motion. Neck supple. No JVD present. No tracheal deviation present. No thyromegaly present.   Cardiovascular: Normal rate, regular rhythm, normal heart sounds and intact distal pulses.  Exam reveals no gallop.    No murmur heard.  No murmur, no peripheral edema   Pulmonary/Chest: Effort normal and breath sounds normal. No stridor. No respiratory distress. She has no wheezes. She has no rales. She exhibits no tenderness.   Abdominal: Soft. Bowel sounds are normal. She exhibits no distension and no mass. There is no tenderness. There is no rebound and no guarding. No hernia.   Musculoskeletal: She exhibits no edema or tenderness.   Lymphadenopathy:     She has no cervical " adenopathy.   Neurological: She is alert. She exhibits normal muscle tone.   uanble to assess orientation, not answering all questions   Skin: Skin is warm and dry. No rash noted. She is not diaphoretic. No erythema.   Psychiatric: She has a normal mood and affect. Judgment and thought content normal.   Nursing note and vitals reviewed.      Assessment:       1. Type 2 diabetes mellitus with stage 4 chronic kidney disease and hypertension    2. Anemia of chronic renal failure, stage 4 (severe)    3. Chronic kidney disease, stage IV (severe)    4. Essential hypertension    5. Chronic combined systolic and diastolic heart failure    6. Hypercalcemia        Plan:     84 yo AAF with IDDM type 2, HTN, HPL, serum crt 2.0-2.4 until recent episode of CHF complicated by LESLEY, with initial improvement in GFR but now at 2.6, with set gfr 16 cc/min, 2.7 gm protein on UPRCT, EF 30 % with diastolic dysfunction, etiology of CKd liekly related to nephrosclerosis, DM nephropathy,and cardiorenal syndrome.    Would complete a baseline evaluation of proteinuria with serologies.  worsening renal function; appears dry  Suggest decrease lasix to 20 mg qod  Hold fosomax    Hypercalcemia:  D/c vit d and calcium supplements. Consdier dx of sarcoid with increased to high normlal 1,25 vit d inf ace of ckd 4, and low pth and rPTH. Pulmonary eval ongoing. Biopsy planned.    1. CKD: serum crt now 2.6 est gfr 16cc/min stage 4 borderline stage 5. It appears to have fluctuated between 15-20 cc/min fro the last 6 months.  Currently she has no signs of fluid overload, metabolic acidosis, electrolyte  imbalance or uremic symptoms.    Repeat labs now      Would give iron infusion for anemia, and DANUTA as indicated after infusion will check irons and cbc again       I had a preliminary discussion with the sister and the patient about the option of RRT if and when necessary. I explained it may not prolong life and would be a change in lifestyle that may  carry more burdens fort he patient. I have offered them the option of visiting a hemodialysis unit (patient would not be a PD nor a home hemo candidate), to learn more about dialysis. They will speak with the patients son and get back to me.  No immediate need for RRT at this time.      Lab Results   Component Value Date    CREATININE 2.8 (H) 04/14/2017         Proteinuria: serologies ordered, it will likely be difficult to collect 24 hr specimen, will check serologies and spot UPEP for now.  Prot/Creat Ratio, Ur   Date Value Ref Range Status   04/10/2017 0.69 (H) 0.00 - 0.20 Final   10/31/2016 2.79 (H) 0.00 - 0.20 Final     Peripheral edema: none     HTN: stable      Medication: no change      Monitor BP as directed in instructions    Metabolic acidosis:none       Hyponatremia:       Hyperkalemia:     Lab Results   Component Value Date     04/14/2017    K 3.4 (L) 04/14/2017    CO2 23 04/14/2017         Renal osteodystrophy secondary hyperparathyroidism:  On fosomax, would reduce to once monthly, vit d 18830p reduce to once monthly, check PTH and vit d level today  Lab Results   Component Value Date    PTH 23.0 04/10/2017    CALCIUM 10.1 04/14/2017    PHOS 3.7 04/14/2017        Anemia: iron sat 8 ferrtin 83 2016, will; likeltyy need iron infusion, will check irons now and likely order iron infusion  Lab Results   Component Value Date    HGB 9.0 (L) 04/14/2017           DM:  Metformin max dose 1000mg daily with gfr <45, d/c for gfr < 30 ml/min               Avoid long acting sulfonylurea      Weight: Weight: 69.1 kg (152 lb 5.4 oz). she states that her daily weights are stable weighed at NH daily  Wt Readings from Last 3 Encounters:   08/28/17 69.1 kg (152 lb 5.4 oz)   08/28/17 69.5 kg (153 lb 3.5 oz)   06/01/17 73.9 kg (163 lb)          Hyperlipidemia: stable  Lab Results   Component Value Date    LDLCALC 84.8 11/01/2016         D iet:  Education/referral:       Sodium: 2gm      Potassium:      Phosphorus:       Protein:      Fluid:       ESRD planing: Family and patient want to discuss idea of RRT with her son  And then get back to me. i have expalined that it will not necessarily prolong life at her age and with her comorbidiites.       Education:       Access:     PCP followup:     Referrals: not at this tiem      Please avoid or minimize all NSAIDS (ibuprofen, motrin, aleve, indocin, naprosyn) to minimize the risk to your kidneys.        Dryden avoid and minimize use of all Proton Pump inhibitors (such as nexium prilosec, omeprazole, pantroprazole, protonix)and Please speak with your PCP about switching to H2 blocker such as Pepcid        Follow up in: 12 weeks standing labs for rfp an d cbc q 8 weeks,a dn labs today

## 2017-08-28 NOTE — PROGRESS NOTES
Subjective:       Patient ID: Misa Yun is a 83 y.o. female.    Chief Complaint: RUL lung lesions    HPI  Misa Yun is a 83 y.o. female who presents today (with a caregiver and her son) as new patient for evaluation of lung mass on CT chest in RUL. Her medical history includes CAD, CHF, CKD, anemia, dementia, ovarian cancer (complete hysterectomy, no chemo or RTX), and previous MI. She lives at [facility]. The patient received a CT chest on 8/8/17 to further evaluate a non productive cough in which 2 large RUL lesions were found in RUL one 2.2cm in the apical segment and 4.7cm in the superior segment with mediastinal adenopathy in subcarinal node and right paratracheal node. She denies any previous respiratory issues (son acknowledged agreement). She is not on any respiratory medications. She has a 60 pk year smoking history which she quit in 2007 after her MI. Her caregiver reports she is on ASA daily. Patient denies cough, shortness of breath, hemoptysis, chest pain, fever or chills.    Review of patient's allergies indicates:  No Known Allergies  Past Medical History:   Diagnosis Date    Anemia     Anemia     CAD (coronary artery disease)     Cataract     CHF (congestive heart failure)     Chronic kidney disease (CKD), stage IV (severe) 03/27/2017    Sees  at [facility], no dialysis    Dementia     Encounter for blood transfusion 2006    at time of CABG    Fracture of humerus, proximal, left, closed 1/28/2015    Hypercalcemia 04/10/2017    Hyperlipidemia     Hyperparathyroidism     Hypertension     MI (myocardial infarction) 10/2016    Osteoporosis     Ovarian cancer     in the past    Type 2 diabetes mellitus, controlled     UTI (urinary tract infection) 04/11/2017     Past Surgical History:   Procedure Laterality Date    CATARACT EXTRACTION W/  INTRAOCULAR LENS IMPLANT Left 04/17/2017    Dr. Motta    CATARACT EXTRACTION W/  INTRAOCULAR LENS  "IMPLANT Right 05/06/2017    Dr Motta     CORONARY ARTERY BYPASS GRAFT  2006    EYE SURGERY      HYSTERECTOMY  2002    ORIF HUMERUS FRACTURE Left 1/28/2015     Family History     Problem Relation (Age of Onset)    Blindness Maternal Grandmother        Social History Main Topics    Smoking status: Former Smoker     Packs/day: 2.00     Years: 30.00     Quit date: 1/20/2007    Smokeless tobacco: Never Used    Alcohol use No    Drug use: No    Sexual activity: Not on file       Review of Systems   Constitutional: Negative for fever and chills.   HENT: Negative for trouble swallowing.    Respiratory: Negative for cough, hemoptysis, shortness of breath and wheezing.    Cardiovascular: Negative for chest pain and leg swelling.   Genitourinary: Negative for difficulty urinating.   Endocrine: Negative for polyphagia.    Musculoskeletal: Negative for gait problem.   Gastrointestinal: Negative for acid reflux.   Neurological: Negative for headaches.   Psychiatric/Behavioral:        Has Dementia       Objective:       Vitals:    08/28/17 0811   BP: 126/72   Pulse: (!) 58   SpO2: 97%   Weight: 69.5 kg (153 lb 3.5 oz)   Height: 5' 3" (1.6 m)     Physical Exam   HENT:   Head: Normocephalic.   Neck: Normal range of motion.   Cardiovascular: Normal rate and normal heart sounds.    Pulmonary/Chest: Normal expansion, symmetric chest wall expansion, effort normal and breath sounds normal. No respiratory distress. She has no wheezes. She has no rhonchi. She has no rales.   Abdominal: Soft. Bowel sounds are normal.   Musculoskeletal: She exhibits no edema.   Lymphadenopathy: No supraclavicular adenopathy is present.     She has no cervical adenopathy.   Neurological: She is alert.   Alert to person and place but not situation. (Has dementia)   Skin: Skin is warm and dry.   Vitals reviewed.    Personal Diagnostic Review    CT chest reviewed 8/8/17: 2 large lesions in RUL 2.2 in apical segment and a 4.7cm in superior segment. Also " mediastinal adenopathy right paratracheal node and subcarinal node.  Assessment:         Outpatient Encounter Prescriptions as of 8/28/2017   Medication Sig Dispense Refill    alendronate (FOSAMAX) 70 MG tablet Take 70 mg by mouth every 7 days.      amlodipine (NORVASC) 10 MG tablet Take 10 mg by mouth once daily.      aspirin 81 MG Chew Take 1 tablet (81 mg total) by mouth once daily. RESUME ON 2/14/15      atorvastatin (LIPITOR) 40 MG tablet Take 1 tablet (40 mg total) by mouth once daily. 30 tablet 3    carvedilol (COREG) 3.125 MG tablet Take 2 tablets (6.25 mg total) by mouth 2 (two) times daily. 60 tablet 3    diphenhydrAMINE (BENADRYL ALLERGY) 25 mg tablet Take 25 mg by mouth nightly as needed for Insomnia.      donepezil (ARICEPT) 5 MG tablet Take 5 mg by mouth every evening.      ergocalciferol (ERGOCALCIFEROL) 50,000 unit Cap Take 50,000 Units by mouth every 7 days.      furosemide (LASIX) 20 MG tablet Take 1 tablet (20 mg total) by mouth once daily. 30 tablet 11    insulin aspart (NOVOLOG) 100 unit/mL injection Inject 100 Units into the skin 3 (three) times daily before meals.      isosorbide-hydrALAZINE 20-37.5 mg (BIDIL) 20-37.5 mg Tab Take 1 tablet by mouth 3 (three) times daily. 90 tablet 11    mirtazapine (REMERON) 7.5 MG Tab Take 7.5 mg by mouth every evening.      nitroGLYCERIN (NITROSTAT) 0.4 MG SL tablet Place 1 tablet (0.4 mg total) under the tongue every 5 (five) minutes as needed for Chest pain. 25 tablet 3    senna-docusate 8.6-50 mg (PERICOLACE) 8.6-50 mg per tablet Take 1 tablet by mouth 2 (two) times daily.      tramadol (ULTRAM) 50 mg tablet Take 50 mg by mouth every 6 (six) hours as needed for Pain.       No facility-administered encounter medications on file as of 8/28/2017.      Problem List Items Addressed This Visit        Neuro    Dementia (Chronic)      Other Visit Diagnoses     Pulmonary nodules/lesions, multiple    -  Primary    Mediastinal adenopathy        History  of myocardial infarction        Anemia, unspecified type            Plan:       · Reviewed test results with patient, son, and caregiver.   · Will collaborate with pulmonologist to decide best possible option for biopsy. Likely bronchoscopy which has been discussed with patient, son, and caregiver.   · Further instruction to follow. Will call patient sibling, son, and nursing facility with further instruction (as per son request).

## 2017-09-06 ENCOUNTER — TELEPHONE (OUTPATIENT)
Dept: PULMONOLOGY | Facility: CLINIC | Age: 82
End: 2017-09-06

## 2017-09-07 DIAGNOSIS — R91.8 PULMONARY NODULES: Primary | ICD-10-CM

## 2017-09-11 ENCOUNTER — OFFICE VISIT (OUTPATIENT)
Dept: CARDIOLOGY | Facility: CLINIC | Age: 82
End: 2017-09-11
Payer: MEDICARE

## 2017-09-11 VITALS
DIASTOLIC BLOOD PRESSURE: 52 MMHG | BODY MASS INDEX: 28.08 KG/M2 | HEIGHT: 63 IN | WEIGHT: 158.5 LBS | SYSTOLIC BLOOD PRESSURE: 118 MMHG | HEART RATE: 63 BPM

## 2017-09-11 DIAGNOSIS — Z01.818 PRE-OP EVALUATION: ICD-10-CM

## 2017-09-11 DIAGNOSIS — I50.42 CHRONIC COMBINED SYSTOLIC AND DIASTOLIC HEART FAILURE: Chronic | ICD-10-CM

## 2017-09-11 DIAGNOSIS — I25.10 CORONARY ARTERY DISEASE INVOLVING NATIVE CORONARY ARTERY OF NATIVE HEART WITHOUT ANGINA PECTORIS: Chronic | ICD-10-CM

## 2017-09-11 DIAGNOSIS — E78.2 MIXED HYPERLIPIDEMIA: Chronic | ICD-10-CM

## 2017-09-11 DIAGNOSIS — I10 ESSENTIAL HYPERTENSION: Primary | Chronic | ICD-10-CM

## 2017-09-11 PROCEDURE — 1159F MED LIST DOCD IN RCRD: CPT | Mod: GC,S$GLB,, | Performed by: STUDENT IN AN ORGANIZED HEALTH CARE EDUCATION/TRAINING PROGRAM

## 2017-09-11 PROCEDURE — 3008F BODY MASS INDEX DOCD: CPT | Mod: GC,S$GLB,, | Performed by: STUDENT IN AN ORGANIZED HEALTH CARE EDUCATION/TRAINING PROGRAM

## 2017-09-11 PROCEDURE — 1157F ADVNC CARE PLAN IN RCRD: CPT | Mod: GC,S$GLB,, | Performed by: STUDENT IN AN ORGANIZED HEALTH CARE EDUCATION/TRAINING PROGRAM

## 2017-09-11 PROCEDURE — 99999 PR PBB SHADOW E&M-EST. PATIENT-LVL IV: CPT | Mod: PBBFAC,GC,, | Performed by: STUDENT IN AN ORGANIZED HEALTH CARE EDUCATION/TRAINING PROGRAM

## 2017-09-11 PROCEDURE — 99214 OFFICE O/P EST MOD 30 MIN: CPT | Mod: GC,S$GLB,, | Performed by: STUDENT IN AN ORGANIZED HEALTH CARE EDUCATION/TRAINING PROGRAM

## 2017-09-11 PROCEDURE — 1126F AMNT PAIN NOTED NONE PRSNT: CPT | Mod: GC,S$GLB,, | Performed by: STUDENT IN AN ORGANIZED HEALTH CARE EDUCATION/TRAINING PROGRAM

## 2017-09-11 PROCEDURE — 3074F SYST BP LT 130 MM HG: CPT | Mod: GC,S$GLB,, | Performed by: STUDENT IN AN ORGANIZED HEALTH CARE EDUCATION/TRAINING PROGRAM

## 2017-09-11 PROCEDURE — 3078F DIAST BP <80 MM HG: CPT | Mod: GC,S$GLB,, | Performed by: STUDENT IN AN ORGANIZED HEALTH CARE EDUCATION/TRAINING PROGRAM

## 2017-09-11 NOTE — PROGRESS NOTES
Subjective:   Patient ID:  Misa Yun is a 83 y.o. female who presents for evaluation of Hypertension and CHF.      HPI: Misty Yun is a 83 y.o. female with a PMH of CAD s/p CABG (2006), ICM (EF 30-35%), Dementia, CKD 4 (baseline Cr 2.4-2.6), T2DM, HTN, HLD who presented to the cardiology clinic for management of CHF/HTN. She is from [facility]. Being followed by nephrology for CKD. Being followed by pulmonary for newly diagnosed lung mass. S/P right eye cataract extraction (6/5/17) complicated by trigeminal herpes zoster (no eye involvement) which she is recovering from. Reports a non-productive cough for over 1 month for which she had a CT-chest on 8/8/17 which revealed 2 RUL masses (2.2cm, 4.7cm) with mediastinal LAD. She is scheduled for lung Bx by IR 9/14/17. Had a visit at the nephrology clinic 8/28/17 and on their assessment the patient looked dry so she was recommended to take lasix 20 QD, hold fosomax, d/c vit D and calcium supplements.     In terms of cardiac complaints, she feels well and denies any new complaints. Has chronic WILDER (NYHA Class II symptoms). Denies chest pain, lightheadedness, LOC, palpitations, NV, diaphoresis, orthopnea, PND, numbness, weakness. Feels safe at the NH. Has a good appetite eating 3 meals a day. She is able to walk independently, however is not very active at the NH.    Past Medical History:   Diagnosis Date    Anemia     Anemia     CAD (coronary artery disease)     Cataract     CHF (congestive heart failure)     Chronic kidney disease (CKD), stage IV (severe) 03/27/2017    Sees  at [facility], no dialysis    Dementia     Encounter for blood transfusion 2006    at time of CABG    Fracture of humerus, proximal, left, closed 1/28/2015    Hypercalcemia 04/10/2017    Hyperlipidemia     Hyperparathyroidism     Hypertension     MI (myocardial infarction) 10/2016    Osteoporosis     Ovarian cancer     in the past    Type 2 diabetes  mellitus, controlled     UTI (urinary tract infection) 04/11/2017       Past Surgical History:   Procedure Laterality Date    CATARACT EXTRACTION W/  INTRAOCULAR LENS IMPLANT Left 04/17/2017    Dr. Motta    CATARACT EXTRACTION W/  INTRAOCULAR LENS IMPLANT Right 05/06/2017    Dr Motta     CORONARY ARTERY BYPASS GRAFT  2006    EYE SURGERY      HYSTERECTOMY  2002    ORIF HUMERUS FRACTURE Left 1/28/2015       Social History     Social History    Marital status:      Spouse name: N/A    Number of children: N/A    Years of education: N/A     Social History Main Topics    Smoking status: Former Smoker     Packs/day: 2.00     Years: 30.00     Quit date: 1/20/2007    Smokeless tobacco: Never Used    Alcohol use No    Drug use: No    Sexual activity: Not Asked     Other Topics Concern    None     Social History Narrative    None       Family History   Problem Relation Age of Onset    Adopted: Yes    Blindness Maternal Grandmother        Patient's Medications   New Prescriptions    No medications on file   Previous Medications    ALENDRONATE (FOSAMAX) 70 MG TABLET    Take 70 mg by mouth every 7 days.    AMLODIPINE (NORVASC) 10 MG TABLET    Take 10 mg by mouth once daily.    ASPIRIN 81 MG CHEW    Take 1 tablet (81 mg total) by mouth once daily. RESUME ON 2/14/15    ATORVASTATIN (LIPITOR) 40 MG TABLET    Take 1 tablet (40 mg total) by mouth once daily.    CARVEDILOL (COREG) 3.125 MG TABLET    Take 2 tablets (6.25 mg total) by mouth 2 (two) times daily.    DIPHENHYDRAMINE (BENADRYL ALLERGY) 25 MG TABLET    Take 25 mg by mouth nightly as needed for Insomnia.    DONEPEZIL (ARICEPT) 5 MG TABLET    Take 5 mg by mouth every evening.    ERGOCALCIFEROL (ERGOCALCIFEROL) 50,000 UNIT CAP    Take 50,000 Units by mouth every 7 days.    FUROSEMIDE (LASIX) 20 MG TABLET    Take 1 tablet (20 mg total) by mouth once daily.    INSULIN ASPART (NOVOLOG) 100 UNIT/ML INJECTION    Inject 100 Units into the skin 3 (three) times  "daily before meals.    ISOSORBIDE-HYDRALAZINE 20-37.5 MG (BIDIL) 20-37.5 MG TAB    Take 1 tablet by mouth 3 (three) times daily.    MIRTAZAPINE (REMERON) 7.5 MG TAB    Take 7.5 mg by mouth every evening.    NITROGLYCERIN (NITROSTAT) 0.4 MG SL TABLET    Place 1 tablet (0.4 mg total) under the tongue every 5 (five) minutes as needed for Chest pain.    SENNA-DOCUSATE 8.6-50 MG (PERICOLACE) 8.6-50 MG PER TABLET    Take 1 tablet by mouth 2 (two) times daily.    TRAMADOL (ULTRAM) 50 MG TABLET    Take 50 mg by mouth every 6 (six) hours as needed for Pain.   Modified Medications    No medications on file   Discontinued Medications    No medications on file       ROS  Constitution: Negative for fever, chills, weight loss or gain.   HENT: Negative for sore throat, rhinorrhea, or headache.  Eyes: Negative for blurred or double vision.   Cardiovascular: See above  Pulmonary: Positive for SOB   Gastrointestinal: Negative for abdominal pain, nausea, vomiting, or diarrhea.   : Negative for dysuria.   Neurological: Negative for focal weakness or sensory changes.    BP (!) 118/52 (BP Location: Left arm, Patient Position: Sitting, BP Method: Medium (Automatic))   Pulse 63   Ht 5' 3" (1.6 m)   Wt 71.9 kg (158 lb 8.2 oz)   BMI 28.08 kg/m²     Objective:   Physical Exam  Constitutional: NAD, conversant  HEENT: Sclera anicteric, PERRLA, EOMI  Neck: No JVD, no carotid bruits  CV: RRR, no murmur, normal S1/S2, No Pericardial rub  Pulm: CTAB with no wheezes, rales, or rhonchi  GI: Abdomen soft, NTND, +BS  Extremities: No LE edema, warm and well perfused, No cyanosis, No clubbing  Skin: No ecchymosis, erythema, or ulcers  Psych: AOx3, appropriate affect  Neuro: CNII-XII intact, no focal deficits      Lab Results   Component Value Date     04/14/2017    K 3.4 (L) 04/14/2017     04/14/2017    CO2 23 04/14/2017    BUN 49 (H) 04/14/2017    CREATININE 2.8 (H) 04/14/2017     04/14/2017    HGBA1C 5.8 10/31/2016    MG 1.9 " 04/14/2017    AST 15 04/12/2017    ALT 9 (L) 04/12/2017    ALBUMIN 2.6 (L) 04/14/2017    PROT 7.1 04/12/2017    BILITOT 0.2 04/12/2017    WBC 6.62 04/14/2017    HGB 9.0 (L) 04/14/2017    HCT 27.1 (L) 04/14/2017    HCT 28 (L) 01/28/2015    MCV 87 04/14/2017     04/14/2017    INR 1.0 10/31/2016    TSH 1.643 10/31/2016    CHOL 142 11/01/2016    HDL 42 11/01/2016    LDLCALC 84.8 11/01/2016    TRIG 76 11/01/2016     (H) 11/23/2016     NH Labs reviewed:  Last CBC (7/11/17) - Hb 9.4, HCT 27.6, WBC 3.13  CMP (8/17/17): Na 137, K 4.2, Cl 105, CO2 25, BUN 31, Cr 2.48, Mg 2.2, AST 17, ALT 9, TIBILI 0.3, Ca 8.5      Assessment:     1. Essential hypertension    2. Coronary artery disease involving native coronary artery of native heart without angina pectoris    3. Mixed hyperlipidemia    4. Chronic combined systolic and diastolic heart failure    5. Pre-op evaluation        Plan:     Misa was seen today for hypertension and CHF.    Diagnoses and all orders for this visit:    Essential hypertension  - controlled  - c/w amlodipine 10 mg QD, coreg 6.25 mg BID, lasix 20 mg QD, Bidil 20-37.5 mg TID    Coronary artery disease s/p CABG (2006)  - c/w ASA, coreg, lipitor  - not on ACEI/ARB due to CKD stage 4    Mixed hyperlipidemia    Chronic combined systolic and diastolic heart failure/ Ischemic Cardiomyopathy (EF 30-35%)  - well compensated  - c/w ASA 81 mg QD  - c/w coreg to 6.25mg TWICE DAILY  - c/w lasix 20 mg QD  - c/w Bidil 20-37.5 TID  - check CMP, Mg in 6 months at NH prior to next clinic visit  - check weight dailys; if weight goes up by 3 lbs in 1 day or 5 lbs in 1 week can give extra dose of lasix  - 2 gram sodium diet    Pre-op evaluation  - IR to perform right lung biopsy  - may proceed with procedure and hold ASA if required by IR. Resume ASAP after procedure  - Continue with all other medications     Thank you for allowing me to participate in this patient's care. Please do not hesitate to contact me  with any questions or concerns. RTC in 6 months/PRN with labs drawn at NH. Check CBC/CMP/Mg/HbA1C/lipid profile.    Cici Clemens MD  Cardiology Fellow  Pager: [phone]  9/11/2017 2:19 PM

## 2017-09-11 NOTE — PROGRESS NOTES
I have personally seen and examined the patient. All labs and studies were reviewed. I agree with the fellows findings and plan as above.    She is well compensated for IR lung biopsy.

## 2017-09-11 NOTE — PATIENT INSTRUCTIONS
C/w current medications  Proceed with lung Biopsy  RTC in 6 months or earlier if needed  Advise daily walking/exercise/ physical therapy  Will fax progress note to [facility]  RTC in 6 months with blood work drawn - CBC/CMP/lipid panel/HbA1c

## 2017-09-13 ENCOUNTER — PATIENT MESSAGE (OUTPATIENT)
Dept: PULMONOLOGY | Facility: CLINIC | Age: 82
End: 2017-09-13

## 2017-09-13 ENCOUNTER — TELEPHONE (OUTPATIENT)
Dept: CARDIOLOGY | Facility: CLINIC | Age: 82
End: 2017-09-13

## 2017-09-13 DIAGNOSIS — R91.8 PULMONARY NODULES: Primary | ICD-10-CM

## 2017-09-13 NOTE — TELEPHONE ENCOUNTER
----- Message from Shira Miranda sent at 9/13/2017 10:22 AM CDT -----  Contact: [facility](Susanne)  [facility](Susanne) called, states pt is having a lung biopsy and is needing to discuss pt's aspirin. She states pt is having the procedure on tomorrow. Pt of Dr. Clemens and LOV 9/11/17.Ph for Susanne is [phone]. Thank you

## 2017-09-13 NOTE — TELEPHONE ENCOUNTER
Spoke with Dr. Bernstein, says that it is ok to hold ASA for 5 days. [facility]. Says that the pt's daughter wanted the pt to hold ASA for 5 days, was not instructed by MD to hold. Gave nurse number for [phone].

## 2017-09-13 NOTE — TELEPHONE ENCOUNTER
Dr Bae, I spoke with Susanne from [facility] [[phone]].  Says that the pt's daughter informed her that the pt was instructed to hold ASA for 5 days prior to her lung biopsy that is scheduled for tomorrow.  Susanne says that she received your note that was faxed to the facility and there is no mention of holding ASA - pt to continue ASA. Susanne says that she has a call into the pt's daughter - says that she was told that the daughter wants to cancel the procedure, and Susanne says she is going to call Interventional Radiology.   Please advise. Thanks, Nelly

## 2017-09-20 ENCOUNTER — TELEPHONE (OUTPATIENT)
Dept: INTERVENTIONAL RADIOLOGY/VASCULAR | Facility: HOSPITAL | Age: 82
End: 2017-09-20

## 2017-09-21 ENCOUNTER — HOSPITAL ENCOUNTER (OUTPATIENT)
Facility: HOSPITAL | Age: 82
Discharge: HOME | End: 2017-09-21
Attending: RADIOLOGY | Admitting: RADIOLOGY
Payer: MEDICARE

## 2017-09-21 VITALS
SYSTOLIC BLOOD PRESSURE: 135 MMHG | BODY MASS INDEX: 28.08 KG/M2 | RESPIRATION RATE: 18 BRPM | HEIGHT: 63 IN | DIASTOLIC BLOOD PRESSURE: 51 MMHG | HEART RATE: 71 BPM | TEMPERATURE: 98 F | WEIGHT: 158.5 LBS | OXYGEN SATURATION: 99 %

## 2017-09-21 DIAGNOSIS — R91.8 PULMONARY NODULES/LESIONS, MULTIPLE: ICD-10-CM

## 2017-09-21 LAB
BASOPHILS # BLD AUTO: 0.04 K/UL
BASOPHILS NFR BLD: 0.5 %
DIFFERENTIAL METHOD: ABNORMAL
EOSINOPHIL # BLD AUTO: 0.2 K/UL
EOSINOPHIL NFR BLD: 2.4 %
ERYTHROCYTE [DISTWIDTH] IN BLOOD BY AUTOMATED COUNT: 14.8 %
HCT VFR BLD AUTO: 29.7 %
HGB BLD-MCNC: 10 G/DL
INR PPP: 1.5
LYMPHOCYTES # BLD AUTO: 2.1 K/UL
LYMPHOCYTES NFR BLD: 24.3 %
MCH RBC QN AUTO: 28.1 PG
MCHC RBC AUTO-ENTMCNC: 33.7 G/DL
MCV RBC AUTO: 83 FL
MONOCYTES # BLD AUTO: 0.7 K/UL
MONOCYTES NFR BLD: 7.6 %
NEUTROPHILS # BLD AUTO: 5.7 K/UL
NEUTROPHILS NFR BLD: 65 %
PLATELET # BLD AUTO: 263 K/UL
PMV BLD AUTO: 9.3 FL
POCT GLUCOSE: 121 MG/DL (ref 70–110)
PROTHROMBIN TIME: 15.4 SEC
RBC # BLD AUTO: 3.56 M/UL
WBC # BLD AUTO: 8.78 K/UL

## 2017-09-21 PROCEDURE — 88342 IMHCHEM/IMCYTCHM 1ST ANTB: CPT | Mod: 26,,, | Performed by: PATHOLOGY

## 2017-09-21 PROCEDURE — 88333 PATH CONSLTJ SURG CYTO XM 1: CPT | Mod: 26,,, | Performed by: PATHOLOGY

## 2017-09-21 PROCEDURE — 25000003 PHARM REV CODE 250: Performed by: NURSE PRACTITIONER

## 2017-09-21 PROCEDURE — 88341 IMHCHEM/IMCYTCHM EA ADD ANTB: CPT | Mod: 26,,, | Performed by: PATHOLOGY

## 2017-09-21 PROCEDURE — 82962 GLUCOSE BLOOD TEST: CPT

## 2017-09-21 PROCEDURE — 88305 TISSUE EXAM BY PATHOLOGIST: CPT | Mod: 26,,, | Performed by: PATHOLOGY

## 2017-09-21 PROCEDURE — 63600175 PHARM REV CODE 636 W HCPCS: Performed by: RADIOLOGY

## 2017-09-21 PROCEDURE — 88305 TISSUE EXAM BY PATHOLOGIST: CPT | Performed by: PATHOLOGY

## 2017-09-21 PROCEDURE — 85610 PROTHROMBIN TIME: CPT

## 2017-09-21 PROCEDURE — 25000003 PHARM REV CODE 250: Performed by: FAMILY MEDICINE

## 2017-09-21 PROCEDURE — 85025 COMPLETE CBC W/AUTO DIFF WBC: CPT

## 2017-09-21 RX ORDER — MIDAZOLAM HYDROCHLORIDE 1 MG/ML
1 INJECTION INTRAMUSCULAR; INTRAVENOUS
Status: DISCONTINUED | OUTPATIENT
Start: 2017-09-21 | End: 2017-09-21 | Stop reason: HOSPADM

## 2017-09-21 RX ORDER — LIDOCAINE HYDROCHLORIDE 10 MG/ML
1 INJECTION, SOLUTION EPIDURAL; INFILTRATION; INTRACAUDAL; PERINEURAL ONCE
Status: COMPLETED | OUTPATIENT
Start: 2017-09-21 | End: 2017-09-21

## 2017-09-21 RX ORDER — SODIUM CHLORIDE 9 MG/ML
500 INJECTION, SOLUTION INTRAVENOUS ONCE
Status: COMPLETED | OUTPATIENT
Start: 2017-09-21 | End: 2017-09-21

## 2017-09-21 RX ORDER — FENTANYL CITRATE 50 UG/ML
50 INJECTION, SOLUTION INTRAMUSCULAR; INTRAVENOUS
Status: DISCONTINUED | OUTPATIENT
Start: 2017-09-21 | End: 2017-09-21 | Stop reason: HOSPADM

## 2017-09-21 RX ORDER — MIDAZOLAM HYDROCHLORIDE 1 MG/ML
INJECTION, SOLUTION INTRAMUSCULAR; INTRAVENOUS CODE/TRAUMA/SEDATION MEDICATION
Status: COMPLETED | OUTPATIENT
Start: 2017-09-21 | End: 2017-09-21

## 2017-09-21 RX ADMIN — SODIUM CHLORIDE 500 ML: 0.9 INJECTION, SOLUTION INTRAVENOUS at 08:09

## 2017-09-21 RX ADMIN — LIDOCAINE HYDROCHLORIDE 10 MG: 10 INJECTION, SOLUTION EPIDURAL; INFILTRATION; INTRACAUDAL; PERINEURAL at 08:09

## 2017-09-21 RX ADMIN — MIDAZOLAM HYDROCHLORIDE 0.5 MG: 1 INJECTION, SOLUTION INTRAMUSCULAR; INTRAVENOUS at 10:09

## 2017-09-21 NOTE — PROGRESS NOTES
Assume care of pt. Pt s/p lung Bx. Dressing to the right back is clean clean and intact. Vss. NAD. Will cont to monitor,.

## 2017-09-21 NOTE — PROGRESS NOTES
Lung biopsy complete. Patient tolerated without any acute events. Post procedure image taken following a rapid roll-over. Post procedure X-rays to be taken during recovery. Patient to be transferred to [facility]. Report to be given at the bedside.

## 2017-09-21 NOTE — PROGRESS NOTES
Lung Bx  complete. Pt tolerated well. Discharge instructions and handouts provided. Pt verbalized understanding. Pt escorted to lobby via wheelchair.

## 2017-09-21 NOTE — PLAN OF CARE
Patient arrived via wheelchair from preop area, sister at bedside. Requires assistance to dress, ambulate safety. Oriented to person and situation but poor historian. Aware of procedure today. Sister is POA and will be responsible for signing consents. VS stable. Patient safety maintained. Will continue to monitor.

## 2017-09-21 NOTE — PROCEDURES
Radiology Post-Procedure Note    Pre Op Diagnosis: Right lung mass, 60 pack year smoker    Post Op Diagnosis: Right lung mass    Procedure: right lung biopsy    Procedure performed by: Beth BLAND, Angelica Hobbs    Written Informed Consent Obtained: Yes    Specimen Removed: YES 3 cores    Estimated Blood Loss: Minimal    Findings:   Using CT guidance a 19g sheath needle was placed into a Right lung mass. 20g monopty biopsy gun used to take 3 core biopsy samples. Specimen sent to pathology.     Additional specimen sent to lab: No    Patient tolerated procedure well.    Anjel Campos MD  Staff Radiologist  Department of Radiology  Pager: [phone]

## 2017-09-21 NOTE — DISCHARGE SUMMARY
Radiology Discharge Summary      Hospital Course: No complications    Admit Date: 9/21/2017  Discharge Date: 09/21/2017     Instructions Given to Patient: Yes  Diet: Resume prior diet  Activity: activity as tolerated and no driving for today    Description of Condition on Discharge: Stable  Vital Signs (Most Recent): Temp: 97.5 °F (36.4 °C) (09/21/17 1045)  Pulse: 71 (09/21/17 1400)  Resp: 18 (09/21/17 1400)  BP: (!) 135/51 (09/21/17 1400)  SpO2: 99 % (09/21/17 1400)    Discharge Disposition: Home    Discharge Diagnosis: hx of smoking and ovarian cancer s/p right lung bx     Follow-up: with referring MD Anjel Campos MD  Staff Radiologist  Department of Radiology  Pager: [phone]

## 2017-09-21 NOTE — H&P
Radiology History & Physical      SUBJECTIVE:     Chief Complaint: Lung mass    History of Present Illness:  Misa Yun is a 83 y.o. female who presents for CT guided biopsy of RUL mass.     Past Medical History:   Diagnosis Date    Anemia     Anemia     CAD (coronary artery disease)     Cataract     CHF (congestive heart failure)     Chronic kidney disease (CKD), stage IV (severe) 03/27/2017    Sees  at [facility], no dialysis    Dementia     Encounter for blood transfusion 2006    at time of CABG    Fracture of humerus, proximal, left, closed 1/28/2015    Hypercalcemia 04/10/2017    Hyperlipidemia     Hyperparathyroidism     Hypertension     MI (myocardial infarction) 10/2016    Osteoporosis     Ovarian cancer     in the past    Type 2 diabetes mellitus, controlled     UTI (urinary tract infection) 04/11/2017     Past Surgical History:   Procedure Laterality Date    CATARACT EXTRACTION W/  INTRAOCULAR LENS IMPLANT Left 04/17/2017    Dr. Motta    CATARACT EXTRACTION W/  INTRAOCULAR LENS IMPLANT Right 05/06/2017    Dr Motta     CORONARY ARTERY BYPASS GRAFT  2006    EYE SURGERY      HYSTERECTOMY  2002    ORIF HUMERUS FRACTURE Left 1/28/2015       Home Meds:   Prior to Admission medications    Medication Sig Start Date End Date Taking? Authorizing Provider   alendronate (FOSAMAX) 70 MG tablet Take 70 mg by mouth every 7 days.   Yes Historical Provider, MD   amlodipine (NORVASC) 10 MG tablet Take 10 mg by mouth once daily.   Yes Historical Provider, MD   aspirin 81 MG Chew Take 1 tablet (81 mg total) by mouth once daily. RESUME ON 2/14/15 2/26/15  Yes Miguel Crisostomo NP   atorvastatin (LIPITOR) 40 MG tablet Take 1 tablet (40 mg total) by mouth once daily. 11/5/16  Yes Anton Brown MD   carvedilol (COREG) 3.125 MG tablet Take 2 tablets (6.25 mg total) by mouth 2 (two) times daily. 12/5/16 12/5/17 Yes Cici Clemens MD   diphenhydrAMINE (BENADRYL ALLERGY) 25  mg tablet Take 25 mg by mouth nightly as needed for Insomnia.   Yes Historical Provider, MD   donepezil (ARICEPT) 5 MG tablet Take 5 mg by mouth every evening.   Yes Historical Provider, MD   ergocalciferol (ERGOCALCIFEROL) 50,000 unit Cap Take 50,000 Units by mouth every 7 days.   Yes Historical Provider, MD   furosemide (LASIX) 20 MG tablet Take 1 tablet (20 mg total) by mouth once daily. 3/27/17  Yes Cici Clemens MD   insulin aspart (NOVOLOG) 100 unit/mL injection Inject 100 Units into the skin 3 (three) times daily before meals.   Yes Historical Provider, MD   isosorbide-hydrALAZINE 20-37.5 mg (BIDIL) 20-37.5 mg Tab Take 1 tablet by mouth 3 (three) times daily. 1/30/17 1/30/18 Yes Cici Clemens MD   mirtazapine (REMERON) 7.5 MG Tab Take 7.5 mg by mouth every evening.   Yes Historical Provider, MD   nitroGLYCERIN (NITROSTAT) 0.4 MG SL tablet Place 1 tablet (0.4 mg total) under the tongue every 5 (five) minutes as needed for Chest pain. 11/5/16 11/5/17 Yes Anton Brown MD   senna-docusate 8.6-50 mg (PERICOLACE) 8.6-50 mg per tablet Take 1 tablet by mouth 2 (two) times daily. 2/26/15  Yes Miguel Crisostomo NP   tramadol (ULTRAM) 50 mg tablet Take 50 mg by mouth every 6 (six) hours as needed for Pain.   Yes Historical Provider, MD     Anticoagulants/Antiplatelets: aspirin; d/c x 5 days    Allergies: Review of patient's allergies indicates:  No Known Allergies  Sedation History:  no adverse reactions    Review of Systems:   Hematological: no known coagulopathies  Respiratory: no shortness of breath  Cardiovascular: no chest pain  Gastrointestinal: no abdominal pain  Genito-Urinary: no dysuria  Musculoskeletal: negative  Neurological: no TIA or stroke symptoms         OBJECTIVE:     Vital Signs (Most Recent)  Temp: 98 °F (36.7 °C) (09/21/17 0753)  Pulse: 63 (09/21/17 0753)  Resp: 18 (09/21/17 0753)  BP: 138/63 (09/21/17 0753)  SpO2: 100 % (09/21/17 0753)    Physical Exam:  ASA: 2  Mallampati:  2    General: no acute distress  Mental Status: alert and oriented to person, place and time  HEENT: normocephalic, atraumatic  Chest: unlabored breathing  Heart: regular heart rate  Abdomen: nondistended  Extremity: moves all extremities    Laboratory  Lab Results   Component Value Date    INR 1.0 10/31/2016       Lab Results   Component Value Date    WBC 6.62 04/14/2017    HGB 9.0 (L) 04/14/2017    HCT 27.1 (L) 04/14/2017    MCV 87 04/14/2017     04/14/2017      Lab Results   Component Value Date     04/14/2017     04/14/2017    K 3.4 (L) 04/14/2017     04/14/2017    CO2 23 04/14/2017    BUN 49 (H) 04/14/2017    CREATININE 2.8 (H) 04/14/2017    CALCIUM 10.1 04/14/2017    MG 1.9 04/14/2017    ALT 9 (L) 04/12/2017    AST 15 04/12/2017    ALBUMIN 2.6 (L) 04/14/2017    BILITOT 0.2 04/12/2017       ASSESSMENT/PLAN:     Sedation Plan: moderate  Patient will undergo CT guided lung mass biopsy, pending lab work.    Jun Dominguez M.D. 8:36 AM 9/21/2017

## 2017-09-25 ENCOUNTER — PATIENT MESSAGE (OUTPATIENT)
Dept: PULMONOLOGY | Facility: CLINIC | Age: 82
End: 2017-09-25

## 2017-10-02 ENCOUNTER — TELEPHONE (OUTPATIENT)
Dept: HEMATOLOGY/ONCOLOGY | Facility: CLINIC | Age: 82
End: 2017-10-02

## 2017-10-02 ENCOUNTER — TELEPHONE (OUTPATIENT)
Dept: PULMONOLOGY | Facility: CLINIC | Age: 82
End: 2017-10-02

## 2017-10-02 DIAGNOSIS — C34.90 NON-SMALL CELL CARCINOMA OF LUNG: Primary | ICD-10-CM

## 2017-10-02 NOTE — TELEPHONE ENCOUNTER
Called and spoke with sibling of patient Carlota Tejeda. 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 Discussed patient's advanced degree dementia as well as age, co morbidities and risk/benefits to further treatment options. Ms. Tejeda stated she was worried patient would not do well given those circumstances but she will speak with her nephew (patient's son) as well as patient. She asked to please put in referral to Hem/Onc just in case they decide to pursue treatment option. She will call and cancel if not. Ms. Tejeda verbalized understanding of all information and recommendations provided and was satisfied with this encounter.

## 2017-10-03 ENCOUNTER — TELEPHONE (OUTPATIENT)
Dept: HEMATOLOGY/ONCOLOGY | Facility: CLINIC | Age: 82
End: 2017-10-03

## 2017-10-03 NOTE — TELEPHONE ENCOUNTER
----- Message from Nikia Hernandez sent at 10/3/2017 10:33 AM CDT -----  Contact: Carlota Van returning your call.     Carlota call back number [phone]

## 2017-10-03 NOTE — TELEPHONE ENCOUNTER
----- Message from Stephen Mckeon sent at 10/3/2017  9:58 AM CDT -----  Contact: Sister-Carlota  Will like to know is Monday 10/9 slot still open for 8:00A    Contact::[phone]

## 2017-10-09 ENCOUNTER — TELEPHONE (OUTPATIENT)
Dept: HEMATOLOGY/ONCOLOGY | Facility: CLINIC | Age: 82
End: 2017-10-09

## 2017-10-09 ENCOUNTER — LAB VISIT (OUTPATIENT)
Dept: LAB | Facility: HOSPITAL | Age: 82
End: 2017-10-09
Attending: INTERNAL MEDICINE
Payer: MEDICARE

## 2017-10-09 ENCOUNTER — INITIAL CONSULT (OUTPATIENT)
Dept: HEMATOLOGY/ONCOLOGY | Facility: CLINIC | Age: 82
End: 2017-10-09
Payer: MEDICARE

## 2017-10-09 VITALS
RESPIRATION RATE: 18 BRPM | SYSTOLIC BLOOD PRESSURE: 144 MMHG | TEMPERATURE: 98 F | WEIGHT: 157.44 LBS | DIASTOLIC BLOOD PRESSURE: 74 MMHG | OXYGEN SATURATION: 97 % | BODY MASS INDEX: 27.88 KG/M2 | HEART RATE: 67 BPM

## 2017-10-09 DIAGNOSIS — R59.0 MEDIASTINAL LYMPHADENOPATHY: ICD-10-CM

## 2017-10-09 DIAGNOSIS — N18.4 CHRONIC KIDNEY DISEASE, STAGE IV (SEVERE): Chronic | ICD-10-CM

## 2017-10-09 DIAGNOSIS — C34.91 PRIMARY LUNG CANCER, RIGHT: ICD-10-CM

## 2017-10-09 DIAGNOSIS — R91.8 PULMONARY NODULES/LESIONS, MULTIPLE: Primary | ICD-10-CM

## 2017-10-09 DIAGNOSIS — R91.8 PULMONARY NODULES/LESIONS, MULTIPLE: ICD-10-CM

## 2017-10-09 LAB
ALBUMIN SERPL BCP-MCNC: 2.4 G/DL
ALP SERPL-CCNC: 122 U/L
ALT SERPL W/O P-5'-P-CCNC: 8 U/L
ANION GAP SERPL CALC-SCNC: 11 MMOL/L
AST SERPL-CCNC: 14 U/L
BILIRUB SERPL-MCNC: 0.3 MG/DL
BUN SERPL-MCNC: 35 MG/DL
CALCIUM SERPL-MCNC: 8.7 MG/DL
CHLORIDE SERPL-SCNC: 109 MMOL/L
CO2 SERPL-SCNC: 22 MMOL/L
CREAT SERPL-MCNC: 2.6 MG/DL
ERYTHROCYTE [DISTWIDTH] IN BLOOD BY AUTOMATED COUNT: 14.4 %
EST. GFR  (AFRICAN AMERICAN): 19 ML/MIN/1.73 M^2
EST. GFR  (NON AFRICAN AMERICAN): 16.5 ML/MIN/1.73 M^2
GLUCOSE SERPL-MCNC: 197 MG/DL
HCT VFR BLD AUTO: 32.6 %
HGB BLD-MCNC: 10.4 G/DL
MCH RBC QN AUTO: 27.7 PG
MCHC RBC AUTO-ENTMCNC: 31.9 G/DL
MCV RBC AUTO: 87 FL
NEUTROPHILS # BLD AUTO: 6.8 K/UL
PLATELET # BLD AUTO: 273 K/UL
PMV BLD AUTO: 9 FL
POTASSIUM SERPL-SCNC: 3.4 MMOL/L
PROT SERPL-MCNC: 7.4 G/DL
RBC # BLD AUTO: 3.76 M/UL
SODIUM SERPL-SCNC: 142 MMOL/L
WBC # BLD AUTO: 9.38 K/UL

## 2017-10-09 PROCEDURE — 99999 PR PBB SHADOW E&M-EST. PATIENT-LVL III: CPT | Mod: PBBFAC,,, | Performed by: INTERNAL MEDICINE

## 2017-10-09 PROCEDURE — 99205 OFFICE O/P NEW HI 60 MIN: CPT | Mod: S$GLB,,, | Performed by: INTERNAL MEDICINE

## 2017-10-09 PROCEDURE — 85027 COMPLETE CBC AUTOMATED: CPT

## 2017-10-09 PROCEDURE — 80053 COMPREHEN METABOLIC PANEL: CPT

## 2017-10-09 PROCEDURE — 36415 COLL VENOUS BLD VENIPUNCTURE: CPT

## 2017-10-09 NOTE — Clinical Note
Labs today including gaurdant PET/CT Ask pathology for PD-L1 testing on her biopsy Follow-up in 2 weeks

## 2017-10-09 NOTE — PROGRESS NOTES
PATIENT: Misa Yun  MRN: [id number]  DATE: 10/9/2017      Diagnosis:   1. Pulmonary nodules/lesions, multiple    2. Primary lung cancer, right    3. Mediastinal lymphadenopathy    4. Chronic kidney disease, stage IV (severe)        Chief Complaint: Lung Cancer      Oncologic History:      Oncologic History Non-small cell lung cancer, right, diagnosed 9/2017     Oncologic Treatment     Pathology Squamous cell carcinoma, cT3, N2, Mx          Subjective:    Interval History: Ms. Yun is a 83 y.o. female who is seen as an initial visit for lung cancer.  Her history dates to 8/2017 when she underwent an x-ray for a cough which had been present for only a few weeks which had shown abnormalities.  Subsequently a CT was performed revealing 2 small bilateral pleural effusions along with large pulmonary lesions in the right upper lobe injuring 2.2 cm and also another lesion measuring 4.7 cm.  There were also multiple enlarged mediastinal lymph nodes.  In addition there were bilateral multiple pulmonary micronodules.  She underwent a CT-guided biopsy in 9/2017 with pathology revealing squamous cell carcinoma.    She is seen with family members today which give much of the history is a patient has dementia.  She is oriented to person.  She states that her cough has resolved.  She does complain of some ongoing shortness of breath.  Family members state that she is mainly in a wheelchair due to her shortness of breath which has been present for years and relatively unchanged and felt to be related to cardiac issues.  She denies any pain.  She has never had hemoptysis.  Denies any headaches or visual changes.  Her weight has been stable.  She is without other complaints.        Past Medical History:   Past Medical History:   Diagnosis Date    Anemia     Anemia     CAD (coronary artery disease)     Cataract     CHF (congestive heart failure)     Chronic kidney disease (CKD), stage IV (severe) 03/27/2017     [facility], no dialysis    Dementia     Encounter for blood transfusion 2006    at time of CABG    Fracture of humerus, proximal, left, closed 1/28/2015    Hypercalcemia 04/10/2017    Hyperlipidemia     Hyperparathyroidism     Hypertension     Mediastinal lymphadenopathy 10/9/2017    MI (myocardial infarction) 10/2016    Osteoporosis     Ovarian cancer     in the past    Primary lung cancer, right 10/9/2017    Type 2 diabetes mellitus, controlled     UTI (urinary tract infection) 04/11/2017       Past Surgical HIstory:   Past Surgical History:   Procedure Laterality Date    CATARACT EXTRACTION W/  INTRAOCULAR LENS IMPLANT Left 04/17/2017    Dr. Motta    CATARACT EXTRACTION W/  INTRAOCULAR LENS IMPLANT Right 05/06/2017    Dr Motta     CORONARY ARTERY BYPASS GRAFT  2006    EYE SURGERY      HYSTERECTOMY  2002    ORIF HUMERUS FRACTURE Left 1/28/2015       Family History:   Family History   Problem Relation Age of Onset    Adopted: Yes    Blindness Maternal Grandmother     Cancer Neg Hx        Social History:  reports that she quit smoking about 10 years ago. She has a 60.00 pack-year smoking history. She has never used smokeless tobacco. She reports that she does not drink alcohol or use drugs.    Allergies:  Review of patient's allergies indicates:  No Known Allergies    Medications:  Current Outpatient Prescriptions   Medication Sig Dispense Refill    alendronate (FOSAMAX) 70 MG tablet Take 70 mg by mouth every 7 days.      amlodipine (NORVASC) 10 MG tablet Take 10 mg by mouth once daily.      aspirin 81 MG Chew Take 1 tablet (81 mg total) by mouth once daily. RESUME ON 2/14/15      atorvastatin (LIPITOR) 40 MG tablet Take 1 tablet (40 mg total) by mouth once daily. 30 tablet 3    carvedilol (COREG) 3.125 MG tablet Take 2 tablets (6.25 mg total) by mouth 2 (two) times daily. 60 tablet 3    diphenhydrAMINE (BENADRYL ALLERGY) 25 mg tablet Take 25 mg by mouth nightly as  needed for Insomnia.      donepezil (ARICEPT) 5 MG tablet Take 5 mg by mouth every evening.      ergocalciferol (ERGOCALCIFEROL) 50,000 unit Cap Take 50,000 Units by mouth every 7 days.      furosemide (LASIX) 20 MG tablet Take 1 tablet (20 mg total) by mouth once daily. 30 tablet 11    insulin aspart (NOVOLOG) 100 unit/mL injection Inject 100 Units into the skin 3 (three) times daily before meals.      isosorbide-hydrALAZINE 20-37.5 mg (BIDIL) 20-37.5 mg Tab Take 1 tablet by mouth 3 (three) times daily. 90 tablet 11    mirtazapine (REMERON) 7.5 MG Tab Take 7.5 mg by mouth every evening.      nitroGLYCERIN (NITROSTAT) 0.4 MG SL tablet Place 1 tablet (0.4 mg total) under the tongue every 5 (five) minutes as needed for Chest pain. 25 tablet 3    senna-docusate 8.6-50 mg (PERICOLACE) 8.6-50 mg per tablet Take 1 tablet by mouth 2 (two) times daily.      tramadol (ULTRAM) 50 mg tablet Take 50 mg by mouth every 6 (six) hours as needed for Pain.       No current facility-administered medications for this visit.        Review of Systems   Constitutional: Negative for appetite change, chills, fever and unexpected weight change.   HENT: Negative for congestion, hearing loss and nosebleeds.    Eyes: Negative for visual disturbance.   Respiratory: Positive for shortness of breath. Negative for cough.    Cardiovascular: Negative for chest pain and palpitations.   Gastrointestinal: Negative for abdominal pain, blood in stool, constipation, diarrhea, nausea and vomiting.   Genitourinary: Negative for dysuria and frequency.   Musculoskeletal: Negative for back pain and gait problem.   Skin: Negative for color change and rash.   Neurological: Negative for dizziness, weakness and headaches.   Hematological: Negative for adenopathy. Does not bruise/bleed easily.   Psychiatric/Behavioral: Negative for confusion. The patient is not nervous/anxious.        ECOG Performance Status:   ECOG SCORE    3 - Capable of only limited  selfcare, confined to bed or chair more than 50% of waking hours         Objective:      Vitals:   Vitals:    10/09/17 0815   BP: (!) 144/74   BP Location: Right arm   Patient Position: Sitting   BP Method: Medium (Automatic)   Pulse: 67   Resp: 18   Temp: 98.4 °F (36.9 °C)   TempSrc: Oral   SpO2: 97%   Weight: 71.4 kg (157 lb 6.5 oz)     BMI: Body mass index is 27.88 kg/m².    Physical Exam   Constitutional: She appears well-developed and well-nourished. No distress.   Sitting in a wheelchair   HENT:   Head: Normocephalic.   Mouth/Throat: No oropharyngeal exudate.   Eyes: EOM are normal. No scleral icterus.   Neck: Neck supple. No tracheal deviation present. No thyromegaly present.   Cardiovascular: Normal rate and regular rhythm.    Pulmonary/Chest: Effort normal and breath sounds normal. No respiratory distress. She has no wheezes. She has no rales.   Abdominal: Soft. She exhibits no distension and no mass. There is no tenderness. There is no rebound and no guarding.   Musculoskeletal: Normal range of motion. She exhibits no edema.   Lymphadenopathy:     She has no cervical adenopathy.   Neurological: She is alert. No cranial nerve deficit.   Skin: Skin is warm and dry.   Psychiatric: She has a normal mood and affect.       Laboratory Data:  No visits with results within 1 Week(s) from this visit.   Latest known visit with results is:   Admission on 09/21/2017, Discharged on 09/21/2017   Component Date Value Ref Range Status    Prothrombin Time 09/21/2017 15.4* 9.0 - 12.5 sec Final    INR 09/21/2017 1.5* 0.8 - 1.2 Final    Comment: Coumadin Therapy:  2.0 - 3.0 for INR for all indicators except mechanical heart valves  and antiphospholipid syndromes which should use 2.5 - 3.5.      WBC 09/21/2017 8.78  3.90 - 12.70 K/uL Final    RBC 09/21/2017 3.56* 4.00 - 5.40 M/uL Final    Hemoglobin 09/21/2017 10.0* 12.0 - 16.0 g/dL Final    Hematocrit 09/21/2017 29.7* 37.0 - 48.5 % Final    MCV 09/21/2017 83  82 - 98 fL  Final    MCH 09/21/2017 28.1  27.0 - 31.0 pg Final    MCHC 09/21/2017 33.7  32.0 - 36.0 g/dL Final    RDW 09/21/2017 14.8* 11.5 - 14.5 % Final    Platelets 09/21/2017 263  150 - 350 K/uL Final    MPV 09/21/2017 9.3  9.2 - 12.9 fL Final    Gran # 09/21/2017 5.7  1.8 - 7.7 K/uL Final    Lymph # 09/21/2017 2.1  1.0 - 4.8 K/uL Final    Mono # 09/21/2017 0.7  0.3 - 1.0 K/uL Final    Eos # 09/21/2017 0.2  0.0 - 0.5 K/uL Final    Baso # 09/21/2017 0.04  0.00 - 0.20 K/uL Final    Gran% 09/21/2017 65.0  38.0 - 73.0 % Final    Lymph% 09/21/2017 24.3  18.0 - 48.0 % Final    Mono% 09/21/2017 7.6  4.0 - 15.0 % Final    Eosinophil% 09/21/2017 2.4  0.0 - 8.0 % Final    Basophil% 09/21/2017 0.5  0.0 - 1.9 % Final    Differential Method 09/21/2017 Automated   Final    POCT Glucose 09/21/2017 121* 70 - 110 mg/dL Final     1) Right upper lobe. (Rad)  Supplemental Diagnosis  Immunohistochemical stains are completed on the lung biopsy and revealed the tumor cells to stain positively for  cytokeratin 5/6 and p63. The tumor cells are negative for TTF-1, cytokeratin 7 and estrogen receptor. All stains  have satisfactory positive and negative controls. This staining profile together with the morphology of the tumor  support a diagnosis of non-small cell carcinoma consistent with squamous cell carcinoma.  ER  (Electronically Signed: 2017-09-25 09:32:02 )  Diagnosed by: Bebe Child  FINAL PATHOLOGIC DIAGNOSIS  Right lung mass, core biopsy:  Invasive carcinoma.       Imaging:   CT 8/8/17  CT CHEST WITHOUT CONTRAST  Time of Procedure: 08/08/17 14:25:00  Accession # [id number]    Technique: The chest was surveyed from the lung apices through the costophrenic angles without the use of intravenous contrast material.  Data was reformatted for contiguous 5 mm axial, sagittal, and coronal images. Data was post processed as contiguous 1.25 mm axial images for effective high-resolution CT imaging and 8-mm maximum intensity projection  (MIP) axial images at 2-mm increments.    Total exam DLP: 314.32 mGy-cm    INDICATION: Cough    COMPARISON: CXR portable 10/31/2016.    FINDINGS:    The vascular and soft tissue structure of the base of the neck are unremarkable.    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  The thoracic aorta maintains normal caliber, contour, and course with significant atherosclerotic calcification.      There is postoperative change of median sternotomy for CABG procedure with intact sternotomy wires.  The sternal fragments appear fused, however there is no identifiable union of the manubrial fragments.  There is mild cardiac enlargement, however there is no pleural effusion.  There is dense coronary atherosclerosis in a three-vessel distribution.  There is calcification of the aortic valve annulus as well as the mitral valve annulus.    The pulmonary arteries distribute normally.  There are four pulmonary veins.  Systemic and pulmonary venoatrial connections are concordant.    There are multiple prominent mediastinal nodes at the upper limits of normal as detailed below:     - Right paratracheal node, 1.1 cm (series 2, image 19)     - Subcarinal node 1.5 cm (axial series 2, image 24)    The hilar contours are unremarkable on this non-contrast examination.    The esophagus is normal in course and caliber.  There is a small hiatal hernia.    The osseous structures demonstrate age-appropriate degenerative change with compression deformity of the T8 and T9 vertebral bodies.  There is metallic plate and screw orthopedic hardware in the left humerus and humeral head.     Limited views of the abdomen demonstrate no significant abnormality.    The trachea is midline; the proximal airways are patent.    The lungs are  symmetrically expanded.  There are small bilateral pleural effusions.  2 adjacent, large pulmonary lesions occupy the right upper lobe measuring 2.2 cm in long axis measurement in the apical segment  (axial series 3, image 44) and 4.7 cm in long axis measurement in the posterior segment (axial series 3, image 60).  The larger lesion abuts the pleura of the right posterior hemithorax, however the right posterior ribs are intact and there is a small crescent of fluid  the lesion from the posterior chest wall.  No definite chest wall invasion is identified.  There are scattered pulmonary nodules bilaterally measuring up to 0.2 cm in greatest diameter.  There is no pneumothorax.   Impression         1.  2 large right upper lobe lesions measuring 2.2 cm in the apical segment and 4.7 cm in the posterior segment highly concerning for neoplasm.    2.  Multiple prominent mediastinal nodes with index nodes as detailed below:     - Right paratracheal node, 1.1 cm (series 2, image 19)     - Subcarinal node 1.5 cm (axial series 2, image 24)    3.   Scattered, bilateral pulmonary micronodules measuring up to 0.2 cm     4.  Abundant atherosclerosis including coronary atherosclerosis.    5.  Additional findings including cardiomegaly, postoperative change of CABG procedure, and T8/T9 compression deformities.    This report has been flagged in the [facility] medical record.    Findings first visualized at 14:50.  Impression discussed with Shara Callaway RN at 14:52 by Dr. Pham via telephone for staff radiologist Dae Mazariegos M.D.  ______________________________________             Assessment:       1. Pulmonary nodules/lesions, multiple    2. Primary lung cancer, right    3. Mediastinal lymphadenopathy    4. Chronic kidney disease, stage IV (severe)           Plan:     I have had a long conversation with Mrs. Yun and family members today concerning her disease.  I have reviewed the findings of her CT indicating the presence of 2 right sided lung lesions with mediastinal lymphadenopathy as well as multiple pulmonary subcentimeter nodules in both lungs.  The bulk of her symptoms are not related to her lung cancer  but rather her other comorbidities.  Review of her CT indicates that this appears to be at least stage III disease and possibly stage IV disease.  I have outlined the fact that stage III disease is generally treated with concurrent chemotherapy and radiation and because of her multiple comorbidities I do not think that this would be a feasible option.  Additionally, I have discussed options for treatment for stage IV disease including chemotherapy, immunotherapy or molecular targeted therapy.  Again, I do not think chemotherapy would be a very good option for her given her comorbidities including her severe chronic kidney disease.  Immunotherapy may be an option as well as molecular targeted therapy.  They do wish to proceed further with a workup and have recommended we check a PET/CT at this time.  She cannot have an MRI the brain with contrast due to her kidney disease.  I will send a cell free DNA analysis today as well as ask pathology to run PD-L1 testing on her biopsy specimen.  Plan to see back in another 2 weeks to discuss these findings and possible options for treatment.  We've also discussed no treatment which is also a very viable option but they do wish to move forward with staging.  Multiple questions were answered and they are agreeable with this plan.      Arnav Barnhart DO, FACP  Hematology & Oncology  [facility] [place] [address]  ph. [phone]  Fax. [phone]    60 minutes were spent in coordination of patient's care, record review and counseling.  More than 50% of the time was face-to-face.

## 2017-10-23 ENCOUNTER — LAB VISIT (OUTPATIENT)
Dept: LAB | Facility: HOSPITAL | Age: 82
End: 2017-10-23
Attending: INTERNAL MEDICINE
Payer: MEDICARE

## 2017-10-23 ENCOUNTER — OFFICE VISIT (OUTPATIENT)
Dept: HEMATOLOGY/ONCOLOGY | Facility: CLINIC | Age: 82
End: 2017-10-23
Payer: MEDICARE

## 2017-10-23 VITALS
DIASTOLIC BLOOD PRESSURE: 61 MMHG | SYSTOLIC BLOOD PRESSURE: 126 MMHG | RESPIRATION RATE: 18 BRPM | WEIGHT: 156.5 LBS | HEART RATE: 59 BPM | TEMPERATURE: 98 F | OXYGEN SATURATION: 96 % | BODY MASS INDEX: 27.73 KG/M2

## 2017-10-23 DIAGNOSIS — R59.0 MEDIASTINAL LYMPHADENOPATHY: ICD-10-CM

## 2017-10-23 DIAGNOSIS — C34.91 PRIMARY LUNG CANCER, RIGHT: Primary | ICD-10-CM

## 2017-10-23 DIAGNOSIS — R91.8 PULMONARY NODULES/LESIONS, MULTIPLE: ICD-10-CM

## 2017-10-23 DIAGNOSIS — N18.4 CHRONIC KIDNEY DISEASE, STAGE IV (SEVERE): Chronic | ICD-10-CM

## 2017-10-23 DIAGNOSIS — C34.91 PRIMARY LUNG CANCER, RIGHT: ICD-10-CM

## 2017-10-23 LAB
ALBUMIN SERPL BCP-MCNC: 2.3 G/DL
ALP SERPL-CCNC: 128 U/L
ALT SERPL W/O P-5'-P-CCNC: 9 U/L
ANION GAP SERPL CALC-SCNC: 9 MMOL/L
AST SERPL-CCNC: 14 U/L
BILIRUB SERPL-MCNC: 0.2 MG/DL
BUN SERPL-MCNC: 33 MG/DL
CALCIUM SERPL-MCNC: 8.9 MG/DL
CHLORIDE SERPL-SCNC: 112 MMOL/L
CO2 SERPL-SCNC: 23 MMOL/L
CREAT SERPL-MCNC: 2.4 MG/DL
ERYTHROCYTE [DISTWIDTH] IN BLOOD BY AUTOMATED COUNT: 14.3 %
EST. GFR  (AFRICAN AMERICAN): 20.9 ML/MIN/1.73 M^2
EST. GFR  (NON AFRICAN AMERICAN): 18.1 ML/MIN/1.73 M^2
GLUCOSE SERPL-MCNC: 91 MG/DL
HCT VFR BLD AUTO: 30 %
HGB BLD-MCNC: 9.8 G/DL
IMM GRANULOCYTES # BLD AUTO: 0.03 K/UL
MCH RBC QN AUTO: 27.2 PG
MCHC RBC AUTO-ENTMCNC: 32.7 G/DL
MCV RBC AUTO: 83 FL
NEUTROPHILS # BLD AUTO: 6 K/UL
PLATELET # BLD AUTO: 281 K/UL
PMV BLD AUTO: 9.6 FL
POTASSIUM SERPL-SCNC: 3.9 MMOL/L
PROT SERPL-MCNC: 7.5 G/DL
RBC # BLD AUTO: 3.6 M/UL
SODIUM SERPL-SCNC: 144 MMOL/L
TSH SERPL DL<=0.005 MIU/L-ACNC: 2.4 UIU/ML
WBC # BLD AUTO: 8.95 K/UL

## 2017-10-23 PROCEDURE — 99999 PR PBB SHADOW E&M-EST. PATIENT-LVL IV: CPT | Mod: PBBFAC,,, | Performed by: INTERNAL MEDICINE

## 2017-10-23 PROCEDURE — 84443 ASSAY THYROID STIM HORMONE: CPT

## 2017-10-23 PROCEDURE — 36415 COLL VENOUS BLD VENIPUNCTURE: CPT

## 2017-10-23 PROCEDURE — 80053 COMPREHEN METABOLIC PANEL: CPT

## 2017-10-23 PROCEDURE — 85027 COMPLETE CBC AUTOMATED: CPT

## 2017-10-23 PROCEDURE — 99214 OFFICE O/P EST MOD 30 MIN: CPT | Mod: S$GLB,,, | Performed by: INTERNAL MEDICINE

## 2017-10-23 RX ORDER — HEPARIN 100 UNIT/ML
500 SYRINGE INTRAVENOUS
Status: CANCELLED | OUTPATIENT
Start: 2017-10-30

## 2017-10-23 RX ORDER — SODIUM CHLORIDE 0.9 % (FLUSH) 0.9 %
10 SYRINGE (ML) INJECTION
Status: CANCELLED | OUTPATIENT
Start: 2017-10-30

## 2017-10-23 NOTE — PROGRESS NOTES
PATIENT: Misa Yun  MRN: [id number]  DATE: 10/23/2017      Diagnosis:   1. Primary lung cancer, right    2. Pulmonary nodules/lesions, multiple    3. Mediastinal lymphadenopathy        Chief Complaint: Lung Cancer      Oncologic History:      Oncologic History Non-small cell lung cancer, right, diagnosed 9/2017     Oncologic Treatment     Pathology Squamous cell carcinoma, cT3, N2, M1a          Subjective:    Interval History: Ms. Yun is a 83 y.o. female who is seen in follow-up for lung cancer.  She states that she still has some ongoing shortness of breath.  No other new complaints.    Her history dates to 8/2017 when she underwent an x-ray for a cough which had been present for only a few weeks which had shown abnormalities.  Subsequently a CT was performed revealing 2 small bilateral pleural effusions along with large pulmonary lesions in the right upper lobe injuring 2.2 cm and also another lesion measuring 4.7 cm.  There were also multiple enlarged mediastinal lymph nodes.  In addition there were bilateral multiple pulmonary micronodules.  She underwent a CT-guided biopsy in 9/2017 with pathology revealing squamous cell carcinoma.  PD-L1 tumor proportion score was 95%.          Past Medical History:   Past Medical History:   Diagnosis Date    Anemia     Anemia     CAD (coronary artery disease)     Cataract     CHF (congestive heart failure)     Chronic kidney disease (CKD), stage IV (severe) 03/27/2017    Sees  at [facility], no dialysis    Dementia     Encounter for blood transfusion 2006    at time of CABG    Fracture of humerus, proximal, left, closed 1/28/2015    Hypercalcemia 04/10/2017    Hyperlipidemia     Hyperparathyroidism     Hypertension     Mediastinal lymphadenopathy 10/9/2017    MI (myocardial infarction) 10/2016    Osteoporosis     Ovarian cancer     in the past    Primary lung cancer, right 10/9/2017    Type 2 diabetes mellitus, controlled      UTI (urinary tract infection) 04/11/2017       Past Surgical HIstory:   Past Surgical History:   Procedure Laterality Date    CATARACT EXTRACTION W/  INTRAOCULAR LENS IMPLANT Left 04/17/2017    Dr. Motta    CATARACT EXTRACTION W/  INTRAOCULAR LENS IMPLANT Right 05/06/2017    Dr Motta     CORONARY ARTERY BYPASS GRAFT  2006    EYE SURGERY      HYSTERECTOMY  2002    ORIF HUMERUS FRACTURE Left 1/28/2015       Family History:   Family History   Problem Relation Age of Onset    Adopted: Yes    Blindness Maternal Grandmother     Cancer Neg Hx        Social History:  reports that she quit smoking about 10 years ago. She has a 60.00 pack-year smoking history. She has never used smokeless tobacco. She reports that she does not drink alcohol or use drugs.    Allergies:  Review of patient's allergies indicates:  No Known Allergies    Medications:  Current Outpatient Prescriptions   Medication Sig Dispense Refill    alendronate (FOSAMAX) 70 MG tablet Take 70 mg by mouth every 7 days.      amlodipine (NORVASC) 10 MG tablet Take 10 mg by mouth once daily.      aspirin 81 MG Chew Take 1 tablet (81 mg total) by mouth once daily. RESUME ON 2/14/15      atorvastatin (LIPITOR) 40 MG tablet Take 1 tablet (40 mg total) by mouth once daily. 30 tablet 3    carvedilol (COREG) 3.125 MG tablet Take 2 tablets (6.25 mg total) by mouth 2 (two) times daily. 60 tablet 3    diphenhydrAMINE (BENADRYL ALLERGY) 25 mg tablet Take 25 mg by mouth nightly as needed for Insomnia.      donepezil (ARICEPT) 5 MG tablet Take 5 mg by mouth every evening.      ergocalciferol (ERGOCALCIFEROL) 50,000 unit Cap Take 50,000 Units by mouth every 7 days.      furosemide (LASIX) 20 MG tablet Take 1 tablet (20 mg total) by mouth once daily. 30 tablet 11    insulin aspart (NOVOLOG) 100 unit/mL injection Inject 100 Units into the skin 3 (three) times daily before meals.      isosorbide-hydrALAZINE 20-37.5 mg (BIDIL) 20-37.5 mg Tab Take 1 tablet by  mouth 3 (three) times daily. 90 tablet 11    mirtazapine (REMERON) 7.5 MG Tab Take 7.5 mg by mouth every evening.      nitroGLYCERIN (NITROSTAT) 0.4 MG SL tablet Place 1 tablet (0.4 mg total) under the tongue every 5 (five) minutes as needed for Chest pain. 25 tablet 3    senna-docusate 8.6-50 mg (PERICOLACE) 8.6-50 mg per tablet Take 1 tablet by mouth 2 (two) times daily.      tramadol (ULTRAM) 50 mg tablet Take 50 mg by mouth every 6 (six) hours as needed for Pain.       No current facility-administered medications for this visit.        Review of Systems   Constitutional: Negative for appetite change, chills, fever and unexpected weight change.   HENT: Negative for congestion, hearing loss and nosebleeds.    Eyes: Negative for visual disturbance.   Respiratory: Positive for shortness of breath. Negative for cough.    Cardiovascular: Negative for chest pain and palpitations.   Gastrointestinal: Negative for abdominal pain, blood in stool, constipation, diarrhea, nausea and vomiting.   Genitourinary: Negative for dysuria and frequency.   Musculoskeletal: Negative for back pain and gait problem.   Skin: Negative for color change and rash.   Neurological: Negative for dizziness, weakness and headaches.   Hematological: Negative for adenopathy. Does not bruise/bleed easily.   Psychiatric/Behavioral: Negative for confusion. The patient is not nervous/anxious.        ECOG Performance Status:   ECOG SCORE    1 - Restricted in strenuous activity-ambulatory and able to carry out work of a light nature         Objective:      Vitals:   Vitals:    10/23/17 1335   BP: 126/61   BP Location: Right arm   Patient Position: Sitting   BP Method: Medium (Automatic)   Pulse: (!) 59   Resp: 18   Temp: 98 °F (36.7 °C)   TempSrc: Oral   SpO2: 96%   Weight: 71 kg (156 lb 8.4 oz)     BMI: Body mass index is 27.73 kg/m².    Physical Exam   Constitutional: She appears well-developed and well-nourished. No distress.   Sitting in a  wheelchair   HENT:   Head: Normocephalic.   Mouth/Throat: No oropharyngeal exudate.   Eyes: EOM are normal. No scleral icterus.   Neck: Neck supple. No tracheal deviation present. No thyromegaly present.   Cardiovascular: Normal rate and regular rhythm.    Pulmonary/Chest: Effort normal and breath sounds normal. No respiratory distress. She has no wheezes. She has no rales.   Abdominal: Soft. She exhibits no distension and no mass. There is no tenderness. There is no rebound and no guarding.   Musculoskeletal: Normal range of motion. She exhibits no edema.   Lymphadenopathy:     She has no cervical adenopathy.   Neurological: She is alert. No cranial nerve deficit.   Skin: Skin is warm and dry.   Psychiatric: She has a normal mood and affect.       Laboratory Data:  No visits with results within 1 Week(s) from this visit.   Latest known visit with results is:   Lab Visit on 10/09/2017   Component Date Value Ref Range Status    WBC 10/09/2017 9.38  3.90 - 12.70 K/uL Final    RBC 10/09/2017 3.76* 4.00 - 5.40 M/uL Final    Hemoglobin 10/09/2017 10.4* 12.0 - 16.0 g/dL Final    Hematocrit 10/09/2017 32.6* 37.0 - 48.5 % Final    MCV 10/09/2017 87  82 - 98 fL Final    MCH 10/09/2017 27.7  27.0 - 31.0 pg Final    MCHC 10/09/2017 31.9* 32.0 - 36.0 g/dL Final    RDW 10/09/2017 14.4  11.5 - 14.5 % Final    Platelets 10/09/2017 273  150 - 350 K/uL Final    MPV 10/09/2017 9.0* 9.2 - 12.9 fL Final    Gran # 10/09/2017 6.8  1.8 - 7.7 K/uL Final    Comment: The ANC is based on a white cell differential from an   automated cell counter. It has not been microscopically   reviewed for the presence of abnormal cells. Clinical   correlation is required.      Sodium 10/09/2017 142  136 - 145 mmol/L Final    Potassium 10/09/2017 3.4* 3.5 - 5.1 mmol/L Final    Chloride 10/09/2017 109  95 - 110 mmol/L Final    CO2 10/09/2017 22* 23 - 29 mmol/L Final    Glucose 10/09/2017 197* 70 - 110 mg/dL Final    BUN, Bld 10/09/2017 35*  8 - 23 mg/dL Final    Creatinine 10/09/2017 2.6* 0.5 - 1.4 mg/dL Final    Calcium 10/09/2017 8.7  8.7 - 10.5 mg/dL Final    Total Protein 10/09/2017 7.4  6.0 - 8.4 g/dL Final    Albumin 10/09/2017 2.4* 3.5 - 5.2 g/dL Final    Total Bilirubin 10/09/2017 0.3  0.1 - 1.0 mg/dL Final    Comment: For infants and newborns, interpretation of results should be based  on gestational age, weight and in agreement with clinical  observations.  Premature Infant recommended reference ranges:  Up to 24 hours.............<8.0 mg/dL  Up to 48 hours............<12.0 mg/dL  3-5 days..................<15.0 mg/dL  6-29 days.................<15.0 mg/dL      Alkaline Phosphatase 10/09/2017 122  55 - 135 U/L Final    AST 10/09/2017 14  10 - 40 U/L Final    ALT 10/09/2017 8* 10 - 44 U/L Final    Anion Gap 10/09/2017 11  8 - 16 mmol/L Final    eGFR if  10/09/2017 19.0* >60 mL/min/1.73 m^2 Final    eGFR if non African American 10/09/2017 16.5* >60 mL/min/1.73 m^2 Final    Comment: Calculation used to obtain the estimated glomerular filtration  rate (eGFR) is the CKD-EPI equation. Since race is unknown   in our information system, the eGFR values for   -American and Non--American patients are given   for each creatinine result.       1) Right upper lobe. (Rad)  Supplemental Diagnosis  Immunohistochemical stains are completed on the lung biopsy and revealed the tumor cells to stain positively for  cytokeratin 5/6 and p63. The tumor cells are negative for TTF-1, cytokeratin 7 and estrogen receptor. All stains  have satisfactory positive and negative controls. This staining profile together with the morphology of the tumor  support a diagnosis of non-small cell carcinoma consistent with squamous cell carcinoma.  ER  (Electronically Signed: 2017-09-25 09:32:02 )  Diagnosed by: Bebe Child  FINAL PATHOLOGIC DIAGNOSIS  Right lung mass, core biopsy:  Invasive carcinoma.       Imaging:   CT 8/8/17  CT CHEST  WITHOUT CONTRAST  Time of Procedure: 08/08/17 14:25:00  Accession # [id number]    Technique: The chest was surveyed from the lung apices through the costophrenic angles without the use of intravenous contrast material.  Data was reformatted for contiguous 5 mm axial, sagittal, and coronal images. Data was post processed as contiguous 1.25 mm axial images for effective high-resolution CT imaging and 8-mm maximum intensity projection (MIP) axial images at 2-mm increments.    Total exam DLP: 314.32 mGy-cm    INDICATION: Cough    COMPARISON: CXR portable 10/31/2016.    FINDINGS:    The vascular and soft tissue structure of the base of the neck are unremarkable.    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  The thoracic aorta maintains normal caliber, contour, and course with significant atherosclerotic calcification.      There is postoperative change of median sternotomy for CABG procedure with intact sternotomy wires.  The sternal fragments appear fused, however there is no identifiable union of the manubrial fragments.  There is mild cardiac enlargement, however there is no pleural effusion.  There is dense coronary atherosclerosis in a three-vessel distribution.  There is calcification of the aortic valve annulus as well as the mitral valve annulus.    The pulmonary arteries distribute normally.  There are four pulmonary veins.  Systemic and pulmonary venoatrial connections are concordant.    There are multiple prominent mediastinal nodes at the upper limits of normal as detailed below:     - Right paratracheal node, 1.1 cm (series 2, image 19)     - Subcarinal node 1.5 cm (axial series 2, image 24)    The hilar contours are unremarkable on this non-contrast examination.    The esophagus is normal in course and caliber.  There is a small hiatal hernia.    The osseous structures demonstrate age-appropriate degenerative change with compression deformity of the T8 and T9 vertebral bodies.  There is  metallic plate and screw orthopedic hardware in the left humerus and humeral head.     Limited views of the abdomen demonstrate no significant abnormality.    The trachea is midline; the proximal airways are patent.    The lungs are  symmetrically expanded.  There are small bilateral pleural effusions.  2 adjacent, large pulmonary lesions occupy the right upper lobe measuring 2.2 cm in long axis measurement in the apical segment (axial series 3, image 44) and 4.7 cm in long axis measurement in the posterior segment (axial series 3, image 60).  The larger lesion abuts the pleura of the right posterior hemithorax, however the right posterior ribs are intact and there is a small crescent of fluid  the lesion from the posterior chest wall.  No definite chest wall invasion is identified.  There are scattered pulmonary nodules bilaterally measuring up to 0.2 cm in greatest diameter.  There is no pneumothorax.   Impression         1.  2 large right upper lobe lesions measuring 2.2 cm in the apical segment and 4.7 cm in the posterior segment highly concerning for neoplasm.    2.  Multiple prominent mediastinal nodes with index nodes as detailed below:     - Right paratracheal node, 1.1 cm (series 2, image 19)     - Subcarinal node 1.5 cm (axial series 2, image 24)    3.   Scattered, bilateral pulmonary micronodules measuring up to 0.2 cm     4.  Abundant atherosclerosis including coronary atherosclerosis.    5.  Additional findings including cardiomegaly, postoperative change of CABG procedure, and T8/T9 compression deformities.    This report has been flagged in the [facility] medical record.    Findings first visualized at 14:50.  Impression discussed with Shara Callaway RN at 14:52 by Dr. Pham via telephone for staff radiologist Dae Mazariegos M.D.  ______________________________________             Assessment:       1. Primary lung cancer, right    2. Pulmonary nodules/lesions, multiple    3. Mediastinal  lymphadenopathy           Plan:     I have reviewed the findings of her cell free DNA analysis which do not show the presence of any targetable mutations in the first-line setting.  She does have a HER-2 mutation, however, given her renal insufficiency she may not be the best candidate for HER-2 directed therapy.  She does, however, have a PD-L1 tumor proportion score of 95%.  Because of this I would recommend treatment with pembrolizumab.  I've discussed the rationale, alternatives and potential side effects of treatment with her and her family members including her sister.  I've given her written information on this medication.  She is agreeable to proceed and her sister has signed informed consent.  We'll check a PET/CT now.  See me with cycle 2.    Arnav Barnhart DO, FACP  Hematology & Oncology  [facility] [place] [address]  ph. [phone]  Fax. [phone]    25 minutes were spent in coordination of patient's care, record review and counseling.  More than 50% of the time was face-to-face.

## 2017-10-30 ENCOUNTER — HOSPITAL ENCOUNTER (OUTPATIENT)
Dept: RADIOLOGY | Facility: HOSPITAL | Age: 82
Discharge: HOME | End: 2017-10-30
Attending: INTERNAL MEDICINE
Payer: MEDICARE

## 2017-10-30 DIAGNOSIS — N18.4 CHRONIC KIDNEY DISEASE, STAGE IV (SEVERE): Chronic | ICD-10-CM

## 2017-10-30 DIAGNOSIS — C34.91 PRIMARY LUNG CANCER, RIGHT: ICD-10-CM

## 2017-10-30 DIAGNOSIS — R91.8 PULMONARY NODULES/LESIONS, MULTIPLE: ICD-10-CM

## 2017-10-30 DIAGNOSIS — R59.0 MEDIASTINAL LYMPHADENOPATHY: ICD-10-CM

## 2017-10-30 PROCEDURE — 78815 PET IMAGE W/CT SKULL-THIGH: CPT | Mod: 26,PI,, | Performed by: NUCLEAR MEDICINE

## 2017-10-30 PROCEDURE — A9552 F18 FDG: HCPCS

## 2017-10-31 LAB — POCT GLUCOSE: 114 MG/DL (ref 70–110)

## 2017-11-06 ENCOUNTER — INFUSION (OUTPATIENT)
Dept: INFUSION THERAPY | Facility: HOSPITAL | Age: 82
End: 2017-11-06
Attending: INTERNAL MEDICINE
Payer: MEDICARE

## 2017-11-06 VITALS — HEART RATE: 66 BPM | SYSTOLIC BLOOD PRESSURE: 137 MMHG | RESPIRATION RATE: 18 BRPM | DIASTOLIC BLOOD PRESSURE: 63 MMHG

## 2017-11-06 DIAGNOSIS — C34.91 PRIMARY LUNG CANCER, RIGHT: Primary | ICD-10-CM

## 2017-11-06 PROCEDURE — 25000003 PHARM REV CODE 250: Performed by: INTERNAL MEDICINE

## 2017-11-06 PROCEDURE — 96413 CHEMO IV INFUSION 1 HR: CPT

## 2017-11-06 PROCEDURE — 63600175 PHARM REV CODE 636 W HCPCS: Performed by: INTERNAL MEDICINE

## 2017-11-06 RX ORDER — SODIUM CHLORIDE 0.9 % (FLUSH) 0.9 %
10 SYRINGE (ML) INJECTION
Status: DISCONTINUED | OUTPATIENT
Start: 2017-11-06 | End: 2017-11-06 | Stop reason: HOSPADM

## 2017-11-06 RX ADMIN — SODIUM CHLORIDE: 9 INJECTION, SOLUTION INTRAVENOUS at 01:11

## 2017-11-06 RX ADMIN — SODIUM CHLORIDE 200 MG: 9 INJECTION, SOLUTION INTRAVENOUS at 02:11

## 2017-11-27 ENCOUNTER — OFFICE VISIT (OUTPATIENT)
Dept: HEMATOLOGY/ONCOLOGY | Facility: CLINIC | Age: 82
End: 2017-11-27
Payer: MEDICARE

## 2017-11-27 ENCOUNTER — INFUSION (OUTPATIENT)
Dept: INFUSION THERAPY | Facility: HOSPITAL | Age: 82
End: 2017-11-27
Attending: INTERNAL MEDICINE
Payer: MEDICARE

## 2017-11-27 VITALS
SYSTOLIC BLOOD PRESSURE: 137 MMHG | HEART RATE: 64 BPM | HEIGHT: 65 IN | RESPIRATION RATE: 14 BRPM | OXYGEN SATURATION: 98 % | TEMPERATURE: 98 F | BODY MASS INDEX: 25.97 KG/M2 | WEIGHT: 155.88 LBS | DIASTOLIC BLOOD PRESSURE: 63 MMHG

## 2017-11-27 VITALS
HEART RATE: 64 BPM | SYSTOLIC BLOOD PRESSURE: 131 MMHG | TEMPERATURE: 98 F | DIASTOLIC BLOOD PRESSURE: 60 MMHG | RESPIRATION RATE: 18 BRPM

## 2017-11-27 DIAGNOSIS — C34.91 PRIMARY LUNG CANCER, RIGHT: Primary | ICD-10-CM

## 2017-11-27 DIAGNOSIS — Z09 CHEMOTHERAPY FOLLOW-UP EXAMINATION: ICD-10-CM

## 2017-11-27 PROCEDURE — 63600175 PHARM REV CODE 636 W HCPCS: Performed by: INTERNAL MEDICINE

## 2017-11-27 PROCEDURE — 25000003 PHARM REV CODE 250: Performed by: INTERNAL MEDICINE

## 2017-11-27 PROCEDURE — 99214 OFFICE O/P EST MOD 30 MIN: CPT | Mod: S$GLB,,, | Performed by: INTERNAL MEDICINE

## 2017-11-27 PROCEDURE — 99999 PR PBB SHADOW E&M-EST. PATIENT-LVL IV: CPT | Mod: PBBFAC,,, | Performed by: INTERNAL MEDICINE

## 2017-11-27 PROCEDURE — 96413 CHEMO IV INFUSION 1 HR: CPT

## 2017-11-27 RX ORDER — HEPARIN 100 UNIT/ML
500 SYRINGE INTRAVENOUS
Status: DISCONTINUED | OUTPATIENT
Start: 2017-11-27 | End: 2017-11-27 | Stop reason: HOSPADM

## 2017-11-27 RX ORDER — HEPARIN 100 UNIT/ML
500 SYRINGE INTRAVENOUS
Status: CANCELLED | OUTPATIENT
Start: 2017-11-27

## 2017-11-27 RX ORDER — SODIUM CHLORIDE 0.9 % (FLUSH) 0.9 %
10 SYRINGE (ML) INJECTION
Status: CANCELLED | OUTPATIENT
Start: 2017-11-27

## 2017-11-27 RX ORDER — SODIUM CHLORIDE 0.9 % (FLUSH) 0.9 %
10 SYRINGE (ML) INJECTION
Status: DISCONTINUED | OUTPATIENT
Start: 2017-11-27 | End: 2017-11-27 | Stop reason: HOSPADM

## 2017-11-27 RX ADMIN — SODIUM CHLORIDE 200 MG: 9 INJECTION, SOLUTION INTRAVENOUS at 04:11

## 2017-11-27 RX ADMIN — SODIUM CHLORIDE: 9 INJECTION, SOLUTION INTRAVENOUS at 04:11

## 2017-11-27 NOTE — PROGRESS NOTES
PATIENT: Misa Yun  MRN: [id number]  DATE: 11/27/2017      Diagnosis:   1. Primary lung cancer, right    2. Chemotherapy follow-up examination        Chief Complaint: Follow-up      Oncologic History:      Oncologic History Non-small cell lung cancer, right, diagnosed 9/2017     Oncologic Treatment Pembrolizumab 11/6/17    Pathology Squamous cell carcinoma, cT3, N2, M1a, PD-L1 TPS 95%          Subjective:    Interval History: Ms. Yun is a 83 y.o. female who is seen in follow-up for lung cancer.  She states that her breathing has improved.  She tolerated cycle 1 of pembrolizumab well.  She has no new complaints.    Her history dates to 8/2017 when she underwent an x-ray for a cough which had been present for only a few weeks which had shown abnormalities.  Subsequently a CT was performed revealing 2 small bilateral pleural effusions along with large pulmonary lesions in the right upper lobe measuring 2.2 cm and also another lesion measuring 4.7 cm.  There were also multiple enlarged mediastinal lymph nodes.  In addition there were bilateral multiple pulmonary micronodules.  She underwent a CT-guided biopsy in 9/2017 with pathology revealing squamous cell carcinoma.  PD-L1 tumor proportion score was 95%.  She was started on pembrolizumab on 11/6/17.          Past Medical History:   Past Medical History:   Diagnosis Date    Anemia     Anemia     CAD (coronary artery disease)     Cataract     Chemotherapy follow-up examination 11/27/2017    CHF (congestive heart failure)     Chronic kidney disease (CKD), stage IV (severe) 03/27/2017    Sees  at [facility], no dialysis    Dementia     Encounter for blood transfusion 2006    at time of CABG    Fracture of humerus, proximal, left, closed 1/28/2015    Hypercalcemia 04/10/2017    Hyperlipidemia     Hyperparathyroidism     Hypertension     Mediastinal lymphadenopathy 10/9/2017    MI (myocardial infarction) 10/2016     Osteoporosis     Ovarian cancer     in the past    Primary lung cancer, right 10/9/2017    Type 2 diabetes mellitus, controlled     UTI (urinary tract infection) 04/11/2017       Past Surgical HIstory:   Past Surgical History:   Procedure Laterality Date    CATARACT EXTRACTION W/  INTRAOCULAR LENS IMPLANT Left 04/17/2017    Dr. Motta    CATARACT EXTRACTION W/  INTRAOCULAR LENS IMPLANT Right 05/06/2017    Dr Motta     CORONARY ARTERY BYPASS GRAFT  2006    EYE SURGERY      HYSTERECTOMY  2002    ORIF HUMERUS FRACTURE Left 1/28/2015       Family History:   Family History   Problem Relation Age of Onset    Adopted: Yes    Blindness Maternal Grandmother     Cancer Neg Hx        Social History:  reports that she quit smoking about 10 years ago. She has a 60.00 pack-year smoking history. She has never used smokeless tobacco. She reports that she does not drink alcohol or use drugs.    Allergies:  Review of patient's allergies indicates:  No Known Allergies    Medications:  Current Outpatient Prescriptions   Medication Sig Dispense Refill    alendronate (FOSAMAX) 70 MG tablet Take 70 mg by mouth every 7 days.      amlodipine (NORVASC) 10 MG tablet Take 10 mg by mouth once daily.      aspirin 81 MG Chew Take 1 tablet (81 mg total) by mouth once daily. RESUME ON 2/14/15      atorvastatin (LIPITOR) 40 MG tablet Take 1 tablet (40 mg total) by mouth once daily. 30 tablet 3    carvedilol (COREG) 3.125 MG tablet Take 2 tablets (6.25 mg total) by mouth 2 (two) times daily. 60 tablet 3    diphenhydrAMINE (BENADRYL ALLERGY) 25 mg tablet Take 25 mg by mouth nightly as needed for Insomnia.      donepezil (ARICEPT) 5 MG tablet Take 5 mg by mouth every evening.      ergocalciferol (ERGOCALCIFEROL) 50,000 unit Cap Take 50,000 Units by mouth every 7 days.      furosemide (LASIX) 20 MG tablet Take 1 tablet (20 mg total) by mouth once daily. 30 tablet 11    insulin aspart (NOVOLOG) 100 unit/mL injection Inject 100 Units  "into the skin 3 (three) times daily before meals.      isosorbide-hydrALAZINE 20-37.5 mg (BIDIL) 20-37.5 mg Tab Take 1 tablet by mouth 3 (three) times daily. 90 tablet 11    mirtazapine (REMERON) 7.5 MG Tab Take 7.5 mg by mouth every evening.      senna-docusate 8.6-50 mg (PERICOLACE) 8.6-50 mg per tablet Take 1 tablet by mouth 2 (two) times daily.      tramadol (ULTRAM) 50 mg tablet Take 50 mg by mouth every 6 (six) hours as needed for Pain.      nitroGLYCERIN (NITROSTAT) 0.4 MG SL tablet Place 1 tablet (0.4 mg total) under the tongue every 5 (five) minutes as needed for Chest pain. 25 tablet 3     No current facility-administered medications for this visit.        Review of Systems   Constitutional: Negative for appetite change, chills, fever and unexpected weight change.   HENT: Negative for congestion, hearing loss and nosebleeds.    Eyes: Negative for visual disturbance.   Respiratory: Positive for shortness of breath. Negative for cough.    Cardiovascular: Negative for chest pain and palpitations.   Gastrointestinal: Negative for abdominal pain, blood in stool, constipation, diarrhea, nausea and vomiting.   Genitourinary: Negative for dysuria and frequency.   Musculoskeletal: Negative for back pain and gait problem.   Skin: Negative for color change and rash.   Neurological: Negative for dizziness, weakness and headaches.   Hematological: Negative for adenopathy. Does not bruise/bleed easily.   Psychiatric/Behavioral: Negative for confusion. The patient is not nervous/anxious.        ECOG Performance Status:   ECOG SCORE           Objective:      Vitals:   Vitals:    11/27/17 1352   BP: 137/63   BP Location: Left arm   Patient Position: Sitting   BP Method: X-Large (Automatic)   Pulse: 64   Resp: 14   Temp: 97.8 °F (36.6 °C)   TempSrc: Oral   SpO2: 98%   Weight: 70.7 kg (155 lb 13.8 oz)   Height: 5' 5" (1.651 m)     BMI: Body mass index is 25.94 kg/m².    Physical Exam   Constitutional: She appears " well-developed and well-nourished. No distress.   Sitting in a wheelchair   HENT:   Head: Normocephalic.   Mouth/Throat: No oropharyngeal exudate.   Eyes: EOM are normal. No scleral icterus.   Neck: Neck supple. No tracheal deviation present. No thyromegaly present.   Cardiovascular: Normal rate and regular rhythm.    Pulmonary/Chest: Effort normal and breath sounds normal. No respiratory distress. She has no wheezes. She has no rales.   Abdominal: Soft. She exhibits no distension and no mass. There is no tenderness. There is no rebound and no guarding.   Musculoskeletal: Normal range of motion. She exhibits no edema.   Lymphadenopathy:     She has no cervical adenopathy.   Neurological: She is alert. No cranial nerve deficit.   Skin: Skin is warm and dry.   Psychiatric: She has a normal mood and affect.       Laboratory Data:  Lab Visit on 11/27/2017   Component Date Value Ref Range Status    WBC 11/27/2017 8.13  3.90 - 12.70 K/uL Final    RBC 11/27/2017 3.36* 4.00 - 5.40 M/uL Final    Hemoglobin 11/27/2017 8.8* 12.0 - 16.0 g/dL Final    Hematocrit 11/27/2017 28.0* 37.0 - 48.5 % Final    MCV 11/27/2017 83  82 - 98 fL Final    MCH 11/27/2017 26.2* 27.0 - 31.0 pg Final    MCHC 11/27/2017 31.4* 32.0 - 36.0 g/dL Final    RDW 11/27/2017 14.6* 11.5 - 14.5 % Final    Platelets 11/27/2017 247  150 - 350 K/uL Final    MPV 11/27/2017 9.0* 9.2 - 12.9 fL Final    Gran # 11/27/2017 5.4  1.8 - 7.7 K/uL Final    Comment: The ANC is based on a white cell differential from an   automated cell counter. It has not been microscopically   reviewed for the presence of abnormal cells. Clinical   correlation is required.      Immature Grans (Abs) 11/27/2017 0.03  0.00 - 0.04 K/uL Final     1) Right upper lobe. (Rad)  Supplemental Diagnosis  Immunohistochemical stains are completed on the lung biopsy and revealed the tumor cells to stain positively for  cytokeratin 5/6 and p63. The tumor cells are negative for TTF-1,  cytokeratin 7 and estrogen receptor. All stains  have satisfactory positive and negative controls. This staining profile together with the morphology of the tumor  support a diagnosis of non-small cell carcinoma consistent with squamous cell carcinoma.  ER  (Electronically Signed: 2017-09-25 09:32:02 )  Diagnosed by: Bebe Child  FINAL PATHOLOGIC DIAGNOSIS  Right lung mass, core biopsy:  Invasive carcinoma.       Imaging:   CT 8/8/17  CT CHEST WITHOUT CONTRAST  Time of Procedure: 08/08/17 14:25:00  Accession # [id number]    Technique: The chest was surveyed from the lung apices through the costophrenic angles without the use of intravenous contrast material.  Data was reformatted for contiguous 5 mm axial, sagittal, and coronal images. Data was post processed as contiguous 1.25 mm axial images for effective high-resolution CT imaging and 8-mm maximum intensity projection (MIP) axial images at 2-mm increments.    Total exam DLP: 314.32 mGy-cm    INDICATION: Cough    COMPARISON: CXR portable 10/31/2016.    FINDINGS:    The vascular and soft tissue structure of the base of the neck are unremarkable.    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  The thoracic aorta maintains normal caliber, contour, and course with significant atherosclerotic calcification.      There is postoperative change of median sternotomy for CABG procedure with intact sternotomy wires.  The sternal fragments appear fused, however there is no identifiable union of the manubrial fragments.  There is mild cardiac enlargement, however there is no pleural effusion.  There is dense coronary atherosclerosis in a three-vessel distribution.  There is calcification of the aortic valve annulus as well as the mitral valve annulus.    The pulmonary arteries distribute normally.  There are four pulmonary veins.  Systemic and pulmonary venoatrial connections are concordant.    There are multiple prominent mediastinal nodes at the upper limits  of normal as detailed below:     - Right paratracheal node, 1.1 cm (series 2, image 19)     - Subcarinal node 1.5 cm (axial series 2, image 24)    The hilar contours are unremarkable on this non-contrast examination.    The esophagus is normal in course and caliber.  There is a small hiatal hernia.    The osseous structures demonstrate age-appropriate degenerative change with compression deformity of the T8 and T9 vertebral bodies.  There is metallic plate and screw orthopedic hardware in the left humerus and humeral head.     Limited views of the abdomen demonstrate no significant abnormality.    The trachea is midline; the proximal airways are patent.    The lungs are  symmetrically expanded.  There are small bilateral pleural effusions.  2 adjacent, large pulmonary lesions occupy the right upper lobe measuring 2.2 cm in long axis measurement in the apical segment (axial series 3, image 44) and 4.7 cm in long axis measurement in the posterior segment (axial series 3, image 60).  The larger lesion abuts the pleura of the right posterior hemithorax, however the right posterior ribs are intact and there is a small crescent of fluid  the lesion from the posterior chest wall.  No definite chest wall invasion is identified.  There are scattered pulmonary nodules bilaterally measuring up to 0.2 cm in greatest diameter.  There is no pneumothorax.   Impression         1.  2 large right upper lobe lesions measuring 2.2 cm in the apical segment and 4.7 cm in the posterior segment highly concerning for neoplasm.    2.  Multiple prominent mediastinal nodes with index nodes as detailed below:     - Right paratracheal node, 1.1 cm (series 2, image 19)     - Subcarinal node 1.5 cm (axial series 2, image 24)    3.   Scattered, bilateral pulmonary micronodules measuring up to 0.2 cm     4.  Abundant atherosclerosis including coronary atherosclerosis.    5.  Additional findings including cardiomegaly, postoperative change  of CABG procedure, and T8/T9 compression deformities.    This report has been flagged in the Epic medical record.    Findings first visualized at 14:50.  Impression discussed with Shara Callaway RN at 14:52 by Dr. Pham via telephone for staff radiologist Dae Mazariegos M.D.  ______________________________________             Assessment:       1. Primary lung cancer, right    2. Chemotherapy follow-up examination           Plan:     Mrs. Yun is doing well from an oncologic standpoint.  She is tolerating treatment with pembrolizumab.  Review of her lab work shows no detrimental changes.  She will proceed onto cycle 2 today.  I will plan to see back in 3 weeks.    Arnav Barnhart DO, FACP  Hematology & Oncology  [facility] [place] [address]  ph. [phone]  Fax. [phone]    25 minutes were spent in coordination of patient's care, record review and counseling.  More than 50% of the time was face-to-face.

## 2017-11-27 NOTE — PLAN OF CARE
Problem: Patient Care Overview  Goal: Plan of Care Review  Outcome: Ongoing (interventions implemented as appropriate)  Pt tolerated Keytruda with no complications. Pt instructed to call MD with any problems. Pt discharged home with family at side.

## 2017-12-18 ENCOUNTER — INFUSION (OUTPATIENT)
Dept: INFUSION THERAPY | Facility: HOSPITAL | Age: 82
End: 2017-12-18
Attending: INTERNAL MEDICINE
Payer: MEDICARE

## 2017-12-18 ENCOUNTER — OFFICE VISIT (OUTPATIENT)
Dept: HEMATOLOGY/ONCOLOGY | Facility: CLINIC | Age: 82
End: 2017-12-18
Payer: MEDICARE

## 2017-12-18 VITALS
BODY MASS INDEX: 25.31 KG/M2 | RESPIRATION RATE: 17 BRPM | HEART RATE: 68 BPM | WEIGHT: 152.13 LBS | TEMPERATURE: 98 F | SYSTOLIC BLOOD PRESSURE: 122 MMHG | DIASTOLIC BLOOD PRESSURE: 57 MMHG

## 2017-12-18 VITALS
DIASTOLIC BLOOD PRESSURE: 65 MMHG | RESPIRATION RATE: 17 BRPM | TEMPERATURE: 98 F | HEART RATE: 65 BPM | SYSTOLIC BLOOD PRESSURE: 146 MMHG

## 2017-12-18 DIAGNOSIS — C34.91 PRIMARY LUNG CANCER, RIGHT: Primary | ICD-10-CM

## 2017-12-18 DIAGNOSIS — R91.8 PULMONARY NODULES/LESIONS, MULTIPLE: ICD-10-CM

## 2017-12-18 DIAGNOSIS — Z09 CHEMOTHERAPY FOLLOW-UP EXAMINATION: ICD-10-CM

## 2017-12-18 PROCEDURE — 96413 CHEMO IV INFUSION 1 HR: CPT

## 2017-12-18 PROCEDURE — 63600175 PHARM REV CODE 636 W HCPCS: Performed by: INTERNAL MEDICINE

## 2017-12-18 PROCEDURE — 99999 PR PBB SHADOW E&M-EST. PATIENT-LVL III: CPT | Mod: PBBFAC,,, | Performed by: INTERNAL MEDICINE

## 2017-12-18 PROCEDURE — 99214 OFFICE O/P EST MOD 30 MIN: CPT | Mod: S$GLB,,, | Performed by: INTERNAL MEDICINE

## 2017-12-18 PROCEDURE — 25000003 PHARM REV CODE 250: Performed by: INTERNAL MEDICINE

## 2017-12-18 RX ORDER — HEPARIN 100 UNIT/ML
500 SYRINGE INTRAVENOUS
Status: DISCONTINUED | OUTPATIENT
Start: 2017-12-18 | End: 2017-12-18 | Stop reason: HOSPADM

## 2017-12-18 RX ORDER — SODIUM CHLORIDE 0.9 % (FLUSH) 0.9 %
10 SYRINGE (ML) INJECTION
Status: CANCELLED | OUTPATIENT
Start: 2017-12-18

## 2017-12-18 RX ORDER — SODIUM CHLORIDE 0.9 % (FLUSH) 0.9 %
10 SYRINGE (ML) INJECTION
Status: DISCONTINUED | OUTPATIENT
Start: 2017-12-18 | End: 2017-12-18 | Stop reason: HOSPADM

## 2017-12-18 RX ORDER — HEPARIN 100 UNIT/ML
500 SYRINGE INTRAVENOUS
Status: CANCELLED | OUTPATIENT
Start: 2017-12-18

## 2017-12-18 RX ADMIN — SODIUM CHLORIDE: 900 INJECTION, SOLUTION INTRAVENOUS at 11:12

## 2017-12-18 RX ADMIN — SODIUM CHLORIDE 200 MG: 900 INJECTION, SOLUTION INTRAVENOUS at 11:12

## 2017-12-18 NOTE — PLAN OF CARE
Problem: Patient Care Overview  Goal: Plan of Care Review  Outcome: Ongoing (interventions implemented as appropriate)  Pt tolerated keytruda without issue, avs given, rtc 1/8/18, no distress noted upon d/c to home

## 2017-12-18 NOTE — LETTER
December 18, 2017        [facility]  [address]  [facility] [address]             [facility]  [address]  [facility] [address]  Phone: [phone]   Patient: Misa Yun   MR Number: [id number]   YOB: 1934   Date of Visit: 12/18/2017       Dear  Family:    Thank you for referring Misa Yun to me for evaluation. Attached you will find relevant portions of my assessment and plan of care.    If you have questions, please do not hesitate to call me. I look forward to following Misa Yun along with you.    Sincerely,      Arnav Barnhart, DO, FACP            CC  No Recipients    Enclosure

## 2017-12-18 NOTE — PROGRESS NOTES
PATIENT: Misa Yun  MRN: [id number]  DATE: 12/18/2017      Diagnosis:   1. Primary lung cancer, right    2. Chemotherapy follow-up examination    3. Pulmonary nodules/lesions, multiple        Chief Complaint: Chemotherapy      Oncologic History:      Oncologic History Non-small cell lung cancer, right, diagnosed 9/2017     Oncologic Treatment Pembrolizumab 11/6/17    Pathology Squamous cell carcinoma, cT3, N2, M1a, PD-L1 TPS 95%          Subjective:    Interval History: Ms. Yun is a 83 y.o. female who is seen in follow-up for lung cancer.  She states she has been feeling well.  She denies any shortness of breath.  She is without new complaints.    Her history dates to 8/2017 when she underwent an x-ray for a cough which had been present for only a few weeks which had shown abnormalities.  Subsequently a CT was performed revealing 2 small bilateral pleural effusions along with large pulmonary lesions in the right upper lobe measuring 2.2 cm and also another lesion measuring 4.7 cm.  There were also multiple enlarged mediastinal lymph nodes.  In addition there were bilateral multiple pulmonary micronodules.  She underwent a CT-guided biopsy in 9/2017 with pathology revealing squamous cell carcinoma.  PD-L1 tumor proportion score was 95%.  She was started on pembrolizumab on 11/6/17.          Past Medical History:   Past Medical History:   Diagnosis Date    Anemia     Anemia     CAD (coronary artery disease)     Cataract     Chemotherapy follow-up examination 11/27/2017    CHF (congestive heart failure)     Chronic kidney disease (CKD), stage IV (severe) 03/27/2017    Sees  at [facility], no dialysis    Dementia     Encounter for blood transfusion 2006    at time of CABG    Fracture of humerus, proximal, left, closed 1/28/2015    Hypercalcemia 04/10/2017    Hyperlipidemia     Hyperparathyroidism     Hypertension     Mediastinal lymphadenopathy 10/9/2017    MI  (myocardial infarction) 10/2016    Osteoporosis     Ovarian cancer     in the past    Primary lung cancer, right 10/9/2017    Type 2 diabetes mellitus, controlled     UTI (urinary tract infection) 04/11/2017       Past Surgical HIstory:   Past Surgical History:   Procedure Laterality Date    CATARACT EXTRACTION W/  INTRAOCULAR LENS IMPLANT Left 04/17/2017    Dr. Motta    CATARACT EXTRACTION W/  INTRAOCULAR LENS IMPLANT Right 05/06/2017    Dr Motta     CORONARY ARTERY BYPASS GRAFT  2006    EYE SURGERY      HYSTERECTOMY  2002    ORIF HUMERUS FRACTURE Left 1/28/2015       Family History:   Family History   Problem Relation Age of Onset    Adopted: Yes    Blindness Maternal Grandmother     Cancer Neg Hx        Social History:  reports that she quit smoking about 10 years ago. She has a 60.00 pack-year smoking history. She has never used smokeless tobacco. She reports that she does not drink alcohol or use drugs.    Allergies:  Review of patient's allergies indicates:  No Known Allergies    Medications:  Current Outpatient Prescriptions   Medication Sig Dispense Refill    alendronate (FOSAMAX) 70 MG tablet Take 70 mg by mouth every 7 days.      amlodipine (NORVASC) 10 MG tablet Take 10 mg by mouth once daily.      aspirin 81 MG Chew Take 1 tablet (81 mg total) by mouth once daily. RESUME ON 2/14/15      atorvastatin (LIPITOR) 40 MG tablet Take 1 tablet (40 mg total) by mouth once daily. 30 tablet 3    diphenhydrAMINE (BENADRYL ALLERGY) 25 mg tablet Take 25 mg by mouth nightly as needed for Insomnia.      donepezil (ARICEPT) 5 MG tablet Take 5 mg by mouth every evening.      ergocalciferol (ERGOCALCIFEROL) 50,000 unit Cap Take 50,000 Units by mouth every 7 days.      furosemide (LASIX) 20 MG tablet Take 1 tablet (20 mg total) by mouth once daily. 30 tablet 11    insulin aspart (NOVOLOG) 100 unit/mL injection Inject 100 Units into the skin 3 (three) times daily before meals.       isosorbide-hydrALAZINE 20-37.5 mg (BIDIL) 20-37.5 mg Tab Take 1 tablet by mouth 3 (three) times daily. 90 tablet 11    mirtazapine (REMERON) 7.5 MG Tab Take 7.5 mg by mouth every evening.      senna-docusate 8.6-50 mg (PERICOLACE) 8.6-50 mg per tablet Take 1 tablet by mouth 2 (two) times daily.      tramadol (ULTRAM) 50 mg tablet Take 50 mg by mouth every 6 (six) hours as needed for Pain.      carvedilol (COREG) 3.125 MG tablet Take 2 tablets (6.25 mg total) by mouth 2 (two) times daily. 60 tablet 3    nitroGLYCERIN (NITROSTAT) 0.4 MG SL tablet Place 1 tablet (0.4 mg total) under the tongue every 5 (five) minutes as needed for Chest pain. 25 tablet 3     No current facility-administered medications for this visit.        Review of Systems   Constitutional: Negative for appetite change, chills, fever and unexpected weight change.   HENT: Negative for congestion, hearing loss and nosebleeds.    Eyes: Negative for visual disturbance.   Respiratory: Negative for cough and shortness of breath.    Cardiovascular: Negative for chest pain and palpitations.   Gastrointestinal: Negative for abdominal pain, blood in stool, constipation, diarrhea, nausea and vomiting.   Genitourinary: Negative for dysuria and frequency.   Musculoskeletal: Negative for back pain and gait problem.   Skin: Negative for color change and rash.   Neurological: Negative for dizziness, weakness and headaches.   Hematological: Negative for adenopathy. Does not bruise/bleed easily.   Psychiatric/Behavioral: Negative for confusion. The patient is not nervous/anxious.        ECOG Performance Status:   ECOG SCORE    0 - Fully active-able to carry on all pre-disease performance without restriction         Objective:      Vitals:   Vitals:    12/18/17 0939   BP: (!) 122/57   Pulse: 68   Resp: 17   Temp: 97.9 °F (36.6 °C)   Weight: 69 kg (152 lb 1.9 oz)     BMI: Body mass index is 25.31 kg/m².    Physical Exam   Constitutional: She appears well-developed  and well-nourished. No distress.   Sitting in a wheelchair   HENT:   Head: Normocephalic.   Mouth/Throat: No oropharyngeal exudate.   Eyes: EOM are normal. No scleral icterus.   Neck: Neck supple. No tracheal deviation present. No thyromegaly present.   Cardiovascular: Normal rate and regular rhythm.    Pulmonary/Chest: Effort normal and breath sounds normal. No respiratory distress. She has no wheezes. She has no rales.   Abdominal: Soft. She exhibits no distension and no mass. There is no tenderness. There is no rebound and no guarding.   Musculoskeletal: Normal range of motion. She exhibits no edema.   Lymphadenopathy:     She has no cervical adenopathy.   Neurological: She is alert. No cranial nerve deficit.   Skin: Skin is warm and dry.   Psychiatric: She has a normal mood and affect.       Laboratory Data:  Lab Visit on 12/18/2017   Component Date Value Ref Range Status    WBC 12/18/2017 7.94  3.90 - 12.70 K/uL Final    RBC 12/18/2017 3.54* 4.00 - 5.40 M/uL Final    Hemoglobin 12/18/2017 9.4* 12.0 - 16.0 g/dL Final    Hematocrit 12/18/2017 29.4* 37.0 - 48.5 % Final    MCV 12/18/2017 83  82 - 98 fL Final    MCH 12/18/2017 26.6* 27.0 - 31.0 pg Final    MCHC 12/18/2017 32.0  32.0 - 36.0 g/dL Final    RDW 12/18/2017 14.8* 11.5 - 14.5 % Final    Platelets 12/18/2017 282  150 - 350 K/uL Final    MPV 12/18/2017 9.4  9.2 - 12.9 fL Final    Gran # 12/18/2017 5.5  1.8 - 7.7 K/uL Final    Comment: The ANC is based on a white cell differential from an   automated cell counter. It has not been microscopically   reviewed for the presence of abnormal cells. Clinical   correlation is required.      Immature Grans (Abs) 12/18/2017 0.03  0.00 - 0.04 K/uL Final     1) Right upper lobe. (Rad)  Supplemental Diagnosis  Immunohistochemical stains are completed on the lung biopsy and revealed the tumor cells to stain positively for  cytokeratin 5/6 and p63. The tumor cells are negative for TTF-1, cytokeratin 7 and  estrogen receptor. All stains  have satisfactory positive and negative controls. This staining profile together with the morphology of the tumor  support a diagnosis of non-small cell carcinoma consistent with squamous cell carcinoma.  ER  (Electronically Signed: 2017-09-25 09:32:02 )  Diagnosed by: Bebe Child  FINAL PATHOLOGIC DIAGNOSIS  Right lung mass, core biopsy:  Invasive carcinoma.       Imaging:   CT 8/8/17  CT CHEST WITHOUT CONTRAST  Time of Procedure: 08/08/17 14:25:00  Accession # [id number]    Technique: The chest was surveyed from the lung apices through the costophrenic angles without the use of intravenous contrast material.  Data was reformatted for contiguous 5 mm axial, sagittal, and coronal images. Data was post processed as contiguous 1.25 mm axial images for effective high-resolution CT imaging and 8-mm maximum intensity projection (MIP) axial images at 2-mm increments.    Total exam DLP: 314.32 mGy-cm    INDICATION: Cough    COMPARISON: CXR portable 10/31/2016.    FINDINGS:    The vascular and soft tissue structure of the base of the neck are unremarkable.    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  The thoracic aorta maintains normal caliber, contour, and course with significant atherosclerotic calcification.      There is postoperative change of median sternotomy for CABG procedure with intact sternotomy wires.  The sternal fragments appear fused, however there is no identifiable union of the manubrial fragments.  There is mild cardiac enlargement, however there is no pleural effusion.  There is dense coronary atherosclerosis in a three-vessel distribution.  There is calcification of the aortic valve annulus as well as the mitral valve annulus.    The pulmonary arteries distribute normally.  There are four pulmonary veins.  Systemic and pulmonary venoatrial connections are concordant.    There are multiple prominent mediastinal nodes at the upper limits of normal as  detailed below:     - Right paratracheal node, 1.1 cm (series 2, image 19)     - Subcarinal node 1.5 cm (axial series 2, image 24)    The hilar contours are unremarkable on this non-contrast examination.    The esophagus is normal in course and caliber.  There is a small hiatal hernia.    The osseous structures demonstrate age-appropriate degenerative change with compression deformity of the T8 and T9 vertebral bodies.  There is metallic plate and screw orthopedic hardware in the left humerus and humeral head.     Limited views of the abdomen demonstrate no significant abnormality.    The trachea is midline; the proximal airways are patent.    The lungs are  symmetrically expanded.  There are small bilateral pleural effusions.  2 adjacent, large pulmonary lesions occupy the right upper lobe measuring 2.2 cm in long axis measurement in the apical segment (axial series 3, image 44) and 4.7 cm in long axis measurement in the posterior segment (axial series 3, image 60).  The larger lesion abuts the pleura of the right posterior hemithorax, however the right posterior ribs are intact and there is a small crescent of fluid  the lesion from the posterior chest wall.  No definite chest wall invasion is identified.  There are scattered pulmonary nodules bilaterally measuring up to 0.2 cm in greatest diameter.  There is no pneumothorax.   Impression         1.  2 large right upper lobe lesions measuring 2.2 cm in the apical segment and 4.7 cm in the posterior segment highly concerning for neoplasm.    2.  Multiple prominent mediastinal nodes with index nodes as detailed below:     - Right paratracheal node, 1.1 cm (series 2, image 19)     - Subcarinal node 1.5 cm (axial series 2, image 24)    3.   Scattered, bilateral pulmonary micronodules measuring up to 0.2 cm     4.  Abundant atherosclerosis including coronary atherosclerosis.    5.  Additional findings including cardiomegaly, postoperative change of CABG  procedure, and T8/T9 compression deformities.    This report has been flagged in the Epic medical record.    Findings first visualized at 14:50.  Impression discussed with Shara Callaway RN at 14:52 by Dr. Pahm via telephone for staff radiologist Dae Mazariegos M.D.  ______________________________________             Assessment:       1. Primary lung cancer, right    2. Chemotherapy follow-up examination    3. Pulmonary nodules/lesions, multiple           Plan:     Mrs. Yun continues to do well from an oncologic standpoint and tolerating treatment with pembrolizumab.  I am awaiting a CMP today along with thyroid function tests and we'll follow-up on these.  She will proceed onto cycle 3 today.  Follow back up with me in another 3 weeks and will check a PET/CT prior.  All questions were answered and she is agreeable with this plan.    Arnav Barnhart DO, FACP  Hematology & Oncology  [facility] [place] [address]  ph. [phone]  Fax. [phone]    25 minutes were spent in coordination of patient's care, record review and counseling.  More than 50% of the time was face-to-face.

## 2017-12-18 NOTE — PLAN OF CARE
Problem: Chemotherapy Effects (Adult)  Goal: Signs and Symptoms of Listed Potential Problems Will be Absent, Minimized or Managed (Chemotherapy Effects)  Signs and symptoms of listed potential problems will be absent, minimized or managed by discharge/transition of care (reference Chemotherapy Effects (Adult) CPG).   Outcome: Ongoing (interventions implemented as appropriate)  Pt here for Keytruda infusion D1C3, labs, hx, meds, allergies reviewed, pt assisted to chair from wheelchair, blanket provided continue to monitor

## 2018-01-04 ENCOUNTER — HOSPITAL ENCOUNTER (OUTPATIENT)
Dept: RADIOLOGY | Facility: HOSPITAL | Age: 83
Discharge: HOME | End: 2018-01-04
Attending: INTERNAL MEDICINE
Payer: MEDICARE

## 2018-01-04 VITALS — WEIGHT: 147.69 LBS | BODY MASS INDEX: 24.61 KG/M2 | HEIGHT: 65 IN

## 2018-01-04 DIAGNOSIS — Z09 CHEMOTHERAPY FOLLOW-UP EXAMINATION: ICD-10-CM

## 2018-01-04 DIAGNOSIS — C34.91 PRIMARY LUNG CANCER, RIGHT: ICD-10-CM

## 2018-01-04 DIAGNOSIS — R91.8 PULMONARY NODULES/LESIONS, MULTIPLE: ICD-10-CM

## 2018-01-04 LAB — POCT GLUCOSE: 82 MG/DL (ref 70–110)

## 2018-01-04 PROCEDURE — A9552 F18 FDG: HCPCS

## 2018-01-04 PROCEDURE — 78815 PET IMAGE W/CT SKULL-THIGH: CPT | Mod: 26,PS,, | Performed by: RADIOLOGY

## 2018-01-04 PROCEDURE — 78815 PET IMAGE W/CT SKULL-THIGH: CPT | Mod: TC

## 2018-01-06 RX ORDER — SODIUM CHLORIDE 0.9 % (FLUSH) 0.9 %
10 SYRINGE (ML) INJECTION
Status: CANCELLED | OUTPATIENT
Start: 2018-01-08

## 2018-01-06 RX ORDER — HEPARIN 100 UNIT/ML
500 SYRINGE INTRAVENOUS
Status: CANCELLED | OUTPATIENT
Start: 2018-01-08

## 2018-01-08 ENCOUNTER — INFUSION (OUTPATIENT)
Dept: INFUSION THERAPY | Facility: HOSPITAL | Age: 83
End: 2018-01-08
Attending: INTERNAL MEDICINE
Payer: MEDICARE

## 2018-01-08 ENCOUNTER — OFFICE VISIT (OUTPATIENT)
Dept: HEMATOLOGY/ONCOLOGY | Facility: CLINIC | Age: 83
End: 2018-01-08
Payer: MEDICARE

## 2018-01-08 VITALS
TEMPERATURE: 98 F | HEART RATE: 62 BPM | DIASTOLIC BLOOD PRESSURE: 83 MMHG | SYSTOLIC BLOOD PRESSURE: 181 MMHG | RESPIRATION RATE: 16 BRPM

## 2018-01-08 VITALS
TEMPERATURE: 98 F | DIASTOLIC BLOOD PRESSURE: 74 MMHG | WEIGHT: 149.06 LBS | RESPIRATION RATE: 14 BRPM | HEART RATE: 63 BPM | SYSTOLIC BLOOD PRESSURE: 173 MMHG | OXYGEN SATURATION: 94 % | HEIGHT: 63 IN | BODY MASS INDEX: 26.41 KG/M2

## 2018-01-08 DIAGNOSIS — C34.91 PRIMARY LUNG CANCER, RIGHT: Primary | ICD-10-CM

## 2018-01-08 DIAGNOSIS — Z09 CHEMOTHERAPY FOLLOW-UP EXAMINATION: ICD-10-CM

## 2018-01-08 PROCEDURE — 99214 OFFICE O/P EST MOD 30 MIN: CPT | Mod: S$GLB,,, | Performed by: INTERNAL MEDICINE

## 2018-01-08 PROCEDURE — 63600175 PHARM REV CODE 636 W HCPCS: Mod: JG | Performed by: INTERNAL MEDICINE

## 2018-01-08 PROCEDURE — 96413 CHEMO IV INFUSION 1 HR: CPT

## 2018-01-08 PROCEDURE — 99999 PR PBB SHADOW E&M-EST. PATIENT-LVL IV: CPT | Mod: PBBFAC,,, | Performed by: INTERNAL MEDICINE

## 2018-01-08 PROCEDURE — 25000003 PHARM REV CODE 250: Performed by: INTERNAL MEDICINE

## 2018-01-08 RX ADMIN — SODIUM CHLORIDE 200 MG: 9 INJECTION, SOLUTION INTRAVENOUS at 01:01

## 2018-01-08 RX ADMIN — SODIUM CHLORIDE: 0.9 INJECTION, SOLUTION INTRAVENOUS at 01:01

## 2018-01-08 NOTE — LETTER
January 8, 2018        [facility]  [address]  [facility] [address]             [facility]  [address]  [facility] [address]  Phone: [phone]   Patient: Misa Yun   MR Number: [id number]   YOB: 1934   Date of Visit: 1/8/2018       Dear  Family:    Thank you for referring Misa Yun to me for evaluation. Attached you will find relevant portions of my assessment and plan of care.    If you have questions, please do not hesitate to call me. I look forward to following Misa Yun along with you.    Sincerely,      Arnav Barnhart, DO, FACP            CC  No Recipients    Enclosure

## 2018-01-08 NOTE — PROGRESS NOTES
PATIENT: Misa Yun  MRN: [id number]  DATE: 1/8/2018      Diagnosis:   1. Primary lung cancer, right    2. Chemotherapy follow-up examination        Chief Complaint: Follow-up      Oncologic History:      Oncologic History Non-small cell lung cancer, right, diagnosed 9/2017   Metastatic to contralateral lung at presentation     Oncologic Treatment Pembrolizumab 11/6/17    Pathology Squamous cell carcinoma, cT3, N2, M1a, PD-L1 TPS 95%          Subjective:    Interval History: Ms. Yun is a 83 y.o. female who is seen in follow-up for lung cancer.  She states she has been feeling well.  She denies any shortness of breath.  She is without new complaints.    Her history dates to 8/2017 when she underwent an x-ray for a cough which had been present for only a few weeks which had shown abnormalities.  Subsequently a CT was performed revealing 2 small bilateral pleural effusions along with large pulmonary lesions in the right upper lobe measuring 2.2 cm and also another lesion measuring 4.7 cm.  There were also multiple enlarged mediastinal lymph nodes.  In addition there were bilateral multiple pulmonary micronodules.  She underwent a CT-guided biopsy in 9/2017 with pathology revealing squamous cell carcinoma.  PD-L1 tumor proportion score was 95%.  She was started on pembrolizumab on 11/6/17.  PET/CT in 1/18 had shown a partial response.          Past Medical History:   Past Medical History:   Diagnosis Date    Anemia     Anemia     CAD (coronary artery disease)     Cataract     Chemotherapy follow-up examination 11/27/2017    CHF (congestive heart failure)     Chronic kidney disease (CKD), stage IV (severe) 03/27/2017    Sees  at [facility], no dialysis    Dementia     Encounter for blood transfusion 2006    at time of CABG    Fracture of humerus, proximal, left, closed 1/28/2015    Hypercalcemia 04/10/2017    Hyperlipidemia     Hyperparathyroidism     Hypertension      Mediastinal lymphadenopathy 10/9/2017    MI (myocardial infarction) 10/2016    Osteoporosis     Ovarian cancer     in the past    Primary lung cancer, right 10/9/2017    Type 2 diabetes mellitus, controlled     UTI (urinary tract infection) 04/11/2017       Past Surgical HIstory:   Past Surgical History:   Procedure Laterality Date    CATARACT EXTRACTION W/  INTRAOCULAR LENS IMPLANT Left 04/17/2017    Dr. Motta    CATARACT EXTRACTION W/  INTRAOCULAR LENS IMPLANT Right 05/06/2017    Dr Motta     CORONARY ARTERY BYPASS GRAFT  2006    EYE SURGERY      HYSTERECTOMY  2002    ORIF HUMERUS FRACTURE Left 1/28/2015       Family History:   Family History   Problem Relation Age of Onset    Adopted: Yes    Blindness Maternal Grandmother     Cancer Neg Hx        Social History:  reports that she quit smoking about 10 years ago. She has a 60.00 pack-year smoking history. She has never used smokeless tobacco. She reports that she does not drink alcohol or use drugs.    Allergies:  Review of patient's allergies indicates:  No Known Allergies    Medications:  Current Outpatient Prescriptions   Medication Sig Dispense Refill    alendronate (FOSAMAX) 70 MG tablet Take 70 mg by mouth every 7 days.      amlodipine (NORVASC) 10 MG tablet Take 10 mg by mouth once daily.      aspirin 81 MG Chew Take 1 tablet (81 mg total) by mouth once daily. RESUME ON 2/14/15      atorvastatin (LIPITOR) 40 MG tablet Take 1 tablet (40 mg total) by mouth once daily. 30 tablet 3    diphenhydrAMINE (BENADRYL ALLERGY) 25 mg tablet Take 25 mg by mouth nightly as needed for Insomnia.      donepezil (ARICEPT) 5 MG tablet Take 5 mg by mouth every evening.      ergocalciferol (ERGOCALCIFEROL) 50,000 unit Cap Take 50,000 Units by mouth every 7 days.      furosemide (LASIX) 20 MG tablet Take 1 tablet (20 mg total) by mouth once daily. 30 tablet 11    insulin aspart (NOVOLOG) 100 unit/mL injection Inject 100 Units into the skin 3 (three) times  daily before meals.      isosorbide-hydrALAZINE 20-37.5 mg (BIDIL) 20-37.5 mg Tab Take 1 tablet by mouth 3 (three) times daily. 90 tablet 11    mirtazapine (REMERON) 7.5 MG Tab Take 7.5 mg by mouth every evening.      senna-docusate 8.6-50 mg (PERICOLACE) 8.6-50 mg per tablet Take 1 tablet by mouth 2 (two) times daily.      tramadol (ULTRAM) 50 mg tablet Take 50 mg by mouth every 6 (six) hours as needed for Pain.      carvedilol (COREG) 3.125 MG tablet Take 2 tablets (6.25 mg total) by mouth 2 (two) times daily. 60 tablet 3    nitroGLYCERIN (NITROSTAT) 0.4 MG SL tablet Place 1 tablet (0.4 mg total) under the tongue every 5 (five) minutes as needed for Chest pain. 25 tablet 3     No current facility-administered medications for this visit.        Review of Systems   Constitutional: Negative for appetite change, chills, fever and unexpected weight change.   HENT: Negative for congestion, hearing loss and nosebleeds.    Eyes: Negative for visual disturbance.   Respiratory: Negative for cough and shortness of breath.    Cardiovascular: Negative for chest pain and palpitations.   Gastrointestinal: Negative for abdominal pain, blood in stool, constipation, diarrhea, nausea and vomiting.   Genitourinary: Negative for dysuria and frequency.   Musculoskeletal: Negative for back pain and gait problem.   Skin: Negative for color change and rash.   Neurological: Negative for dizziness, weakness and headaches.   Hematological: Negative for adenopathy. Does not bruise/bleed easily.   Psychiatric/Behavioral: Negative for confusion. The patient is not nervous/anxious.        ECOG Performance Status:   ECOG SCORE    0 - Fully active-able to carry on all pre-disease performance without restriction         Objective:      Vitals:   Vitals:    01/08/18 1156   BP: (!) 173/74   BP Location: Left arm   Patient Position: Sitting   BP Method: X-Large (Automatic)   Pulse: 63   Resp: 14   Temp: 98.2 °F (36.8 °C)   TempSrc: Oral   SpO2:  "(!) 94%   Weight: 67.6 kg (149 lb 0.5 oz)   Height: 5' 3" (1.6 m)     BMI: Body mass index is 26.4 kg/m².    Physical Exam   Constitutional: She appears well-developed and well-nourished. No distress.   Sitting in a wheelchair   HENT:   Head: Normocephalic.   Mouth/Throat: No oropharyngeal exudate.   Eyes: EOM are normal. No scleral icterus.   Neck: Neck supple. No tracheal deviation present. No thyromegaly present.   Cardiovascular: Normal rate and regular rhythm.    Pulmonary/Chest: Effort normal and breath sounds normal. No respiratory distress. She has no wheezes. She has no rales.   Abdominal: Soft. She exhibits no distension and no mass. There is no tenderness. There is no rebound and no guarding.   Musculoskeletal: Normal range of motion. She exhibits no edema.   Lymphadenopathy:     She has no cervical adenopathy.   Neurological: She is alert. No cranial nerve deficit.   Skin: Skin is warm and dry.   Psychiatric: She has a normal mood and affect.       Laboratory Data:  Lab Visit on 01/08/2018   Component Date Value Ref Range Status    WBC 01/08/2018 8.45  3.90 - 12.70 K/uL Final    RBC 01/08/2018 3.65* 4.00 - 5.40 M/uL Final    Hemoglobin 01/08/2018 9.7* 12.0 - 16.0 g/dL Final    Hematocrit 01/08/2018 30.2* 37.0 - 48.5 % Final    MCV 01/08/2018 83  82 - 98 fL Final    MCH 01/08/2018 26.6* 27.0 - 31.0 pg Final    MCHC 01/08/2018 32.1  32.0 - 36.0 g/dL Final    RDW 01/08/2018 15.9* 11.5 - 14.5 % Final    Platelets 01/08/2018 246  150 - 350 K/uL Final    MPV 01/08/2018 9.3  9.2 - 12.9 fL Final    Gran # 01/08/2018 5.7  1.8 - 7.7 K/uL Final    Comment: The ANC is based on a white cell differential from an   automated cell counter. It has not been microscopically   reviewed for the presence of abnormal cells. Clinical   correlation is required.      Immature Grans (Abs) 01/08/2018 0.02  0.00 - 0.04 K/uL Final    Sodium 01/08/2018 144  136 - 145 mmol/L Final    Potassium 01/08/2018 3.4* 3.5 - 5.1 " mmol/L Final    Chloride 01/08/2018 111* 95 - 110 mmol/L Final    CO2 01/08/2018 24  23 - 29 mmol/L Final    Glucose 01/08/2018 146* 70 - 110 mg/dL Final    BUN, Bld 01/08/2018 34* 8 - 23 mg/dL Final    Creatinine 01/08/2018 3.2* 0.5 - 1.4 mg/dL Final    Calcium 01/08/2018 9.3  8.7 - 10.5 mg/dL Final    Total Protein 01/08/2018 7.8  6.0 - 8.4 g/dL Final    Albumin 01/08/2018 2.5* 3.5 - 5.2 g/dL Final    Total Bilirubin 01/08/2018 0.3  0.1 - 1.0 mg/dL Final    Comment: For infants and newborns, interpretation of results should be based  on gestational age, weight and in agreement with clinical  observations.  Premature Infant recommended reference ranges:  Up to 24 hours.............<8.0 mg/dL  Up to 48 hours............<12.0 mg/dL  3-5 days..................<15.0 mg/dL  6-29 days.................<15.0 mg/dL      Alkaline Phosphatase 01/08/2018 125  55 - 135 U/L Final    AST 01/08/2018 12  10 - 40 U/L Final    ALT 01/08/2018 7* 10 - 44 U/L Final    Anion Gap 01/08/2018 9  8 - 16 mmol/L Final    eGFR if  01/08/2018 14.8* >60 mL/min/1.73 m^2 Final    eGFR if non  01/08/2018 12.8* >60 mL/min/1.73 m^2 Final    Comment: Calculation used to obtain the estimated glomerular filtration  rate (eGFR) is the CKD-EPI equation.      Hospital Outpatient Visit on 01/04/2018   Component Date Value Ref Range Status    POCT Glucose 01/04/2018 82  70 - 110 mg/dL Final     1) Right upper lobe. (Rad)  Supplemental Diagnosis  Immunohistochemical stains are completed on the lung biopsy and revealed the tumor cells to stain positively for  cytokeratin 5/6 and p63. The tumor cells are negative for TTF-1, cytokeratin 7 and estrogen receptor. All stains  have satisfactory positive and negative controls. This staining profile together with the morphology of the tumor  support a diagnosis of non-small cell carcinoma consistent with squamous cell carcinoma.  ER  (Electronically Signed: 2017-09-25  09:32:02 )  Diagnosed by: Bebe Child  FINAL PATHOLOGIC DIAGNOSIS  Right lung mass, core biopsy:  Invasive carcinoma.       Imaging:   PET/CT 1/4/18  Findings: Comparison is 10/30/2017. The patient was administered 11.70 mCi of FDG intravenously.    Right upper lobe lung mass smaller SUV max 12.21, previously 17.87. There are low-grade hilar and mediastinal lymph nodes. Precarinal SUV max 4.14, previously 2.69.    There is physiologic intracranial head, neck activity. There are artery calcifications. There is physiologic adrenal, GI, and  activity. Bones showed nothing focal. Left lung is clear.   Impression      Right upper lobe mass has improved significantly.    Hilar and mediastinal lymph nodes are slightly increased and could be metastatic disease progressing.                  Assessment:       1. Primary lung cancer, right    2. Chemotherapy follow-up examination           Plan:     Mrs. Yun continues to do well from an oncologic standpoint.  Review of her PET/CT shows a partial response.  Labs are appropriate to continue treatment.  Continue on to cycle 4 of pembrolizumab today and follow-up for her next cycle in 3 weeks.  Report any new symptoms in the interim.  All questions were answered and she is agreeable with this plan.    Arnav Barnhart DO, FACP  Hematology & Oncology  [facility] [place] [address]  ph. [phone]  Fax. [phone]    25 minutes were spent in coordination of patient's care, record review and counseling.  More than 50% of the time was face-to-face.

## 2018-01-29 ENCOUNTER — OFFICE VISIT (OUTPATIENT)
Dept: HEMATOLOGY/ONCOLOGY | Facility: CLINIC | Age: 83
End: 2018-01-29
Payer: MEDICARE

## 2018-01-29 ENCOUNTER — INFUSION (OUTPATIENT)
Dept: INFUSION THERAPY | Facility: HOSPITAL | Age: 83
End: 2018-01-29
Attending: INTERNAL MEDICINE
Payer: MEDICARE

## 2018-01-29 VITALS
HEART RATE: 65 BPM | WEIGHT: 149 LBS | DIASTOLIC BLOOD PRESSURE: 68 MMHG | BODY MASS INDEX: 24.83 KG/M2 | RESPIRATION RATE: 18 BRPM | SYSTOLIC BLOOD PRESSURE: 160 MMHG | HEIGHT: 65 IN | OXYGEN SATURATION: 99 % | TEMPERATURE: 98 F

## 2018-01-29 VITALS
DIASTOLIC BLOOD PRESSURE: 56 MMHG | HEART RATE: 62 BPM | TEMPERATURE: 98 F | SYSTOLIC BLOOD PRESSURE: 115 MMHG | RESPIRATION RATE: 17 BRPM

## 2018-01-29 DIAGNOSIS — C34.91 PRIMARY LUNG CANCER, RIGHT: Primary | ICD-10-CM

## 2018-01-29 DIAGNOSIS — Z09 CHEMOTHERAPY FOLLOW-UP EXAMINATION: ICD-10-CM

## 2018-01-29 PROCEDURE — 63600175 PHARM REV CODE 636 W HCPCS: Mod: JG | Performed by: INTERNAL MEDICINE

## 2018-01-29 PROCEDURE — 25000003 PHARM REV CODE 250: Performed by: INTERNAL MEDICINE

## 2018-01-29 PROCEDURE — 99999 PR PBB SHADOW E&M-EST. PATIENT-LVL III: CPT | Mod: PBBFAC,,, | Performed by: INTERNAL MEDICINE

## 2018-01-29 PROCEDURE — 96413 CHEMO IV INFUSION 1 HR: CPT

## 2018-01-29 PROCEDURE — 99214 OFFICE O/P EST MOD 30 MIN: CPT | Mod: S$GLB,,, | Performed by: INTERNAL MEDICINE

## 2018-01-29 RX ORDER — HEPARIN 100 UNIT/ML
500 SYRINGE INTRAVENOUS
Status: DISCONTINUED | OUTPATIENT
Start: 2018-01-29 | End: 2018-01-29 | Stop reason: HOSPADM

## 2018-01-29 RX ORDER — SODIUM CHLORIDE 0.9 % (FLUSH) 0.9 %
10 SYRINGE (ML) INJECTION
Status: DISCONTINUED | OUTPATIENT
Start: 2018-01-29 | End: 2018-01-29 | Stop reason: HOSPADM

## 2018-01-29 RX ORDER — SODIUM CHLORIDE 0.9 % (FLUSH) 0.9 %
10 SYRINGE (ML) INJECTION
Status: CANCELLED | OUTPATIENT
Start: 2018-01-29

## 2018-01-29 RX ORDER — HEPARIN 100 UNIT/ML
500 SYRINGE INTRAVENOUS
Status: CANCELLED | OUTPATIENT
Start: 2018-01-29

## 2018-01-29 RX ADMIN — SODIUM CHLORIDE: 0.9 INJECTION, SOLUTION INTRAVENOUS at 02:01

## 2018-01-29 RX ADMIN — SODIUM CHLORIDE 200 MG: 9 INJECTION, SOLUTION INTRAVENOUS at 02:01

## 2018-01-29 NOTE — LETTER
January 29, 2018        [facility]  [address]  [facility] [address]             [facility]  [address]  [facility] [address]  Phone: [phone]   Patient: Misa Yun   MR Number: [id number]   YOB: 1934   Date of Visit: 1/29/2018       Dear  Family:    Thank you for referring Misa Yun to me for evaluation. Attached you will find relevant portions of my assessment and plan of care.    If you have questions, please do not hesitate to call me. I look forward to following Misa Yun along with you.    Sincerely,      Arnav Barnhart, DO, FACP            CC  No Recipients    Enclosure

## 2018-01-29 NOTE — PLAN OF CARE
Problem: Patient Care Overview  Goal: Plan of Care Review  Outcome: Ongoing (interventions implemented as appropriate)  Pt tolerated keytruda infusion without issue, avs given, rtc 2/19/18, no distress noted upon d/c to home with daughter

## 2018-01-29 NOTE — PLAN OF CARE
Problem: Chemotherapy Effects (Adult)  Goal: Signs and Symptoms of Listed Potential Problems Will be Absent, Minimized or Managed (Chemotherapy Effects)  Signs and symptoms of listed potential problems will be absent, minimized or managed by discharge/transition of care (reference Chemotherapy Effects (Adult) CPG).   Outcome: Ongoing (interventions implemented as appropriate)  Pt here for Keytruda infusion , labs, hx, meds, allergies reviewed. Pt with no complaints or concerns at this time. Reclined in chair, warm blanket provided, continue to monitor

## 2018-01-29 NOTE — PROGRESS NOTES
PATIENT: Misa Yun  MRN: [id number]  DATE: 1/29/2018      Diagnosis:   1. Primary lung cancer, right    2. Chemotherapy follow-up examination        Chief Complaint: Lung Cancer      Oncologic History:      Oncologic History Non-small cell lung cancer, right, diagnosed 9/2017   Metastatic to contralateral lung at presentation     Oncologic Treatment Pembrolizumab 11/6/17    Pathology Squamous cell carcinoma, cT3, N2, M1a, PD-L1 TPS 95%          Subjective:    Interval History: Ms. Yun is a 83 y.o. female who is seen in follow-up for lung cancer.  She states she has been feeling well.  She denies any shortness of breath.  She is without new complaints.    Her history dates to 8/2017 when she underwent an x-ray for a cough which had been present for only a few weeks which had shown abnormalities.  Subsequently a CT was performed revealing 2 small bilateral pleural effusions along with large pulmonary lesions in the right upper lobe measuring 2.2 cm and also another lesion measuring 4.7 cm.  There were also multiple enlarged mediastinal lymph nodes.  In addition there were bilateral multiple pulmonary micronodules.  She underwent a CT-guided biopsy in 9/2017 with pathology revealing squamous cell carcinoma.  PD-L1 tumor proportion score was 95%.  She was started on pembrolizumab on 11/6/17.  PET/CT in 1/18 had shown a partial response.          Past Medical History:   Past Medical History:   Diagnosis Date    Anemia     Anemia     CAD (coronary artery disease)     Cataract     Chemotherapy follow-up examination 11/27/2017    CHF (congestive heart failure)     Chronic kidney disease (CKD), stage IV (severe) 03/27/2017    Sees  at [facility], no dialysis    Dementia     Encounter for blood transfusion 2006    at time of CABG    Fracture of humerus, proximal, left, closed 1/28/2015    Hypercalcemia 04/10/2017    Hyperlipidemia     Hyperparathyroidism     Hypertension      Mediastinal lymphadenopathy 10/9/2017    MI (myocardial infarction) 10/2016    Osteoporosis     Ovarian cancer     in the past    Primary lung cancer, right 10/9/2017    Type 2 diabetes mellitus, controlled     UTI (urinary tract infection) 04/11/2017       Past Surgical HIstory:   Past Surgical History:   Procedure Laterality Date    CATARACT EXTRACTION W/  INTRAOCULAR LENS IMPLANT Left 04/17/2017    Dr. Motta    CATARACT EXTRACTION W/  INTRAOCULAR LENS IMPLANT Right 05/06/2017    Dr Motta     CORONARY ARTERY BYPASS GRAFT  2006    EYE SURGERY      HYSTERECTOMY  2002    ORIF HUMERUS FRACTURE Left 1/28/2015       Family History:   Family History   Problem Relation Age of Onset    Adopted: Yes    Blindness Maternal Grandmother     Cancer Neg Hx        Social History:  reports that she quit smoking about 11 years ago. She has a 60.00 pack-year smoking history. She has never used smokeless tobacco. She reports that she does not drink alcohol or use drugs.    Allergies:  Review of patient's allergies indicates:  No Known Allergies    Medications:  Current Outpatient Prescriptions   Medication Sig Dispense Refill    alendronate (FOSAMAX) 70 MG tablet Take 70 mg by mouth every 7 days.      amlodipine (NORVASC) 10 MG tablet Take 10 mg by mouth once daily.      aspirin 81 MG Chew Take 1 tablet (81 mg total) by mouth once daily. RESUME ON 2/14/15      atorvastatin (LIPITOR) 40 MG tablet Take 1 tablet (40 mg total) by mouth once daily. 30 tablet 3    carvedilol (COREG) 3.125 MG tablet Take 2 tablets (6.25 mg total) by mouth 2 (two) times daily. 60 tablet 3    diphenhydrAMINE (BENADRYL ALLERGY) 25 mg tablet Take 25 mg by mouth nightly as needed for Insomnia.      donepezil (ARICEPT) 5 MG tablet Take 5 mg by mouth every evening.      ergocalciferol (ERGOCALCIFEROL) 50,000 unit Cap Take 50,000 Units by mouth every 7 days.      furosemide (LASIX) 20 MG tablet Take 1 tablet (20 mg total) by mouth once daily.  "30 tablet 11    insulin aspart (NOVOLOG) 100 unit/mL injection Inject 100 Units into the skin 3 (three) times daily before meals.      isosorbide-hydrALAZINE 20-37.5 mg (BIDIL) 20-37.5 mg Tab Take 1 tablet by mouth 3 (three) times daily. 90 tablet 11    mirtazapine (REMERON) 7.5 MG Tab Take 7.5 mg by mouth every evening.      nitroGLYCERIN (NITROSTAT) 0.4 MG SL tablet Place 1 tablet (0.4 mg total) under the tongue every 5 (five) minutes as needed for Chest pain. 25 tablet 3    senna-docusate 8.6-50 mg (PERICOLACE) 8.6-50 mg per tablet Take 1 tablet by mouth 2 (two) times daily.      tramadol (ULTRAM) 50 mg tablet Take 50 mg by mouth every 6 (six) hours as needed for Pain.       No current facility-administered medications for this visit.        Review of Systems   Constitutional: Negative for appetite change, chills, fever and unexpected weight change.   HENT: Negative for congestion, hearing loss and nosebleeds.    Eyes: Negative for visual disturbance.   Respiratory: Negative for cough and shortness of breath.    Cardiovascular: Negative for chest pain and palpitations.   Gastrointestinal: Negative for abdominal pain, blood in stool, constipation, diarrhea, nausea and vomiting.   Genitourinary: Negative for dysuria and frequency.   Musculoskeletal: Negative for back pain and gait problem.   Skin: Negative for color change and rash.   Neurological: Negative for dizziness, weakness and headaches.   Hematological: Negative for adenopathy. Does not bruise/bleed easily.   Psychiatric/Behavioral: Negative for confusion. The patient is not nervous/anxious.        ECOG Performance Status:   ECOG SCORE    0 - Fully active-able to carry on all pre-disease performance without restriction         Objective:      Vitals:   Vitals:    01/29/18 1033   BP: (!) 160/68   Pulse: 65   Resp: 18   Temp: 98 °F (36.7 °C)   TempSrc: Oral   SpO2: 99%   Weight: 67.6 kg (149 lb)   Height: 5' 5" (1.651 m)     BMI: Body mass index is " 24.79 kg/m².    Physical Exam   Constitutional: She appears well-developed and well-nourished. No distress.   Sitting in a wheelchair   HENT:   Head: Normocephalic.   Mouth/Throat: No oropharyngeal exudate.   Eyes: EOM are normal. No scleral icterus.   Neck: Neck supple. No tracheal deviation present. No thyromegaly present.   Cardiovascular: Normal rate and regular rhythm.    Pulmonary/Chest: Effort normal and breath sounds normal. No respiratory distress. She has no wheezes. She has no rales.   Abdominal: Soft. She exhibits no distension and no mass. There is no tenderness. There is no rebound and no guarding.   Musculoskeletal: Normal range of motion. She exhibits no edema.   Lymphadenopathy:     She has no cervical adenopathy.   Neurological: She is alert. No cranial nerve deficit.   Skin: Skin is warm and dry.   Psychiatric: She has a normal mood and affect.       Laboratory Data:  Lab Visit on 01/29/2018   Component Date Value Ref Range Status    TSH 01/29/2018 1.691  0.400 - 4.000 uIU/mL Final    WBC 01/29/2018 7.30  3.90 - 12.70 K/uL Final    RBC 01/29/2018 3.57* 4.00 - 5.40 M/uL Final    Hemoglobin 01/29/2018 9.5* 12.0 - 16.0 g/dL Final    Hematocrit 01/29/2018 29.8* 37.0 - 48.5 % Final    MCV 01/29/2018 84  82 - 98 fL Final    MCH 01/29/2018 26.6* 27.0 - 31.0 pg Final    MCHC 01/29/2018 31.9* 32.0 - 36.0 g/dL Final    RDW 01/29/2018 17.1* 11.5 - 14.5 % Final    Platelets 01/29/2018 226  150 - 350 K/uL Final    MPV 01/29/2018 9.4  9.2 - 12.9 fL Final    Gran # (ANC) 01/29/2018 4.8  1.8 - 7.7 K/uL Final    Comment: The ANC is based on a white cell differential from an   automated cell counter. It has not been microscopically   reviewed for the presence of abnormal cells. Clinical   correlation is required.      Immature Grans (Abs) 01/29/2018 0.02  0.00 - 0.04 K/uL Final    Sodium 01/29/2018 146* 136 - 145 mmol/L Final    Potassium 01/29/2018 4.0  3.5 - 5.1 mmol/L Final    Chloride 01/29/2018  115* 95 - 110 mmol/L Final    CO2 01/29/2018 22* 23 - 29 mmol/L Final    Glucose 01/29/2018 153* 70 - 110 mg/dL Final    BUN, Bld 01/29/2018 31* 8 - 23 mg/dL Final    Creatinine 01/29/2018 3.1* 0.5 - 1.4 mg/dL Final    Calcium 01/29/2018 8.7  8.7 - 10.5 mg/dL Final    Total Protein 01/29/2018 7.4  6.0 - 8.4 g/dL Final    Albumin 01/29/2018 2.4* 3.5 - 5.2 g/dL Final    Total Bilirubin 01/29/2018 0.3  0.1 - 1.0 mg/dL Final    Comment: For infants and newborns, interpretation of results should be based  on gestational age, weight and in agreement with clinical  observations.  Premature Infant recommended reference ranges:  Up to 24 hours.............<8.0 mg/dL  Up to 48 hours............<12.0 mg/dL  3-5 days..................<15.0 mg/dL  6-29 days.................<15.0 mg/dL      Alkaline Phosphatase 01/29/2018 117  55 - 135 U/L Final    AST 01/29/2018 13  10 - 40 U/L Final    ALT 01/29/2018 7* 10 - 44 U/L Final    Anion Gap 01/29/2018 9  8 - 16 mmol/L Final    eGFR if  01/29/2018 15.3* >60 mL/min/1.73 m^2 Final    eGFR if non African American 01/29/2018 13.3* >60 mL/min/1.73 m^2 Final    Comment: Calculation used to obtain the estimated glomerular filtration  rate (eGFR) is the CKD-EPI equation.        1) Right upper lobe. (Rad)  Supplemental Diagnosis  Immunohistochemical stains are completed on the lung biopsy and revealed the tumor cells to stain positively for  cytokeratin 5/6 and p63. The tumor cells are negative for TTF-1, cytokeratin 7 and estrogen receptor. All stains  have satisfactory positive and negative controls. This staining profile together with the morphology of the tumor  support a diagnosis of non-small cell carcinoma consistent with squamous cell carcinoma.  ER  (Electronically Signed: 2017-09-25 09:32:02 )  Diagnosed by: Bebe Child  FINAL PATHOLOGIC DIAGNOSIS  Right lung mass, core biopsy:  Invasive carcinoma.       Imaging:   PET/CT 1/4/18  Findings: Comparison is  10/30/2017. The patient was administered 11.70 mCi of FDG intravenously.    Right upper lobe lung mass smaller SUV max 12.21, previously 17.87. There are low-grade hilar and mediastinal lymph nodes. Precarinal SUV max 4.14, previously 2.69.    There is physiologic intracranial head, neck activity. There are artery calcifications. There is physiologic adrenal, GI, and  activity. Bones showed nothing focal. Left lung is clear.   Impression      Right upper lobe mass has improved significantly.    Hilar and mediastinal lymph nodes are slightly increased and could be metastatic disease progressing.                  Assessment:       1. Primary lung cancer, right    2. Chemotherapy follow-up examination           Plan:     Mrs. Yun continues to do well and tolerating treatment with pembrolizumab.  Labs are appropriate to continue to cycle 5.  See me again in 3 weeks.  All questions were answered.    Arnav Barnhart DO, FACP  Hematology & Oncology  [facility] [place] [address]  ph. [phone]  Fax. [phone]    25 minutes were spent in coordination of patient's care, record review and counseling.  More than 50% of the time was face-to-face.

## 2018-02-19 ENCOUNTER — OFFICE VISIT (OUTPATIENT)
Dept: HEMATOLOGY/ONCOLOGY | Facility: CLINIC | Age: 83
End: 2018-02-19
Payer: MEDICARE

## 2018-02-19 ENCOUNTER — INFUSION (OUTPATIENT)
Dept: INFUSION THERAPY | Facility: HOSPITAL | Age: 83
End: 2018-02-19
Attending: INTERNAL MEDICINE
Payer: MEDICARE

## 2018-02-19 VITALS
DIASTOLIC BLOOD PRESSURE: 63 MMHG | TEMPERATURE: 97 F | RESPIRATION RATE: 20 BRPM | BODY MASS INDEX: 24.62 KG/M2 | SYSTOLIC BLOOD PRESSURE: 139 MMHG | HEART RATE: 83 BPM | OXYGEN SATURATION: 96 % | WEIGHT: 147.94 LBS

## 2018-02-19 VITALS
TEMPERATURE: 98 F | WEIGHT: 148.56 LBS | DIASTOLIC BLOOD PRESSURE: 66 MMHG | BODY MASS INDEX: 24.73 KG/M2 | SYSTOLIC BLOOD PRESSURE: 124 MMHG | HEART RATE: 61 BPM | RESPIRATION RATE: 18 BRPM

## 2018-02-19 DIAGNOSIS — C34.91 PRIMARY LUNG CANCER, RIGHT: Primary | ICD-10-CM

## 2018-02-19 PROCEDURE — 63600175 PHARM REV CODE 636 W HCPCS: Mod: JG | Performed by: INTERNAL MEDICINE

## 2018-02-19 PROCEDURE — 25000003 PHARM REV CODE 250: Performed by: INTERNAL MEDICINE

## 2018-02-19 PROCEDURE — 3008F BODY MASS INDEX DOCD: CPT | Mod: S$GLB,,, | Performed by: INTERNAL MEDICINE

## 2018-02-19 PROCEDURE — 99214 OFFICE O/P EST MOD 30 MIN: CPT | Mod: S$GLB,,, | Performed by: INTERNAL MEDICINE

## 2018-02-19 PROCEDURE — 99999 PR PBB SHADOW E&M-EST. PATIENT-LVL III: CPT | Mod: PBBFAC,,, | Performed by: INTERNAL MEDICINE

## 2018-02-19 PROCEDURE — 96413 CHEMO IV INFUSION 1 HR: CPT

## 2018-02-19 PROCEDURE — 1126F AMNT PAIN NOTED NONE PRSNT: CPT | Mod: S$GLB,,, | Performed by: INTERNAL MEDICINE

## 2018-02-19 PROCEDURE — 1159F MED LIST DOCD IN RCRD: CPT | Mod: S$GLB,,, | Performed by: INTERNAL MEDICINE

## 2018-02-19 RX ORDER — HEPARIN 100 UNIT/ML
500 SYRINGE INTRAVENOUS
Status: CANCELLED | OUTPATIENT
Start: 2018-02-19

## 2018-02-19 RX ORDER — SODIUM CHLORIDE 0.9 % (FLUSH) 0.9 %
10 SYRINGE (ML) INJECTION
Status: CANCELLED | OUTPATIENT
Start: 2018-02-19

## 2018-02-19 RX ORDER — SODIUM CHLORIDE 0.9 % (FLUSH) 0.9 %
10 SYRINGE (ML) INJECTION
Status: DISCONTINUED | OUTPATIENT
Start: 2018-02-19 | End: 2018-02-19 | Stop reason: HOSPADM

## 2018-02-19 RX ORDER — HEPARIN 100 UNIT/ML
500 SYRINGE INTRAVENOUS
Status: DISCONTINUED | OUTPATIENT
Start: 2018-02-19 | End: 2018-02-19 | Stop reason: HOSPADM

## 2018-02-19 RX ADMIN — SODIUM CHLORIDE 200 MG: 9 INJECTION, SOLUTION INTRAVENOUS at 02:02

## 2018-02-19 NOTE — PLAN OF CARE
Problem: Chemotherapy Effects (Adult)  Goal: Signs and Symptoms of Listed Potential Problems Will be Absent, Minimized or Managed (Chemotherapy Effects)  Signs and symptoms of listed potential problems will be absent, minimized or managed by discharge/transition of care (reference Chemotherapy Effects (Adult) CPG).   Outcome: Ongoing (interventions implemented as appropriate)  Pt here for Keytruda infusion, accompanied by son, no complaints or concerns at present; discussed treatment plan for today and pt agrees to proceed

## 2018-02-19 NOTE — PROGRESS NOTES
Subjective:       Patient ID: Misa Yun is a 83 y.o. female.    Chief Complaint: Follow-up    HPI     Oncologic History:       Oncologic History Non-small cell lung cancer, right, diagnosed 9/2017   Metastatic to contralateral lung at presentation     Oncologic Treatment Pembrolizumab 11/6/17    Pathology Squamous cell carcinoma, cT3, N2, M1a, PD-L1 TPS 95%     Ms. Yun is a 83 y.o. female who is seen in follow-up for lung cancer.  She states she has been feeling well.  She denies any shortness of breath.  She is without new complaints.     Her history dates to 8/2017 when she underwent an x-ray for a cough which had been present for only a few weeks which had shown abnormalities.  Subsequently a CT was performed revealing 2 small bilateral pleural effusions along with large pulmonary lesions in the right upper lobe measuring 2.2 cm and also another lesion measuring 4.7 cm.  There were also multiple enlarged mediastinal lymph nodes.  In addition there were bilateral multiple pulmonary micronodules.  She underwent a CT-guided biopsy in 9/2017 with pathology revealing squamous cell carcinoma.  PD-L1 tumor proportion score was 95%.  She was started on pembrolizumab on 11/6/17.  PET/CT in 1/18 had shown a partial response.    Review of Systems   Constitutional: Negative for activity change, appetite change, chills, fatigue, fever and unexpected weight change.   HENT: Positive for congestion. Negative for dental problem, hearing loss, mouth sores, nosebleeds, postnasal drip, rhinorrhea, sinus pain, sinus pressure and trouble swallowing.    Eyes: Negative for visual disturbance.   Respiratory: Negative for cough, shortness of breath and wheezing.    Cardiovascular: Negative for chest pain.   Gastrointestinal: Negative for abdominal distention, abdominal pain, constipation, diarrhea, nausea and vomiting.   Genitourinary: Negative for decreased urine volume, difficulty urinating, dysuria and frequency.    Musculoskeletal: Negative for arthralgias, back pain and myalgias.   Skin: Negative for color change, pallor, rash and wound.   Neurological: Negative for dizziness, weakness, numbness and headaches.   Hematological: Negative for adenopathy.   Psychiatric/Behavioral: Negative for dysphoric mood. The patient is not nervous/anxious.        Objective:      Physical Exam   Constitutional: She appears well-developed and well-nourished. No distress.   Sitting in a wheelchair  Presents with son   HENT:   Head: Normocephalic and atraumatic.   Right Ear: External ear normal.   Nose: Nose normal.   Mouth/Throat: Oropharynx is clear and moist. No oropharyngeal exudate.   Eyes: Conjunctivae and EOM are normal. Pupils are equal, round, and reactive to light. Right eye exhibits no discharge. Left eye exhibits no discharge. No scleral icterus.   Neck: Normal range of motion. Neck supple. No tracheal deviation present. No thyromegaly present.   Cardiovascular: Normal rate, regular rhythm and normal heart sounds.    Pulmonary/Chest: Effort normal and breath sounds normal. No respiratory distress. She has no wheezes. She has no rales.   Abdominal: Soft. Bowel sounds are normal. She exhibits no distension and no mass. There is no tenderness. There is no rebound and no guarding.   No organomegaly   Musculoskeletal: Normal range of motion. She exhibits no edema, tenderness or deformity.   Lymphadenopathy:     She has no cervical adenopathy.   Neurological: She is alert. No cranial nerve deficit. Coordination normal.   Skin: Skin is warm and dry.   Psychiatric: She has a normal mood and affect. Her behavior is normal.   Nursing note and vitals reviewed.    Labs- reviewed  Assessment:       1. Primary lung cancer, right        Plan:     1. Continue Pembrolizumab  RTC 3 weeks to see Dr. Barnhart.  Knows to call for any issues.

## 2018-02-19 NOTE — PLAN OF CARE
Problem: Patient Care Overview  Goal: Plan of Care Review  Outcome: Ongoing (interventions implemented as appropriate)  Infusion completed, pt tolerated well; pt instructed to remain well-hydrated; instructed to contact MD for any needs or concerns; printed AVS given to pt, son states he will visit online for next appt once scheduled (3/12/18); pt and son verbalized understanding of all disucssed and when to report next

## 2018-02-20 ENCOUNTER — PATIENT MESSAGE (OUTPATIENT)
Dept: HEMATOLOGY/ONCOLOGY | Facility: CLINIC | Age: 83
End: 2018-02-20

## 2018-02-20 PROCEDURE — 96361 HYDRATE IV INFUSION ADD-ON: CPT

## 2018-02-26 PROBLEM — Z09 CHEMOTHERAPY FOLLOW-UP EXAMINATION: Status: RESOLVED | Noted: 2017-11-27 | Resolved: 2018-02-26

## 2018-03-05 NOTE — PT/OT/SLP DISCHARGE
Physical Therapy Discharge Summary    Name: Misa Yun  MRN: [id number]   Principal Problem: Hypercalcemia     Patient Discharged from acute Physical Therapy on 2017.  Please refer to prior PT noted date on 2017 for functional status.     Assessment:     Patient was discharged unexpectedly.  Information required to complete an accurate discharge summary is unknown.  Refer to therapy initial evaluation and last progress note for initial and most recent functional status and goal achievement.  Recommendations made may be found in medical record.    Objective:     GOALS:    Physical Therapy Goals     Not on file          Multidisciplinary Problems (Resolved)        Problem: Physical Therapy Goal    Goal Priority Disciplines Outcome Goal Variances Interventions   Physical Therapy Goal   (Resolved)     PT/OT, PT Outcome(s) achieved     Description:  Goals to be met by: 2017     Patient will increase functional independence with mobility by performin. Supine to sit with Contact Guard Assistance  2. Sit to supine with Contact Guard Assistance  3. Sit to stand transfer with Stand-by Assistance  4. Bed to chair transfer with Stand-by Assistance using Rolling Walker  5. Gait  x 20 feet with Stand-by Assistance using Rolling Walker.   6. Wheelchair propulsion x100 feet with Stand-by Assistance using bilateral uppper extremities                      Reasons for Discontinuation of Therapy Services  Transfer to alternate level of care.      Plan:     Patient Discharged to: Skilled Nursing Facility.    PRINCE CARRASCO, PT  3/5/2018

## 2018-03-12 ENCOUNTER — OFFICE VISIT (OUTPATIENT)
Dept: HEMATOLOGY/ONCOLOGY | Facility: CLINIC | Age: 83
End: 2018-03-12
Payer: MEDICARE

## 2018-03-12 ENCOUNTER — INFUSION (OUTPATIENT)
Dept: INFUSION THERAPY | Facility: HOSPITAL | Age: 83
End: 2018-03-12
Attending: INTERNAL MEDICINE
Payer: MEDICARE

## 2018-03-12 VITALS
DIASTOLIC BLOOD PRESSURE: 67 MMHG | TEMPERATURE: 98 F | HEART RATE: 64 BPM | SYSTOLIC BLOOD PRESSURE: 153 MMHG | RESPIRATION RATE: 16 BRPM

## 2018-03-12 VITALS
BODY MASS INDEX: 24.49 KG/M2 | WEIGHT: 147 LBS | DIASTOLIC BLOOD PRESSURE: 61 MMHG | TEMPERATURE: 98 F | SYSTOLIC BLOOD PRESSURE: 129 MMHG | OXYGEN SATURATION: 99 % | RESPIRATION RATE: 18 BRPM | HEIGHT: 65 IN | HEART RATE: 62 BPM

## 2018-03-12 DIAGNOSIS — C34.91 PRIMARY LUNG CANCER, RIGHT: Primary | ICD-10-CM

## 2018-03-12 DIAGNOSIS — N18.4 ANEMIA OF CHRONIC RENAL FAILURE, STAGE 4 (SEVERE): ICD-10-CM

## 2018-03-12 DIAGNOSIS — E11.22 TYPE 2 DIABETES MELLITUS WITH STAGE 4 CHRONIC KIDNEY DISEASE AND HYPERTENSION: ICD-10-CM

## 2018-03-12 DIAGNOSIS — N18.4 TYPE 2 DIABETES MELLITUS WITH STAGE 4 CHRONIC KIDNEY DISEASE AND HYPERTENSION: ICD-10-CM

## 2018-03-12 DIAGNOSIS — F03.90 DEMENTIA WITHOUT BEHAVIORAL DISTURBANCE, UNSPECIFIED DEMENTIA TYPE: Chronic | ICD-10-CM

## 2018-03-12 DIAGNOSIS — M81.8 OTHER OSTEOPOROSIS WITHOUT CURRENT PATHOLOGICAL FRACTURE: Chronic | ICD-10-CM

## 2018-03-12 DIAGNOSIS — I12.9 TYPE 2 DIABETES MELLITUS WITH STAGE 4 CHRONIC KIDNEY DISEASE AND HYPERTENSION: ICD-10-CM

## 2018-03-12 DIAGNOSIS — D63.1 ANEMIA OF CHRONIC RENAL FAILURE, STAGE 4 (SEVERE): ICD-10-CM

## 2018-03-12 DIAGNOSIS — I50.42 CHRONIC COMBINED SYSTOLIC AND DIASTOLIC HEART FAILURE: Chronic | ICD-10-CM

## 2018-03-12 PROCEDURE — 25000003 PHARM REV CODE 250: Performed by: INTERNAL MEDICINE

## 2018-03-12 PROCEDURE — 3078F DIAST BP <80 MM HG: CPT | Mod: CPTII,S$GLB,, | Performed by: NURSE PRACTITIONER

## 2018-03-12 PROCEDURE — 3074F SYST BP LT 130 MM HG: CPT | Mod: CPTII,S$GLB,, | Performed by: NURSE PRACTITIONER

## 2018-03-12 PROCEDURE — 96413 CHEMO IV INFUSION 1 HR: CPT

## 2018-03-12 PROCEDURE — 99214 OFFICE O/P EST MOD 30 MIN: CPT | Mod: S$GLB,,, | Performed by: NURSE PRACTITIONER

## 2018-03-12 PROCEDURE — 63600175 PHARM REV CODE 636 W HCPCS: Mod: JG | Performed by: INTERNAL MEDICINE

## 2018-03-12 PROCEDURE — 99999 PR PBB SHADOW E&M-EST. PATIENT-LVL IV: CPT | Mod: PBBFAC,,, | Performed by: NURSE PRACTITIONER

## 2018-03-12 RX ORDER — HEPARIN 100 UNIT/ML
500 SYRINGE INTRAVENOUS
Status: CANCELLED | OUTPATIENT
Start: 2018-03-12

## 2018-03-12 RX ORDER — SODIUM CHLORIDE 0.9 % (FLUSH) 0.9 %
10 SYRINGE (ML) INJECTION
Status: CANCELLED | OUTPATIENT
Start: 2018-03-12

## 2018-03-12 RX ADMIN — SODIUM CHLORIDE: 9 INJECTION, SOLUTION INTRAVENOUS at 02:03

## 2018-03-12 RX ADMIN — SODIUM CHLORIDE 200 MG: 9 INJECTION, SOLUTION INTRAVENOUS at 02:03

## 2018-03-12 NOTE — PROGRESS NOTES
PATIENT: iMsa Yun  MRN: [id number]  DATE: 3/12/2018      Diagnosis:   1. Primary lung cancer, right    2. Anemia of chronic renal failure, stage 4 (severe)    3. 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 Other osteoporosis without current pathological fracture    5. Dementia without behavioral disturbance, unspecified dementia type    6. Chronic combined systolic and diastolic heart failure        Chief Complaint: Follow-up      Oncologic History:      Oncologic History Non-small cell lung cancer, right, diagnosed 9/2017   Metastatic to contralateral lung at presentation     Oncologic Treatment Pembrolizumab 11/6/17    Pathology Squamous cell carcinoma, cT3, N2, M1a, PD-L1 TPS 95%          Subjective:    Interval History: Ms. Yun is a 84 y.o. female who is seen in follow-up for lung cancer.  She is currently receiving pembrolizumab.     She states she has been feeling well.  She denies any shortness of breath.  She is without new complaints. She is accompanied by her sister.     Her history dates to 8/2017 when she underwent an x-ray for a cough which had been present for only a few weeks which had shown abnormalities.  Subsequently a CT was performed revealing 2 small bilateral pleural effusions along with large pulmonary lesions in the right upper lobe measuring 2.2 cm and also another lesion measuring 4.7 cm.  There were also multiple enlarged mediastinal lymph nodes.  In addition there were bilateral multiple pulmonary micronodules.  She underwent a CT-guided biopsy in 9/2017 with pathology revealing squamous cell carcinoma.  PD-L1 tumor proportion score was 95%.  She was started on pembrolizumab on 11/6/17.  PET/CT in 1/18 had shown a partial response.      Past Medical History:   Past Medical History:   Diagnosis Date    Anemia     Anemia     CAD (coronary artery disease)     Cataract     Chemotherapy follow-up examination 11/27/2017    CHF  (congestive heart failure)     Chronic kidney disease (CKD), stage IV (severe) 03/27/2017    Sees  at [facility], no dialysis    Dementia     Encounter for blood transfusion 2006    at time of CABG    Fracture of humerus, proximal, left, closed 1/28/2015    Hypercalcemia 04/10/2017    Hyperlipidemia     Hyperparathyroidism     Hypertension     Mediastinal lymphadenopathy 10/9/2017    MI (myocardial infarction) 10/2016    Osteoporosis     Ovarian cancer     in the past    Primary lung cancer, right 10/9/2017    Type 2 diabetes mellitus, controlled     UTI (urinary tract infection) 04/11/2017       Past Surgical HIstory:   Past Surgical History:   Procedure Laterality Date    CATARACT EXTRACTION W/  INTRAOCULAR LENS IMPLANT Left 04/17/2017    Dr. Motta    CATARACT EXTRACTION W/  INTRAOCULAR LENS IMPLANT Right 05/06/2017    Dr Motta     CORONARY ARTERY BYPASS GRAFT  2006    EYE SURGERY      HYSTERECTOMY  2002    ORIF HUMERUS FRACTURE Left 1/28/2015       Family History:   Family History   Problem Relation Age of Onset    Adopted: Yes    Blindness Maternal Grandmother     Cancer Neg Hx        Social History:  reports that she quit smoking about 11 years ago. She has a 60.00 pack-year smoking history. She has never used smokeless tobacco. She reports that she does not drink alcohol or use drugs.    Allergies:  Review of patient's allergies indicates:  No Known Allergies    Medications:  Current Outpatient Prescriptions   Medication Sig Dispense Refill    alendronate (FOSAMAX) 70 MG tablet Take 70 mg by mouth every 7 days.      amlodipine (NORVASC) 10 MG tablet Take 10 mg by mouth once daily.      aspirin 81 MG Chew Take 1 tablet (81 mg total) by mouth once daily. RESUME ON 2/14/15      atorvastatin (LIPITOR) 40 MG tablet Take 1 tablet (40 mg total) by mouth once daily. 30 tablet 3    carvedilol (COREG) 3.125 MG tablet Take 2 tablets (6.25 mg total) by mouth 2 (two) times daily.  60 tablet 3    diphenhydrAMINE (BENADRYL ALLERGY) 25 mg tablet Take 25 mg by mouth nightly as needed for Insomnia.      donepezil (ARICEPT) 5 MG tablet Take 5 mg by mouth every evening.      ergocalciferol (ERGOCALCIFEROL) 50,000 unit Cap Take 50,000 Units by mouth every 7 days.      furosemide (LASIX) 20 MG tablet Take 1 tablet (20 mg total) by mouth once daily. 30 tablet 11    insulin aspart (NOVOLOG) 100 unit/mL injection Inject 100 Units into the skin 3 (three) times daily before meals.      isosorbide-hydrALAZINE 20-37.5 mg (BIDIL) 20-37.5 mg Tab Take 1 tablet by mouth 3 (three) times daily. 90 tablet 11    mirtazapine (REMERON) 7.5 MG Tab Take 7.5 mg by mouth every evening.      nitroGLYCERIN (NITROSTAT) 0.4 MG SL tablet Place 1 tablet (0.4 mg total) under the tongue every 5 (five) minutes as needed for Chest pain. 25 tablet 3    senna-docusate 8.6-50 mg (PERICOLACE) 8.6-50 mg per tablet Take 1 tablet by mouth 2 (two) times daily.      tramadol (ULTRAM) 50 mg tablet Take 50 mg by mouth every 6 (six) hours as needed for Pain.       No current facility-administered medications for this visit.        Review of Systems   Constitutional: Negative for appetite change, chills, fever and unexpected weight change.   HENT: Negative for congestion, hearing loss and nosebleeds.    Eyes: Negative for visual disturbance.   Respiratory: Negative for cough and shortness of breath.    Cardiovascular: Negative for chest pain and palpitations.   Gastrointestinal: Negative for abdominal pain, blood in stool, constipation, diarrhea, nausea and vomiting.   Genitourinary: Negative for dysuria and frequency.   Musculoskeletal: Negative for back pain and gait problem.   Skin: Negative for color change and rash.   Neurological: Negative for dizziness, weakness and headaches.   Hematological: Negative for adenopathy. Does not bruise/bleed easily.   Psychiatric/Behavioral: Negative for confusion. The patient is not  "nervous/anxious.        ECOG Performance Status:   ECOG SCORE    0 - Fully active-able to carry on all pre-disease performance without restriction         Objective:      Vitals:   Vitals:    03/12/18 1308   BP: 129/61   Pulse: 62   Resp: 18   Temp: 98 °F (36.7 °C)   TempSrc: Oral   SpO2: 99%   Weight: 66.7 kg (147 lb)   Height: 5' 5" (1.651 m)     BMI: Body mass index is 24.46 kg/m².    Physical Exam   Constitutional: She appears well-developed and well-nourished. No distress.   Sitting in a wheelchair   HENT:   Head: Normocephalic.   Mouth/Throat: No oropharyngeal exudate.   Eyes: EOM are normal. No scleral icterus.   Neck: Neck supple. No tracheal deviation present. No thyromegaly present.   Cardiovascular: Normal rate and regular rhythm.    Pulmonary/Chest: Effort normal and breath sounds normal. No respiratory distress. She has no wheezes. She has no rales.   Abdominal: Soft. She exhibits no distension and no mass. There is no tenderness. There is no rebound and no guarding.   Musculoskeletal: Normal range of motion. She exhibits no edema.   Lymphadenopathy:     She has no cervical adenopathy.   Neurological: She is alert. No cranial nerve deficit.   Skin: Skin is warm and dry.   Psychiatric: She has a normal mood and affect.       Laboratory Data:  Lab Visit on 03/12/2018   Component Date Value Ref Range Status    WBC 03/12/2018 7.57  3.90 - 12.70 K/uL Final    RBC 03/12/2018 3.20* 4.00 - 5.40 M/uL Final    Hemoglobin 03/12/2018 8.9* 12.0 - 16.0 g/dL Final    Hematocrit 03/12/2018 27.0* 37.0 - 48.5 % Final    MCV 03/12/2018 84  82 - 98 fL Final    MCH 03/12/2018 27.8  27.0 - 31.0 pg Final    MCHC 03/12/2018 33.0  32.0 - 36.0 g/dL Final    RDW 03/12/2018 15.9* 11.5 - 14.5 % Final    Platelets 03/12/2018 230  150 - 350 K/uL Final    MPV 03/12/2018 10.0  9.2 - 12.9 fL Final    Gran # (ANC) 03/12/2018 5.1  1.8 - 7.7 K/uL Final    Comment: The ANC is based on a white cell differential from an   automated " cell counter. It has not been microscopically   reviewed for the presence of abnormal cells. Clinical   correlation is required.      Immature Grans (Abs) 03/12/2018 0.03  0.00 - 0.04 K/uL Final    Comment: Mild elevation in immature granulocytes is non specific and   can be seen in a variety of conditions including stress response,   acute inflammation, trauma and pregnancy. Correlation with other   laboratory and clinical findings is essential.      Sodium 03/12/2018 143  136 - 145 mmol/L Final    Potassium 03/12/2018 4.2  3.5 - 5.1 mmol/L Final    Chloride 03/12/2018 113* 95 - 110 mmol/L Final    CO2 03/12/2018 21* 23 - 29 mmol/L Final    Glucose 03/12/2018 77  70 - 110 mg/dL Final    BUN, Bld 03/12/2018 59* 8 - 23 mg/dL Final    Creatinine 03/12/2018 3.7* 0.5 - 1.4 mg/dL Final    Calcium 03/12/2018 8.6* 8.7 - 10.5 mg/dL Final    Total Protein 03/12/2018 7.5  6.0 - 8.4 g/dL Final    Albumin 03/12/2018 2.7* 3.5 - 5.2 g/dL Final    Total Bilirubin 03/12/2018 0.3  0.1 - 1.0 mg/dL Final    Comment: For infants and newborns, interpretation of results should be based  on gestational age, weight and in agreement with clinical  observations.  Premature Infant recommended reference ranges:  Up to 24 hours.............<8.0 mg/dL  Up to 48 hours............<12.0 mg/dL  3-5 days..................<15.0 mg/dL  6-29 days.................<15.0 mg/dL      Alkaline Phosphatase 03/12/2018 104  55 - 135 U/L Final    AST 03/12/2018 12  10 - 40 U/L Final    ALT 03/12/2018 6* 10 - 44 U/L Final    Anion Gap 03/12/2018 9  8 - 16 mmol/L Final    eGFR if  03/12/2018 12.3* >60 mL/min/1.73 m^2 Final    eGFR if non African American 03/12/2018 10.7* >60 mL/min/1.73 m^2 Final    Comment: Calculation used to obtain the estimated glomerular filtration  rate (eGFR) is the CKD-EPI equation.       TSH 03/12/2018 2.289  0.400 - 4.000 uIU/mL Final    Free T4 03/12/2018 1.02  0.71 - 1.51 ng/dL Final     1) Right  upper lobe. (Rad)  Supplemental Diagnosis  Immunohistochemical stains are completed on the lung biopsy and revealed the tumor cells to stain positively for  cytokeratin 5/6 and p63. The tumor cells are negative for TTF-1, cytokeratin 7 and estrogen receptor. All stains  have satisfactory positive and negative controls. This staining profile together with the morphology of the tumor  support a diagnosis of non-small cell carcinoma consistent with squamous cell carcinoma.  ER  (Electronically Signed: 2017-09-25 09:32:02 )  Diagnosed by: Bebe Child  FINAL PATHOLOGIC DIAGNOSIS  Right lung mass, core biopsy:  Invasive carcinoma.       Imaging:   PET/CT 1/4/18  Findings: Comparison is 10/30/2017. The patient was administered 11.70 mCi of FDG intravenously.    Right upper lobe lung mass smaller SUV max 12.21, previously 17.87. There are low-grade hilar and mediastinal lymph nodes. Precarinal SUV max 4.14, previously 2.69.    There is physiologic intracranial head, neck activity. There are artery calcifications. There is physiologic adrenal, GI, and  activity. Bones showed nothing focal. Left lung is clear.   Impression      Right upper lobe mass has improved significantly.    Hilar and mediastinal lymph nodes are slightly increased and could be metastatic disease progressing.                  Assessment:       1. Primary lung cancer, right    2. Anemia of chronic renal failure, stage 4 (severe)    3. 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 Other osteoporosis without current pathological fracture    5. Dementia without behavioral disturbance, unspecified dementia type    6. Chronic combined systolic and diastolic heart failure           Plan:     Mrs. Yun continues to do well and is tolerating treatment with pembrolizumab.  Labs reviewed and are appropriate to continue to cycle 7 Pembrolizumab.  RTC in 3 weeks to see Dr. Barnhart with CBC, CMP, TSH, T4 and PET scan.    Will continue to monitor anemia. Her glucose is normal today. No evidence of decompensation. She understands to  continue follow up with appropriate, established MD for all other diagnosis.      Patient is in agreement with the proposed treatment plan. All questions were answered to the patient's satisfaction. Pt knows to call clinic if anything is needed before the next clinic visit.      MINERVA Matamoros-C  Hematology & Oncology  [address] [place] [id number]  ph. [phone]  Fax. [phone]     30 minutes were spent in coordination of patient's care, record review and counseling. More than 50% of the time was face-to-face.          Distress Screening Results: Psychosocial Distress screening score of Distress Score: 1 noted and reviewed. No intervention indicated.

## 2018-03-12 NOTE — PLAN OF CARE
Problem: Patient Care Overview  Goal: Plan of Care Review  Outcome: Ongoing (interventions implemented as appropriate)  Patient tolerated infusion well.  No reaction suspected.  AVS given to patient's sister.  No questions or concerns.  Patient left unit in WC accompanied by her sister.

## 2018-04-02 ENCOUNTER — INFUSION (OUTPATIENT)
Dept: INFUSION THERAPY | Facility: HOSPITAL | Age: 83
End: 2018-04-02
Attending: INTERNAL MEDICINE
Payer: MEDICARE

## 2018-04-02 ENCOUNTER — OFFICE VISIT (OUTPATIENT)
Dept: HEMATOLOGY/ONCOLOGY | Facility: CLINIC | Age: 83
End: 2018-04-02
Payer: MEDICARE

## 2018-04-02 ENCOUNTER — HOSPITAL ENCOUNTER (OUTPATIENT)
Dept: RADIOLOGY | Facility: HOSPITAL | Age: 83
Discharge: HOME | End: 2018-04-02
Attending: NURSE PRACTITIONER
Payer: MEDICARE

## 2018-04-02 VITALS
SYSTOLIC BLOOD PRESSURE: 161 MMHG | HEART RATE: 77 BPM | DIASTOLIC BLOOD PRESSURE: 70 MMHG | WEIGHT: 147.25 LBS | TEMPERATURE: 98 F | RESPIRATION RATE: 18 BRPM | BODY MASS INDEX: 26.09 KG/M2 | HEIGHT: 63 IN

## 2018-04-02 VITALS
TEMPERATURE: 98 F | DIASTOLIC BLOOD PRESSURE: 58 MMHG | WEIGHT: 147.25 LBS | RESPIRATION RATE: 14 BRPM | OXYGEN SATURATION: 93 % | HEIGHT: 63 IN | SYSTOLIC BLOOD PRESSURE: 124 MMHG | BODY MASS INDEX: 26.09 KG/M2 | HEART RATE: 68 BPM

## 2018-04-02 DIAGNOSIS — Z09 CHEMOTHERAPY FOLLOW-UP EXAMINATION: ICD-10-CM

## 2018-04-02 DIAGNOSIS — C34.91 PRIMARY LUNG CANCER, RIGHT: ICD-10-CM

## 2018-04-02 DIAGNOSIS — N18.4 CHRONIC KIDNEY DISEASE, STAGE IV (SEVERE): Chronic | ICD-10-CM

## 2018-04-02 DIAGNOSIS — C34.91 PRIMARY LUNG CANCER, RIGHT: Primary | ICD-10-CM

## 2018-04-02 DIAGNOSIS — N18.4 ANEMIA OF CHRONIC RENAL FAILURE, STAGE 4 (SEVERE): ICD-10-CM

## 2018-04-02 DIAGNOSIS — D63.1 ANEMIA OF CHRONIC RENAL FAILURE, STAGE 4 (SEVERE): ICD-10-CM

## 2018-04-02 DIAGNOSIS — N18.4 CHRONIC KIDNEY DISEASE, STAGE IV (SEVERE): ICD-10-CM

## 2018-04-02 PROCEDURE — 3078F DIAST BP <80 MM HG: CPT | Mod: CPTII,S$GLB,, | Performed by: INTERNAL MEDICINE

## 2018-04-02 PROCEDURE — 78815 PET IMAGE W/CT SKULL-THIGH: CPT | Mod: 26,PS,, | Performed by: RADIOLOGY

## 2018-04-02 PROCEDURE — 99999 PR PBB SHADOW E&M-EST. PATIENT-LVL IV: CPT | Mod: PBBFAC,,, | Performed by: INTERNAL MEDICINE

## 2018-04-02 PROCEDURE — 25000003 PHARM REV CODE 250: Performed by: INTERNAL MEDICINE

## 2018-04-02 PROCEDURE — 63600175 PHARM REV CODE 636 W HCPCS: Mod: JG | Performed by: INTERNAL MEDICINE

## 2018-04-02 PROCEDURE — 96413 CHEMO IV INFUSION 1 HR: CPT

## 2018-04-02 PROCEDURE — 78815 PET IMAGE W/CT SKULL-THIGH: CPT | Mod: PS,TC

## 2018-04-02 PROCEDURE — 3074F SYST BP LT 130 MM HG: CPT | Mod: CPTII,S$GLB,, | Performed by: INTERNAL MEDICINE

## 2018-04-02 PROCEDURE — A9552 F18 FDG: HCPCS

## 2018-04-02 PROCEDURE — 99214 OFFICE O/P EST MOD 30 MIN: CPT | Mod: S$GLB,,, | Performed by: INTERNAL MEDICINE

## 2018-04-02 PROCEDURE — 96361 HYDRATE IV INFUSION ADD-ON: CPT

## 2018-04-02 RX ORDER — HEPARIN 100 UNIT/ML
500 SYRINGE INTRAVENOUS
Status: DISCONTINUED | OUTPATIENT
Start: 2018-04-02 | End: 2018-04-02 | Stop reason: HOSPADM

## 2018-04-02 RX ORDER — HEPARIN 100 UNIT/ML
500 SYRINGE INTRAVENOUS
Status: CANCELLED | OUTPATIENT
Start: 2018-04-02

## 2018-04-02 RX ORDER — SODIUM CHLORIDE 0.9 % (FLUSH) 0.9 %
10 SYRINGE (ML) INJECTION
Status: CANCELLED | OUTPATIENT
Start: 2018-04-02

## 2018-04-02 RX ORDER — SODIUM CHLORIDE 0.9 % (FLUSH) 0.9 %
10 SYRINGE (ML) INJECTION
Status: DISCONTINUED | OUTPATIENT
Start: 2018-04-02 | End: 2018-04-02 | Stop reason: HOSPADM

## 2018-04-02 RX ADMIN — SODIUM CHLORIDE 200 MG: 9 INJECTION, SOLUTION INTRAVENOUS at 04:04

## 2018-04-02 RX ADMIN — SODIUM CHLORIDE: 0.9 INJECTION, SOLUTION INTRAVENOUS at 04:04

## 2018-04-02 NOTE — PLAN OF CARE
Problem: Patient Care Overview  Goal: Individualization & Mutuality  PIV  Warm blankets     Outcome: Ongoing (interventions implemented as appropriate)  5475-Labs , hx, and medications reviewed, patient was seen by Md prior to arrival- will have 1 liter of fluids in addition to treatment. Assessment completed. Discussed plan of care with patient. Patient in agreement. Chair reclined and warm blanket and snack offered.

## 2018-04-02 NOTE — PROGRESS NOTES
PATIENT: Misa Yun  MRN: [id number]  DATE: 4/2/2018      Diagnosis:   1. Primary lung cancer, right    2. Chronic kidney disease, stage IV (severe)    3. Chemotherapy follow-up examination        Chief Complaint: Primary lung cancer, right      Oncologic History:      Oncologic History Non-small cell lung cancer, right, diagnosed 9/2017   Metastatic to contralateral lung at presentation     Oncologic Treatment Pembrolizumab 11/6/17    Pathology Squamous cell carcinoma, cT3, N2, M1a, PD-L1 TPS 95%          Subjective:    Interval History: Ms. uYn is a 84 y.o. female who is seen in follow-up for lung cancer.  She states she has been feeling well.  She denies any shortness of breath.  She is without new complaints.    Her history dates to 8/2017 when she underwent an x-ray for a cough which had been present for only a few weeks which had shown abnormalities.  Subsequently a CT was performed revealing 2 small bilateral pleural effusions along with large pulmonary lesions in the right upper lobe measuring 2.2 cm and also another lesion measuring 4.7 cm.  There were also multiple enlarged mediastinal lymph nodes.  In addition there were bilateral multiple pulmonary micronodules.  She underwent a CT-guided biopsy in 9/2017 with pathology revealing squamous cell carcinoma.  PD-L1 tumor proportion score was 95%.  She was started on pembrolizumab on 11/6/17.  PET/CT in 1/18 had shown a partial response.          Past Medical History:   Past Medical History:   Diagnosis Date    Anemia     Anemia     CAD (coronary artery disease)     Cataract     Chemotherapy follow-up examination 11/27/2017    CHF (congestive heart failure)     Chronic kidney disease (CKD), stage IV (severe) 03/27/2017    Sees  at [facility], no dialysis    Dementia     Encounter for blood transfusion 2006    at time of CABG    Fracture of humerus, proximal, left, closed 1/28/2015    Hypercalcemia 04/10/2017     Hyperlipidemia     Hyperparathyroidism     Hypertension     Mediastinal lymphadenopathy 10/9/2017    MI (myocardial infarction) 10/2016    Osteoporosis     Ovarian cancer     in the past    Primary lung cancer, right 10/9/2017    Type 2 diabetes mellitus, controlled     UTI (urinary tract infection) 04/11/2017       Past Surgical HIstory:   Past Surgical History:   Procedure Laterality Date    CATARACT EXTRACTION W/  INTRAOCULAR LENS IMPLANT Left 04/17/2017    Dr. Motta    CATARACT EXTRACTION W/  INTRAOCULAR LENS IMPLANT Right 05/06/2017    Dr Motta     CORONARY ARTERY BYPASS GRAFT  2006    EYE SURGERY      HYSTERECTOMY  2002    ORIF HUMERUS FRACTURE Left 1/28/2015       Family History:   Family History   Problem Relation Age of Onset    Adopted: Yes    Blindness Maternal Grandmother     Cancer Neg Hx        Social History:  reports that she quit smoking about 11 years ago. She has a 60.00 pack-year smoking history. She has never used smokeless tobacco. She reports that she does not drink alcohol or use drugs.    Allergies:  Review of patient's allergies indicates:  No Known Allergies    Medications:  Current Outpatient Prescriptions   Medication Sig Dispense Refill    alendronate (FOSAMAX) 70 MG tablet Take 70 mg by mouth every 7 days.      amlodipine (NORVASC) 10 MG tablet Take 10 mg by mouth once daily.      aspirin 81 MG Chew Take 1 tablet (81 mg total) by mouth once daily. RESUME ON 2/14/15      atorvastatin (LIPITOR) 40 MG tablet Take 1 tablet (40 mg total) by mouth once daily. 30 tablet 3    diphenhydrAMINE (BENADRYL ALLERGY) 25 mg tablet Take 25 mg by mouth nightly as needed for Insomnia.      donepezil (ARICEPT) 5 MG tablet Take 5 mg by mouth every evening.      ergocalciferol (ERGOCALCIFEROL) 50,000 unit Cap Take 50,000 Units by mouth every 7 days.      furosemide (LASIX) 20 MG tablet Take 1 tablet (20 mg total) by mouth once daily. 30 tablet 11    insulin aspart (NOVOLOG) 100  unit/mL injection Inject 100 Units into the skin 3 (three) times daily before meals.      mirtazapine (REMERON) 7.5 MG Tab Take 7.5 mg by mouth every evening.      senna-docusate 8.6-50 mg (PERICOLACE) 8.6-50 mg per tablet Take 1 tablet by mouth 2 (two) times daily.      tramadol (ULTRAM) 50 mg tablet Take 50 mg by mouth every 6 (six) hours as needed for Pain.      carvedilol (COREG) 3.125 MG tablet Take 2 tablets (6.25 mg total) by mouth 2 (two) times daily. 60 tablet 3    isosorbide-hydrALAZINE 20-37.5 mg (BIDIL) 20-37.5 mg Tab Take 1 tablet by mouth 3 (three) times daily. 90 tablet 11    nitroGLYCERIN (NITROSTAT) 0.4 MG SL tablet Place 1 tablet (0.4 mg total) under the tongue every 5 (five) minutes as needed for Chest pain. 25 tablet 3     No current facility-administered medications for this visit.        Review of Systems   Constitutional: Negative for appetite change, chills, fever and unexpected weight change.   HENT: Negative for congestion, hearing loss and nosebleeds.    Eyes: Negative for visual disturbance.   Respiratory: Negative for cough and shortness of breath.    Cardiovascular: Negative for chest pain and palpitations.   Gastrointestinal: Negative for abdominal pain, blood in stool, constipation, diarrhea, nausea and vomiting.   Genitourinary: Negative for dysuria and frequency.   Musculoskeletal: Negative for back pain and gait problem.   Skin: Negative for color change and rash.   Neurological: Negative for dizziness, weakness and headaches.   Hematological: Negative for adenopathy. Does not bruise/bleed easily.   Psychiatric/Behavioral: Negative for confusion. The patient is not nervous/anxious.        ECOG Performance Status:   ECOG SCORE    0 - Fully active-able to carry on all pre-disease performance without restriction         Objective:      Vitals:   Vitals:    04/02/18 1429   BP: (!) 124/58   BP Location: Left arm   Patient Position: Sitting   BP Method: Large (Automatic)   Pulse:  "68   Resp: 14   Temp: 97.9 °F (36.6 °C)   TempSrc: Oral   SpO2: (!) 93%   Weight: 66.8 kg (147 lb 4.3 oz)   Height: 5' 3" (1.6 m)     BMI: Body mass index is 26.09 kg/m².    Physical Exam   Constitutional: She appears well-developed and well-nourished. No distress.   Sitting in a wheelchair   HENT:   Head: Normocephalic.   Mouth/Throat: No oropharyngeal exudate.   Eyes: EOM are normal. No scleral icterus.   Neck: Neck supple. No tracheal deviation present. No thyromegaly present.   Cardiovascular: Normal rate and regular rhythm.    Pulmonary/Chest: Effort normal and breath sounds normal. No respiratory distress. She has no wheezes. She has no rales.   Abdominal: Soft. She exhibits no distension and no mass. There is no tenderness. There is no rebound and no guarding.   Musculoskeletal: Normal range of motion. She exhibits no edema.   Lymphadenopathy:     She has no cervical adenopathy.   Neurological: She is alert. No cranial nerve deficit.   Skin: Skin is warm and dry.   Psychiatric: She has a normal mood and affect.       Laboratory Data:  Lab Visit on 04/02/2018   Component Date Value Ref Range Status    TSH 04/02/2018 2.601  0.400 - 4.000 uIU/mL Final    WBC 04/02/2018 7.77  3.90 - 12.70 K/uL Final    RBC 04/02/2018 3.60* 4.00 - 5.40 M/uL Final    Hemoglobin 04/02/2018 10.0* 12.0 - 16.0 g/dL Final    Hematocrit 04/02/2018 31.4* 37.0 - 48.5 % Final    MCV 04/02/2018 87  82 - 98 fL Final    MCH 04/02/2018 27.8  27.0 - 31.0 pg Final    MCHC 04/02/2018 31.8* 32.0 - 36.0 g/dL Final    RDW 04/02/2018 14.8* 11.5 - 14.5 % Final    Platelets 04/02/2018 244  150 - 350 K/uL Final    MPV 04/02/2018 9.9  9.2 - 12.9 fL Final    Gran # (ANC) 04/02/2018 5.2  1.8 - 7.7 K/uL Final    Comment: The ANC is based on a white cell differential from an   automated cell counter. It has not been microscopically   reviewed for the presence of abnormal cells. Clinical   correlation is required.      Immature Grans (Abs) " 04/02/2018 0.02  0.00 - 0.04 K/uL Final    Comment: Mild elevation in immature granulocytes is non specific and   can be seen in a variety of conditions including stress response,   acute inflammation, trauma and pregnancy. Correlation with other   laboratory and clinical findings is essential.      Sodium 04/02/2018 143  136 - 145 mmol/L Final    Potassium 04/02/2018 4.5  3.5 - 5.1 mmol/L Final    Chloride 04/02/2018 109  95 - 110 mmol/L Final    CO2 04/02/2018 23  23 - 29 mmol/L Final    Glucose 04/02/2018 157* 70 - 110 mg/dL Final    BUN, Bld 04/02/2018 49* 8 - 23 mg/dL Final    Creatinine 04/02/2018 3.9* 0.5 - 1.4 mg/dL Final    Calcium 04/02/2018 9.9  8.7 - 10.5 mg/dL Final    Total Protein 04/02/2018 8.0  6.0 - 8.4 g/dL Final    Albumin 04/02/2018 2.9* 3.5 - 5.2 g/dL Final    Total Bilirubin 04/02/2018 0.3  0.1 - 1.0 mg/dL Final    Comment: For infants and newborns, interpretation of results should be based  on gestational age, weight and in agreement with clinical  observations.  Premature Infant recommended reference ranges:  Up to 24 hours.............<8.0 mg/dL  Up to 48 hours............<12.0 mg/dL  3-5 days..................<15.0 mg/dL  6-29 days.................<15.0 mg/dL      Alkaline Phosphatase 04/02/2018 123  55 - 135 U/L Final    AST 04/02/2018 19  10 - 40 U/L Final    ALT 04/02/2018 12  10 - 44 U/L Final    Anion Gap 04/02/2018 11  8 - 16 mmol/L Final    eGFR if  04/02/2018 11.5* >60 mL/min/1.73 m^2 Final    eGFR if non African American 04/02/2018 10.0* >60 mL/min/1.73 m^2 Final    Comment: Calculation used to obtain the estimated glomerular filtration  rate (eGFR) is the CKD-EPI equation.        1) Right upper lobe. (Rad)  Supplemental Diagnosis  Immunohistochemical stains are completed on the lung biopsy and revealed the tumor cells to stain positively for  cytokeratin 5/6 and p63. The tumor cells are negative for TTF-1, cytokeratin 7 and estrogen receptor. All  stains  have satisfactory positive and negative controls. This staining profile together with the morphology of the tumor  support a diagnosis of non-small cell carcinoma consistent with squamous cell carcinoma.  ER  (Electronically Signed: 2017-09-25 09:32:02 )  Diagnosed by: Bebe Child  FINAL PATHOLOGIC DIAGNOSIS  Right lung mass, core biopsy:  Invasive carcinoma.       Imaging:   PET/CT 4/2/18  EXAMINATION:  NM PET CT ROUTINE    CLINICAL HISTORY:  lung cancer;  Malignant neoplasm of unspecified part of right bronchus or lung    FINDINGS:  Comparison is 01/04/2018.  The patient was administered 10.36 millicuries of FDG intravenously.    Right upper lobe mass SUV max 11.73, previously 12.21.  Precarinal lymph node SUV max 2.99, previously 4.14.    There is involutional change and small vessel ischemic change.  There are hard palate dianna.  There is physiologic head and neck activity.  There are coronary calcifications.  There is physiologic liver, spleen, GI and  activity.  There is aortic plaque and DJD.  Pelvic organs show nothing unusual.  No bone or lung metastases are seen.  There are some inflammatory areas of lung activity.  Hilar lymph nodes have improved.   Impression       See above    Slight improvement in the primary neoplasm but noticeable improvement of mediastinal and hilar lymph nodes.                  Assessment:       1. Primary lung cancer, right    2. Chronic kidney disease, stage IV (severe)    3. Chemotherapy follow-up examination           Plan:     Mrs. Yun is doing well from an oncologic standpoint and review of her PET/CT done today shows ongoing response to therapy.  We'll plan to continue on with pembrolizumab she will proceed to cycle 8.  It is noted that she has worsening renal function and she does appear dry on today's exam and will give her 1 L of fluids.  I've encouraged increased by mouth intake.  I will refer back to nephrology.  Follow back up in 3 weeks or sooner if  needed.    Arnav Barnhart DO, FACP  Hematology & Oncology  [address] [place] [address]  ph. [phone]  Fax. [phone]    25 minutes were spent in coordination of patient's care, record review and counseling.  More than 50% of the time was face-to-face.

## 2018-04-02 NOTE — PLAN OF CARE
Problem: Patient Care Overview  Goal: Plan of Care Review  Outcome: Ongoing (interventions implemented as appropriate)  1741-Patient tolerated treatment well. Discharged without complaints or S/S of adverse event. AVS given.  Instructed to call provider for any questions or concerns.

## 2018-04-02 NOTE — LETTER
April 2, 2018        [facility]  [address]  [facility] [address]             [place] [facility]  [address]  [facility] [address]  Phone: [phone]   Patient: Misa Yun   MR Number: [id number]   YOB: 1934   Date of Visit: 4/2/2018       Dear  Family:    Thank you for referring Misa Yun to me for evaluation. Attached you will find relevant portions of my assessment and plan of care.    If you have questions, please do not hesitate to call me. I look forward to following Misa Yun along with you.    Sincerely,      Arnav Barnhart, DO, FACP            CC  No Recipients    Enclosure

## 2018-04-23 ENCOUNTER — INFUSION (OUTPATIENT)
Dept: INFUSION THERAPY | Facility: HOSPITAL | Age: 83
End: 2018-04-23
Attending: INTERNAL MEDICINE
Payer: MEDICARE

## 2018-04-23 ENCOUNTER — OFFICE VISIT (OUTPATIENT)
Dept: HEMATOLOGY/ONCOLOGY | Facility: CLINIC | Age: 83
End: 2018-04-23
Payer: MEDICARE

## 2018-04-23 VITALS
WEIGHT: 143.75 LBS | HEIGHT: 63 IN | RESPIRATION RATE: 18 BRPM | TEMPERATURE: 98 F | HEART RATE: 66 BPM | DIASTOLIC BLOOD PRESSURE: 65 MMHG | OXYGEN SATURATION: 95 % | SYSTOLIC BLOOD PRESSURE: 135 MMHG | BODY MASS INDEX: 25.47 KG/M2

## 2018-04-23 VITALS
BODY MASS INDEX: 25.47 KG/M2 | DIASTOLIC BLOOD PRESSURE: 65 MMHG | HEIGHT: 63 IN | WEIGHT: 143.75 LBS | SYSTOLIC BLOOD PRESSURE: 135 MMHG | HEART RATE: 66 BPM | RESPIRATION RATE: 18 BRPM

## 2018-04-23 DIAGNOSIS — I50.42 CHRONIC COMBINED SYSTOLIC AND DIASTOLIC HEART FAILURE: Chronic | ICD-10-CM

## 2018-04-23 DIAGNOSIS — D63.1 ANEMIA OF CHRONIC RENAL FAILURE, STAGE 4 (SEVERE): ICD-10-CM

## 2018-04-23 DIAGNOSIS — N18.4 CHRONIC KIDNEY DISEASE, STAGE IV (SEVERE): ICD-10-CM

## 2018-04-23 DIAGNOSIS — E11.22 TYPE 2 DIABETES MELLITUS WITH STAGE 4 CHRONIC KIDNEY DISEASE AND HYPERTENSION: ICD-10-CM

## 2018-04-23 DIAGNOSIS — N18.4 ANEMIA OF CHRONIC RENAL FAILURE, STAGE 4 (SEVERE): ICD-10-CM

## 2018-04-23 DIAGNOSIS — C34.91 PRIMARY LUNG CANCER, RIGHT: Primary | ICD-10-CM

## 2018-04-23 DIAGNOSIS — N18.4 CHRONIC KIDNEY DISEASE, STAGE IV (SEVERE): Chronic | ICD-10-CM

## 2018-04-23 DIAGNOSIS — I12.9 TYPE 2 DIABETES MELLITUS WITH STAGE 4 CHRONIC KIDNEY DISEASE AND HYPERTENSION: ICD-10-CM

## 2018-04-23 DIAGNOSIS — N18.4 TYPE 2 DIABETES MELLITUS WITH STAGE 4 CHRONIC KIDNEY DISEASE AND HYPERTENSION: ICD-10-CM

## 2018-04-23 DIAGNOSIS — F03.90 DEMENTIA WITHOUT BEHAVIORAL DISTURBANCE, UNSPECIFIED DEMENTIA TYPE: Chronic | ICD-10-CM

## 2018-04-23 PROCEDURE — 25000003 PHARM REV CODE 250: Performed by: NURSE PRACTITIONER

## 2018-04-23 PROCEDURE — 63600175 PHARM REV CODE 636 W HCPCS: Mod: JG | Performed by: INTERNAL MEDICINE

## 2018-04-23 PROCEDURE — 96413 CHEMO IV INFUSION 1 HR: CPT

## 2018-04-23 PROCEDURE — 3075F SYST BP GE 130 - 139MM HG: CPT | Mod: CPTII,S$GLB,, | Performed by: NURSE PRACTITIONER

## 2018-04-23 PROCEDURE — 99999 PR PBB SHADOW E&M-EST. PATIENT-LVL IV: CPT | Mod: PBBFAC,,, | Performed by: NURSE PRACTITIONER

## 2018-04-23 PROCEDURE — 99214 OFFICE O/P EST MOD 30 MIN: CPT | Mod: S$GLB,,, | Performed by: NURSE PRACTITIONER

## 2018-04-23 PROCEDURE — 3078F DIAST BP <80 MM HG: CPT | Mod: CPTII,S$GLB,, | Performed by: NURSE PRACTITIONER

## 2018-04-23 PROCEDURE — A4216 STERILE WATER/SALINE, 10 ML: HCPCS | Performed by: INTERNAL MEDICINE

## 2018-04-23 PROCEDURE — 25000003 PHARM REV CODE 250: Performed by: INTERNAL MEDICINE

## 2018-04-23 PROCEDURE — 96361 HYDRATE IV INFUSION ADD-ON: CPT

## 2018-04-23 RX ORDER — HEPARIN 100 UNIT/ML
500 SYRINGE INTRAVENOUS
Status: CANCELLED | OUTPATIENT
Start: 2018-04-23

## 2018-04-23 RX ORDER — HEPARIN 100 UNIT/ML
500 SYRINGE INTRAVENOUS
Status: DISCONTINUED | OUTPATIENT
Start: 2018-04-23 | End: 2018-04-23 | Stop reason: HOSPADM

## 2018-04-23 RX ORDER — SODIUM CHLORIDE 0.9 % (FLUSH) 0.9 %
10 SYRINGE (ML) INJECTION
Status: DISCONTINUED | OUTPATIENT
Start: 2018-04-23 | End: 2018-04-23 | Stop reason: HOSPADM

## 2018-04-23 RX ORDER — SODIUM CHLORIDE 0.9 % (FLUSH) 0.9 %
10 SYRINGE (ML) INJECTION
Status: CANCELLED | OUTPATIENT
Start: 2018-04-23

## 2018-04-23 RX ADMIN — SODIUM CHLORIDE, PRESERVATIVE FREE 10 ML: 5 INJECTION INTRAVENOUS at 04:04

## 2018-04-23 RX ADMIN — SODIUM CHLORIDE 200 MG: 9 INJECTION, SOLUTION INTRAVENOUS at 02:04

## 2018-04-23 RX ADMIN — SODIUM CHLORIDE: 0.9 INJECTION, SOLUTION INTRAVENOUS at 02:04

## 2018-04-23 NOTE — PLAN OF CARE
Problem: Chemotherapy Effects (Adult)  Goal: Signs and Symptoms of Listed Potential Problems Will be Absent, Minimized or Managed (Chemotherapy Effects)  Signs and symptoms of listed potential problems will be absent, minimized or managed by discharge/transition of care (reference Chemotherapy Effects (Adult) CPG).   Outcome: Ongoing (interventions implemented as appropriate)  Pt here for D1C9 Keytruda and 1L NS. VSS. No complaints voiced. Consent/labs/meds/allergies reviewed. PIV to right wrist with blood return noted. All questions answered. Will continue to monitor.

## 2018-04-23 NOTE — PROGRESS NOTES
"PATIENT: Misa Yun  MRN: [id number]  DATE: 4/23/2018      Diagnosis:   1. Primary lung cancer, right    2. Chronic kidney disease, stage IV (severe)    3. Chronic combined systolic and diastolic heart failure    4. Type 2 diabetes mellitus with stage 4 chronic kidney disease and hypertension    5. Dementia without behavioral disturbance, unspecified dementia type    6. Anemia of chronic renal failure, stage 4 (severe)        Chief Complaint: Primary lung cancer, right      Oncologic History:      Oncologic History Non-small cell lung cancer, right, diagnosed 9/2017   Metastatic to contralateral lung at presentation     Oncologic Treatment Pembrolizumab 11/6/17    Pathology Squamous cell carcinoma, cT3, N2, M1a, PD-L1 TPS 95%          Subjective:    Interval History: Ms. Yun is a 84 y.o. female who is seen in follow-up for lung cancer. She is currently receiving pembrolizumab.  She states she has been feeling well. She lost 4lbs since her last visit. She states that she "cut back" on eating because she is not as active. She also reports going to bed before her evening snack is delivered. She reports her appetite is good.  She denies any shortness of breath.  She is accompanied by her sister and nurse aid from the NH.       Her history dates to 8/2017 when she underwent an x-ray for a cough which had been present for only a few weeks which had shown abnormalities.  Subsequently a CT was performed revealing 2 small bilateral pleural effusions along with large pulmonary lesions in the right upper lobe measuring 2.2 cm and also another lesion measuring 4.7 cm.  There were also multiple enlarged mediastinal lymph nodes.  In addition there were bilateral multiple pulmonary micronodules.  She underwent a CT-guided biopsy in 9/2017 with pathology revealing squamous cell carcinoma.  PD-L1 tumor proportion score was 95%.  She was started on pembrolizumab on 11/6/17.  PET/CT in 1/18 had shown a partial " response. PET 4/2/18 shows ongoing response to therapy.          Past Medical History:   Past Medical History:   Diagnosis Date    Anemia     Anemia     CAD (coronary artery disease)     Cataract     Chemotherapy follow-up examination 11/27/2017    CHF (congestive heart failure)     Chronic kidney disease (CKD), stage IV (severe) 03/27/2017    Sees  at [facility], no dialysis    Dementia     Encounter for blood transfusion 2006    at time of CABG    Fracture of humerus, proximal, left, closed 1/28/2015    Hypercalcemia 04/10/2017    Hyperlipidemia     Hyperparathyroidism     Hypertension     Mediastinal lymphadenopathy 10/9/2017    MI (myocardial infarction) 10/2016    Osteoporosis     Ovarian cancer     in the past    Primary lung cancer, right 10/9/2017    Type 2 diabetes mellitus, controlled     UTI (urinary tract infection) 04/11/2017       Past Surgical HIstory:   Past Surgical History:   Procedure Laterality Date    CATARACT EXTRACTION W/  INTRAOCULAR LENS IMPLANT Left 04/17/2017    Dr. Motta    CATARACT EXTRACTION W/  INTRAOCULAR LENS IMPLANT Right 05/06/2017    Dr Motta     CORONARY ARTERY BYPASS GRAFT  2006    EYE SURGERY      HYSTERECTOMY  2002    ORIF HUMERUS FRACTURE Left 1/28/2015       Family History:   Family History   Problem Relation Age of Onset    Adopted: Yes    Blindness Maternal Grandmother     Cancer Neg Hx        Social History:  reports that she quit smoking about 11 years ago. She has a 60.00 pack-year smoking history. She has never used smokeless tobacco. She reports that she does not drink alcohol or use drugs.    Allergies:  Review of patient's allergies indicates:  No Known Allergies    Medications:  Current Outpatient Prescriptions   Medication Sig Dispense Refill    alendronate (FOSAMAX) 70 MG tablet Take 70 mg by mouth every 7 days.      amlodipine (NORVASC) 10 MG tablet Take 10 mg by mouth once daily.      aspirin 81 MG Chew Take 1  tablet (81 mg total) by mouth once daily. RESUME ON 2/14/15      atorvastatin (LIPITOR) 40 MG tablet Take 1 tablet (40 mg total) by mouth once daily. 30 tablet 3    diphenhydrAMINE (BENADRYL ALLERGY) 25 mg tablet Take 25 mg by mouth nightly as needed for Insomnia.      donepezil (ARICEPT) 5 MG tablet Take 5 mg by mouth every evening.      ergocalciferol (ERGOCALCIFEROL) 50,000 unit Cap Take 50,000 Units by mouth every 7 days.      furosemide (LASIX) 20 MG tablet Take 1 tablet (20 mg total) by mouth once daily. 30 tablet 11    insulin aspart (NOVOLOG) 100 unit/mL injection Inject 100 Units into the skin 3 (three) times daily before meals.      mirtazapine (REMERON) 7.5 MG Tab Take 7.5 mg by mouth every evening.      senna-docusate 8.6-50 mg (PERICOLACE) 8.6-50 mg per tablet Take 1 tablet by mouth 2 (two) times daily.      tramadol (ULTRAM) 50 mg tablet Take 50 mg by mouth every 6 (six) hours as needed for Pain.      carvedilol (COREG) 3.125 MG tablet Take 2 tablets (6.25 mg total) by mouth 2 (two) times daily. 60 tablet 3    isosorbide-hydrALAZINE 20-37.5 mg (BIDIL) 20-37.5 mg Tab Take 1 tablet by mouth 3 (three) times daily. 90 tablet 11    nitroGLYCERIN (NITROSTAT) 0.4 MG SL tablet Place 1 tablet (0.4 mg total) under the tongue every 5 (five) minutes as needed for Chest pain. 25 tablet 3     No current facility-administered medications for this visit.        Review of Systems   Constitutional: Negative for appetite change, chills, fever and unexpected weight change.   HENT: Negative for congestion, hearing loss and nosebleeds.    Eyes: Negative for visual disturbance.   Respiratory: Negative for cough and shortness of breath.    Cardiovascular: Negative for chest pain and palpitations.   Gastrointestinal: Negative for abdominal pain, blood in stool, constipation, diarrhea, nausea and vomiting.   Genitourinary: Negative for dysuria and frequency.   Musculoskeletal: Negative for back pain and gait  "problem.   Skin: Negative for color change and rash.   Neurological: Negative for dizziness, weakness and headaches.   Hematological: Negative for adenopathy. Does not bruise/bleed easily.   Psychiatric/Behavioral: Negative for confusion. The patient is not nervous/anxious.        ECOG Performance Status:   ECOG SCORE    1 - Restricted in strenuous activity-ambulatory and able to carry out work of a light nature         Objective:      Vitals:   Vitals:    04/23/18 1315   BP: 135/65   BP Location: Left arm   Patient Position: Sitting   BP Method: Large (Automatic)   Pulse: 66   Resp: 18   Temp: 97.6 °F (36.4 °C)   TempSrc: Oral   SpO2: 95%   Weight: 65.2 kg (143 lb 11.8 oz)   Height: 5' 3" (1.6 m)     BMI: Body mass index is 25.46 kg/m².    Physical Exam   Constitutional: She appears well-developed and well-nourished. No distress.   Sitting in a wheelchair   HENT:   Head: Normocephalic.   Mouth/Throat: No oropharyngeal exudate.   Eyes: EOM are normal. No scleral icterus.   Neck: Neck supple. No tracheal deviation present. No thyromegaly present.   Cardiovascular: Normal rate and regular rhythm.    Pulmonary/Chest: Effort normal and breath sounds normal. No respiratory distress. She has no wheezes. She has no rales.   Abdominal: Soft. She exhibits no distension and no mass. There is no tenderness. There is no rebound and no guarding.   Musculoskeletal: Normal range of motion. She exhibits no edema.   Lymphadenopathy:     She has no cervical adenopathy.   Neurological: She is alert. No cranial nerve deficit.   Skin: Skin is warm and dry.   Poor turgor   Psychiatric: She has a normal mood and affect.       Laboratory Data:  Lab Visit on 04/23/2018   Component Date Value Ref Range Status    Glucose 04/23/2018 97  70 - 110 mg/dL Final    Sodium 04/23/2018 140  136 - 145 mmol/L Final    Potassium 04/23/2018 3.8  3.5 - 5.1 mmol/L Final    Chloride 04/23/2018 108  95 - 110 mmol/L Final    CO2 04/23/2018 20* 23 - 29 " mmol/L Final    BUN, Bld 04/23/2018 53* 8 - 23 mg/dL Final    Calcium 04/23/2018 9.8  8.7 - 10.5 mg/dL Final    Creatinine 04/23/2018 3.9* 0.5 - 1.4 mg/dL Final    Albumin 04/23/2018 2.8* 3.5 - 5.2 g/dL Final    Phosphorus 04/23/2018 4.5  2.7 - 4.5 mg/dL Final    eGFR if  04/23/2018 11.5* >60 mL/min/1.73 m^2 Final    eGFR if non African American 04/23/2018 10.0* >60 mL/min/1.73 m^2 Final    Comment: Calculation used to obtain the estimated glomerular filtration  rate (eGFR) is the CKD-EPI equation.       Anion Gap 04/23/2018 12  8 - 16 mmol/L Final    WBC 04/23/2018 7.14  3.90 - 12.70 K/uL Final    RBC 04/23/2018 3.64* 4.00 - 5.40 M/uL Final    Hemoglobin 04/23/2018 10.0* 12.0 - 16.0 g/dL Final    Hematocrit 04/23/2018 31.6* 37.0 - 48.5 % Final    MCV 04/23/2018 87  82 - 98 fL Final    MCH 04/23/2018 27.5  27.0 - 31.0 pg Final    MCHC 04/23/2018 31.6* 32.0 - 36.0 g/dL Final    RDW 04/23/2018 14.6* 11.5 - 14.5 % Final    Platelets 04/23/2018 236  150 - 350 K/uL Final    MPV 04/23/2018 10.4  9.2 - 12.9 fL Final    Immature Granulocytes 04/23/2018 0.3  0.0 - 0.5 % Final    Gran # (ANC) 04/23/2018 4.7  1.8 - 7.7 K/uL Final    Immature Grans (Abs) 04/23/2018 0.02  0.00 - 0.04 K/uL Final    Comment: Mild elevation in immature granulocytes is non specific and   can be seen in a variety of conditions including stress response,   acute inflammation, trauma and pregnancy. Correlation with other   laboratory and clinical findings is essential.      Lymph # 04/23/2018 1.4  1.0 - 4.8 K/uL Final    Mono # 04/23/2018 0.7  0.3 - 1.0 K/uL Final    Eos # 04/23/2018 0.3  0.0 - 0.5 K/uL Final    Baso # 04/23/2018 0.05  0.00 - 0.20 K/uL Final    nRBC 04/23/2018 0  0 /100 WBC Final    Gran% 04/23/2018 65.6  38.0 - 73.0 % Final    Lymph% 04/23/2018 19.5  18.0 - 48.0 % Final    Mono% 04/23/2018 9.7  4.0 - 15.0 % Final    Eosinophil% 04/23/2018 4.2  0.0 - 8.0 % Final    Basophil% 04/23/2018 0.7   0.0 - 1.9 % Final    Differential Method 04/23/2018 Automated   Final    TSH 04/23/2018 2.612  0.400 - 4.000 uIU/mL Final    WBC 04/23/2018 7.14  3.90 - 12.70 K/uL Final    RBC 04/23/2018 3.64* 4.00 - 5.40 M/uL Final    Hemoglobin 04/23/2018 10.0* 12.0 - 16.0 g/dL Final    Hematocrit 04/23/2018 31.6* 37.0 - 48.5 % Final    MCV 04/23/2018 87  82 - 98 fL Final    MCH 04/23/2018 27.5  27.0 - 31.0 pg Final    MCHC 04/23/2018 31.6* 32.0 - 36.0 g/dL Final    RDW 04/23/2018 14.6* 11.5 - 14.5 % Final    Platelets 04/23/2018 236  150 - 350 K/uL Final    MPV 04/23/2018 10.4  9.2 - 12.9 fL Final    Gran # (ANC) 04/23/2018 4.7  1.8 - 7.7 K/uL Final    Immature Grans (Abs) 04/23/2018 0.02  0.00 - 0.04 K/uL Final    Comment: Mild elevation in immature granulocytes is non specific and   can be seen in a variety of conditions including stress response,   acute inflammation, trauma and pregnancy. Correlation with other   laboratory and clinical findings is essential.      Sodium 04/23/2018 140  136 - 145 mmol/L Final    Potassium 04/23/2018 3.8  3.5 - 5.1 mmol/L Final    Chloride 04/23/2018 108  95 - 110 mmol/L Final    CO2 04/23/2018 20* 23 - 29 mmol/L Final    Glucose 04/23/2018 97  70 - 110 mg/dL Final    BUN, Bld 04/23/2018 53* 8 - 23 mg/dL Final    Creatinine 04/23/2018 3.9* 0.5 - 1.4 mg/dL Final    Calcium 04/23/2018 9.8  8.7 - 10.5 mg/dL Final    Total Protein 04/23/2018 8.0  6.0 - 8.4 g/dL Final    Albumin 04/23/2018 2.8* 3.5 - 5.2 g/dL Final    Total Bilirubin 04/23/2018 0.3  0.1 - 1.0 mg/dL Final    Comment: For infants and newborns, interpretation of results should be based  on gestational age, weight and in agreement with clinical  observations.  Premature Infant recommended reference ranges:  Up to 24 hours.............<8.0 mg/dL  Up to 48 hours............<12.0 mg/dL  3-5 days..................<15.0 mg/dL  6-29 days.................<15.0 mg/dL      Alkaline Phosphatase 04/23/2018 114  55 -  135 U/L Final    AST 04/23/2018 12  10 - 40 U/L Final    ALT 04/23/2018 7* 10 - 44 U/L Final    Anion Gap 04/23/2018 12  8 - 16 mmol/L Final    eGFR if  04/23/2018 11.5* >60 mL/min/1.73 m^2 Final    eGFR if non African American 04/23/2018 10.0* >60 mL/min/1.73 m^2 Final    Comment: Calculation used to obtain the estimated glomerular filtration  rate (eGFR) is the CKD-EPI equation.        1) Right upper lobe. (Rad)  Supplemental Diagnosis  Immunohistochemical stains are completed on the lung biopsy and revealed the tumor cells to stain positively for  cytokeratin 5/6 and p63. The tumor cells are negative for TTF-1, cytokeratin 7 and estrogen receptor. All stains  have satisfactory positive and negative controls. This staining profile together with the morphology of the tumor  support a diagnosis of non-small cell carcinoma consistent with squamous cell carcinoma.  ER  (Electronically Signed: 2017-09-25 09:32:02 )  Diagnosed by: Bebe Child  FINAL PATHOLOGIC DIAGNOSIS  Right lung mass, core biopsy:  Invasive carcinoma.       Imaging:   PET/CT 4/2/18  EXAMINATION:  NM PET CT ROUTINE    CLINICAL HISTORY:  lung cancer;  Malignant neoplasm of unspecified part of right bronchus or lung    FINDINGS:  Comparison is 01/04/2018.  The patient was administered 10.36 millicuries of FDG intravenously.    Right upper lobe mass SUV max 11.73, previously 12.21.  Precarinal lymph node SUV max 2.99, previously 4.14.    There is involutional change and small vessel ischemic change.  There are hard palate dianna.  There is physiologic head and neck activity.  There are coronary calcifications.  There is physiologic liver, spleen, GI and  activity.  There is aortic plaque and DJD.  Pelvic organs show nothing unusual.  No bone or lung metastases are seen.  There are some inflammatory areas of lung activity.  Hilar lymph nodes have improved.   Impression       See above    Slight improvement in the primary neoplasm  but noticeable improvement of mediastinal and hilar lymph nodes.                  Assessment:       1. Primary lung cancer, right    2. Chronic kidney disease, stage IV (severe)    3. Chronic combined systolic and diastolic heart failure    4. Type 2 diabetes mellitus with stage 4 chronic kidney disease and hypertension    5. Dementia without behavioral disturbance, unspecified dementia type    6. Anemia of chronic renal failure, stage 4 (severe)           Plan:       Mrs. Yun continues to do well and is tolerating treatment with pembrolizumab.  Labs reviewed and are appropriate to continue to cycle 9 Pembrolizumab. She has worsening renal function and she appears dry on today's exam. Plan to give her an extra  500ml of NS today. She has a scheduled follow up with nephrology on 4/30/18.  No evidence of cardiac decompensation. RTC in 3 weeks to see Dr. Barnhart or myself  with CBC, CMP, TSH, T4.   Will continue to monitor anemia. Her glucose is normal today.  She understands to  continue follow up with appropriate, established MD for all other diagnosis.  Her evening snack will be delivered sooner per Nurse aid that is accompanying her.          Patient is in agreement with the proposed treatment plan. All questions were answered to the patient's satisfaction. Pt knows to call clinic if anything is needed before the next clinic visit.      MINERVA Matamoros-MICHAEL  Hematology & Oncology  [facility] [place] [address]  ph. [phone]  Fax. [phone]     30 minutes were spent in coordination of patient's care, record review and counseling. More than 50% of the time was face-to-face.        Distress Screening Results: Psychosocial Distress screening score of Distress Score: 0 noted and reviewed. No intervention indicated.

## 2018-04-30 ENCOUNTER — OFFICE VISIT (OUTPATIENT)
Dept: NEPHROLOGY | Facility: CLINIC | Age: 83
End: 2018-04-30
Payer: MEDICARE

## 2018-04-30 ENCOUNTER — OFFICE VISIT (OUTPATIENT)
Dept: CARDIOLOGY | Facility: CLINIC | Age: 83
End: 2018-04-30
Payer: MEDICARE

## 2018-04-30 VITALS
SYSTOLIC BLOOD PRESSURE: 122 MMHG | TEMPERATURE: 64 F | BODY MASS INDEX: 24.42 KG/M2 | HEIGHT: 63 IN | DIASTOLIC BLOOD PRESSURE: 54 MMHG | HEART RATE: 64 BPM | WEIGHT: 137.81 LBS

## 2018-04-30 VITALS
HEIGHT: 63 IN | SYSTOLIC BLOOD PRESSURE: 130 MMHG | HEART RATE: 68 BPM | BODY MASS INDEX: 24.45 KG/M2 | OXYGEN SATURATION: 96 % | WEIGHT: 138 LBS | DIASTOLIC BLOOD PRESSURE: 60 MMHG

## 2018-04-30 DIAGNOSIS — E78.2 MIXED HYPERLIPIDEMIA: Chronic | ICD-10-CM

## 2018-04-30 DIAGNOSIS — M81.8 OTHER OSTEOPOROSIS, UNSPECIFIED PATHOLOGICAL FRACTURE PRESENCE: Chronic | ICD-10-CM

## 2018-04-30 DIAGNOSIS — E83.52 HYPERCALCEMIA: Primary | ICD-10-CM

## 2018-04-30 DIAGNOSIS — I50.42 CHRONIC COMBINED SYSTOLIC AND DIASTOLIC HEART FAILURE: Chronic | ICD-10-CM

## 2018-04-30 DIAGNOSIS — I12.0 TYPE 2 DM WITH CKD STAGE 5 AND HYPERTENSION: ICD-10-CM

## 2018-04-30 DIAGNOSIS — I25.10 CORONARY ARTERY DISEASE INVOLVING NATIVE CORONARY ARTERY OF NATIVE HEART WITHOUT ANGINA PECTORIS: Chronic | ICD-10-CM

## 2018-04-30 DIAGNOSIS — N39.0 URINARY TRACT INFECTION WITHOUT HEMATURIA, SITE UNSPECIFIED: ICD-10-CM

## 2018-04-30 DIAGNOSIS — E78.49 OTHER HYPERLIPIDEMIA: Chronic | ICD-10-CM

## 2018-04-30 DIAGNOSIS — I25.5 ISCHEMIC CARDIOMYOPATHY: ICD-10-CM

## 2018-04-30 DIAGNOSIS — E11.22 TYPE 2 DM WITH CKD STAGE 5 AND HYPERTENSION: ICD-10-CM

## 2018-04-30 DIAGNOSIS — I10 ESSENTIAL HYPERTENSION: Primary | Chronic | ICD-10-CM

## 2018-04-30 DIAGNOSIS — N18.5 TYPE 2 DM WITH CKD STAGE 5 AND HYPERTENSION: ICD-10-CM

## 2018-04-30 DIAGNOSIS — I10 ESSENTIAL HYPERTENSION: Chronic | ICD-10-CM

## 2018-04-30 PROCEDURE — 99999 PR PBB SHADOW E&M-EST. PATIENT-LVL V: CPT | Mod: PBBFAC,GC,, | Performed by: STUDENT IN AN ORGANIZED HEALTH CARE EDUCATION/TRAINING PROGRAM

## 2018-04-30 PROCEDURE — 99999 PR PBB SHADOW E&M-EST. PATIENT-LVL III: CPT | Mod: PBBFAC,,, | Performed by: INTERNAL MEDICINE

## 2018-04-30 PROCEDURE — 3075F SYST BP GE 130 - 139MM HG: CPT | Mod: CPTII,S$GLB,, | Performed by: INTERNAL MEDICINE

## 2018-04-30 PROCEDURE — 3074F SYST BP LT 130 MM HG: CPT | Mod: CPTII,GC,S$GLB, | Performed by: STUDENT IN AN ORGANIZED HEALTH CARE EDUCATION/TRAINING PROGRAM

## 2018-04-30 PROCEDURE — 99213 OFFICE O/P EST LOW 20 MIN: CPT | Mod: GC,S$GLB,, | Performed by: STUDENT IN AN ORGANIZED HEALTH CARE EDUCATION/TRAINING PROGRAM

## 2018-04-30 PROCEDURE — 3078F DIAST BP <80 MM HG: CPT | Mod: CPTII,S$GLB,, | Performed by: INTERNAL MEDICINE

## 2018-04-30 PROCEDURE — 3078F DIAST BP <80 MM HG: CPT | Mod: CPTII,GC,S$GLB, | Performed by: STUDENT IN AN ORGANIZED HEALTH CARE EDUCATION/TRAINING PROGRAM

## 2018-04-30 PROCEDURE — 99213 OFFICE O/P EST LOW 20 MIN: CPT | Mod: S$GLB,,, | Performed by: INTERNAL MEDICINE

## 2018-04-30 RX ORDER — SODIUM BICARBONATE 650 MG/1
650 TABLET ORAL 3 TIMES DAILY
Qty: 270 TABLET | Refills: 3 | COMMUNITY
Start: 2018-04-30 | End: 2019-04-30

## 2018-04-30 NOTE — PROGRESS NOTES
I have personally interviewed and examined the patient. I have reviewed the notes, assessments and plans performed by Dr Clemens and I CONCUR with his documentation of Misa Yun.

## 2018-04-30 NOTE — PROGRESS NOTES
Subjective:   Patient ID:  Misa Yun is a 84 y.o. female who presents for evaluation of Hypertension      HPI: Misty Yun is a 84 y.o. female with a PMH of CAD s/p CABG (2006), ICM (EF 30-35%), Dementia, CKD 5, T2DM, HTN, HLD who presented to the cardiology clinic for management of CHF/HTN. She is from [facility]. Being followed by nephrology for CKD which is now stage 5, however has not required RRT as of yet. Being followed by oncology for newly diagnosed squamous cell CA of the lung and currently undergoing chemotherapy with pembrolizumb (keytruda) since 11/2017.      In terms of cardiac complaints, she feels well and denies any new complaints. Has chronic WILDER (NYHA Class II symptoms). Denies chest pain, lightheadedness, LOC, palpitations, NV, diaphoresis, orthopnea, PND, numbness, weakness. Feels safe at the NH. Has a good appetite. She does not ambulate routinely. She is mostly wheelchair bound and only stands up to go to the bathroom or transfer to the bed.       Past Medical History:   Diagnosis Date    Anemia     Anemia     CAD (coronary artery disease)     Cataract     Chemotherapy follow-up examination 11/27/2017    CHF (congestive heart failure)     Chronic kidney disease (CKD), stage IV (severe) 03/27/2017    Sees  at [facility], no dialysis    Dementia     Encounter for blood transfusion 2006    at time of CABG    Fracture of humerus, proximal, left, closed 1/28/2015    Hypercalcemia 04/10/2017    Hyperlipidemia     Hyperparathyroidism     Hypertension     Mediastinal lymphadenopathy 10/9/2017    MI (myocardial infarction) 10/2016    Osteoporosis     Ovarian cancer     in the past    Primary lung cancer, right 10/9/2017    Type 2 diabetes mellitus, controlled     UTI (urinary tract infection) 04/11/2017       Past Surgical History:   Procedure Laterality Date    CATARACT EXTRACTION W/  INTRAOCULAR LENS IMPLANT Left 04/17/2017    Dr. Motta     CATARACT EXTRACTION W/  INTRAOCULAR LENS IMPLANT Right 05/06/2017    Dr Motta     CORONARY ARTERY BYPASS GRAFT  2006    EYE SURGERY      HYSTERECTOMY  2002    ORIF HUMERUS FRACTURE Left 1/28/2015       Social History     Social History    Marital status:      Spouse name: N/A    Number of children: N/A    Years of education: N/A     Social History Main Topics    Smoking status: Former Smoker     Packs/day: 2.00     Years: 30.00     Quit date: 1/20/2007    Smokeless tobacco: Never Used    Alcohol use No    Drug use: No    Sexual activity: Not Asked     Other Topics Concern    None     Social History Narrative    None       Family History   Problem Relation Age of Onset    Adopted: Yes    Blindness Maternal Grandmother     Cancer Neg Hx        Patient's Medications   New Prescriptions    No medications on file   Previous Medications    AMLODIPINE (NORVASC) 10 MG TABLET    Take 10 mg by mouth once daily.    ASPIRIN 81 MG CHEW    Take 1 tablet (81 mg total) by mouth once daily. RESUME ON 2/14/15    ATORVASTATIN (LIPITOR) 40 MG TABLET    Take 1 tablet (40 mg total) by mouth once daily.    CARVEDILOL (COREG) 3.125 MG TABLET    Take 2 tablets (6.25 mg total) by mouth 2 (two) times daily.    DIPHENHYDRAMINE (BENADRYL ALLERGY) 25 MG TABLET    Take 25 mg by mouth nightly as needed for Insomnia.    DONEPEZIL (ARICEPT) 5 MG TABLET    Take 5 mg by mouth every evening.    ERGOCALCIFEROL (ERGOCALCIFEROL) 50,000 UNIT CAP    Take 50,000 Units by mouth every 7 days.    FUROSEMIDE (LASIX) 20 MG TABLET    Take 1 tablet (20 mg total) by mouth once daily.    INSULIN ASPART (NOVOLOG) 100 UNIT/ML INJECTION    Inject 100 Units into the skin 3 (three) times daily before meals.    ISOSORBIDE-HYDRALAZINE 20-37.5 MG (BIDIL) 20-37.5 MG TAB    Take 1 tablet by mouth 3 (three) times daily.    NITROGLYCERIN (NITROSTAT) 0.4 MG SL TABLET    Place 1 tablet (0.4 mg total) under the tongue every 5 (five) minutes as needed for  "Chest pain.    SENNA-DOCUSATE 8.6-50 MG (PERICOLACE) 8.6-50 MG PER TABLET    Take 1 tablet by mouth 2 (two) times daily.    SODIUM BICARBONATE 650 MG TABLET    Take 1 tablet (650 mg total) by mouth 3 (three) times daily.    TRAMADOL (ULTRAM) 50 MG TABLET    Take 50 mg by mouth every 6 (six) hours as needed for Pain.   Modified Medications    No medications on file   Discontinued Medications    No medications on file       ROS  Constitution: Negative for fever, chills, weight loss or gain.   HENT: Negative for sore throat, rhinorrhea, or headache.  Eyes: Negative for blurred or double vision.   Cardiovascular: See above  Pulmonary: Negative for SOB   Gastrointestinal: Negative for abdominal pain, nausea, vomiting, or diarrhea.   : Negative for dysuria.   Neurological: Negative for focal weakness or sensory changes.    BP (!) 122/54   Pulse 64   Temp (!) 64 °F (17.8 °C)   Ht 5' 3" (1.6 m)   Wt 62.5 kg (137 lb 12.6 oz)   BMI 24.41 kg/m²     Objective:   Physical Exam  Constitutional: NAD, conversant  HEENT: Sclera anicteric, PERRLA, EOMI  Neck: No JVD, no carotid bruits  CV: RRR, no murmur, normal S1/S2, No Pericardial rub  Pulm: CTAB with no wheezes, rales, or rhonchi  GI: Abdomen soft, NTND, +BS  Extremities: No LE edema, warm and well perfused, No cyanosis, No clubbing  Skin: No ecchymosis, erythema, or ulcers      Lab Results   Component Value Date     04/23/2018     04/23/2018    K 3.8 04/23/2018    K 3.8 04/23/2018     04/23/2018     04/23/2018    CO2 20 (L) 04/23/2018    CO2 20 (L) 04/23/2018    BUN 53 (H) 04/23/2018    BUN 53 (H) 04/23/2018    CREATININE 3.9 (H) 04/23/2018    CREATININE 3.9 (H) 04/23/2018    GLU 97 04/23/2018    GLU 97 04/23/2018    HGBA1C 5.8 10/31/2016    MG 1.9 04/14/2017    AST 12 04/23/2018    ALT 7 (L) 04/23/2018    ALBUMIN 2.8 (L) 04/23/2018    ALBUMIN 2.8 (L) 04/23/2018    PROT 8.0 04/23/2018    BILITOT 0.3 04/23/2018    WBC 7.14 04/23/2018    WBC 7.14 " 04/23/2018    HGB 10.0 (L) 04/23/2018    HGB 10.0 (L) 04/23/2018    HCT 31.6 (L) 04/23/2018    HCT 31.6 (L) 04/23/2018    HCT 28 (L) 01/28/2015    MCV 87 04/23/2018    MCV 87 04/23/2018     04/23/2018     04/23/2018    INR 1.5 (H) 09/21/2017    TSH 2.612 04/23/2018    CHOL 142 11/01/2016    HDL 42 11/01/2016    LDLCALC 84.8 11/01/2016    TRIG 76 11/01/2016     (H) 11/23/2016       Assessment:     1. Essential hypertension    2. Coronary artery disease involving native coronary artery of native heart without angina pectoris    3. Mixed hyperlipidemia    4. Chronic combined systolic and diastolic heart failure    5. Ischemic cardiomyopathy        Plan:     Misa was seen today for hypertension.    Diagnoses and all orders for this visit:    Essential hypertension  - controlled  - c/w amlodipine 10 mg QD, coreg 6.25 mg BID, lasix 20 mg QD, Bidil 20-37.5 mg TID    Coronary artery disease  - c/w ASA, coreg, lipitor  - not on ACEI/ARB due to CKD stage 5    Hyperlipidemia  - c/w lipitor 40 mg QD  - LDL 85, HDL 42 (11/1/16)    Chronic combined systolic and diastolic heart failure  - compensated with lasix 20 mg PO QD    Ischemic cardiomyopathy  - c/w coreg and Bidil  - repeat TTE w/ CFD - assess if LVEF improved    Thank you for allowing me to participate in this patient's care. Please do not hesitate to contact me with any questions or concerns.    Cici Clemens MD  Cardiology Fellow  Pager: [phone]  4/30/2018 3:45 PM

## 2018-04-30 NOTE — PROGRESS NOTES
Subjective:       Patient ID: Misa Yun is a 84 y.o. Black or  female who presents for followup evaluation of renal function. The pateint is in a wheelchair, accompanied by her sister and an aide from the Nursing home where the patient resides.  HPI     83 o AAF with IDDM type 2, HTN, HPL, serum crt 2.0-2.4 until recent episode of CHF complicated by LESLEY, with initial improvement in GFR but now at 2.6, with set gfr 16 cc/min, 2.7 gm protein on UPRCT, EF 30 % with diastolic dysfunction, h/o hypercalcemia and lung ca  no NSAIDs, nephrolithiasis, gout, SOB, Dysuria, hematuria, LUTS.  renal US 1/2016: RIGHT KIDNEY:  Normal in size measuring 10.4 cm. There is increased echogenicity and mild cortical thinning. There are no solid renal masses, nephrolithiasis, or hydronephrosis. Perfusion is decreased. Resistive index is elevated, measuring 0.85.   LEFT KIDNEY:  Normal in size measuring 10.6 cm.  There is increased echogenicity and cortical thinning. There are no solid renal masses, nephrolithiasis, or hydronephrosis. Perfusion is decreased. Resistive index is elevated, measuring 0.82.     Interval Hx: last seen 8/2017 4/30/18  ID called for positive U/C.  - U/C 4/10/17 positive for E coli ESBL  Recommendation was only to treat if symptomatic  on-small cell lung cancer, right, diagnosed 9/2017   Metastatic to contralateral lung at presentation   Squamous cell carcinoma, cT3, N2, M1a, PD-L1 TPS 95%  Pembrolizumab 11/6/17 receiving cycle 8  worsening renal function noted despite IV hydration at chemo infusion  crt 3.89, grf 11.5  calcium 9.8 alb 2.8 corrected calcium:   Last renal us 2016  She is on fosamax weekly                      Review of Systems   Constitutional: Negative for appetite change, chills, fatigue, fever and unexpected weight change.   HENT: Negative for congestion, facial swelling, hearing loss, nosebleeds and trouble swallowing.    Eyes: Negative for pain, discharge, redness  "and visual disturbance.   Respiratory: Negative for cough, chest tightness, shortness of breath and wheezing.    Cardiovascular: Negative for chest pain, palpitations and leg swelling.   Gastrointestinal: Negative for abdominal pain, constipation, diarrhea, nausea and vomiting.   Endocrine: Negative for cold intolerance, heat intolerance and polydipsia.   Genitourinary: Negative for decreased urine volume, difficulty urinating, dysuria, flank pain, hematuria and urgency.   Musculoskeletal: Negative for arthralgias, back pain, joint swelling and myalgias.   Skin: Negative for color change, pallor, rash and wound.   Neurological: Negative for dizziness, tremors, seizures, syncope, speech difficulty, weakness and headaches.   Hematological: Negative for adenopathy. Does not bruise/bleed easily.   Psychiatric/Behavioral: Negative for agitation, behavioral problems, dysphoric mood, self-injury and sleep disturbance.       Objective:   Blood pressure 130/60, pulse 68, height 5' 3" (1.6 m), weight 62.6 kg (138 lb), SpO2 96 %.      Physical Exam   Constitutional: She is oriented to person, place, and time. She appears well-developed and well-nourished. No distress.   Pleasant,no distress in wheelchair   HENT:   Head: Normocephalic and atraumatic.   Mouth/Throat: No oropharyngeal exudate.   Eyes: Conjunctivae and EOM are normal. Pupils are equal, round, and reactive to light. Right eye exhibits no discharge. Left eye exhibits no discharge. No scleral icterus.   Neck: Normal range of motion. Neck supple. No JVD present. No tracheal deviation present. No thyromegaly present.   Cardiovascular: Normal rate, regular rhythm, normal heart sounds and intact distal pulses.  Exam reveals no gallop.    No murmur heard.  No murmur, no peripheral edema   Pulmonary/Chest: Effort normal and breath sounds normal. No stridor. No respiratory distress. She has no wheezes. She has no rales. She exhibits no tenderness.   Abdominal: Soft. Bowel " sounds are normal. She exhibits no distension and no mass. There is no tenderness. There is no rebound and no guarding. No hernia.   Musculoskeletal: She exhibits no edema or tenderness.   Lymphadenopathy:     She has no cervical adenopathy.   Neurological: She is alert and oriented to person, place, and time. She exhibits normal muscle tone.   Skin: Skin is warm and dry. No rash noted. She is not diaphoretic. No erythema.   Psychiatric: She has a normal mood and affect. Judgment and thought content normal.   Nursing note and vitals reviewed.      Assessment:     1 ckd 5  5 Hypercalcemia    2. Essential hypertension    3. Other hyperlipidemia    4. Urinary tract infection without hematuria, site unspecified    5. Other osteoporosis, unspecified pathological fracture presence        Plan:     84 yo AAF with IDDM type 2, HTN, HPL, serum crt 2.0-2.4 until recent episode of CHF complicated by LESLEY, with initial improvement in GFR but now at 2.6, with set gfr 16 cc/min, 2.7 gm protein on UPRCT, EF 30 % with diastolic dysfunction, etiology of CKd liekly related to nephrosclerosis, DM nephropathy,and cardiorenal syndrome.          1. CKD: serum crt now 2.6--> 3.7  est gfr 11cc/min despite hydration   Currently she has no signs of fluid overload, metabolic acidosis, electrolyte  imbalance or uremic symptoms. With reduced EF I am reluctant to stop lasix, but if serum creatinine owrsens any further may wwant to hold short term or reduce to every other day.  She does not want to pursue RRT at this time.   Clinically she does not require RRT and given her coomorbidties she would have significant decrease in quality of life and prolongation of life at most about 12 months.  UTI ESBL: not treated, would check renal us and PVR to look for reversible obstruction contributing to worsening renal function    Hypercalcemia corercted serum calcium about 10.4 to 10.6 doubt this is contributing to decreased gfr but will check urinary  calcium spot excretion   Would stop fosamax and if treatment for calcium and or osteoporosis is required would suggestt Prolia  (denusomab)      In the past I had a preliminary discussion with the sister and the patient about the option of RRT if and when necessary. I explained it may not prolong life and would be a change in lifestyle that may carry more burdens for the patient  No immediate need for RRT at this time.      Lab Results   Component Value Date    CREATININE 3.9 (H) 04/23/2018    CREATININE 3.9 (H) 04/23/2018         Proteinuria: serologies ordered, it will likely be difficult to collect 24 hr specimen, will check serologies and spot UPEP for now.  Prot/Creat Ratio, Ur   Date Value Ref Range Status   04/10/2017 0.69 (H) 0.00 - 0.20 Final   10/31/2016 2.79 (H) 0.00 - 0.20 Final     Peripheral edema: none     HTN: stable      Medication: no change      Monitor BP as directed in instructions    Metabolic acidosis:add sodium bicarb 650 mg three times daily with meals        Hyponatremia:       Hyperkalemia:     Lab Results   Component Value Date     04/23/2018     04/23/2018    K 3.8 04/23/2018    K 3.8 04/23/2018    CO2 20 (L) 04/23/2018    CO2 20 (L) 04/23/2018         Renal osteodystrophy secondary hyperparathyroidism:  On fosomax, would reduce to once monthly, vit d [id number] reduce to once monthly, check PTH and vit d level today  Lab Results   Component Value Date    PTH 23.0 04/10/2017    CALCIUM 9.8 04/23/2018    CALCIUM 9.8 04/23/2018    PHOS 4.5 04/23/2018        Anemia: as per hematology if sat < 20 and ferritin less than 500 she may benefit from iron infusion as would be considered in CKD , DANUTA as per hem/onc   Lab Results   Component Value Date    HGB 10.0 (L) 04/23/2018    HGB 10.0 (L) 04/23/2018           DM:  Metformin max dose 1000mg daily with gfr <45, d/c for gfr < 30 ml/min               Avoid long acting sulfonylurea      Weight: Weight: 62.6 kg (138 lb). she states that her  daily weights are stable weighed at NH daily  Wt Readings from Last 3 Encounters:   04/30/18 62.6 kg (138 lb)   04/23/18 65.2 kg (143 lb 11.8 oz)   04/23/18 65.2 kg (143 lb 11.8 oz)          Hyperlipidemia: stable  Lab Results   Component Value Date    LDLCALC 84.8 11/01/2016         D iet:  Education/referral:       Sodium: 2gm      Potassium:      Phosphorus:      Protein:      Fluid:       ESRD planing: as above        PCP followup:     Referrals: not at this tiem      Please avoid or minimize all NSAIDS (ibuprofen, motrin, aleve, indocin, naprosyn) to minimize the risk to your kidneys.        Addie avoid and minimize use of all Proton Pump inhibitors (such as nexium prilosec, omeprazole, pantroprazole, protonix)and Please speak with your PCP about switching to H2 blocker such as Pepcid       Labs urine renal us in next 2 weeks and labs 3 mo  rtc 3 mo or as clinically needed   stop fosamax

## 2018-04-30 NOTE — PATIENT INSTRUCTIONS
Labs,  Urine,  renal us in the next 2-4 weeks coordinate with [facility] labs       Stop fosomax    Speak with your PCP and oncology about using denusomab rather than fosamax   add sodium bicarb 650 mg three times daily     rtc 3 mo

## 2018-04-30 NOTE — PATIENT INSTRUCTIONS
C/w current medications   PT at [facility]  Repeat 2D ECHO   F/u nephrology - CKD stage V  RTC in 6 months

## 2018-05-01 ENCOUNTER — PATIENT MESSAGE (OUTPATIENT)
Dept: NEPHROLOGY | Facility: CLINIC | Age: 83
End: 2018-05-01

## 2018-05-07 ENCOUNTER — HOSPITAL ENCOUNTER (OUTPATIENT)
Dept: CARDIOLOGY | Facility: CLINIC | Age: 83
Discharge: HOME | End: 2018-05-07
Attending: STUDENT IN AN ORGANIZED HEALTH CARE EDUCATION/TRAINING PROGRAM
Payer: MEDICARE

## 2018-05-07 DIAGNOSIS — I25.5 ISCHEMIC CARDIOMYOPATHY: ICD-10-CM

## 2018-05-07 LAB
DIASTOLIC DYSFUNCTION: YES
ESTIMATED PA SYSTOLIC PRESSURE: 25.66
MITRAL VALVE MOBILITY: NORMAL
RETIRED EF AND QEF - SEE NOTES: 45 (ref 55–65)
TRICUSPID VALVE REGURGITATION: ABNORMAL

## 2018-05-07 PROCEDURE — 93306 TTE W/DOPPLER COMPLETE: CPT | Mod: S$GLB,,, | Performed by: INTERNAL MEDICINE

## 2018-05-14 ENCOUNTER — TELEPHONE (OUTPATIENT)
Dept: NEPHROLOGY | Facility: CLINIC | Age: 83
End: 2018-05-14

## 2018-05-14 ENCOUNTER — OFFICE VISIT (OUTPATIENT)
Dept: HEMATOLOGY/ONCOLOGY | Facility: CLINIC | Age: 83
End: 2018-05-14
Payer: MEDICARE

## 2018-05-14 ENCOUNTER — LAB VISIT (OUTPATIENT)
Dept: LAB | Facility: HOSPITAL | Age: 83
End: 2018-05-14
Attending: INTERNAL MEDICINE
Payer: MEDICARE

## 2018-05-14 ENCOUNTER — DOCUMENTATION ONLY (OUTPATIENT)
Dept: NEPHROLOGY | Facility: CLINIC | Age: 83
End: 2018-05-14

## 2018-05-14 VITALS
TEMPERATURE: 98 F | BODY MASS INDEX: 25.11 KG/M2 | SYSTOLIC BLOOD PRESSURE: 150 MMHG | RESPIRATION RATE: 14 BRPM | HEART RATE: 64 BPM | OXYGEN SATURATION: 93 % | DIASTOLIC BLOOD PRESSURE: 70 MMHG | WEIGHT: 141.75 LBS

## 2018-05-14 DIAGNOSIS — I50.42 CHRONIC COMBINED SYSTOLIC AND DIASTOLIC HEART FAILURE: Chronic | ICD-10-CM

## 2018-05-14 DIAGNOSIS — D63.1 ANEMIA OF CHRONIC RENAL FAILURE, STAGE 5: ICD-10-CM

## 2018-05-14 DIAGNOSIS — C34.91 PRIMARY LUNG CANCER, RIGHT: ICD-10-CM

## 2018-05-14 DIAGNOSIS — N18.5 CKD (CHRONIC KIDNEY DISEASE) STAGE 5, GFR LESS THAN 15 ML/MIN: ICD-10-CM

## 2018-05-14 DIAGNOSIS — N39.0 URINARY TRACT INFECTION WITHOUT HEMATURIA, SITE UNSPECIFIED: ICD-10-CM

## 2018-05-14 DIAGNOSIS — F03.90 DEMENTIA WITHOUT BEHAVIORAL DISTURBANCE, UNSPECIFIED DEMENTIA TYPE: Chronic | ICD-10-CM

## 2018-05-14 DIAGNOSIS — N18.5 ANEMIA OF CHRONIC RENAL FAILURE, STAGE 5: ICD-10-CM

## 2018-05-14 DIAGNOSIS — C34.91 PRIMARY LUNG CANCER, RIGHT: Primary | ICD-10-CM

## 2018-05-14 DIAGNOSIS — I12.0 TYPE 2 DM WITH CKD STAGE 5 AND HYPERTENSION: ICD-10-CM

## 2018-05-14 DIAGNOSIS — M81.8 OTHER OSTEOPOROSIS, UNSPECIFIED PATHOLOGICAL FRACTURE PRESENCE: Chronic | ICD-10-CM

## 2018-05-14 DIAGNOSIS — I10 ESSENTIAL HYPERTENSION: Chronic | ICD-10-CM

## 2018-05-14 DIAGNOSIS — N18.5 TYPE 2 DM WITH CKD STAGE 5 AND HYPERTENSION: ICD-10-CM

## 2018-05-14 DIAGNOSIS — E11.22 TYPE 2 DM WITH CKD STAGE 5 AND HYPERTENSION: ICD-10-CM

## 2018-05-14 DIAGNOSIS — N18.5 CKD (CHRONIC KIDNEY DISEASE) STAGE 5, GFR LESS THAN 15 ML/MIN: Primary | ICD-10-CM

## 2018-05-14 DIAGNOSIS — E83.52 HYPERCALCEMIA: ICD-10-CM

## 2018-05-14 DIAGNOSIS — N17.2 ACUTE KIDNEY FAILURE WITH MEDULLARY NECROSIS: ICD-10-CM

## 2018-05-14 DIAGNOSIS — E78.49 OTHER HYPERLIPIDEMIA: Chronic | ICD-10-CM

## 2018-05-14 LAB
25(OH)D3+25(OH)D2 SERPL-MCNC: 19 NG/ML
ALBUMIN SERPL BCP-MCNC: 2.7 G/DL
ALP SERPL-CCNC: 110 U/L
ALT SERPL W/O P-5'-P-CCNC: 8 U/L
ANION GAP SERPL CALC-SCNC: 10 MMOL/L
AST SERPL-CCNC: 15 U/L
BILIRUB SERPL-MCNC: 0.3 MG/DL
BUN SERPL-MCNC: 64 MG/DL
CALCIUM SERPL-MCNC: 10.1 MG/DL
CHLORIDE SERPL-SCNC: 109 MMOL/L
CO2 SERPL-SCNC: 25 MMOL/L
CREAT SERPL-MCNC: 4.1 MG/DL
ERYTHROCYTE [DISTWIDTH] IN BLOOD BY AUTOMATED COUNT: 14.1 %
EST. GFR  (AFRICAN AMERICAN): 10.9 ML/MIN/1.73 M^2
EST. GFR  (NON AFRICAN AMERICAN): 9.4 ML/MIN/1.73 M^2
GLUCOSE SERPL-MCNC: 79 MG/DL
HCT VFR BLD AUTO: 29.6 %
HGB BLD-MCNC: 9.4 G/DL
IMM GRANULOCYTES # BLD AUTO: 0.02 K/UL
MCH RBC QN AUTO: 28.1 PG
MCHC RBC AUTO-ENTMCNC: 31.8 G/DL
MCV RBC AUTO: 88 FL
NEUTROPHILS # BLD AUTO: 4.7 K/UL
PLATELET # BLD AUTO: 233 K/UL
PMV BLD AUTO: 10.1 FL
POTASSIUM SERPL-SCNC: 4.3 MMOL/L
PROT SERPL-MCNC: 7.7 G/DL
PTH-INTACT SERPL-MCNC: 65 PG/ML
RBC # BLD AUTO: 3.35 M/UL
SODIUM SERPL-SCNC: 144 MMOL/L
T4 FREE SERPL-MCNC: 1.03 NG/DL
TSH SERPL DL<=0.005 MIU/L-ACNC: 1.92 UIU/ML
WBC # BLD AUTO: 7.38 K/UL

## 2018-05-14 PROCEDURE — 83970 ASSAY OF PARATHORMONE: CPT

## 2018-05-14 PROCEDURE — 84439 ASSAY OF FREE THYROXINE: CPT

## 2018-05-14 PROCEDURE — 80053 COMPREHEN METABOLIC PANEL: CPT

## 2018-05-14 PROCEDURE — 3077F SYST BP >= 140 MM HG: CPT | Mod: CPTII,S$GLB,, | Performed by: NURSE PRACTITIONER

## 2018-05-14 PROCEDURE — 82306 VITAMIN D 25 HYDROXY: CPT

## 2018-05-14 PROCEDURE — 99999 PR PBB SHADOW E&M-EST. PATIENT-LVL IV: CPT | Mod: PBBFAC,,, | Performed by: NURSE PRACTITIONER

## 2018-05-14 PROCEDURE — 3078F DIAST BP <80 MM HG: CPT | Mod: CPTII,S$GLB,, | Performed by: NURSE PRACTITIONER

## 2018-05-14 PROCEDURE — 85027 COMPLETE CBC AUTOMATED: CPT

## 2018-05-14 PROCEDURE — 84443 ASSAY THYROID STIM HORMONE: CPT

## 2018-05-14 PROCEDURE — 99214 OFFICE O/P EST MOD 30 MIN: CPT | Mod: S$GLB,,, | Performed by: NURSE PRACTITIONER

## 2018-05-14 PROCEDURE — 36415 COLL VENOUS BLD VENIPUNCTURE: CPT

## 2018-05-14 NOTE — PROGRESS NOTES
PATIENT: Misa Yun  MRN: [id number]  DATE: 5/14/2018      Diagnosis:   1. Primary lung cancer, right    2. Anemia of chronic renal failure, stage 5    3. CKD (chronic kidney disease) stage 5, GFR less than 15 ml/min    4. Chronic combined systolic and diastolic heart failure    5. Dementia without behavioral disturbance, unspecified dementia type    6. Type 2 DM with CKD stage 5 and hypertension        Chief Complaint: Primary lung cancer, right      Oncologic History:      Oncologic History Non-small cell lung cancer, right, diagnosed 9/2017   Metastatic to contralateral lung at presentation     Oncologic Treatment Pembrolizumab 11/6/17    Pathology Squamous cell carcinoma, cT3, N2, M1a, PD-L1 TPS 95%          Subjective:    Interval History: Ms. Yun is a 84 y.o. female who is seen in follow-up for lung cancer. She is currently receiving pembrolizumab.    She states she has been feeling well. She gained 4lbs since her last visit and attributes this to now eating her bedtime snack.  She denies any shortness of breath. No swelling today. She is accompanied by her son and nurse aid from the NH. She is being followed by Dr. Erickson for CKD and is scheduled for a renal ultrasound 5/21/18. Her last creatinine was 3.9 mg/dL.    Her history dates to 8/2017 when she underwent an x-ray for a cough which had been present for only a few weeks which had shown abnormalities.  Subsequently a CT was performed revealing 2 small bilateral pleural effusions along with large pulmonary lesions in the right upper lobe measuring 2.2 cm and also another lesion measuring 4.7 cm.  There were also multiple enlarged mediastinal lymph nodes.  In addition there were bilateral multiple pulmonary micronodules.  She underwent a CT-guided biopsy in 9/2017 with pathology revealing squamous cell carcinoma.  PD-L1 tumor proportion score was 95%.  She was started on pembrolizumab on 11/6/17.  PET/CT in 1/18 had shown a partial  response. PET 4/2/18 shows ongoing response to therapy.      Past Medical History:   Past Medical History:   Diagnosis Date    Anemia     Anemia     CAD (coronary artery disease)     Cataract     Chemotherapy follow-up examination 11/27/2017    CHF (congestive heart failure)     Chronic kidney disease (CKD), stage IV (severe) 03/27/2017    Sees  at [facility] [place], no dialysis    Dementia     Encounter for blood transfusion 2006    at time of CABG    Fracture of humerus, proximal, left, closed 1/28/2015    Hypercalcemia 04/10/2017    Hyperlipidemia     Hyperparathyroidism     Hypertension     Mediastinal lymphadenopathy 10/9/2017    MI (myocardial infarction) 10/2016    Osteoporosis     Ovarian cancer     in the past    Primary lung cancer, right 10/9/2017    Type 2 diabetes mellitus, controlled     UTI (urinary tract infection) 04/11/2017       Past Surgical HIstory:   Past Surgical History:   Procedure Laterality Date    CATARACT EXTRACTION W/  INTRAOCULAR LENS IMPLANT Left 04/17/2017    Dr. Motta    CATARACT EXTRACTION W/  INTRAOCULAR LENS IMPLANT Right 05/06/2017    Dr Motta     CORONARY ARTERY BYPASS GRAFT  2006    EYE SURGERY      HYSTERECTOMY  2002    ORIF HUMERUS FRACTURE Left 1/28/2015       Family History:   Family History   Problem Relation Age of Onset    Adopted: Yes    Blindness Maternal Grandmother     Cancer Neg Hx        Social History:  reports that she quit smoking about 11 years ago. She has a 60.00 pack-year smoking history. She has never used smokeless tobacco. She reports that she does not drink alcohol or use drugs.    Allergies:  Review of patient's allergies indicates:  No Known Allergies    Medications:  Current Outpatient Prescriptions   Medication Sig Dispense Refill    amlodipine (NORVASC) 10 MG tablet Take 10 mg by mouth once daily.      aspirin 81 MG Chew Take 1 tablet (81 mg total) by mouth once daily. RESUME ON 2/14/15      atorvastatin  (LIPITOR) 40 MG tablet Take 1 tablet (40 mg total) by mouth once daily. 30 tablet 3    diphenhydrAMINE (BENADRYL ALLERGY) 25 mg tablet Take 25 mg by mouth nightly as needed for Insomnia.      donepezil (ARICEPT) 5 MG tablet Take 5 mg by mouth every evening.      ergocalciferol (ERGOCALCIFEROL) 50,000 unit Cap Take 50,000 Units by mouth every 7 days.      furosemide (LASIX) 20 MG tablet Take 1 tablet (20 mg total) by mouth once daily. 30 tablet 11    insulin aspart (NOVOLOG) 100 unit/mL injection Inject 100 Units into the skin 3 (three) times daily before meals.      senna-docusate 8.6-50 mg (PERICOLACE) 8.6-50 mg per tablet Take 1 tablet by mouth 2 (two) times daily.      sodium bicarbonate 650 MG tablet Take 1 tablet (650 mg total) by mouth 3 (three) times daily. 270 tablet 3    tramadol (ULTRAM) 50 mg tablet Take 50 mg by mouth every 6 (six) hours as needed for Pain.      carvedilol (COREG) 3.125 MG tablet Take 2 tablets (6.25 mg total) by mouth 2 (two) times daily. 60 tablet 3    isosorbide-hydrALAZINE 20-37.5 mg (BIDIL) 20-37.5 mg Tab Take 1 tablet by mouth 3 (three) times daily. 90 tablet 11    nitroGLYCERIN (NITROSTAT) 0.4 MG SL tablet Place 1 tablet (0.4 mg total) under the tongue every 5 (five) minutes as needed for Chest pain. 25 tablet 3     No current facility-administered medications for this visit.        Review of Systems   Constitutional: Negative for appetite change, chills, fever and unexpected weight change.   HENT: Negative for congestion, hearing loss and nosebleeds.    Eyes: Negative for visual disturbance.   Respiratory: Negative for cough and shortness of breath.    Cardiovascular: Negative for chest pain and palpitations.   Gastrointestinal: Negative for abdominal pain, blood in stool, constipation, diarrhea, nausea and vomiting.   Genitourinary: Negative for dysuria and frequency.   Musculoskeletal: Negative for back pain and gait problem.   Skin: Negative for color change and  rash.   Neurological: Negative for dizziness, weakness and headaches.   Hematological: Negative for adenopathy. Does not bruise/bleed easily.   Psychiatric/Behavioral: Negative for confusion. The patient is not nervous/anxious.        ECOG Performance Status:   ECOG SCORE    1 - Restricted in strenuous activity-ambulatory and able to carry out work of a light nature         Objective:      Vitals:   Vitals:    05/14/18 1109   BP: (!) 150/70   BP Location: Left arm   Patient Position: Sitting   BP Method: Large (Automatic)   Pulse: 64   Resp: 14   Temp: 98.1 °F (36.7 °C)   TempSrc: Oral   SpO2: (!) 93%   Weight: 64.3 kg (141 lb 12.1 oz)     BMI: Body mass index is 25.11 kg/m².    Physical Exam   Constitutional: She appears well-developed and well-nourished. No distress.   Sitting in a wheelchair   HENT:   Head: Normocephalic.   Mouth/Throat: No oropharyngeal exudate.   Eyes: EOM are normal. No scleral icterus.   Neck: Neck supple. No tracheal deviation present. No thyromegaly present.   Cardiovascular: Normal rate and regular rhythm.    Pulmonary/Chest: Effort normal and breath sounds normal. No respiratory distress. She has no wheezes. She has no rales.   Abdominal: Soft. She exhibits no distension and no mass. There is no tenderness. There is no rebound and no guarding.   Musculoskeletal: Normal range of motion. She exhibits no edema.   Lymphadenopathy:     She has no cervical adenopathy.   Neurological: She is alert. No cranial nerve deficit.   Skin: Skin is warm and dry.   Psychiatric: She has a normal mood and affect.       Laboratory Data:        Lab Visit on 05/14/2018   Component Date Value Ref Range Status    WBC 05/14/2018 7.38  3.90 - 12.70 K/uL Final    RBC 05/14/2018 3.35* 4.00 - 5.40 M/uL Final    Hemoglobin 05/14/2018 9.4* 12.0 - 16.0 g/dL Final    Hematocrit 05/14/2018 29.6* 37.0 - 48.5 % Final    MCV 05/14/2018 88  82 - 98 fL Final    MCH 05/14/2018 28.1  27.0 - 31.0 pg Final    MCHC  05/14/2018 31.8* 32.0 - 36.0 g/dL Final    RDW 05/14/2018 14.1  11.5 - 14.5 % Final    Platelets 05/14/2018 233  150 - 350 K/uL Final    MPV 05/14/2018 10.1  9.2 - 12.9 fL Final    Gran # (ANC) 05/14/2018 4.7  1.8 - 7.7 K/uL Final    Comment: The ANC is based on a white cell differential from an   automated cell counter. It has not been microscopically   reviewed for the presence of abnormal cells. Clinical   correlation is required.      Immature Grans (Abs) 05/14/2018 0.02  0.00 - 0.04 K/uL Final    Comment: Mild elevation in immature granulocytes is non specific and   can be seen in a variety of conditions including stress response,   acute inflammation, trauma and pregnancy. Correlation with other   laboratory and clinical findings is essential.      Sodium 05/14/2018 144  136 - 145 mmol/L Final    Potassium 05/14/2018 4.3  3.5 - 5.1 mmol/L Final    Chloride 05/14/2018 109  95 - 110 mmol/L Final    CO2 05/14/2018 25  23 - 29 mmol/L Final    Glucose 05/14/2018 79  70 - 110 mg/dL Final    BUN, Bld 05/14/2018 64* 8 - 23 mg/dL Final    Creatinine 05/14/2018 4.1* 0.5 - 1.4 mg/dL Final    Calcium 05/14/2018 10.1  8.7 - 10.5 mg/dL Final    Total Protein 05/14/2018 7.7  6.0 - 8.4 g/dL Final    Albumin 05/14/2018 2.7* 3.5 - 5.2 g/dL Final    Total Bilirubin 05/14/2018 0.3  0.1 - 1.0 mg/dL Final    Comment: For infants and newborns, interpretation of results should be based  on gestational age, weight and in agreement with clinical  observations.  Premature Infant recommended reference ranges:  Up to 24 hours.............<8.0 mg/dL  Up to 48 hours............<12.0 mg/dL  3-5 days..................<15.0 mg/dL  6-29 days.................<15.0 mg/dL      Alkaline Phosphatase 05/14/2018 110  55 - 135 U/L Final    AST 05/14/2018 15  10 - 40 U/L Final    ALT 05/14/2018 8* 10 - 44 U/L Final    Anion Gap 05/14/2018 10  8 - 16 mmol/L Final    eGFR if African American 05/14/2018 10.9* >60 mL/min/1.73 m^2 Final     eGFR if non African American 05/14/2018 9.4* >60 mL/min/1.73 m^2 Final    Comment: Calculation used to obtain the estimated glomerular filtration  rate (eGFR) is the CKD-EPI equation.       PTH, Intact 05/14/2018 65.0  9.0 - 77.0 pg/mL Final     1) Right upper lobe. (Rad)  Supplemental Diagnosis  Immunohistochemical stains are completed on the lung biopsy and revealed the tumor cells to stain positively for  cytokeratin 5/6 and p63. The tumor cells are negative for TTF-1, cytokeratin 7 and estrogen receptor. All stains  have satisfactory positive and negative controls. This staining profile together with the morphology of the tumor  support a diagnosis of non-small cell carcinoma consistent with squamous cell carcinoma.  ER  (Electronically Signed: 2017-09-25 09:32:02 )  Diagnosed by: Bebe Child  FINAL PATHOLOGIC DIAGNOSIS  Right lung mass, core biopsy:  Invasive carcinoma.       Imaging:   PET/CT 4/2/18  EXAMINATION:  NM PET CT ROUTINE    CLINICAL HISTORY:  lung cancer;  Malignant neoplasm of unspecified part of right bronchus or lung    FINDINGS:  Comparison is 01/04/2018.  The patient was administered 10.36 millicuries of FDG intravenously.    Right upper lobe mass SUV max 11.73, previously 12.21.  Precarinal lymph node SUV max 2.99, previously 4.14.    There is involutional change and small vessel ischemic change.  There are hard palate dianna.  There is physiologic head and neck activity.  There are coronary calcifications.  There is physiologic liver, spleen, GI and  activity.  There is aortic plaque and DJD.  Pelvic organs show nothing unusual.  No bone or lung metastases are seen.  There are some inflammatory areas of lung activity.  Hilar lymph nodes have improved.   Impression       See above    Slight improvement in the primary neoplasm but noticeable improvement of mediastinal and hilar lymph nodes.            Assessment:       1. Primary lung cancer, right    2. Anemia of chronic renal failure,  stage 5    3. CKD (chronic kidney disease) stage 5, GFR less than 15 ml/min    4. Chronic combined systolic and diastolic heart failure    5. Dementia without behavioral disturbance, unspecified dementia type    6. Type 2 DM with CKD stage 5 and hypertension    7.      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 Yun continues to do well and is tolerating treatment with pembrolizumab. However, her renal function continues to worsen. Plan to hold Pembrolizumab due to her creatinine 4.1mg/dL today as this could be indicative of an immune mediated nephritis.  She is grade 2 nephritis but would hold off on steroids at this time given her age and co morbidities. Plan to see her back in 3 weeks to reevaluate and possibly resume treatment. Once treatment with Pembrolizumab resumes, she will need 2 cycles and then rescan.   She is to keep her scheduled renal u/s on 5/21/18  and  follow up with nephrology. She is not on dialysis at this time.  I will send an urgent  message to Dr. Ahumada to review labs.  No evidence of cardiac decompensation on today's exam.   Will continue to monitor anemia. Her glucose is normal today.  She understands to  continue follow up with appropriate, established MD for all other diagnosis.         Patient is in agreement with the proposed treatment plan. All questions were answered to the patient's satisfaction. Pt knows to call clinic if anything is needed before the next clinic visit.   Please note: The above was discussed, on day of follow up, with Dr. Barnhart who agrees with above and provided plan of care. Hold off on Steroids until evaluated by renal u/s and   urine culture obtained. PJ    ADDENDUM 6/1/18: Patient with E coli UTI 5/24/18- nephrology treated with augmentin. May need steroids for nephritis once antibiotics complete and no improvement in creatinine.       MINERVA Matamoros-MICHAEL  Hematology & Oncology  [facility] [facility] [place] [address]  ph. [phone]  Fax. [phone]     30 minutes were spent in coordination of patient's care, record review and counseling. More than 50% of the time was face-to-face.          Distress Screening Results: Psychosocial Distress screening score of Distress Score: 0 noted and reviewed. No intervention indicated.

## 2018-05-14 NOTE — Clinical Note
Hi there!  I am seeing Mrs Yun today. You're last note mentioned reducing the lasix if her creatinine continued to rise.  I wanted you to inform you, patient's creatine is 4.1 mg/dL today.  I am holding her immunotherapy today as this could cause immune mediated nephritis. I will see her back in 3 weeks to reevaluate.  Thanks,  CANDI

## 2018-05-14 NOTE — TELEPHONE ENCOUNTER
Reported to  That's her normal level and does not want dialysis.----- Message from Christelle Guerra sent at 5/14/2018 12:44 PM CDT -----  Contact: [facility]  [phone]  - [facility] - calling to report critical      GFR 11     [facility]  Now   [phone] fax     CAll her back so she can verify she reported it     Thanks

## 2018-05-14 NOTE — PROGRESS NOTES
I spoke with sister:  Serum creatinine remains elevated at 3.9 to 4.1est gfr 9-10cc/min from a baseline serum crt of 2.5 est gfr 17 cc/min one year ago.  The last UA is from 1 year ago.  It is feasible that the Pembrolizumab is affecting renal function leading to an acute interstitial nephritis, or possible TMA. Albeit the change in her gfr is a 50% change, the relative amount of nephron loss could be from CRS and prerenal picture as well as general progression of ckd  She is a poor renal biopsy candidate.   Would check a U c/s immediately and renal US ordered for next week will try and schedule for this week and if this is clean    Stop lasix as she had no edema at last visit and I was then concerned about a prerenal picture    Would strongly consider use of prednisone at 0.5 to 1 mg/kg /day  As recommended by The management of renal toxicities as  summarized in the American Society of Clinical Oncology (ASCO) guidelines   if Uc/s no growth

## 2018-05-21 ENCOUNTER — HOSPITAL ENCOUNTER (OUTPATIENT)
Dept: RADIOLOGY | Facility: HOSPITAL | Age: 83
Discharge: HOME | End: 2018-05-21
Attending: INTERNAL MEDICINE
Payer: MEDICARE

## 2018-05-21 DIAGNOSIS — E78.49 OTHER HYPERLIPIDEMIA: Chronic | ICD-10-CM

## 2018-05-21 DIAGNOSIS — E83.52 HYPERCALCEMIA: ICD-10-CM

## 2018-05-21 DIAGNOSIS — R82.71 BACTERIA IN URINE: Primary | ICD-10-CM

## 2018-05-21 DIAGNOSIS — M81.8 OTHER OSTEOPOROSIS, UNSPECIFIED PATHOLOGICAL FRACTURE PRESENCE: Chronic | ICD-10-CM

## 2018-05-21 DIAGNOSIS — I10 ESSENTIAL HYPERTENSION: Chronic | ICD-10-CM

## 2018-05-21 DIAGNOSIS — N39.0 URINARY TRACT INFECTION WITHOUT HEMATURIA, SITE UNSPECIFIED: ICD-10-CM

## 2018-05-21 PROCEDURE — 76770 US EXAM ABDO BACK WALL COMP: CPT | Mod: TC

## 2018-05-21 PROCEDURE — 76770 US EXAM ABDO BACK WALL COMP: CPT | Mod: 26,,, | Performed by: INTERNAL MEDICINE

## 2018-05-22 ENCOUNTER — LAB VISIT (OUTPATIENT)
Dept: LAB | Facility: HOSPITAL | Age: 83
End: 2018-05-22
Attending: INTERNAL MEDICINE
Payer: MEDICARE

## 2018-05-22 DIAGNOSIS — R82.71 BACTERIA IN URINE: ICD-10-CM

## 2018-05-22 LAB
BACTERIA #/AREA URNS AUTO: ABNORMAL /HPF
BILIRUB UR QL STRIP: NEGATIVE
CLARITY UR REFRACT.AUTO: ABNORMAL
COLOR UR AUTO: YELLOW
GLUCOSE UR QL STRIP: NEGATIVE
HGB UR QL STRIP: ABNORMAL
HYALINE CASTS UR QL AUTO: 0 /LPF
KETONES UR QL STRIP: NEGATIVE
LEUKOCYTE ESTERASE UR QL STRIP: ABNORMAL
MICROSCOPIC COMMENT: ABNORMAL
NITRITE UR QL STRIP: POSITIVE
PH UR STRIP: 5 [PH] (ref 5–8)
PROT UR QL STRIP: ABNORMAL
RBC #/AREA URNS AUTO: 69 /HPF (ref 0–4)
SP GR UR STRIP: 1.01 (ref 1–1.03)
URN SPEC COLLECT METH UR: ABNORMAL
UROBILINOGEN UR STRIP-ACNC: NEGATIVE EU/DL
WBC #/AREA URNS AUTO: >100 /HPF (ref 0–5)
WBC CLUMPS UR QL AUTO: ABNORMAL

## 2018-05-22 PROCEDURE — 87186 SC STD MICRODIL/AGAR DIL: CPT

## 2018-05-22 PROCEDURE — 81001 URINALYSIS AUTO W/SCOPE: CPT

## 2018-05-22 PROCEDURE — 87088 URINE BACTERIA CULTURE: CPT

## 2018-05-22 PROCEDURE — 87077 CULTURE AEROBIC IDENTIFY: CPT

## 2018-05-22 PROCEDURE — 87086 URINE CULTURE/COLONY COUNT: CPT

## 2018-05-24 ENCOUNTER — PATIENT MESSAGE (OUTPATIENT)
Dept: NEPHROLOGY | Facility: CLINIC | Age: 83
End: 2018-05-24

## 2018-05-24 LAB
BACTERIA UR CULT: NORMAL
POCT GLUCOSE: 102 MG/DL (ref 70–110)

## 2018-05-24 RX ORDER — AMOXICILLIN AND CLAVULANATE POTASSIUM 500; 125 MG/1; MG/1
1 TABLET, FILM COATED ORAL 2 TIMES DAILY
Qty: 14 TABLET | Refills: 0 | Status: SHIPPED | OUTPATIENT
Start: 2018-05-24 | End: 2018-05-31

## 2018-06-04 ENCOUNTER — TELEPHONE (OUTPATIENT)
Dept: HEMATOLOGY/ONCOLOGY | Facility: CLINIC | Age: 83
End: 2018-06-04

## 2018-06-04 ENCOUNTER — LAB VISIT (OUTPATIENT)
Dept: LAB | Facility: HOSPITAL | Age: 83
End: 2018-06-04
Payer: MEDICARE

## 2018-06-04 ENCOUNTER — OFFICE VISIT (OUTPATIENT)
Dept: HEMATOLOGY/ONCOLOGY | Facility: CLINIC | Age: 83
End: 2018-06-04
Payer: MEDICARE

## 2018-06-04 VITALS
OXYGEN SATURATION: 98 % | BODY MASS INDEX: 25.01 KG/M2 | HEIGHT: 63 IN | RESPIRATION RATE: 18 BRPM | SYSTOLIC BLOOD PRESSURE: 181 MMHG | HEART RATE: 64 BPM | DIASTOLIC BLOOD PRESSURE: 80 MMHG | WEIGHT: 141.13 LBS | TEMPERATURE: 98 F

## 2018-06-04 DIAGNOSIS — Z92.89 HISTORY OF IMMUNOTHERAPY: Primary | ICD-10-CM

## 2018-06-04 DIAGNOSIS — D63.1 ANEMIA OF CHRONIC RENAL FAILURE, STAGE 5: ICD-10-CM

## 2018-06-04 DIAGNOSIS — C34.91 PRIMARY LUNG CANCER, RIGHT: ICD-10-CM

## 2018-06-04 DIAGNOSIS — N18.5 ANEMIA OF CHRONIC RENAL FAILURE, STAGE 5: ICD-10-CM

## 2018-06-04 LAB
ALBUMIN SERPL BCP-MCNC: 2.3 G/DL
ALP SERPL-CCNC: 97 U/L
ALT SERPL W/O P-5'-P-CCNC: 6 U/L
ANION GAP SERPL CALC-SCNC: 11 MMOL/L
AST SERPL-CCNC: 11 U/L
BILIRUB SERPL-MCNC: 0.3 MG/DL
BUN SERPL-MCNC: 45 MG/DL
CALCIUM SERPL-MCNC: 9.2 MG/DL
CHLORIDE SERPL-SCNC: 107 MMOL/L
CO2 SERPL-SCNC: 24 MMOL/L
CREAT SERPL-MCNC: 3.4 MG/DL
ERYTHROCYTE [DISTWIDTH] IN BLOOD BY AUTOMATED COUNT: 13.8 %
EST. GFR  (AFRICAN AMERICAN): 13.6 ML/MIN/1.73 M^2
EST. GFR  (NON AFRICAN AMERICAN): 11.8 ML/MIN/1.73 M^2
GLUCOSE SERPL-MCNC: 115 MG/DL
HCT VFR BLD AUTO: 26.8 %
HGB BLD-MCNC: 8.6 G/DL
IMM GRANULOCYTES # BLD AUTO: 0.04 K/UL
MCH RBC QN AUTO: 28 PG
MCHC RBC AUTO-ENTMCNC: 32.1 G/DL
MCV RBC AUTO: 87 FL
NEUTROPHILS # BLD AUTO: 5.3 K/UL
PLATELET # BLD AUTO: 264 K/UL
PMV BLD AUTO: 9.9 FL
POTASSIUM SERPL-SCNC: 3.4 MMOL/L
PROT SERPL-MCNC: 7.3 G/DL
RBC # BLD AUTO: 3.07 M/UL
SODIUM SERPL-SCNC: 142 MMOL/L
WBC # BLD AUTO: 7.93 K/UL

## 2018-06-04 PROCEDURE — 80053 COMPREHEN METABOLIC PANEL: CPT

## 2018-06-04 PROCEDURE — 3079F DIAST BP 80-89 MM HG: CPT | Mod: CPTII,S$GLB,, | Performed by: INTERNAL MEDICINE

## 2018-06-04 PROCEDURE — 85027 COMPLETE CBC AUTOMATED: CPT

## 2018-06-04 PROCEDURE — 36415 COLL VENOUS BLD VENIPUNCTURE: CPT

## 2018-06-04 PROCEDURE — 99999 PR PBB SHADOW E&M-EST. PATIENT-LVL III: CPT | Mod: PBBFAC,,, | Performed by: INTERNAL MEDICINE

## 2018-06-04 PROCEDURE — 99215 OFFICE O/P EST HI 40 MIN: CPT | Mod: S$GLB,,, | Performed by: INTERNAL MEDICINE

## 2018-06-04 PROCEDURE — 3077F SYST BP >= 140 MM HG: CPT | Mod: CPTII,S$GLB,, | Performed by: INTERNAL MEDICINE

## 2018-06-04 RX ORDER — PREDNISONE 20 MG/1
40 TABLET ORAL EVERY MORNING
Qty: 60 TABLET | Refills: 0 | Status: SHIPPED | OUTPATIENT
Start: 2018-06-04 | End: 2018-07-04

## 2018-06-04 NOTE — PROGRESS NOTES
Patient Misa Yun, MRN [id number], was dependent on dialysis (ICD10 Z99.2) at the time of this visit on 6/4/18. This addendum is made to the medical record on 06/04/2018.

## 2018-06-04 NOTE — PROGRESS NOTES
PATIENT: Misa Yun  MRN: [id number]  DATE: 6/4/2018      Diagnosis:   1. History of immunotherapy        Chief Complaint: Squamous Cell Lung Cancer    Oncologic History:    Oncologic History Non-small cell lung cancer, right, diagnosed 9/2017   Metastatic to contralateral lung at presentation     Oncologic Treatment Pembrolizumab 11/6/17    Pathology Squamous cell carcinoma, cT3, N2, M1a, PD-L1 TPS 95%        Subjective:    Interval History: Ms. Yun is a 84 y.o. female who is seen in follow-up for lung cancer.  She states she has been feeling well.  She denies any shortness of breath.  She is without new complaints .She is currently receiving pembrolizumab.      She has lost roughly 10 pounds in recent weeks per her caregiver. While Ms. Yun denies all complaints in the review of systems, her care giver and sister feel as though she is eating much less, urinating less, and is over all less energetic.    Oncology History:  Her history dates to 8/2017 when she underwent an x-ray for a cough which had been present for only a few weeks which had shown abnormalities.  Subsequently a CT was performed revealing 2 small bilateral pleural effusions along with large pulmonary lesions in the right upper lobe measuring 2.2 cm and also another lesion measuring 4.7 cm.  There were also multiple enlarged mediastinal lymph nodes.  In addition there were bilateral multiple pulmonary micronodules.  She underwent a CT-guided biopsy in 9/2017 with pathology revealing squamous cell carcinoma.  PD-L1 tumor proportion score was 95%.  She was started on pembrolizumab on 11/6/17.  PET/CT in 1/18 had shown a partial response.  --PET on 4/2/18 Slight improvement in the primary neoplasm but noticeable improvement of mediastinal and hilar lymph nodes.        Past Medical History:   Past Medical History:   Diagnosis Date    Anemia     Anemia     CAD (coronary artery disease)     Cataract     Chemotherapy follow-up  examination 11/27/2017    CHF (congestive heart failure)     Chronic kidney disease (CKD), stage IV (severe) 03/27/2017    [facility], no dialysis    Dementia     Encounter for blood transfusion 2006    at time of CABG    Fracture of humerus, proximal, left, closed 1/28/2015    Hypercalcemia 04/10/2017    Hyperlipidemia     Hyperparathyroidism     Hypertension     Mediastinal lymphadenopathy 10/9/2017    MI (myocardial infarction) 10/2016    Osteoporosis     Ovarian cancer     in the past    Primary lung cancer, right 10/9/2017    Type 2 diabetes mellitus, controlled     UTI (urinary tract infection) 04/11/2017       Past Surgical HIstory:   Past Surgical History:   Procedure Laterality Date    CATARACT EXTRACTION W/  INTRAOCULAR LENS IMPLANT Left 04/17/2017    Dr. Motta    CATARACT EXTRACTION W/  INTRAOCULAR LENS IMPLANT Right 05/06/2017    Dr Motta     CORONARY ARTERY BYPASS GRAFT  2006    EYE SURGERY      HYSTERECTOMY  2002    ORIF HUMERUS FRACTURE Left 1/28/2015       Family History:   Family History   Problem Relation Age of Onset    Adopted: Yes    Blindness Maternal Grandmother     Cancer Neg Hx        Social History:  reports that she quit smoking about 11 years ago. She has a 60.00 pack-year smoking history. She has never used smokeless tobacco. She reports that she does not drink alcohol or use drugs.    Allergies:  Review of patient's allergies indicates:  No Known Allergies    Medications:  Current Outpatient Prescriptions   Medication Sig Dispense Refill    amlodipine (NORVASC) 10 MG tablet Take 10 mg by mouth once daily.      aspirin 81 MG Chew Take 1 tablet (81 mg total) by mouth once daily. RESUME ON 2/14/15      atorvastatin (LIPITOR) 40 MG tablet Take 1 tablet (40 mg total) by mouth once daily. 30 tablet 3    carvedilol (COREG) 3.125 MG tablet Take 2 tablets (6.25 mg total) by mouth 2 (two) times daily. 60 tablet 3    diphenhydrAMINE (BENADRYL  ALLERGY) 25 mg tablet Take 25 mg by mouth nightly as needed for Insomnia.      donepezil (ARICEPT) 5 MG tablet Take 5 mg by mouth every evening.      ergocalciferol (ERGOCALCIFEROL) 50,000 unit Cap Take 50,000 Units by mouth every 7 days.      furosemide (LASIX) 20 MG tablet Take 1 tablet (20 mg total) by mouth once daily. 30 tablet 11    insulin aspart (NOVOLOG) 100 unit/mL injection Inject 100 Units into the skin 3 (three) times daily before meals.      isosorbide-hydrALAZINE 20-37.5 mg (BIDIL) 20-37.5 mg Tab Take 1 tablet by mouth 3 (three) times daily. 90 tablet 11    nitroGLYCERIN (NITROSTAT) 0.4 MG SL tablet Place 1 tablet (0.4 mg total) under the tongue every 5 (five) minutes as needed for Chest pain. 25 tablet 3    predniSONE (DELTASONE) 20 MG tablet Take 2 tablets (40 mg total) by mouth every morning. 60 tablet 0    senna-docusate 8.6-50 mg (PERICOLACE) 8.6-50 mg per tablet Take 1 tablet by mouth 2 (two) times daily.      sodium bicarbonate 650 MG tablet Take 1 tablet (650 mg total) by mouth 3 (three) times daily. 270 tablet 3    tramadol (ULTRAM) 50 mg tablet Take 50 mg by mouth every 6 (six) hours as needed for Pain.       No current facility-administered medications for this visit.        Review of Systems   Constitutional: Negative for appetite change, chills, fever and unexpected weight change.   HENT: Negative for congestion, hearing loss and nosebleeds.    Eyes: Negative for visual disturbance.   Respiratory: Negative for cough and shortness of breath.    Cardiovascular: Negative for chest pain and palpitations.   Gastrointestinal: Negative for abdominal pain, blood in stool, constipation, diarrhea, nausea and vomiting.   Genitourinary: Negative for dysuria and frequency.   Musculoskeletal: Negative for back pain and gait problem.   Skin: Negative for color change and rash.   Neurological: Negative for dizziness, weakness and headaches.   Hematological: Negative for adenopathy. Does not  "bruise/bleed easily.   Psychiatric/Behavioral: Negative for confusion. The patient is not nervous/anxious.        ECOG Performance Status:   ECOG SCORE           Objective:      Vitals:   Vitals:    06/04/18 1311   BP: (!) 181/80   BP Location: Right arm   Patient Position: Sitting   BP Method: Large (Automatic)   Pulse: 64   Resp: 18   Temp: 97.5 °F (36.4 °C)   TempSrc: Oral   SpO2: 98%   Weight: 64 kg (141 lb 1.5 oz)   Height: 5' 3" (1.6 m)     BMI: Body mass index is 24.99 kg/m².    Physical Exam   Constitutional: She appears well-developed and well-nourished. No distress.   Sitting in a wheelchair   HENT:   Head: Normocephalic.   Mouth/Throat: No oropharyngeal exudate.   Eyes: EOM are normal. No scleral icterus.   Neck: Neck supple. No tracheal deviation present. No thyromegaly present.   Cardiovascular: Normal rate and regular rhythm.    Pulmonary/Chest: Effort normal and breath sounds normal. No respiratory distress. She has no wheezes. She has no rales.   Abdominal: Soft. She exhibits no distension and no mass. There is no tenderness. There is no rebound and no guarding.   Musculoskeletal: Normal range of motion. She exhibits no edema.   Lymphadenopathy:     She has no cervical adenopathy.   Neurological: She is alert. No cranial nerve deficit.   Skin: Skin is warm and dry.   Psychiatric: She has a normal mood and affect.       Laboratory Data:  Lab Results   Component Value Date    WBC 7.93 06/04/2018    HGB 8.6 (L) 06/04/2018    HCT 26.8 (L) 06/04/2018     06/04/2018    CHOL 142 11/01/2016    TRIG 76 11/01/2016    HDL 42 11/01/2016    ALT 6 (L) 06/04/2018    AST 11 06/04/2018     06/04/2018    K 3.4 (L) 06/04/2018     06/04/2018    CREATININE 3.4 (H) 06/04/2018    BUN 45 (H) 06/04/2018    CO2 24 06/04/2018    TSH 1.920 05/14/2018    INR 1.5 (H) 09/21/2017    HGBA1C 5.8 10/31/2016       Pathology:  1) Right upper lobe. (Rad)  Supplemental Diagnosis  Immunohistochemical stains are completed " on the lung biopsy and revealed the tumor cells to stain positively for  cytokeratin 5/6 and p63. The tumor cells are negative for TTF-1, cytokeratin 7 and estrogen receptor. All stains  have satisfactory positive and negative controls. This staining profile together with the morphology of the tumor  support a diagnosis of non-small cell carcinoma consistent with squamous cell carcinoma.  ER  (Electronically Signed: 2017-09-25 09:32:02 )  Diagnosed by: Bebe Child  FINAL PATHOLOGIC DIAGNOSIS  Right lung mass, core biopsy:  Invasive carcinoma.       Imaging:   PET/CT 4/2/18  EXAMINATION:  NM PET CT ROUTINE    CLINICAL HISTORY:  lung cancer;  Malignant neoplasm of unspecified part of right bronchus or lung    FINDINGS:  Comparison is 01/04/2018.  The patient was administered 10.36 millicuries of FDG intravenously.    Right upper lobe mass SUV max 11.73, previously 12.21.  Precarinal lymph node SUV max 2.99, previously 4.14.    There is involutional change and small vessel ischemic change.  There are hard palate dianna.  There is physiologic head and neck activity.  There are coronary calcifications.  There is physiologic liver, spleen, GI and  activity.  There is aortic plaque and DJD.  Pelvic organs show nothing unusual.  No bone or lung metastases are seen.  There are some inflammatory areas of lung activity.  Hilar lymph nodes have improved.   Impression       See above    Slight improvement in the primary neoplasm but noticeable improvement of mediastinal and hilar lymph nodes.          Assessment and Plan:      Mrs. Yun is a pleasant 84 year old female with squamous cell lung cancer.    Metastatic Squamous Cell Lung Cancer  --PET/CT done in April shows ongoing response to therapy.    --We'll plan to hold pembrolizumab at this point given ongoing renal failure       Renal Failure  --Creatine remains elevated  --Under the care of nephrology  --prednisone 40mg daily as recommended by The management of renal  toxicities as  summarized in the American Society of Clinical Oncology (ASCO) guidelines  --Will follow up with labs in 1 week at the nursing home    30 minutes were spent face to face with the patient and her  family to discuss the disease, natural history, treatment options and survival statistics. I have provided the patient with an opportunity to ask questions and have all questions answered to her satisfaction.       she will return to clinic in 2 weeks with Scarlet Wyman or Dr. Barnhart, but knows to call in the interim if symptoms change or should a problem arise.      Ashley Patel MD  Hematology and Medical Oncology  Bone Marrow Transplant  [facility]

## 2018-06-04 NOTE — TELEPHONE ENCOUNTER
----- Message from Kiana Rice RN sent at 6/4/2018  3:35 PM CDT -----  Contact:  with nursing facility      ----- Message -----  From: Nikia Hernandez  Sent: 6/4/2018   2:50 PM  To: Jorge Ramirez Staff     calling regarding a new rx pt received       call back number [phone]

## 2018-06-05 ENCOUNTER — PATIENT MESSAGE (OUTPATIENT)
Dept: HEMATOLOGY/ONCOLOGY | Facility: CLINIC | Age: 83
End: 2018-06-05

## 2018-06-06 ENCOUNTER — PATIENT MESSAGE (OUTPATIENT)
Dept: NEPHROLOGY | Facility: CLINIC | Age: 83
End: 2018-06-06

## 2018-06-18 ENCOUNTER — LAB VISIT (OUTPATIENT)
Dept: LAB | Facility: HOSPITAL | Age: 83
End: 2018-06-18
Payer: MEDICARE

## 2018-06-18 ENCOUNTER — OFFICE VISIT (OUTPATIENT)
Dept: HEMATOLOGY/ONCOLOGY | Facility: CLINIC | Age: 83
End: 2018-06-18
Payer: MEDICARE

## 2018-06-18 ENCOUNTER — PATIENT MESSAGE (OUTPATIENT)
Dept: NEPHROLOGY | Facility: CLINIC | Age: 83
End: 2018-06-18

## 2018-06-18 ENCOUNTER — TELEPHONE (OUTPATIENT)
Dept: HEMATOLOGY/ONCOLOGY | Facility: CLINIC | Age: 83
End: 2018-06-18

## 2018-06-18 VITALS
BODY MASS INDEX: 26.8 KG/M2 | HEART RATE: 61 BPM | TEMPERATURE: 98 F | WEIGHT: 151.25 LBS | DIASTOLIC BLOOD PRESSURE: 71 MMHG | OXYGEN SATURATION: 95 % | SYSTOLIC BLOOD PRESSURE: 161 MMHG | HEIGHT: 63 IN | RESPIRATION RATE: 20 BRPM

## 2018-06-18 DIAGNOSIS — N18.5 CKD (CHRONIC KIDNEY DISEASE) STAGE 5, GFR LESS THAN 15 ML/MIN: ICD-10-CM

## 2018-06-18 DIAGNOSIS — C34.91 PRIMARY LUNG CANCER, RIGHT: ICD-10-CM

## 2018-06-18 DIAGNOSIS — C34.91 PRIMARY LUNG CANCER, RIGHT: Primary | ICD-10-CM

## 2018-06-18 DIAGNOSIS — N05.9 NEPHRITIS: ICD-10-CM

## 2018-06-18 DIAGNOSIS — C34.90 SQUAMOUS CELL CARCINOMA OF LUNG, UNSPECIFIED LATERALITY: Primary | ICD-10-CM

## 2018-06-18 DIAGNOSIS — C34.91 MALIGNANT NEOPLASM OF UNSPECIFIED PART OF RIGHT BRONCHUS OR LUNG: ICD-10-CM

## 2018-06-18 DIAGNOSIS — N18.4 ANEMIA OF CHRONIC RENAL FAILURE, STAGE 4 (SEVERE): ICD-10-CM

## 2018-06-18 DIAGNOSIS — D63.1 ANEMIA OF CHRONIC RENAL FAILURE, STAGE 4 (SEVERE): ICD-10-CM

## 2018-06-18 LAB
ERYTHROCYTE [DISTWIDTH] IN BLOOD BY AUTOMATED COUNT: 15.9 %
HCT VFR BLD AUTO: 27.7 %
HGB BLD-MCNC: 9.1 G/DL
IMM GRANULOCYTES # BLD AUTO: 0.14 K/UL
MCH RBC QN AUTO: 29 PG
MCHC RBC AUTO-ENTMCNC: 32.9 G/DL
MCV RBC AUTO: 88 FL
NEUTROPHILS # BLD AUTO: 10.7 K/UL
PLATELET # BLD AUTO: 192 K/UL
PMV BLD AUTO: 10.3 FL
RBC # BLD AUTO: 3.14 M/UL
T4 FREE SERPL-MCNC: 0.81 NG/DL
TSH SERPL DL<=0.005 MIU/L-ACNC: 1.63 UIU/ML
WBC # BLD AUTO: 13.89 K/UL

## 2018-06-18 PROCEDURE — 3078F DIAST BP <80 MM HG: CPT | Mod: CPTII,S$GLB,, | Performed by: INTERNAL MEDICINE

## 2018-06-18 PROCEDURE — 80053 COMPREHEN METABOLIC PANEL: CPT

## 2018-06-18 PROCEDURE — 99214 OFFICE O/P EST MOD 30 MIN: CPT | Mod: S$GLB,,, | Performed by: INTERNAL MEDICINE

## 2018-06-18 PROCEDURE — 85027 COMPLETE CBC AUTOMATED: CPT

## 2018-06-18 PROCEDURE — 84439 ASSAY OF FREE THYROXINE: CPT

## 2018-06-18 PROCEDURE — 36415 COLL VENOUS BLD VENIPUNCTURE: CPT

## 2018-06-18 PROCEDURE — 84443 ASSAY THYROID STIM HORMONE: CPT

## 2018-06-18 PROCEDURE — 80069 RENAL FUNCTION PANEL: CPT

## 2018-06-18 PROCEDURE — 99999 PR PBB SHADOW E&M-EST. PATIENT-LVL III: CPT | Mod: PBBFAC,,, | Performed by: INTERNAL MEDICINE

## 2018-06-18 PROCEDURE — 3077F SYST BP >= 140 MM HG: CPT | Mod: CPTII,S$GLB,, | Performed by: INTERNAL MEDICINE

## 2018-06-18 RX ORDER — PREDNISONE 5 MG/1
5 TABLET ORAL DAILY
Qty: 60 TABLET | Refills: 0 | Status: SHIPPED | OUTPATIENT
Start: 2018-06-18 | End: 2018-06-28

## 2018-06-18 NOTE — LETTER
June 18, 2018        [facility]  [address]  [facility] [address]             [place] [facility]  [address]  [facility] [address]  Phone: [phone]   Patient: Misa Yun   MR Number: [id number]   YOB: 1934   Date of Visit: 6/18/2018       Dear  Family:    Thank you for referring Misa Yun to me for evaluation. Attached you will find relevant portions of my assessment and plan of care.    If you have questions, please do not hesitate to call me. I look forward to following Misa Yun along with you.    Sincerely,      Arnav Barnhart, DO, FACP            CC  No Recipients    Enclosure

## 2018-06-18 NOTE — PROGRESS NOTES
PATIENT: Misa Yun  MRN: [id number]  DATE: 6/18/2018      Diagnosis:   1. Primary lung cancer, right    2. Nephritis        Chief Complaint: Lung Cancer      Oncologic History:      Oncologic History Non-small cell lung cancer, right, diagnosed 9/2017   Metastatic to contralateral lung at presentation     Oncologic Treatment Pembrolizumab 11/6/17 - 4/23/18 (9 cycles, discontinued due to renal function)    Pathology Squamous cell carcinoma, cT3, N2, M1a, PD-L1 TPS 95%          Subjective:    Interval History: Ms. Yun is a 84 y.o. female who is seen in follow-up for lung cancer.  Since I had seen her last she was started on prednisone.  She and her caregiver are unsure of the dose that she is taking right now.  She states that she feels well. She has no new complaints.  She never needed dialysis.    Her history dates to 8/2017 when she underwent an x-ray for a cough which had been present for only a few weeks which had shown abnormalities.  Subsequently a CT was performed revealing 2 small bilateral pleural effusions along with large pulmonary lesions in the right upper lobe measuring 2.2 cm and also another lesion measuring 4.7 cm.  There were also multiple enlarged mediastinal lymph nodes.  In addition there were bilateral multiple pulmonary micronodules.  She underwent a CT-guided biopsy in 9/2017 with pathology revealing squamous cell carcinoma.  PD-L1 tumor proportion score was 95%.  She was started on pembrolizumab on 11/6/17.  PET/CT in 1/18 had shown a partial response.  PET-CT in 04/2018 had shown ongoing responsive disease.  She was discontinued from pembrolizumab in 04/2018 due to a rising creatinine felt to represent nephritis.  She was placed on treatment with corticosteroids with improvement of her creatinine.          Past Medical History:   Past Medical History:   Diagnosis Date    Anemia     Anemia     CAD (coronary artery disease)     Cataract     Chemotherapy follow-up  examination 11/27/2017    CHF (congestive heart failure)     Chronic kidney disease (CKD), stage IV (severe) 03/27/2017    [facility], no dialysis    Dementia     Encounter for blood transfusion 2006    at time of CABG    Fracture of humerus, proximal, left, closed 1/28/2015    Hypercalcemia 04/10/2017    Hyperlipidemia     Hyperparathyroidism     Hypertension     Mediastinal lymphadenopathy 10/9/2017    MI (myocardial infarction) 10/2016    Nephritis 6/18/2018    Osteoporosis     Ovarian cancer     in the past    Primary lung cancer, right 10/9/2017    Type 2 diabetes mellitus, controlled     UTI (urinary tract infection) 04/11/2017       Past Surgical HIstory:   Past Surgical History:   Procedure Laterality Date    CATARACT EXTRACTION W/  INTRAOCULAR LENS IMPLANT Left 04/17/2017    Dr. Motta    CATARACT EXTRACTION W/  INTRAOCULAR LENS IMPLANT Right 05/06/2017    Dr Motta     CORONARY ARTERY BYPASS GRAFT  2006    EYE SURGERY      HYSTERECTOMY  2002    ORIF HUMERUS FRACTURE Left 1/28/2015       Family History:   Family History   Problem Relation Age of Onset    Adopted: Yes    Blindness Maternal Grandmother     Cancer Neg Hx        Social History:  reports that she quit smoking about 11 years ago. She has a 60.00 pack-year smoking history. She has never used smokeless tobacco. She reports that she does not drink alcohol or use drugs.    Allergies:  Review of patient's allergies indicates:  No Known Allergies    Medications:  Current Outpatient Prescriptions   Medication Sig Dispense Refill    amlodipine (NORVASC) 10 MG tablet Take 10 mg by mouth once daily.      aspirin 81 MG Chew Take 1 tablet (81 mg total) by mouth once daily. RESUME ON 2/14/15      atorvastatin (LIPITOR) 40 MG tablet Take 1 tablet (40 mg total) by mouth once daily. 30 tablet 3    carvedilol (COREG) 3.125 MG tablet Take 2 tablets (6.25 mg total) by mouth 2 (two) times daily. 60 tablet 3     diphenhydrAMINE (BENADRYL ALLERGY) 25 mg tablet Take 25 mg by mouth nightly as needed for Insomnia.      donepezil (ARICEPT) 5 MG tablet Take 5 mg by mouth every evening.      ergocalciferol (ERGOCALCIFEROL) 50,000 unit Cap Take 50,000 Units by mouth every 7 days.      furosemide (LASIX) 20 MG tablet Take 1 tablet (20 mg total) by mouth once daily. 30 tablet 11    insulin aspart (NOVOLOG) 100 unit/mL injection Inject 100 Units into the skin 3 (three) times daily before meals.      isosorbide-hydrALAZINE 20-37.5 mg (BIDIL) 20-37.5 mg Tab Take 1 tablet by mouth 3 (three) times daily. 90 tablet 11    nitroGLYCERIN (NITROSTAT) 0.4 MG SL tablet Place 1 tablet (0.4 mg total) under the tongue every 5 (five) minutes as needed for Chest pain. 25 tablet 3    predniSONE (DELTASONE) 20 MG tablet Take 2 tablets (40 mg total) by mouth every morning. 60 tablet 0    senna-docusate 8.6-50 mg (PERICOLACE) 8.6-50 mg per tablet Take 1 tablet by mouth 2 (two) times daily.      sodium bicarbonate 650 MG tablet Take 1 tablet (650 mg total) by mouth 3 (three) times daily. 270 tablet 3    tramadol (ULTRAM) 50 mg tablet Take 50 mg by mouth every 6 (six) hours as needed for Pain.       No current facility-administered medications for this visit.        Review of Systems   Constitutional: Negative for appetite change, chills, fever and unexpected weight change.   HENT: Negative for congestion, hearing loss and nosebleeds.    Eyes: Negative for visual disturbance.   Respiratory: Negative for cough and shortness of breath.    Cardiovascular: Negative for chest pain and palpitations.   Gastrointestinal: Negative for abdominal pain, blood in stool, constipation, diarrhea, nausea and vomiting.   Genitourinary: Negative for dysuria and frequency.   Musculoskeletal: Negative for back pain and gait problem.   Skin: Negative for color change and rash.   Neurological: Negative for dizziness, weakness and headaches.   Hematological: Negative  "for adenopathy. Does not bruise/bleed easily.   Psychiatric/Behavioral: Negative for confusion. The patient is not nervous/anxious.        ECOG Performance Status:   ECOG SCORE    0 - Fully active-able to carry on all pre-disease performance without restriction         Objective:      Vitals:   Vitals:    06/18/18 1338   BP: (!) 161/71   Pulse: 61   Resp: 20   Temp: 97.8 °F (36.6 °C)   TempSrc: Oral   SpO2: 95%   Weight: 68.6 kg (151 lb 3.8 oz)   Height: 5' 3" (1.6 m)     BMI: Body mass index is 26.79 kg/m².    Physical Exam   Constitutional: She appears well-developed and well-nourished. No distress.   Sitting in a wheelchair   HENT:   Head: Normocephalic.   Mouth/Throat: No oropharyngeal exudate.   Eyes: EOM are normal. No scleral icterus.   Neck: Neck supple. No tracheal deviation present. No thyromegaly present.   Cardiovascular: Normal rate and regular rhythm.    Pulmonary/Chest: Effort normal and breath sounds normal. No respiratory distress. She has no wheezes. She has no rales.   Abdominal: Soft. She exhibits no distension and no mass. There is no tenderness. There is no rebound and no guarding.   Musculoskeletal: Normal range of motion. She exhibits no edema.   Lymphadenopathy:     She has no cervical adenopathy.   Neurological: She is alert. No cranial nerve deficit.   Skin: Skin is warm and dry.   Psychiatric: She has a normal mood and affect.       Laboratory Data:  Lab Visit on 06/18/2018   Component Date Value Ref Range Status    WBC 06/18/2018 13.89* 3.90 - 12.70 K/uL Final    RBC 06/18/2018 3.14* 4.00 - 5.40 M/uL Final    Hemoglobin 06/18/2018 9.1* 12.0 - 16.0 g/dL Final    Hematocrit 06/18/2018 27.7* 37.0 - 48.5 % Final    MCV 06/18/2018 88  82 - 98 fL Final    MCH 06/18/2018 29.0  27.0 - 31.0 pg Final    MCHC 06/18/2018 32.9  32.0 - 36.0 g/dL Final    RDW 06/18/2018 15.9* 11.5 - 14.5 % Final    Platelets 06/18/2018 192  150 - 350 K/uL Final    MPV 06/18/2018 10.3  9.2 - 12.9 fL Final    " Gran # (ANC) 06/18/2018 10.7* 1.8 - 7.7 K/uL Final    Comment: The ANC is based on a white cell differential from an   automated cell counter. It has not been microscopically   reviewed for the presence of abnormal cells. Clinical   correlation is required.      Immature Grans (Abs) 06/18/2018 0.14* 0.00 - 0.04 K/uL Final    Comment: Mild elevation in immature granulocytes is non specific and   can be seen in a variety of conditions including stress response,   acute inflammation, trauma and pregnancy. Correlation with other   laboratory and clinical findings is essential.      Sodium 06/18/2018 140  136 - 145 mmol/L Final    Potassium 06/18/2018 3.9  3.5 - 5.1 mmol/L Final    Chloride 06/18/2018 107  95 - 110 mmol/L Final    CO2 06/18/2018 25  23 - 29 mmol/L Final    Glucose 06/18/2018 142* 70 - 110 mg/dL Final    BUN, Bld 06/18/2018 57* 8 - 23 mg/dL Final    Creatinine 06/18/2018 2.2* 0.5 - 1.4 mg/dL Final    Calcium 06/18/2018 8.1* 8.7 - 10.5 mg/dL Final    Total Protein 06/18/2018 6.1  6.0 - 8.4 g/dL Final    Albumin 06/18/2018 2.3* 3.5 - 5.2 g/dL Final    Total Bilirubin 06/18/2018 0.3  0.1 - 1.0 mg/dL Final    Comment: For infants and newborns, interpretation of results should be based  on gestational age, weight and in agreement with clinical  observations.  Premature Infant recommended reference ranges:  Up to 24 hours.............<8.0 mg/dL  Up to 48 hours............<12.0 mg/dL  3-5 days..................<15.0 mg/dL  6-29 days.................<15.0 mg/dL      Alkaline Phosphatase 06/18/2018 78  55 - 135 U/L Final    AST 06/18/2018 14  10 - 40 U/L Final    ALT 06/18/2018 19  10 - 44 U/L Final    Anion Gap 06/18/2018 8  8 - 16 mmol/L Final    eGFR if  06/18/2018 23.0* >60 mL/min/1.73 m^2 Final    eGFR if non African American 06/18/2018 20.0* >60 mL/min/1.73 m^2 Final    Comment: Calculation used to obtain the estimated glomerular filtration  rate (eGFR) is the CKD-EPI  equation.       TSH 06/18/2018 1.628  0.400 - 4.000 uIU/mL Final    Free T4 06/18/2018 0.81  0.71 - 1.51 ng/dL Final    Glucose 06/18/2018 142* 70 - 110 mg/dL Final    Sodium 06/18/2018 140  136 - 145 mmol/L Final    Potassium 06/18/2018 3.9  3.5 - 5.1 mmol/L Final    Chloride 06/18/2018 107  95 - 110 mmol/L Final    CO2 06/18/2018 25  23 - 29 mmol/L Final    BUN, Bld 06/18/2018 57* 8 - 23 mg/dL Final    Calcium 06/18/2018 8.1* 8.7 - 10.5 mg/dL Final    Creatinine 06/18/2018 2.2* 0.5 - 1.4 mg/dL Final    Albumin 06/18/2018 2.3* 3.5 - 5.2 g/dL Final    Phosphorus 06/18/2018 3.0  2.7 - 4.5 mg/dL Final    eGFR if  06/18/2018 23.0* >60 mL/min/1.73 m^2 Final    eGFR if non African American 06/18/2018 20.0* >60 mL/min/1.73 m^2 Final    Comment: Calculation used to obtain the estimated glomerular filtration  rate (eGFR) is the CKD-EPI equation.       Anion Gap 06/18/2018 8  8 - 16 mmol/L Final     1) Right upper lobe. (Rad)  Supplemental Diagnosis  Immunohistochemical stains are completed on the lung biopsy and revealed the tumor cells to stain positively for  cytokeratin 5/6 and p63. The tumor cells are negative for TTF-1, cytokeratin 7 and estrogen receptor. All stains  have satisfactory positive and negative controls. This staining profile together with the morphology of the tumor  support a diagnosis of non-small cell carcinoma consistent with squamous cell carcinoma.  ER  (Electronically Signed: 2017-09-25 09:32:02 )  Diagnosed by: Bebe Child  FINAL PATHOLOGIC DIAGNOSIS  Right lung mass, core biopsy:  Invasive carcinoma.       Imaging:   PET/CT 4/2/18  EXAMINATION:  NM PET CT ROUTINE    CLINICAL HISTORY:  lung cancer;  Malignant neoplasm of unspecified part of right bronchus or lung    FINDINGS:  Comparison is 01/04/2018.  The patient was administered 10.36 millicuries of FDG intravenously.    Right upper lobe mass SUV max 11.73, previously 12.21.  Precarinal lymph node SUV max 2.99,  previously 4.14.    There is involutional change and small vessel ischemic change.  There are hard palate dianna.  There is physiologic head and neck activity.  There are coronary calcifications.  There is physiologic liver, spleen, GI and  activity.  There is aortic plaque and DJD.  Pelvic organs show nothing unusual.  No bone or lung metastases are seen.  There are some inflammatory areas of lung activity.  Hilar lymph nodes have improved.   Impression       See above    Slight improvement in the primary neoplasm but noticeable improvement of mediastinal and hilar lymph nodes.                  Assessment:       1. Primary lung cancer, right    2. Nephritis           Plan:     Mrs. Yun is doing well from an oncologic standpoint and has been off treatment since 04/2018 due to potential nephritis secondary to immunotherapy.  She was started on treatment with corticosteroids and her creatinine is now back to baseline at 2.2.  I am also unsure of the dose as she was written for 40 mg of prednisone but her caregiver states that she may only be taking 20 mg.  Either way, we are going to taper the dose to off.  If she is on 40 mg I would immediately go to 30 mg for the next 5 days followed by 20 mg for 5 days followed by 10 mg for 5 days followed by 5 mg for 5 days then off.  If she is on 20 mg, then I would immediately proceed to 15 mg for 5 days then 10 mg for 5 days then 5 mg for 5 days then off.  I will plan to see her back in 4 weeks and will plan to repeat a PET-CT at that time.  I will send prednisone 5 mg to her pharmacy.  I told her that I would be available to answer questions concerning this treatment regimen at any time.  All questions were answered and she is agreeable with this plan.    Arnav Barnhart DO, FACP  Hematology & Oncology  [facility] [place] [address]  ph. [phone]  Fax. [phone]    25 minutes were spent in coordination of patient's care, record review and  counseling.  More than 50% of the time was face-to-face.

## 2018-06-18 NOTE — TELEPHONE ENCOUNTER
----- Message from Stephen Mckeon sent at 6/18/2018  4:20 PM CDT -----  Contact: [facility]  Will like to know tapering dosage for refill on predniSONE (DELTASONE) 5 MG tablet since lives in a living assistance     Contact::[phone]

## 2018-06-18 NOTE — TELEPHONE ENCOUNTER
Clarified prednisone tapering schedule per Dr. Barnhart.  [facility] states she is on 40mg daily at this time: 40 mg I would immediately go to 30 mg for the next 5 days followed by 20 mg for 5 days followed by 10 mg for 5 days followed by 5 mg for 5 days then off.

## 2018-06-18 NOTE — TELEPHONE ENCOUNTER
----- Message from Arnav Barnhart DO, FACP sent at 6/18/2018  3:17 PM CDT -----  Follow-up in 1 month with a PET-CT  I tried ordering the PET-CT but got the Red box

## 2018-06-19 ENCOUNTER — TELEPHONE (OUTPATIENT)
Dept: HEMATOLOGY/ONCOLOGY | Facility: CLINIC | Age: 83
End: 2018-06-19

## 2018-06-19 LAB
ALBUMIN SERPL BCP-MCNC: 2.3 G/DL
ALBUMIN SERPL BCP-MCNC: 2.3 G/DL
ALP SERPL-CCNC: 78 U/L
ALT SERPL W/O P-5'-P-CCNC: 19 U/L
ANION GAP SERPL CALC-SCNC: 8 MMOL/L
ANION GAP SERPL CALC-SCNC: 8 MMOL/L
AST SERPL-CCNC: 14 U/L
BILIRUB SERPL-MCNC: 0.3 MG/DL
BUN SERPL-MCNC: 57 MG/DL
BUN SERPL-MCNC: 57 MG/DL
CALCIUM SERPL-MCNC: 8.1 MG/DL
CALCIUM SERPL-MCNC: 8.1 MG/DL
CHLORIDE SERPL-SCNC: 107 MMOL/L
CHLORIDE SERPL-SCNC: 107 MMOL/L
CO2 SERPL-SCNC: 25 MMOL/L
CO2 SERPL-SCNC: 25 MMOL/L
CREAT SERPL-MCNC: 3.2 MG/DL
CREAT SERPL-MCNC: 3.2 MG/DL
EST. GFR  (AFRICAN AMERICAN): 14.7 ML/MIN/1.73 M^2
EST. GFR  (AFRICAN AMERICAN): 14.7 ML/MIN/1.73 M^2
EST. GFR  (NON AFRICAN AMERICAN): 12.7 ML/MIN/1.73 M^2
EST. GFR  (NON AFRICAN AMERICAN): 12.7 ML/MIN/1.73 M^2
GLUCOSE SERPL-MCNC: 142 MG/DL
GLUCOSE SERPL-MCNC: 142 MG/DL
PHOSPHATE SERPL-MCNC: 3 MG/DL
POTASSIUM SERPL-SCNC: 3.9 MMOL/L
POTASSIUM SERPL-SCNC: 3.9 MMOL/L
PROT SERPL-MCNC: 6.1 G/DL
SODIUM SERPL-SCNC: 140 MMOL/L
SODIUM SERPL-SCNC: 140 MMOL/L

## 2018-06-19 NOTE — Clinical Note
Can you please notify her daughter this change I have sent copy of my note to the nursing.  Please let me know if they need additional prednisone sent.

## 2018-06-19 NOTE — TELEPHONE ENCOUNTER
----- Message from Arnav Barnhart DO, FACP sent at 6/19/2018  3:49 PM CDT -----  Can you please notify her daughter this change I have sent copy of my note to the nursing.  Please let me know if they need additional prednisone sent.

## 2018-06-19 NOTE — PROGRESS NOTES
Notified today laboratory error indicating that her creatinine is now 3.2 (not 2.2).  This is mildly improved but we should not taper steroids at this time.  I would recommend we continue with 40 mg daily and repeat renal function in 1 week.

## 2018-06-19 NOTE — TELEPHONE ENCOUNTER
Informed by labs patient's creatinine was reported wrong  Yesterday, it is 3.2 no 2.2.  Dr. Barnhart notified.

## 2018-06-19 NOTE — TELEPHONE ENCOUNTER
Notified patient's sister of change. She stated she noted the change via MyChart.  I explained that she shouldn't taper her prednisone at this point to stay on 40mg daily and repeat levels next week. I let her know he has sent information to the nursing home.

## 2018-06-19 NOTE — LETTER
June 19, 2018        [facility]  [address]  [facility] [address]             [place] [facility]  [address]  [facility] [address]  Phone: [phone]   Patient: Misa Yun   MR Number: [id number]   YOB: 1934   Date of Visit: 6/19/2018       Dear  Family:    Thank you for referring Misa Yun to me for evaluation. Below are the relevant portions of my assessment and plan of care.            If you have questions, please do not hesitate to call me. I look forward to following Misa along with you.    Sincerely,      Arnav Barnhart, DO, FACP           CC  No Recipients

## 2018-06-26 ENCOUNTER — LAB VISIT (OUTPATIENT)
Dept: LAB | Facility: HOSPITAL | Age: 83
End: 2018-06-26
Attending: INTERNAL MEDICINE
Payer: MEDICARE

## 2018-06-26 DIAGNOSIS — R59.0 MEDIASTINAL LYMPHADENOPATHY: ICD-10-CM

## 2018-06-26 DIAGNOSIS — C34.91 PRIMARY LUNG CANCER, RIGHT: ICD-10-CM

## 2018-06-26 DIAGNOSIS — N18.4 CHRONIC KIDNEY DISEASE, STAGE IV (SEVERE): Chronic | ICD-10-CM

## 2018-06-26 DIAGNOSIS — R91.8 PULMONARY NODULES/LESIONS, MULTIPLE: ICD-10-CM

## 2018-06-26 LAB
ALBUMIN SERPL BCP-MCNC: 2.3 G/DL
ALP SERPL-CCNC: 69 U/L
ALT SERPL W/O P-5'-P-CCNC: 16 U/L
ANION GAP SERPL CALC-SCNC: 9 MMOL/L
AST SERPL-CCNC: 13 U/L
BILIRUB SERPL-MCNC: 0.4 MG/DL
BUN SERPL-MCNC: 63 MG/DL
CALCIUM SERPL-MCNC: 9.9 MG/DL
CHLORIDE SERPL-SCNC: 106 MMOL/L
CO2 SERPL-SCNC: 26 MMOL/L
CREAT SERPL-MCNC: 3.7 MG/DL
EST. GFR  (AFRICAN AMERICAN): 12.3 ML/MIN/1.73 M^2
EST. GFR  (NON AFRICAN AMERICAN): 10.7 ML/MIN/1.73 M^2
GLUCOSE SERPL-MCNC: 346 MG/DL
POTASSIUM SERPL-SCNC: 4.1 MMOL/L
PROT SERPL-MCNC: 6.2 G/DL
SODIUM SERPL-SCNC: 141 MMOL/L

## 2018-06-26 PROCEDURE — 36415 COLL VENOUS BLD VENIPUNCTURE: CPT

## 2018-06-26 PROCEDURE — 80053 COMPREHEN METABOLIC PANEL: CPT

## 2018-06-28 ENCOUNTER — TELEPHONE (OUTPATIENT)
Dept: HEMATOLOGY/ONCOLOGY | Facility: CLINIC | Age: 83
End: 2018-06-28

## 2018-06-28 NOTE — TELEPHONE ENCOUNTER
Double checked with sister, she said as far as she knows the nursing home is giving her the correct prednisone, she will double check however and leave me a message tomorrow.

## 2018-06-29 ENCOUNTER — PATIENT MESSAGE (OUTPATIENT)
Dept: HEMATOLOGY/ONCOLOGY | Facility: CLINIC | Age: 83
End: 2018-06-29

## 2018-06-29 NOTE — TELEPHONE ENCOUNTER
----- Message from Azra Cabral sent at 6/29/2018  9:33 AM CDT -----  Contact: Carlota Sister [phone]  She is calling to let you know the pt has been taking her predniSONE (DELTASONE) 20 MG tablet every day in the morning. Just an FYI.

## 2018-07-02 PROBLEM — Z09 CHEMOTHERAPY FOLLOW-UP EXAMINATION: Status: RESOLVED | Noted: 2018-04-02 | Resolved: 2018-07-02

## 2018-07-16 ENCOUNTER — PATIENT MESSAGE (OUTPATIENT)
Dept: NEPHROLOGY | Facility: CLINIC | Age: 83
End: 2018-07-16

## 2018-07-16 ENCOUNTER — HOSPITAL ENCOUNTER (INPATIENT)
Facility: HOSPITAL | Age: 83
LOS: 9 days | Discharge: HOME | DRG: 291 | End: 2018-07-25
Attending: EMERGENCY MEDICINE | Admitting: HOSPITALIST
Payer: MEDICARE

## 2018-07-16 ENCOUNTER — PATIENT MESSAGE (OUTPATIENT)
Dept: HEMATOLOGY/ONCOLOGY | Facility: CLINIC | Age: 83
End: 2018-07-16

## 2018-07-16 DIAGNOSIS — I50.43 ACUTE ON CHRONIC COMBINED SYSTOLIC AND DIASTOLIC HEART FAILURE: Chronic | ICD-10-CM

## 2018-07-16 DIAGNOSIS — R06.02 SHORTNESS OF BREATH: ICD-10-CM

## 2018-07-16 DIAGNOSIS — N17.0 ACUTE KIDNEY FAILURE WITH LESION OF TUBULAR NECROSIS: ICD-10-CM

## 2018-07-16 DIAGNOSIS — N39.0 URINARY TRACT INFECTION WITHOUT HEMATURIA, SITE UNSPECIFIED: Primary | ICD-10-CM

## 2018-07-16 PROBLEM — R79.89 ELEVATED TROPONIN: Status: ACTIVE | Noted: 2018-07-16

## 2018-07-16 PROBLEM — J96.01 ACUTE RESPIRATORY FAILURE WITH HYPOXIA: Status: ACTIVE | Noted: 2018-07-16

## 2018-07-16 LAB
ALBUMIN SERPL BCP-MCNC: 2.1 G/DL
ALLENS TEST: ABNORMAL
ALP SERPL-CCNC: 78 U/L
ALT SERPL W/O P-5'-P-CCNC: 11 U/L
ANION GAP SERPL CALC-SCNC: 8 MMOL/L
AST SERPL-CCNC: 13 U/L
BACTERIA #/AREA URNS AUTO: ABNORMAL /HPF
BASOPHILS # BLD AUTO: 0.01 K/UL
BASOPHILS NFR BLD: 0.1 %
BILIRUB SERPL-MCNC: 0.5 MG/DL
BILIRUB UR QL STRIP: NEGATIVE
BNP SERPL-MCNC: 578 PG/ML
BUN SERPL-MCNC: 51 MG/DL
CALCIUM SERPL-MCNC: 8.3 MG/DL
CHLORIDE SERPL-SCNC: 112 MMOL/L
CLARITY UR REFRACT.AUTO: ABNORMAL
CO2 SERPL-SCNC: 25 MMOL/L
COLOR UR AUTO: YELLOW
CREAT SERPL-MCNC: 3.9 MG/DL
DELSYS: ABNORMAL
DIFFERENTIAL METHOD: ABNORMAL
EOSINOPHIL # BLD AUTO: 0.1 K/UL
EOSINOPHIL NFR BLD: 0.6 %
ERYTHROCYTE [DISTWIDTH] IN BLOOD BY AUTOMATED COUNT: 17.2 %
EST. GFR  (AFRICAN AMERICAN): 11.5 ML/MIN/1.73 M^2
EST. GFR  (NON AFRICAN AMERICAN): 10 ML/MIN/1.73 M^2
ESTIMATED AVG GLUCOSE: 183 MG/DL
FLOW: 2
GLUCOSE SERPL-MCNC: 144 MG/DL
GLUCOSE UR QL STRIP: NEGATIVE
HBA1C MFR BLD HPLC: 8 %
HCO3 UR-SCNC: 25.8 MMOL/L (ref 24–28)
HCT VFR BLD AUTO: 28.1 %
HGB BLD-MCNC: 9 G/DL
HGB UR QL STRIP: ABNORMAL
HYALINE CASTS UR QL AUTO: 0 /LPF
IMM GRANULOCYTES # BLD AUTO: 0.06 K/UL
IMM GRANULOCYTES NFR BLD AUTO: 0.6 %
INR PPP: 1.1
KETONES UR QL STRIP: NEGATIVE
LACTATE SERPL-SCNC: 0.9 MMOL/L
LEUKOCYTE ESTERASE UR QL STRIP: ABNORMAL
LYMPHOCYTES # BLD AUTO: 0.9 K/UL
LYMPHOCYTES NFR BLD: 8.2 %
MAGNESIUM SERPL-MCNC: 1.6 MG/DL
MCH RBC QN AUTO: 29.2 PG
MCHC RBC AUTO-ENTMCNC: 32 G/DL
MCV RBC AUTO: 91 FL
MICROSCOPIC COMMENT: ABNORMAL
MODE: ABNORMAL
MONOCYTES # BLD AUTO: 0.6 K/UL
MONOCYTES NFR BLD: 5.4 %
NEUTROPHILS # BLD AUTO: 9.2 K/UL
NEUTROPHILS NFR BLD: 85.1 %
NITRITE UR QL STRIP: NEGATIVE
NRBC BLD-RTO: 0 /100 WBC
PCO2 BLDA: 40.1 MMHG (ref 35–45)
PH SMN: 7.42 [PH] (ref 7.35–7.45)
PH UR STRIP: 6 [PH] (ref 5–8)
PHOSPHATE SERPL-MCNC: 3.2 MG/DL
PLATELET # BLD AUTO: 148 K/UL
PMV BLD AUTO: 10.7 FL
PO2 BLDA: 103 MMHG (ref 80–100)
POC BE: 1 MMOL/L
POC SATURATED O2: 98 % (ref 95–100)
POC TCO2: 27 MMOL/L (ref 23–27)
POCT GLUCOSE: 143 MG/DL (ref 70–110)
POCT GLUCOSE: 184 MG/DL (ref 70–110)
POTASSIUM SERPL-SCNC: 3.8 MMOL/L
PROCALCITONIN SERPL IA-MCNC: 0.51 NG/ML
PROT SERPL-MCNC: 5.4 G/DL
PROT UR QL STRIP: ABNORMAL
PROTHROMBIN TIME: 11.3 SEC
RBC # BLD AUTO: 3.08 M/UL
RBC #/AREA URNS AUTO: 4 /HPF (ref 0–4)
SAMPLE: ABNORMAL
SITE: ABNORMAL
SODIUM SERPL-SCNC: 145 MMOL/L
SP GR UR STRIP: 1.01 (ref 1–1.03)
SP02: 97
SQUAMOUS #/AREA URNS AUTO: 0 /HPF
TROPONIN I SERPL DL<=0.01 NG/ML-MCNC: 0.22 NG/ML
TROPONIN I SERPL DL<=0.01 NG/ML-MCNC: 0.25 NG/ML
TROPONIN I SERPL DL<=0.01 NG/ML-MCNC: 0.28 NG/ML
URN SPEC COLLECT METH UR: ABNORMAL
UROBILINOGEN UR STRIP-ACNC: NEGATIVE EU/DL
WBC # BLD AUTO: 10.83 K/UL
WBC #/AREA URNS AUTO: 31 /HPF (ref 0–5)

## 2018-07-16 PROCEDURE — 36415 COLL VENOUS BLD VENIPUNCTURE: CPT

## 2018-07-16 PROCEDURE — 99900035 HC TECH TIME PER 15 MIN (STAT)

## 2018-07-16 PROCEDURE — 94640 AIRWAY INHALATION TREATMENT: CPT

## 2018-07-16 PROCEDURE — 83036 HEMOGLOBIN GLYCOSYLATED A1C: CPT

## 2018-07-16 PROCEDURE — 84484 ASSAY OF TROPONIN QUANT: CPT

## 2018-07-16 PROCEDURE — 83735 ASSAY OF MAGNESIUM: CPT

## 2018-07-16 PROCEDURE — 36600 WITHDRAWAL OF ARTERIAL BLOOD: CPT

## 2018-07-16 PROCEDURE — 87186 SC STD MICRODIL/AGAR DIL: CPT

## 2018-07-16 PROCEDURE — 99220 PR INITIAL OBSERVATION CARE,LEVL III: CPT | Mod: ,,, | Performed by: HOSPITALIST

## 2018-07-16 PROCEDURE — 84145 PROCALCITONIN (PCT): CPT

## 2018-07-16 PROCEDURE — 84484 ASSAY OF TROPONIN QUANT: CPT | Mod: 91

## 2018-07-16 PROCEDURE — 87077 CULTURE AEROBIC IDENTIFY: CPT

## 2018-07-16 PROCEDURE — 25000003 PHARM REV CODE 250: Performed by: EMERGENCY MEDICINE

## 2018-07-16 PROCEDURE — 93010 ELECTROCARDIOGRAM REPORT: CPT | Mod: ,,, | Performed by: INTERNAL MEDICINE

## 2018-07-16 PROCEDURE — 83605 ASSAY OF LACTIC ACID: CPT

## 2018-07-16 PROCEDURE — 85610 PROTHROMBIN TIME: CPT

## 2018-07-16 PROCEDURE — 63600175 PHARM REV CODE 636 W HCPCS: Performed by: HOSPITALIST

## 2018-07-16 PROCEDURE — 93005 ELECTROCARDIOGRAM TRACING: CPT

## 2018-07-16 PROCEDURE — 25000003 PHARM REV CODE 250: Performed by: HOSPITALIST

## 2018-07-16 PROCEDURE — 80053 COMPREHEN METABOLIC PANEL: CPT

## 2018-07-16 PROCEDURE — 99285 EMERGENCY DEPT VISIT HI MDM: CPT | Mod: ,,, | Performed by: EMERGENCY MEDICINE

## 2018-07-16 PROCEDURE — 94660 CPAP INITIATION&MGMT: CPT

## 2018-07-16 PROCEDURE — 84100 ASSAY OF PHOSPHORUS: CPT

## 2018-07-16 PROCEDURE — 11000001 HC ACUTE MED/SURG PRIVATE ROOM

## 2018-07-16 PROCEDURE — 83880 ASSAY OF NATRIURETIC PEPTIDE: CPT

## 2018-07-16 PROCEDURE — 27000190 HC CPAP FULL FACE MASK W/VALVE

## 2018-07-16 PROCEDURE — 96361 HYDRATE IV INFUSION ADD-ON: CPT

## 2018-07-16 PROCEDURE — 63600175 PHARM REV CODE 636 W HCPCS: Performed by: EMERGENCY MEDICINE

## 2018-07-16 PROCEDURE — 87088 URINE BACTERIA CULTURE: CPT

## 2018-07-16 PROCEDURE — G0378 HOSPITAL OBSERVATION PER HR: HCPCS

## 2018-07-16 PROCEDURE — 27000221 HC OXYGEN, UP TO 24 HOURS

## 2018-07-16 PROCEDURE — 87086 URINE CULTURE/COLONY COUNT: CPT

## 2018-07-16 PROCEDURE — 82803 BLOOD GASES ANY COMBINATION: CPT

## 2018-07-16 PROCEDURE — 85025 COMPLETE CBC W/AUTO DIFF WBC: CPT

## 2018-07-16 PROCEDURE — 25000242 PHARM REV CODE 250 ALT 637 W/ HCPCS: Performed by: EMERGENCY MEDICINE

## 2018-07-16 PROCEDURE — 96374 THER/PROPH/DIAG INJ IV PUSH: CPT

## 2018-07-16 PROCEDURE — 87040 BLOOD CULTURE FOR BACTERIA: CPT

## 2018-07-16 PROCEDURE — 25000242 PHARM REV CODE 250 ALT 637 W/ HCPCS: Performed by: HOSPITALIST

## 2018-07-16 PROCEDURE — 81001 URINALYSIS AUTO W/SCOPE: CPT

## 2018-07-16 PROCEDURE — 94761 N-INVAS EAR/PLS OXIMETRY MLT: CPT

## 2018-07-16 PROCEDURE — 99285 EMERGENCY DEPT VISIT HI MDM: CPT | Mod: 25

## 2018-07-16 RX ORDER — FUROSEMIDE 10 MG/ML
120 INJECTION INTRAMUSCULAR; INTRAVENOUS ONCE
Status: COMPLETED | OUTPATIENT
Start: 2018-07-16 | End: 2018-07-16

## 2018-07-16 RX ORDER — INSULIN ASPART 100 [IU]/ML
1-10 INJECTION, SOLUTION INTRAVENOUS; SUBCUTANEOUS
Status: DISCONTINUED | OUTPATIENT
Start: 2018-07-16 | End: 2018-07-25 | Stop reason: HOSPADM

## 2018-07-16 RX ORDER — CEFEPIME HYDROCHLORIDE 1 G/1
1 INJECTION, POWDER, FOR SOLUTION INTRAMUSCULAR; INTRAVENOUS
Status: COMPLETED | OUTPATIENT
Start: 2018-07-16 | End: 2018-07-16

## 2018-07-16 RX ORDER — NAPROXEN SODIUM 220 MG/1
81 TABLET, FILM COATED ORAL DAILY
Status: DISCONTINUED | OUTPATIENT
Start: 2018-07-16 | End: 2018-07-25 | Stop reason: HOSPADM

## 2018-07-16 RX ORDER — IBUPROFEN 200 MG
16 TABLET ORAL
Status: DISCONTINUED | OUTPATIENT
Start: 2018-07-16 | End: 2018-07-25 | Stop reason: HOSPADM

## 2018-07-16 RX ORDER — CARVEDILOL 6.25 MG/1
6.25 TABLET ORAL 2 TIMES DAILY
Status: DISCONTINUED | OUTPATIENT
Start: 2018-07-16 | End: 2018-07-21

## 2018-07-16 RX ORDER — HEPARIN SODIUM 5000 [USP'U]/ML
5000 INJECTION, SOLUTION INTRAVENOUS; SUBCUTANEOUS EVERY 8 HOURS
Status: DISCONTINUED | OUTPATIENT
Start: 2018-07-16 | End: 2018-07-25 | Stop reason: HOSPADM

## 2018-07-16 RX ORDER — IPRATROPIUM BROMIDE AND ALBUTEROL SULFATE 2.5; .5 MG/3ML; MG/3ML
3 SOLUTION RESPIRATORY (INHALATION)
Status: COMPLETED | OUTPATIENT
Start: 2018-07-16 | End: 2018-07-16

## 2018-07-16 RX ORDER — AMOXICILLIN 250 MG
1 CAPSULE ORAL 2 TIMES DAILY
Status: DISCONTINUED | OUTPATIENT
Start: 2018-07-16 | End: 2018-07-25 | Stop reason: HOSPADM

## 2018-07-16 RX ORDER — PREDNISONE 20 MG/1
40 TABLET ORAL DAILY
Status: DISCONTINUED | OUTPATIENT
Start: 2018-07-16 | End: 2018-07-25 | Stop reason: HOSPADM

## 2018-07-16 RX ORDER — PREDNISONE 20 MG/1
40 TABLET ORAL DAILY
COMMUNITY

## 2018-07-16 RX ORDER — IBUPROFEN 200 MG
24 TABLET ORAL
Status: DISCONTINUED | OUTPATIENT
Start: 2018-07-16 | End: 2018-07-25 | Stop reason: HOSPADM

## 2018-07-16 RX ORDER — IPRATROPIUM BROMIDE AND ALBUTEROL SULFATE 2.5; .5 MG/3ML; MG/3ML
3 SOLUTION RESPIRATORY (INHALATION) EVERY 4 HOURS
Status: DISCONTINUED | OUTPATIENT
Start: 2018-07-16 | End: 2018-07-20

## 2018-07-16 RX ORDER — SODIUM BICARBONATE 650 MG/1
650 TABLET ORAL 3 TIMES DAILY
Status: DISCONTINUED | OUTPATIENT
Start: 2018-07-16 | End: 2018-07-25 | Stop reason: HOSPADM

## 2018-07-16 RX ORDER — GLUCAGON 1 MG
1 KIT INJECTION
Status: DISCONTINUED | OUTPATIENT
Start: 2018-07-16 | End: 2018-07-25 | Stop reason: HOSPADM

## 2018-07-16 RX ORDER — AMLODIPINE BESYLATE 10 MG/1
10 TABLET ORAL DAILY
Status: DISCONTINUED | OUTPATIENT
Start: 2018-07-16 | End: 2018-07-25 | Stop reason: HOSPADM

## 2018-07-16 RX ORDER — ATORVASTATIN CALCIUM 20 MG/1
40 TABLET, FILM COATED ORAL DAILY
Status: DISCONTINUED | OUTPATIENT
Start: 2018-07-16 | End: 2018-07-25 | Stop reason: HOSPADM

## 2018-07-16 RX ORDER — ISOSORBIDE DINITRATE AND HYDRALAZINE HYDROCHLORIDE 37.5; 2 MG/1; MG/1
1 TABLET ORAL 3 TIMES DAILY
Status: DISCONTINUED | OUTPATIENT
Start: 2018-07-16 | End: 2018-07-25

## 2018-07-16 RX ORDER — IPRATROPIUM BROMIDE AND ALBUTEROL SULFATE 2.5; .5 MG/3ML; MG/3ML
3 SOLUTION RESPIRATORY (INHALATION) EVERY 4 HOURS PRN
Status: DISCONTINUED | OUTPATIENT
Start: 2018-07-16 | End: 2018-07-16

## 2018-07-16 RX ORDER — SODIUM CHLORIDE 0.9 % (FLUSH) 0.9 %
5 SYRINGE (ML) INJECTION
Status: DISCONTINUED | OUTPATIENT
Start: 2018-07-16 | End: 2018-07-25 | Stop reason: HOSPADM

## 2018-07-16 RX ORDER — DONEPEZIL HYDROCHLORIDE 5 MG/1
5 TABLET, FILM COATED ORAL NIGHTLY
Status: DISCONTINUED | OUTPATIENT
Start: 2018-07-16 | End: 2018-07-25 | Stop reason: HOSPADM

## 2018-07-16 RX ADMIN — SODIUM BICARBONATE 650 MG TABLET 650 MG: at 09:07

## 2018-07-16 RX ADMIN — ATORVASTATIN CALCIUM 40 MG: 20 TABLET, FILM COATED ORAL at 02:07

## 2018-07-16 RX ADMIN — IPRATROPIUM BROMIDE AND ALBUTEROL SULFATE 3 ML: .5; 3 SOLUTION RESPIRATORY (INHALATION) at 04:07

## 2018-07-16 RX ADMIN — SENNOSIDES AND DOCUSATE SODIUM 1 TABLET: 8.6; 5 TABLET ORAL at 09:07

## 2018-07-16 RX ADMIN — HYDRALAZINE HYDROCHLORIDE AND ISOSORBIDE DINITRATE 1 TABLET: 37.5; 2 TABLET, FILM COATED ORAL at 09:07

## 2018-07-16 RX ADMIN — HEPARIN SODIUM 5000 UNITS: 5000 INJECTION, SOLUTION INTRAVENOUS; SUBCUTANEOUS at 02:07

## 2018-07-16 RX ADMIN — ASPIRIN 81 MG CHEWABLE TABLET 81 MG: 81 TABLET CHEWABLE at 02:07

## 2018-07-16 RX ADMIN — SODIUM BICARBONATE 650 MG TABLET 650 MG: at 04:07

## 2018-07-16 RX ADMIN — IPRATROPIUM BROMIDE AND ALBUTEROL SULFATE 3 ML: .5; 3 SOLUTION RESPIRATORY (INHALATION) at 07:07

## 2018-07-16 RX ADMIN — CARVEDILOL 6.25 MG: 6.25 TABLET, FILM COATED ORAL at 09:07

## 2018-07-16 RX ADMIN — AMLODIPINE BESYLATE 10 MG: 10 TABLET ORAL at 02:07

## 2018-07-16 RX ADMIN — HYDRALAZINE HYDROCHLORIDE AND ISOSORBIDE DINITRATE 1 TABLET: 37.5; 2 TABLET, FILM COATED ORAL at 04:07

## 2018-07-16 RX ADMIN — SODIUM CHLORIDE 1000 ML: 0.9 INJECTION, SOLUTION INTRAVENOUS at 07:07

## 2018-07-16 RX ADMIN — CEFEPIME 1 G: 1 INJECTION, POWDER, FOR SOLUTION INTRAMUSCULAR; INTRAVENOUS at 08:07

## 2018-07-16 RX ADMIN — CEFTRIAXONE SODIUM 2 G: 2 INJECTION, POWDER, FOR SOLUTION INTRAMUSCULAR; INTRAVENOUS at 04:07

## 2018-07-16 RX ADMIN — PREDNISONE 40 MG: 20 TABLET ORAL at 02:07

## 2018-07-16 RX ADMIN — FUROSEMIDE 120 MG: 10 INJECTION, SOLUTION INTRAMUSCULAR; INTRAVENOUS at 02:07

## 2018-07-16 RX ADMIN — HEPARIN SODIUM 5000 UNITS: 5000 INJECTION, SOLUTION INTRAVENOUS; SUBCUTANEOUS at 09:07

## 2018-07-16 RX ADMIN — DONEPEZIL HYDROCHLORIDE 5 MG: 5 TABLET, FILM COATED ORAL at 09:07

## 2018-07-16 NOTE — HPI
History is limited as patient has dementia. History obtained from sister Carlota Tejeda her POA    84F with DM2 A1C 8, HTN, HLD, dementia, CAD s/p CABG '06, combined chronic heart failure (5/18 TTE EF 45-50%, G1DD), ovarian cancer, and lung squamous cell ca (no longer taking chemo due to nephritis)  [facility] reports increased work of breathing and a sat of 89-90% on room air this AM. Was given a breathing treatment and supplemental oxygen with no improvement so she was transferred to the ED. Pt denies cough, chest pain, fever, or chills.     She was initially placed on BIPAP, since has been transitioned to 4 L NC, received Cefepime x 1, a breathing treatment, and a one liter NS bolus in the ED.     Confirmed full code status with sister.   Sister called Bon, who confirmed that there is no report of any fall by patient.

## 2018-07-16 NOTE — ED PROVIDER NOTES
Encounter Date: 7/16/2018    SCRIBE #1 NOTE: I, Carolyn Velez, am scribing for, and in the presence of,  Dr. Landers. I have scribed the entire note.       History     Chief Complaint   Patient presents with    Shortness of Breath         The patient is a 84 y.o. female with co-morbidities including: DM, HTN, HLD, dementia, CAD, MI, CHF, ovarian cancer, CKD, lung cancer, right who presents to the ED with a complaint of SOB that started this morning. Staff at the nursing home reports increased work of breathing and a sat of 89-90% on room air. Was given a breathing treatment and supplemental oxygen with no improvement. Patient also states that she had a mechanical fall this morning and hit her head. Denies any pain. Patient recently stopped chemo for lung cancer.         The history is provided by the patient, medical records and the nursing home. The history is limited by the condition of the patient.     Review of patient's allergies indicates:  No Known Allergies  Past Medical History:   Diagnosis Date    Anemia     Anemia     CAD (coronary artery disease)     Cataract     Chemotherapy follow-up examination 11/27/2017    CHF (congestive heart failure)     Chronic kidney disease (CKD), stage IV (severe) 03/27/2017    Sees  at [facility], no dialysis    Dementia     Encounter for blood transfusion 2006    at time of CABG    Fracture of humerus, proximal, left, closed 1/28/2015    Hypercalcemia 04/10/2017    Hyperlipidemia     Hyperparathyroidism     Hypertension     Mediastinal lymphadenopathy 10/9/2017    MI (myocardial infarction) 10/2016    Nephritis 6/18/2018    Osteoporosis     Ovarian cancer     in the past    Primary lung cancer, right 10/9/2017    Type 2 diabetes mellitus, controlled     UTI (urinary tract infection) 04/11/2017     Past Surgical History:   Procedure Laterality Date    CATARACT EXTRACTION W/  INTRAOCULAR LENS IMPLANT Left 04/17/2017    Dr. Motta     CATARACT EXTRACTION W/  INTRAOCULAR LENS IMPLANT Right 05/06/2017    Dr Motta     CORONARY ARTERY BYPASS GRAFT  2006    EYE SURGERY      HYSTERECTOMY  2002    ORIF HUMERUS FRACTURE Left 1/28/2015     Family History   Problem Relation Age of Onset    Adopted: Yes    Blindness Maternal Grandmother     Cancer Neg Hx      Social History   Substance Use Topics    Smoking status: Former Smoker     Packs/day: 2.00     Years: 30.00     Quit date: 1/20/2007    Smokeless tobacco: Never Used    Alcohol use No     Review of Systems   Unable to perform ROS: Severe respiratory distress   Respiratory: Positive for shortness of breath.        Physical Exam     Initial Vitals [07/16/18 0725]   BP Pulse Resp Temp SpO2   (!) 176/90 94 (!) 26 99.8 °F (37.7 °C) 100 %      MAP       --         Physical Exam    Nursing note and vitals reviewed.  Constitutional: She appears well-developed and well-nourished. No distress.   HENT:   Head: Normocephalic and atraumatic.   Mouth/Throat: Oropharynx is clear and moist.   Eyes: Conjunctivae are normal.   Neck: Normal range of motion.   Cardiovascular: Normal rate, regular rhythm and normal heart sounds.   Pulmonary/Chest: Accessory muscle usage present. Tachypnea noted. No respiratory distress. She has decreased breath sounds.   Abdominal: Soft. She exhibits no distension. There is no tenderness.   Musculoskeletal: Normal range of motion.   Neurological: She is alert and oriented to person, place, and time.   Skin: Skin is warm and dry.         ED Course   Procedures  Labs Reviewed   CBC W/ AUTO DIFFERENTIAL - Abnormal; Notable for the following:        Result Value    RBC 3.08 (*)     Hemoglobin 9.0 (*)     Hematocrit 28.1 (*)     RDW 17.2 (*)     Platelets 148 (*)     Immature Granulocytes 0.6 (*)     Gran # (ANC) 9.2 (*)     Immature Grans (Abs) 0.06 (*)     Lymph # 0.9 (*)     Gran% 85.1 (*)     Lymph% 8.2 (*)     All other components within normal limits   URINALYSIS, REFLEX TO URINE  CULTURE - Abnormal; Notable for the following:     Appearance, UA Hazy (*)     Protein, UA 3+ (*)     Occult Blood UA 1+ (*)     Leukocytes, UA 3+ (*)     All other components within normal limits    Narrative:     Preferred Collection Type->Urine, Clean Catch   B-TYPE NATRIURETIC PEPTIDE - Abnormal; Notable for the following:      (*)     All other components within normal limits   PROCALCITONIN - Abnormal; Notable for the following:     Procalcitonin 0.51 (*)     All other components within normal limits   URINALYSIS MICROSCOPIC - Abnormal; Notable for the following:     WBC, UA 31 (*)     Bacteria, UA Moderate (*)     All other components within normal limits    Narrative:     Preferred Collection Type->Urine, Clean Catch   COMPREHENSIVE METABOLIC PANEL - Abnormal; Notable for the following:     Chloride 112 (*)     Glucose 144 (*)     BUN, Bld 51 (*)     Creatinine 3.9 (*)     Calcium 8.3 (*)     Total Protein 5.4 (*)     Albumin 2.1 (*)     eGFR if  11.5 (*)     eGFR if non  10.0 (*)     All other components within normal limits   TROPONIN I - Abnormal; Notable for the following:     Troponin I 0.250 (*)     All other components within normal limits   CULTURE, URINE   LACTIC ACID, PLASMA   PROTIME-INR   MAGNESIUM   PHOSPHORUS     EKG Readings: (Independently Interpreted)   Initial Reading: No STEMI. Rhythm: Normal Sinus Rhythm. Heart Rate: 96. ST Segments: Non-Specific ST Segment Depression.       Imaging Results          X-Ray Chest AP Portable (Final result)  Result time 07/16/18 08:11:27    Final result by Anders Singleton MD (07/16/18 08:11:27)                 Impression:      See above      Electronically signed by: Anders Singleton MD  Date:    07/16/2018  Time:    08:11             Narrative:    EXAMINATION:  XR CHEST AP PORTABLE    CLINICAL HISTORY:  Sepsis;    TECHNIQUE:  Single frontal view of the chest was performed.    COMPARISON:  No 09/21/2017  ne    FINDINGS:  Postoperative changes in the thorax as before.  Cardiomegaly.  Right upper lobe mass and/or airspace consolidation similar to the previous study.  Ill-defined patchy airspace disease noted at the lung bases more on the right side.  Small amount of pleural effusion cannot be ruled out.                                 Medical Decision Making:   History:   Old Medical Records: I decided to obtain old medical records.  Independently Interpreted Test(s):   I have ordered and independently interpreted X-rays - see prior notes.  I have ordered and independently interpreted EKG Reading(s) - see prior notes  Clinical Tests:   Lab Tests: Ordered and Reviewed  Radiological Study: Ordered and Reviewed  Medical Tests: Ordered and Reviewed            Scribe Attestation:   Scribe #1: I performed the above scribed service and the documentation accurately describes the services I performed. I attest to the accuracy of the note.    Attending Attestation:             Attending ED Notes:   Emergent evaluation of shortness of breath. Patient initiated placed on bipap which improved her breathing rate. empiric antibiotics started. UTI noted. Recommend admission for continued management.              Clinical Impression:   The primary encounter diagnosis was Urinary tract infection without hematuria, site unspecified. A diagnosis of Shortness of breath was also pertinent to this visit.      Disposition:   Disposition: Admitted                        Chilango Landers MD  07/16/18 2127

## 2018-07-16 NOTE — ED NOTES
Pt sleeping soundly, resp even.non-labored.  Sister at bedside updated on pt status and v/u.  Siderails up x 2/will continue to monitor.

## 2018-07-16 NOTE — ED NOTES
Pt's first and last name and birthday confirmed.   LOC: The patient is awake, alert and aware of environment with an appropriate affect, the patient is oriented x 2 and disoriented to time and speaking appropriately.  APPEARANCE: Patient resting comfortably and in no acute distress, patient is clean and well groomed.  SKIN: The skin is warm and dry, patient has normal skin turgor and moist mucus membranes, skin intact, no breakdown or brusing noted.  MUSKULOSKELETAL: Patient moving all extremities well, no obvious swelling or deformities noted. Pt is chair and/or bed bound.   RESPIRATORY: Airway is open and patent, respirations are spontaneous, patient has an increased effort and rate. Breath sounds are coarse upon inhalation bilaterally.   CARDIAC: Normal heart sounds. No peripheral edema.  ABDOMEN: Soft and non tender to palpation, no distention noted. Bowel sounds present.   NEURO: No neuro deficits, hand grasp equal, no drift noted, no facial droop noted. Speech is clear.

## 2018-07-16 NOTE — ED NOTES
The patient is asleep. Family at bedside.    No apparent distress noted. Airway is open and patent.  Respirations with normal effort and rate noted. Explanation of care provided to family. No needs at this time. Will continue to monitor.

## 2018-07-16 NOTE — PLAN OF CARE
Problem: Patient Care Overview  Goal: Plan of Care Review  Outcome: Ongoing (interventions implemented as appropriate)  Plan of care reviewed. Pt lying in bed with sleeping, arouse to calm stimulation. Sister at bedside. MD notified of pt accessory muscle breathing. Blood gases were ordered. Purewick placed and working properly. VS stable, O2 within normal range. Will cont to monitor

## 2018-07-16 NOTE — H&P
[facility] Hospital Medicine  History & Physical    Patient Name: Misa Yun  MRN: [id number]  Admission Date: 7/16/2018  Attending Physician: Margarita Cason MD   Primary Care Provider: [facility] Medicine Team: [facility]OSP MED G Margarita Cason MD     Patient information was obtained from patient, relative(s), past medical records and ER records.     Subjective:     Principal Problem:Acute on chronic combined systolic and diastolic heart failure    Chief Complaint:   Chief Complaint   Patient presents with    Shortness of Breath        HPI: History is limited as patient has dementia. History obtained from sister Carlota Tejeda her POA    84F with DM2 A1C 8, HTN, HLD, dementia, CAD s/p CABG '06, combined chronic heart failure (5/18 TTE EF 45-50%, G1DD), ovarian cancer, and lung squamous cell ca (no longer taking chemo due to nephritis)  [facility] reports increased work of breathing and a sat of 89-90% on room air this AM. Was given a breathing treatment and supplemental oxygen with no improvement so she was transferred to the ED. Pt denies cough, chest pain, fever, or chills.     She was initially placed on BIPAP, since has been transitioned to 4 L NC, received Cefepime x 1, a breathing treatment, and a one liter NS bolus in the ED.     Confirmed full code status with sister.   Sister called Bon, who confirmed that there is no report of any fall by patient.     Past Medical History:   Diagnosis Date    Anemia     Anemia     CAD (coronary artery disease)     Cataract     Chemotherapy follow-up examination 11/27/2017    CHF (congestive heart failure)     Chronic kidney disease (CKD), stage IV (severe) 03/27/2017    Sees  at [facility], no dialysis    Dementia     Encounter for blood transfusion 2006    at time of CABG    Fracture of humerus, proximal, left, closed 1/28/2015    Hypercalcemia 04/10/2017    Hyperlipidemia      Hyperparathyroidism     Hypertension     Mediastinal lymphadenopathy 10/9/2017    MI (myocardial infarction) 10/2016    Nephritis 6/18/2018    Osteoporosis     Ovarian cancer     in the past    Primary lung cancer, right 10/9/2017    Type 2 diabetes mellitus, controlled     UTI (urinary tract infection) 04/11/2017       Past Surgical History:   Procedure Laterality Date    CATARACT EXTRACTION W/  INTRAOCULAR LENS IMPLANT Left 04/17/2017    Dr. Motta    CATARACT EXTRACTION W/  INTRAOCULAR LENS IMPLANT Right 05/06/2017    Dr Motta     CORONARY ARTERY BYPASS GRAFT  2006    EYE SURGERY      HYSTERECTOMY  2002    ORIF HUMERUS FRACTURE Left 1/28/2015       Review of patient's allergies indicates:  No Known Allergies    No current facility-administered medications on file prior to encounter.      Current Outpatient Prescriptions on File Prior to Encounter   Medication Sig    amlodipine (NORVASC) 10 MG tablet Take 10 mg by mouth once daily.    aspirin 81 MG Chew Take 1 tablet (81 mg total) by mouth once daily. RESUME ON 2/14/15    atorvastatin (LIPITOR) 40 MG tablet Take 1 tablet (40 mg total) by mouth once daily.    carvedilol (COREG) 3.125 MG tablet Take 2 tablets (6.25 mg total) by mouth 2 (two) times daily.    diphenhydrAMINE (BENADRYL ALLERGY) 25 mg tablet Take 25 mg by mouth nightly as needed for Insomnia.    donepezil (ARICEPT) 5 MG tablet Take 5 mg by mouth every evening.    ergocalciferol (ERGOCALCIFEROL) 50,000 unit Cap Take 50,000 Units by mouth every 7 days.    furosemide (LASIX) 20 MG tablet Take 1 tablet (20 mg total) by mouth once daily.    isosorbide-hydrALAZINE 20-37.5 mg (BIDIL) 20-37.5 mg Tab Take 1 tablet by mouth 3 (three) times daily.    nitroGLYCERIN (NITROSTAT) 0.4 MG SL tablet Place 1 tablet (0.4 mg total) under the tongue every 5 (five) minutes as needed for Chest pain.    senna-docusate 8.6-50 mg (PERICOLACE) 8.6-50 mg per tablet Take 1 tablet by mouth 2 (two) times daily.     sodium bicarbonate 650 MG tablet Take 1 tablet (650 mg total) by mouth 3 (three) times daily.    tramadol (ULTRAM) 50 mg tablet Take 50 mg by mouth every 6 (six) hours as needed for Pain.    [DISCONTINUED] insulin aspart (NOVOLOG) 100 unit/mL injection Inject 100 Units into the skin 3 (three) times daily before meals.     Family History     Problem Relation (Age of Onset)    Blindness Maternal Grandmother        Social History Main Topics    Smoking status: Former Smoker     Packs/day: 2.00     Years: 30.00     Quit date: 1/20/2007    Smokeless tobacco: Never Used    Alcohol use No    Drug use: No    Sexual activity: Not on file     Review of Systems   Constitutional: Negative for chills and fever.   Respiratory: Positive for shortness of breath. Negative for cough, wheezing and stridor.    Cardiovascular: Positive for leg swelling. Negative for chest pain and palpitations.   Genitourinary: Negative for dysuria, flank pain, frequency and urgency.   All other systems reviewed and are negative.    Objective:     Vital Signs (Most Recent):  Temp: 99.3 °F (37.4 °C) (07/16/18 1106)  Pulse: 82 (07/16/18 1111)  Resp: 19 (07/16/18 1106)  BP: (!) 145/100 (07/16/18 1106)  SpO2: (!) 94 % (07/16/18 1106) Vital Signs (24h Range):  Temp:  [99.3 °F (37.4 °C)-99.8 °F (37.7 °C)] 99.3 °F (37.4 °C)  Pulse:  [82-94] 82  Resp:  [19-26] 19  SpO2:  [94 %-100 %] 94 %  BP: (145-176)/() 145/100     Weight: 68 kg (150 lb)  Body mass index is 25.75 kg/m².    Physical Exam   Constitutional: She is oriented to person, place, and time. She appears well-nourished. No distress.   Pleasantly confused, obese, NC in place   HENT:   Head: Normocephalic and atraumatic.   Nose: Nose normal.   Mouth/Throat: Oropharynx is clear and moist. No oropharyngeal exudate.   Eyes: Conjunctivae and EOM are normal. Pupils are equal, round, and reactive to light. Right eye exhibits no discharge. Left eye exhibits no discharge. No scleral icterus.    Neck: Neck supple. No JVD present. No tracheal deviation present. No thyromegaly present.   Cardiovascular: Normal rate, regular rhythm, normal heart sounds and intact distal pulses.  Exam reveals no friction rub.    No murmur heard.  Pulmonary/Chest: Effort normal. No stridor. No respiratory distress. She has no wheezes. She has rales. She exhibits no tenderness.   Abdominal: Soft. Bowel sounds are normal. She exhibits no distension and no mass. There is no tenderness. There is no rebound and no guarding.   Musculoskeletal: Normal range of motion. She exhibits no edema, tenderness or deformity.   Lymphadenopathy:     She has no cervical adenopathy.   Neurological: She is alert and oriented to person, place, and time. No cranial nerve deficit. She exhibits normal muscle tone. Coordination normal.   Skin: Skin is warm and dry. No rash noted. She is not diaphoretic. No erythema. No pallor.   Psychiatric: She has a normal mood and affect. Her behavior is normal. Judgment and thought content normal.   Vitals reviewed.        CRANIAL NERVES     CN III, IV, VI   Pupils are equal, round, and reactive to light.  Extraocular motions are normal.        Significant Labs: All pertinent labs within the past 24 hours have been reviewed.    Significant Imaging: I have reviewed and interpreted all pertinent imaging results/findings within the past 24 hours.    Assessment/Plan:     * Acute on chronic combined systolic and diastolic heart failure    Presented with SOB, resp failure, elevated BNP, pulmonary edema on CXR, crackles on exam  Received 1 L NS in ED  Will start IV Lasix 120mg x 1 now  Will need to redose according to I/Os  Strict I/O's, daily weights, fluid/sodium restriction  Cont home BB. No ACEI/ARB due to CKD5, according to chart check              Acute respiratory failure with hypoxia    2/2 HF exacerbation as above  Transitioned from BIPAP to 4L in ED  Wean O2 as tolerated          Urinary tract infection without  hematuria    AF, VSS, normal WBC  Received cefepime x 1 in ED  Start Rocephin  Follow UCx          Elevated troponin    Elevated but likely from renal dysfunction. Denies CP. 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 Cont ASA, statin.           Dementia    No acute issues.  Cont home Aricept.   Delirium precautions.            VTE Risk Mitigation         Ordered     heparin (porcine) injection 5,000 Units  Every 8 hours      07/16/18 1222     IP VTE HIGH RISK PATIENT  Once      07/16/18 1222             Margarita Cason MD  Department of Hospital Medicine   [facility]

## 2018-07-16 NOTE — SUBJECTIVE & OBJECTIVE
Past Medical History:   Diagnosis Date    Anemia     Anemia     CAD (coronary artery disease)     Cataract     Chemotherapy follow-up examination 11/27/2017    CHF (congestive heart failure)     Chronic kidney disease (CKD), stage IV (severe) 03/27/2017    Sees  at [facility], no dialysis    Dementia     Encounter for blood transfusion 2006    at time of CABG    Fracture of humerus, proximal, left, closed 1/28/2015    Hypercalcemia 04/10/2017    Hyperlipidemia     Hyperparathyroidism     Hypertension     Mediastinal lymphadenopathy 10/9/2017    MI (myocardial infarction) 10/2016    Nephritis 6/18/2018    Osteoporosis     Ovarian cancer     in the past    Primary lung cancer, right 10/9/2017    Type 2 diabetes mellitus, controlled     UTI (urinary tract infection) 04/11/2017       Past Surgical History:   Procedure Laterality Date    CATARACT EXTRACTION W/  INTRAOCULAR LENS IMPLANT Left 04/17/2017    Dr. Motta    CATARACT EXTRACTION W/  INTRAOCULAR LENS IMPLANT Right 05/06/2017    Dr Motta     CORONARY ARTERY BYPASS GRAFT  2006    EYE SURGERY      HYSTERECTOMY  2002    ORIF HUMERUS FRACTURE Left 1/28/2015       Review of patient's allergies indicates:  No Known Allergies    No current facility-administered medications on file prior to encounter.      Current Outpatient Prescriptions on File Prior to Encounter   Medication Sig    amlodipine (NORVASC) 10 MG tablet Take 10 mg by mouth once daily.    aspirin 81 MG Chew Take 1 tablet (81 mg total) by mouth once daily. RESUME ON 2/14/15    atorvastatin (LIPITOR) 40 MG tablet Take 1 tablet (40 mg total) by mouth once daily.    carvedilol (COREG) 3.125 MG tablet Take 2 tablets (6.25 mg total) by mouth 2 (two) times daily.    diphenhydrAMINE (BENADRYL ALLERGY) 25 mg tablet Take 25 mg by mouth nightly as needed for Insomnia.    donepezil (ARICEPT) 5 MG tablet Take 5 mg by mouth every evening.    ergocalciferol (ERGOCALCIFEROL)  50,000 unit Cap Take 50,000 Units by mouth every 7 days.    furosemide (LASIX) 20 MG tablet Take 1 tablet (20 mg total) by mouth once daily.    isosorbide-hydrALAZINE 20-37.5 mg (BIDIL) 20-37.5 mg Tab Take 1 tablet by mouth 3 (three) times daily.    nitroGLYCERIN (NITROSTAT) 0.4 MG SL tablet Place 1 tablet (0.4 mg total) under the tongue every 5 (five) minutes as needed for Chest pain.    senna-docusate 8.6-50 mg (PERICOLACE) 8.6-50 mg per tablet Take 1 tablet by mouth 2 (two) times daily.    sodium bicarbonate 650 MG tablet Take 1 tablet (650 mg total) by mouth 3 (three) times daily.    tramadol (ULTRAM) 50 mg tablet Take 50 mg by mouth every 6 (six) hours as needed for Pain.    [DISCONTINUED] insulin aspart (NOVOLOG) 100 unit/mL injection Inject 100 Units into the skin 3 (three) times daily before meals.     Family History     Problem Relation (Age of Onset)    Blindness Maternal Grandmother        Social History Main Topics    Smoking status: Former Smoker     Packs/day: 2.00     Years: 30.00     Quit date: 1/20/2007    Smokeless tobacco: Never Used    Alcohol use No    Drug use: No    Sexual activity: Not on file     Review of Systems   Constitutional: Negative for chills and fever.   Respiratory: Positive for shortness of breath. Negative for cough, wheezing and stridor.    Cardiovascular: Positive for leg swelling. Negative for chest pain and palpitations.   Genitourinary: Negative for dysuria, flank pain, frequency and urgency.   All other systems reviewed and are negative.    Objective:     Vital Signs (Most Recent):  Temp: 99.3 °F (37.4 °C) (07/16/18 1106)  Pulse: 82 (07/16/18 1111)  Resp: 19 (07/16/18 1106)  BP: (!) 145/100 (07/16/18 1106)  SpO2: (!) 94 % (07/16/18 1106) Vital Signs (24h Range):  Temp:  [99.3 °F (37.4 °C)-99.8 °F (37.7 °C)] 99.3 °F (37.4 °C)  Pulse:  [82-94] 82  Resp:  [19-26] 19  SpO2:  [94 %-100 %] 94 %  BP: (145-176)/() 145/100     Weight: 68 kg (150 lb)  Body mass  index is 25.75 kg/m².    Physical Exam   Constitutional: She is oriented to person, place, and time. She appears well-nourished. No distress.   Pleasantly confused, obese, NC in place   HENT:   Head: Normocephalic and atraumatic.   Nose: Nose normal.   Mouth/Throat: Oropharynx is clear and moist. No oropharyngeal exudate.   Eyes: Conjunctivae and EOM are normal. Pupils are equal, round, and reactive to light. Right eye exhibits no discharge. Left eye exhibits no discharge. No scleral icterus.   Neck: Neck supple. No JVD present. No tracheal deviation present. No thyromegaly present.   Cardiovascular: Normal rate, regular rhythm, normal heart sounds and intact distal pulses.  Exam reveals no friction rub.    No murmur heard.  Pulmonary/Chest: Effort normal. No stridor. No respiratory distress. She has no wheezes. She has rales. She exhibits no tenderness.   Abdominal: Soft. Bowel sounds are normal. She exhibits no distension and no mass. There is no tenderness. There is no rebound and no guarding.   Musculoskeletal: Normal range of motion. She exhibits no edema, tenderness or deformity.   Lymphadenopathy:     She has no cervical adenopathy.   Neurological: She is alert and oriented to person, place, and time. No cranial nerve deficit. She exhibits normal muscle tone. Coordination normal.   Skin: Skin is warm and dry. No rash noted. She is not diaphoretic. No erythema. No pallor.   Psychiatric: She has a normal mood and affect. Her behavior is normal. Judgment and thought content normal.   Vitals reviewed.        CRANIAL NERVES     CN III, IV, VI   Pupils are equal, round, and reactive to light.  Extraocular motions are normal.        Significant Labs: All pertinent labs within the past 24 hours have been reviewed.    Significant Imaging: I have reviewed and interpreted all pertinent imaging results/findings within the past 24 hours.

## 2018-07-16 NOTE — ASSESSMENT & PLAN NOTE
Elevated but likely from renal dysfunction. Denies CP. No findings on ECG consistent with ischemia  Repeat ordered to make sure levels flat

## 2018-07-16 NOTE — ASSESSMENT & PLAN NOTE
Follows with Dr Barnhart with Oncology  Has appt with him with repeat PET scan on Wed, will need to reschedule if will remain in hospital  Immunotherapy had been discontinued due to concern for nephritis  Cont home prednisone 40 mg daily for mgmt of renal toxicity

## 2018-07-16 NOTE — ASSESSMENT & PLAN NOTE
Presented with SOB, resp failure, elevated BNP, pulmonary edema on CXR, crackles on exam  Received 1 L NS in ED  Will start IV Lasix 120mg x 1 now  Will need to redose according to I/Os  Strict I/O's, daily weights, fluid/sodium restriction  Cont home BB. No ACEI/ARB due to CKD5, according to chart check

## 2018-07-16 NOTE — ED TRIAGE NOTES
Pt arrived from [facility]. Pt was found with SOB by staff this morning with SpO2 of 89% on room air. Pt was given supplemental O2 and a breathing treatment by EMS. Pt has tachypnea and increased work of breathing upon arrival.

## 2018-07-17 LAB
ALBUMIN SERPL BCP-MCNC: 2 G/DL
ALP SERPL-CCNC: 79 U/L
ALT SERPL W/O P-5'-P-CCNC: 10 U/L
ANION GAP SERPL CALC-SCNC: 13 MMOL/L
AST SERPL-CCNC: 14 U/L
BASOPHILS # BLD AUTO: 0 K/UL
BASOPHILS NFR BLD: 0 %
BILIRUB SERPL-MCNC: 0.3 MG/DL
BUN SERPL-MCNC: 64 MG/DL
CALCIUM SERPL-MCNC: 8.2 MG/DL
CHLORIDE SERPL-SCNC: 108 MMOL/L
CO2 SERPL-SCNC: 21 MMOL/L
CREAT SERPL-MCNC: 4.3 MG/DL
DIFFERENTIAL METHOD: ABNORMAL
EOSINOPHIL # BLD AUTO: 0 K/UL
EOSINOPHIL NFR BLD: 0 %
ERYTHROCYTE [DISTWIDTH] IN BLOOD BY AUTOMATED COUNT: 16.6 %
EST. GFR  (AFRICAN AMERICAN): 10.2 ML/MIN/1.73 M^2
EST. GFR  (NON AFRICAN AMERICAN): 8.9 ML/MIN/1.73 M^2
GLUCOSE SERPL-MCNC: 236 MG/DL
HCT VFR BLD AUTO: 25.7 %
HGB BLD-MCNC: 8.1 G/DL
IMM GRANULOCYTES # BLD AUTO: 0.02 K/UL
IMM GRANULOCYTES NFR BLD AUTO: 0.4 %
LYMPHOCYTES # BLD AUTO: 0.5 K/UL
LYMPHOCYTES NFR BLD: 8.6 %
MAGNESIUM SERPL-MCNC: 1.7 MG/DL
MCH RBC QN AUTO: 28.9 PG
MCHC RBC AUTO-ENTMCNC: 31.5 G/DL
MCV RBC AUTO: 92 FL
MONOCYTES # BLD AUTO: 0.1 K/UL
MONOCYTES NFR BLD: 2.2 %
NEUTROPHILS # BLD AUTO: 5 K/UL
NEUTROPHILS NFR BLD: 88.8 %
NRBC BLD-RTO: 0 /100 WBC
PHOSPHATE SERPL-MCNC: 4.7 MG/DL
PLATELET # BLD AUTO: 126 K/UL
PMV BLD AUTO: 10.8 FL
POTASSIUM SERPL-SCNC: 4.5 MMOL/L
PROT SERPL-MCNC: 5.6 G/DL
RBC # BLD AUTO: 2.8 M/UL
SODIUM SERPL-SCNC: 142 MMOL/L
WBC # BLD AUTO: 5.57 K/UL

## 2018-07-17 PROCEDURE — 97165 OT EVAL LOW COMPLEX 30 MIN: CPT

## 2018-07-17 PROCEDURE — 80053 COMPREHEN METABOLIC PANEL: CPT

## 2018-07-17 PROCEDURE — G8988 SELF CARE GOAL STATUS: HCPCS | Mod: CM

## 2018-07-17 PROCEDURE — G8982 BODY POS GOAL STATUS: HCPCS | Mod: CK

## 2018-07-17 PROCEDURE — G8987 SELF CARE CURRENT STATUS: HCPCS | Mod: CM

## 2018-07-17 PROCEDURE — 63600175 PHARM REV CODE 636 W HCPCS: Performed by: HOSPITALIST

## 2018-07-17 PROCEDURE — 85025 COMPLETE CBC W/AUTO DIFF WBC: CPT

## 2018-07-17 PROCEDURE — 97161 PT EVAL LOW COMPLEX 20 MIN: CPT

## 2018-07-17 PROCEDURE — 11000001 HC ACUTE MED/SURG PRIVATE ROOM

## 2018-07-17 PROCEDURE — 94640 AIRWAY INHALATION TREATMENT: CPT

## 2018-07-17 PROCEDURE — 84100 ASSAY OF PHOSPHORUS: CPT

## 2018-07-17 PROCEDURE — 36415 COLL VENOUS BLD VENIPUNCTURE: CPT

## 2018-07-17 PROCEDURE — 25000242 PHARM REV CODE 250 ALT 637 W/ HCPCS: Performed by: HOSPITALIST

## 2018-07-17 PROCEDURE — 94761 N-INVAS EAR/PLS OXIMETRY MLT: CPT

## 2018-07-17 PROCEDURE — 25000003 PHARM REV CODE 250: Performed by: HOSPITALIST

## 2018-07-17 PROCEDURE — 83735 ASSAY OF MAGNESIUM: CPT

## 2018-07-17 PROCEDURE — G0378 HOSPITAL OBSERVATION PER HR: HCPCS

## 2018-07-17 PROCEDURE — 99225 PR SUBSEQUENT OBSERVATION CARE,LEVEL II: CPT | Mod: ,,, | Performed by: HOSPITALIST

## 2018-07-17 PROCEDURE — G8981 BODY POS CURRENT STATUS: HCPCS | Mod: CL

## 2018-07-17 RX ORDER — FUROSEMIDE 10 MG/ML
80 INJECTION INTRAMUSCULAR; INTRAVENOUS ONCE
Status: COMPLETED | OUTPATIENT
Start: 2018-07-17 | End: 2018-07-17

## 2018-07-17 RX ADMIN — IPRATROPIUM BROMIDE AND ALBUTEROL SULFATE 3 ML: .5; 3 SOLUTION RESPIRATORY (INHALATION) at 08:07

## 2018-07-17 RX ADMIN — SODIUM BICARBONATE 650 MG TABLET 650 MG: at 03:07

## 2018-07-17 RX ADMIN — SENNOSIDES AND DOCUSATE SODIUM 1 TABLET: 8.6; 5 TABLET ORAL at 08:07

## 2018-07-17 RX ADMIN — CARVEDILOL 6.25 MG: 6.25 TABLET, FILM COATED ORAL at 08:07

## 2018-07-17 RX ADMIN — CEFTRIAXONE SODIUM 2 G: 2 INJECTION, POWDER, FOR SOLUTION INTRAMUSCULAR; INTRAVENOUS at 03:07

## 2018-07-17 RX ADMIN — DONEPEZIL HYDROCHLORIDE 5 MG: 5 TABLET, FILM COATED ORAL at 09:07

## 2018-07-17 RX ADMIN — IPRATROPIUM BROMIDE AND ALBUTEROL SULFATE 3 ML: .5; 3 SOLUTION RESPIRATORY (INHALATION) at 03:07

## 2018-07-17 RX ADMIN — ATORVASTATIN CALCIUM 40 MG: 20 TABLET, FILM COATED ORAL at 08:07

## 2018-07-17 RX ADMIN — FUROSEMIDE 80 MG: 10 INJECTION, SOLUTION INTRAMUSCULAR; INTRAVENOUS at 01:07

## 2018-07-17 RX ADMIN — PREDNISONE 40 MG: 20 TABLET ORAL at 08:07

## 2018-07-17 RX ADMIN — ASPIRIN 81 MG CHEWABLE TABLET 81 MG: 81 TABLET CHEWABLE at 08:07

## 2018-07-17 RX ADMIN — SENNOSIDES AND DOCUSATE SODIUM 1 TABLET: 8.6; 5 TABLET ORAL at 09:07

## 2018-07-17 RX ADMIN — SODIUM BICARBONATE 650 MG TABLET 650 MG: at 08:07

## 2018-07-17 RX ADMIN — HYDRALAZINE HYDROCHLORIDE AND ISOSORBIDE DINITRATE 1 TABLET: 37.5; 2 TABLET, FILM COATED ORAL at 08:07

## 2018-07-17 RX ADMIN — IPRATROPIUM BROMIDE AND ALBUTEROL SULFATE 3 ML: .5; 3 SOLUTION RESPIRATORY (INHALATION) at 12:07

## 2018-07-17 RX ADMIN — IPRATROPIUM BROMIDE AND ALBUTEROL SULFATE 3 ML: .5; 3 SOLUTION RESPIRATORY (INHALATION) at 07:07

## 2018-07-17 RX ADMIN — AMLODIPINE BESYLATE 10 MG: 10 TABLET ORAL at 08:07

## 2018-07-17 RX ADMIN — HEPARIN SODIUM 5000 UNITS: 5000 INJECTION, SOLUTION INTRAVENOUS; SUBCUTANEOUS at 05:07

## 2018-07-17 RX ADMIN — IPRATROPIUM BROMIDE AND ALBUTEROL SULFATE 3 ML: .5; 3 SOLUTION RESPIRATORY (INHALATION) at 11:07

## 2018-07-17 RX ADMIN — HEPARIN SODIUM 5000 UNITS: 5000 INJECTION, SOLUTION INTRAVENOUS; SUBCUTANEOUS at 01:07

## 2018-07-17 RX ADMIN — HYDRALAZINE HYDROCHLORIDE AND ISOSORBIDE DINITRATE 1 TABLET: 37.5; 2 TABLET, FILM COATED ORAL at 03:07

## 2018-07-17 NOTE — PLAN OF CARE
Problem: Patient Care Overview  Goal: Plan of Care Review  Outcome: Ongoing (interventions implemented as appropriate)  Patient disoriented to time, pleasant, calm.  Patient voided 500mL overnight.  O2Sats stable on 2L N.C.  No falls or injuries.

## 2018-07-17 NOTE — PLAN OF CARE
Problem: Physical Therapy Goal  Goal: Physical Therapy Goal  Outcome: Outcome(s) achieved Date Met: 07/17/18  Physical therapy evaluation completed. No further need for acute PT services.    JESSICA Dotson  7/17/2018

## 2018-07-17 NOTE — ASSESSMENT & PLAN NOTE
- BP initially elevated on presentation; now improved  - continue amlodipine, carvedilol, and isosorbide-hydralazine

## 2018-07-17 NOTE — PLAN OF CARE
Problem: Occupational Therapy Goal  Goal: Occupational Therapy Goal  Outcome: Outcome(s) achieved Date Met: 07/17/18  Evaluation complete.  Pt at baseline functional performance and without skilled OT need.  Irma Quiros, OT  7/17/2018

## 2018-07-17 NOTE — SUBJECTIVE & OBJECTIVE
Interval History: no acute issues; patient states SOB improved; sister at bedside     Review of Systems   Constitutional: Negative for fatigue.   Respiratory: Negative for cough and shortness of breath.    Cardiovascular: Negative for chest pain and palpitations.   Gastrointestinal: Negative for abdominal pain.   Genitourinary: Negative for difficulty urinating and dysuria.   Musculoskeletal: Negative for arthralgias.     Objective:     Vital Signs (Most Recent):  Temp: 98.5 °F (36.9 °C) (07/17/18 1204)  Pulse: 74 (07/17/18 1204)  Resp: 16 (07/17/18 1204)  BP: (!) 120/59 (07/17/18 1204)  SpO2: 100 % (07/17/18 1204) Vital Signs (24h Range):  Temp:  [97.8 °F (36.6 °C)-98.5 °F (36.9 °C)] 98.5 °F (36.9 °C)  Pulse:  [63-87] 74  Resp:  [16-20] 16  SpO2:  [21 %-100 %] 100 %  BP: (120-157)/(59-89) 120/59     Weight: 68 kg (150 lb)  Body mass index is 25.75 kg/m².    Intake/Output Summary (Last 24 hours) at 07/17/18 1304  Last data filed at 07/17/18 0600   Gross per 24 hour   Intake                0 ml   Output              700 ml   Net             -700 ml      Physical Exam   Constitutional: She appears well-developed and well-nourished. No distress.   Cardiovascular: Normal rate.    Pulmonary/Chest: Effort normal. No respiratory distress. She has rales.   Abdominal: Soft. Bowel sounds are normal.   Musculoskeletal: She exhibits no edema.   Neurological: She is alert.   Oriented to person; states she's at a nursing home    Vitals reviewed.      Significant Labs: All pertinent labs within the past 24 hours have been reviewed.    Significant Imaging: I have reviewed all pertinent imaging results/findings within the past 24 hours.

## 2018-07-17 NOTE — ASSESSMENT & PLAN NOTE
Presented with SOB, resp failure, elevated BNP, pulmonary edema on CXR, crackles on exam  Received 1 L NS in ED  Given dose of IV furosemide 120 mg x 1  Good UOP but still net positive; will give another IV dose today of 80 mg x 1 and monitor   Strict I/O's, daily weights, fluid/sodium restriction  Cont home BB. No ACEI/ARB due to CKD5, according to chart check

## 2018-07-17 NOTE — PT/OT/SLP EVAL
Occupational Therapy   Evaluation and Discharge Note    Name: Misa Yun  MRN: [id number]  Admitting Diagnosis:  Acute on chronic combined systolic and diastolic heart failure      Recommendations:     Discharge Recommendations: nursing facility, basic  Discharge Equipment Recommendations:  none  Barriers to discharge:  None    History:     Occupational Profile:  Living Environment: Pt lives in nursing home with assist with self care   Previous level of function: pt with mod A for self care completion  Equipment Owned:  wheelchair  Assistance upon Discharge NH staff    Past Medical History:   Diagnosis Date    Anemia     Anemia     CAD (coronary artery disease)     Cataract     Chemotherapy follow-up examination 11/27/2017    CHF (congestive heart failure)     Chronic kidney disease (CKD), stage IV (severe) 03/27/2017    Sees  at [facility], no dialysis    Dementia     Encounter for blood transfusion 2006    at time of CABG    Fracture of humerus, proximal, left, closed 1/28/2015    Hypercalcemia 04/10/2017    Hyperlipidemia     Hyperparathyroidism     Hypertension     Mediastinal lymphadenopathy 10/9/2017    MI (myocardial infarction) 10/2016    Nephritis 6/18/2018    Osteoporosis     Ovarian cancer     in the past    Primary lung cancer, right 10/9/2017    Type 2 diabetes mellitus, controlled     UTI (urinary tract infection) 04/11/2017       Past Surgical History:   Procedure Laterality Date    CATARACT EXTRACTION W/  INTRAOCULAR LENS IMPLANT Left 04/17/2017    Dr. Motta    CATARACT EXTRACTION W/  INTRAOCULAR LENS IMPLANT Right 05/06/2017    Dr Motta     CORONARY ARTERY BYPASS GRAFT  2006    EYE SURGERY      HYSTERECTOMY  2002    ORIF HUMERUS FRACTURE Left 1/28/2015       Subjective     Chief Complaint: no complaints, poor mobility   Patient/Family stated goals: return to NH  Communicated with: RN prior to session.  Pain/Comfort:  · Pain Rating 1: 0/10    Patients  cultural, spiritual, Yazidism conflicts given the current situation: none stated    Objective:     Patient found with:  (lines intact)    General Precautions: Standard, fall   Orthopedic Precautions:N/A   Braces: N/A     Occupational Performance:    Bed Mobility:    · Patient completed Rolling/Turning to Right with moderate assistance  · Patient completed Scooting/Bridging with moderate assistance  · Patient completed Supine to Sit with minimum assistance  · Patient completed Sit to Supine with minimum assistance    Functional Mobility/Transfers:  · Patient completed Sit <> Stand Transfer with minimum assistance  with  rolling walker   · Functional Mobility: Pt able to stand with rolling walker with CGA    Activities of Daily Living:  · Pt overall mod/max A for self care completion     Cognitive/Visual Perceptual:  Cognitive/Psychosocial Skills:     -       Oriented to: Person   -       Follows Commands/attention:Easily distracted  -       Communication: clear/fluent  -       Memory: Impaired STM, Impaired LTM and dementia  -       Safety awareness/insight to disability: impaired   -       Mood/Affect/Coping skills/emotional control: Appropriate to situation    Physical Exam:  Upper Extremity Range of Motion:     -       Right Upper Extremity: WFL  -       Left Upper Extremity: WFL  Upper Extremity Strength:    -       Right Upper Extremity: WFL  -       Left Upper Extremity: WFL    Patient left supine with all lines intact and sister present    AMPA 6 Click:  AMPA Total Score: 12    Treatment & Education:  Evaluation complete.  Pt educated on safety, role of OT, importance of increased participation in self care for gains , expectations for participation, expectations for gains, POC, energy conservation, caregiver strain. White board updated.     Education:    Assessment:     Misa Yun is a 84 y.o. female with a medical diagnosis of Acute on chronic combined systolic and diastolic heart failure. At  "this time, patient is functioning at their prior level of function and does not require further acute OT services.     Clinical Decision Makin.  OT Low:  "Pt evaluation falls under low complexity for evaluation coding due to performance deficits noted in 1-3 areas as stated above and 0 co-morbities affecting current functional status. Data obtained from problem focused assessments. No modifications or assistance was required for completion of evaluation. Only brief occupational profile and history review completed."     Plan:     During this hospitalization, patient does not require further acute OT services.  Please re-consult if situation changes.    · Plan of Care Reviewed with: patient, sibling    This Plan of care has been discussed with the patient who was involved in its development and understands and is in agreement with the identified goals and treatment plan    GOALS:    Occupational Therapy Goals     Not on file          Multidisciplinary Problems (Resolved)        Problem: Occupational Therapy Goal    Goal Priority Disciplines Outcome Interventions   Occupational Therapy Goal   (Resolved)     OT, PT/OT Outcome(s) achieved                    Time Tracking:     OT Date of Treatment: 18  OT Start Time: 45  OT Stop Time: 08  OT Total Time (min): 13 min    Billable Minutes:Evaluation 13    Irma Quiros, OT  2018    "

## 2018-07-17 NOTE — PLAN OF CARE
CM met with patient and sister for discharge planning.  Patient is a full time resident of [facility].  She is wheelchair bound but can stand long enough to get into wheelchair.  Plan is to discharge back to [facility].    [facility] [place] [facility] [address] [address]  [id number]  [address]  [facility] [address]  Phone: [phone] Fax: [phone]    PCP:  [facility]    Payor: [employer] MEDICARE / Plan: [employer] / Product Type: Medicare Advantage /      07/17/18 1056   Discharge Assessment   Assessment Type Discharge Planning Assessment   Confirmed/corrected address and phone number on facesheet? Yes   Assessment information obtained from? Patient   Expected Length of Stay (days) 3   Communicated expected length of stay with patient/caregiver yes   Prior to hospitilization cognitive status: Alert/Oriented   Prior to hospitalization functional status: Wheelchair Bound   Current cognitive status: Alert/Oriented   Current Functional Status: Wheelchair Bound   Facility Arrived From: From [facility]   Lives With facility resident   Able to Return to Prior Arrangements yes   Is patient able to care for self after discharge? No   Who are your caregiver(s) and their phone number(s)? Carlota Tejeda - [phone]   Patient's perception of discharge disposition nursing home   Readmission Within The Last 30 Days no previous admission in last 30 days   Patient currently being followed by outpatient case management? No   Patient currently receives any other outside agency services? No   Equipment Currently Used at Home wheelchair   Do you have any problems affording any of your prescribed medications? No   Is the patient taking medications as prescribed? yes   Does the patient have transportation home? Yes   Transportation Available other (see comments)  (Nursing Home Transportation)   Does the patient receive services  at [facility]? No   Discharge Plan A Return to nursing home   Discharge Plan B Skilled Nursing Facility   Patient/Family In Agreement With Plan yes

## 2018-07-17 NOTE — ASSESSMENT & PLAN NOTE
Elevated but likely from renal dysfunction. Denies CP. No findings on ECG consistent with ischemia  Trending down

## 2018-07-17 NOTE — PT/OT/SLP EVAL
"Physical Therapy Evaluation and Discharge Note    Patient Name:  Misa Yun   MRN:  [id number]    Recommendations:     Discharge Recommendations:  nursing facility, basic with HH  Discharge Equipment Recommendations: none   Barriers to discharge: None    Assessment:     Misa Yun is a 84 y.o. female admitted with a medical diagnosis of Acute on chronic combined systolic and diastolic heart failure. Patient pleasantly confused during evaluation. Sister at bedside, reported that at baseline patient needs assistance for all ADLs, including bed mobility, dressing, tolieting. Patient does not ambulate and uses w/c as primary means of transportation and is able to stand with assistance to transfer to/from w/c. At this time, patient is functioning at their prior level of function and does not require further acute PT services.     Recent Surgery: * No surgery found *      Plan:     During this hospitalization, patient does not require further acute PT services.  Please re-consult if situation changes.     Plan of Care Reviewed with: patient, sibling    Subjective     Communicated with nsg prior to session.  Patient found supine upon PT entry to room, agreeable to evaluation.      Chief Complaint: decreased functional mobility  Patient comments/goals: "I want to eat."  Pain/Comfort:  · Pain Rating 1: 0/10    Patients cultural, spiritual, Christianity conflicts given the current situation: none    Living Environment:  Patient History:  · Home environment: Nursing facility  · Assistance provided:  Nursing staff      Previous Level of Mobilty  · Transfers: Assistance for all transfers/bed mobility  · Gait: No, uses w/c with BUE/BLE as primary means of transportation      DME: wheelchair  - Bold implies equipment being used      Objective:     Patient found with: telemetry, oxygen, peripheral IV     General Precautions: Standard, fall   Orthopedic Precautions:N/A   Braces: N/A       Exams:  Cognitive Exam  " Patient is oriented to Person and follows 50% of multistep commands. Pleasantly confused, not oriented to place, month or year    Fine Motor Coordination -       Intact   Postural Exam Patient presented with the following abnormalities: -       Rounded shoulders  -       Forward head  -       Posterior pelvic tilt   Sensation -       Intact   Skin Integrity/Edema -       Skin integrity: Visible skin intact   R LE ROM WFL   R LE Strength  WFL   L LE ROM WFL   L LE Strength  WFL       Functional Mobility  Bed Mobility  Scooting: moderate assistance  Supine to Sit: maximal assistance   Sit to Supine: moderate assistance   Transfers Sit to Stand:  minimum assistance with no AD for 1 trials           Balance   Static Sitting stand by assistance   Dynamic Sitting stand by assistance   Static Standing contact guard assistance: Patient able to stand for approximately 3 minutes   Dynamic Standing minimum assistance         AM-PAC 6 CLICK MOBILITY  Total Score:13       Therapeutic Activities and Exercises:    PT educated pt on the following  - role of PT  - PT POC (including frequency and duration while in hospital)  - discharge recommendation (return to nursing facility) and equipment needs (wheelchair)  - level of assistance currently req (safe to transfer with 1 person assist)and safety precautions with nsg staff  -Perineal cleaning performed by OT while patient standing  All questions and concerns answered and addressed. White board updated with pertinent information. Nsg notified.       Patient left with bed in chair position with all lines intact and call button in reach.    GOALS:    Physical Therapy Goals     Not on file          Multidisciplinary Problems (Resolved)        Problem: Physical Therapy Goal    Goal Priority Disciplines Outcome Goal Variances Interventions   Physical Therapy Goal   (Resolved)     PT/OT, PT Outcome(s) achieved                     History:     Past Medical History:   Diagnosis Date     Anemia     Anemia     CAD (coronary artery disease)     Cataract     Chemotherapy follow-up examination 11/27/2017    CHF (congestive heart failure)     Chronic kidney disease (CKD), stage IV (severe) 03/27/2017    [facility]  at [facility], no dialysis    Dementia     Encounter for blood transfusion 2006    at time of CABG    Fracture of humerus, proximal, left, closed 1/28/2015    Hypercalcemia 04/10/2017    Hyperlipidemia     Hyperparathyroidism     Hypertension     Mediastinal lymphadenopathy 10/9/2017    MI (myocardial infarction) 10/2016    Nephritis 6/18/2018    Osteoporosis     Ovarian cancer     in the past    Primary lung cancer, right 10/9/2017    Type 2 diabetes mellitus, controlled     UTI (urinary tract infection) 04/11/2017       Past Surgical History:   Procedure Laterality Date    CATARACT EXTRACTION W/  INTRAOCULAR LENS IMPLANT Left 04/17/2017    Dr. Motta    CATARACT EXTRACTION W/  INTRAOCULAR LENS IMPLANT Right 05/06/2017    Dr Motta     CORONARY ARTERY BYPASS GRAFT  2006    EYE SURGERY      HYSTERECTOMY  2002    ORIF HUMERUS FRACTURE Left 1/28/2015       Clinical Decision Making:     History  Co-morbidities and personal factors that may impact the plan of care Examination  Body Structures and Functions, activity limitations and participation restrictions that may impact the plan of care Clinical Presentation   Decision Making/ Complexity Score   Co-morbidities:   [] Time since onset of injury / illness / exacerbation  [x] Status of current condition  [x]Patient's cognitive status and safety concerns    [] Multiple Medical Problems (see med hx)  Personal Factors:   [] Patient's age  [] Prior Level of function   [] Patient's home situation (environment and family support)  [] Patient's level of motivation  [] Expected progression of patient      HISTORY:(criteria)    [] 59131 - no personal factors/history    [x] 29232 - has 1-2 personal factor/comorbidity     []  61812 - has >3 personal factor/comorbidity     Body Regions:  [] Objective examination findings  [] Head     []  Neck  [] Trunk   [] Upper Extremity  [] Lower Extremity    Body Systems:  [x] For communication ability, affect, cognition, language, and learning style: the assessment of the ability to make needs known, consciousness, orientation (person, place, and time), expected emotional /behavioral responses, and learning preferences (eg, learning barriers, education  needs)  [x] For the neuromuscular system: a general assessment of gross coordinated movement (eg, balance, gait, locomotion, transfers, and transitions) and motor function  (motor control and motor learning)  [] For the musculoskeletal system: the assessment of gross symmetry, gross range of motion, gross strength, height, and weight  [] For the integumentary system: the assessment of pliability(texture), presence of scar formation, skin color, and skin integrity  [] For cardiovascular/pulmonary system: the assessment of heart rate, respiratory rate, blood pressure, and edema     Activity limitations:    [x] Patient's cognitive status and saf ety concerns          [x] Status of current condition      [] Weight bearing restriction  [] Cardiopulmunary Restriction    Participation Restrictions:   [] Goals and goal agreement with the patient     [] Rehab potential (prognosis) and probable outcome      Examination of Body System: (criteria)    [] 13163 - addressing 1-2 elements    [] 06376 - addressing a total of 3 or more elements     [x] 55283 -  Addressing a total of 4 or more elements         Clinical Presentation: (criteria)  Stable - 98902     On examination of body system using standardized tests and measures patient presents with 4 or more elements from any of the following: body structures and functions, activity limitations, and/or participation restrictions.  Leading to a clinical presentation that is considered stable and/or  uncomplicated                              Clinical Decision Making  (Eval Complexity):  Low- 09508     Time Tracking:     PT Received On: 07/17/18  PT Start Time: 0843     PT Stop Time: 0857  PT Total Time (min): 14 min     Billable Minutes: Evaluation 14   Co-evaluation with OT      Gloria Smith, SPT  07/17/2018

## 2018-07-17 NOTE — ASSESSMENT & PLAN NOTE
- creatinine worsened from baseline  - likely due to chf exacerbation  - monitor electrolytes with daily labs  - continue with diuresis

## 2018-07-17 NOTE — ASSESSMENT & PLAN NOTE
2/2 HF exacerbation as above  Transitioned from BIPAP to 4L in ED; now on RA   Continue with nebs

## 2018-07-17 NOTE — PLAN OF CARE
Problem: Patient Care Overview  Goal: Plan of Care Review  Outcome: Ongoing (interventions implemented as appropriate)  Plan of care reviewed. Pt lying in bed AAO, no distress or SOB noted. Will cont to monitor pt

## 2018-07-17 NOTE — PROGRESS NOTES
[facility] Hospital Medicine  Progress Note    Patient Name: Misa Yun  MRN: [id number]  Patient Class: OP- Observation   Admission Date: 7/16/2018  Length of Stay: 0 days  Attending Physician: Itz Salmeron MD  Primary Care Provider: [facility] Medicine Team: [facility] G Itz aSlmeron MD    Subjective:     Principal Problem:Acute on chronic combined systolic and diastolic heart failure    HPI:  History is limited as patient has dementia. History obtained from sister Carlota Tejeda her POA    84F with DM2 A1C 8, HTN, HLD, dementia, CAD s/p CABG '06, combined chronic heart failure (5/18 TTE EF 45-50%, G1DD), ovarian cancer, and lung squamous cell ca (no longer taking chemo due to nephritis)  [facility] reports increased work of breathing and a sat of 89-90% on room air this AM. Was given a breathing treatment and supplemental oxygen with no improvement so she was transferred to the ED. Pt denies cough, chest pain, fever, or chills.     She was initially placed on BIPAP, since has been transitioned to 4 L NC, received Cefepime x 1, a breathing treatment, and a one liter NS bolus in the ED.     Confirmed full code status with sister.   Sister called Bon, who confirmed that there is no report of any fall by patient.     Hospital Course:  No notes on file    Interval History: no acute issues; patient states SOB improved; sister at bedside     Review of Systems   Constitutional: Negative for fatigue.   Respiratory: Negative for cough and shortness of breath.    Cardiovascular: Negative for chest pain and palpitations.   Gastrointestinal: Negative for abdominal pain.   Genitourinary: Negative for difficulty urinating and dysuria.   Musculoskeletal: Negative for arthralgias.     Objective:     Vital Signs (Most Recent):  Temp: 98.5 °F (36.9 °C) (07/17/18 1204)  Pulse: 74 (07/17/18 1204)  Resp: 16 (07/17/18 1204)  BP: (!) 120/59 (07/17/18  1204)  SpO2: 100 % (07/17/18 1204) Vital Signs (24h Range):  Temp:  [97.8 °F (36.6 °C)-98.5 °F (36.9 °C)] 98.5 °F (36.9 °C)  Pulse:  [63-87] 74  Resp:  [16-20] 16  SpO2:  [21 %-100 %] 100 %  BP: (120-157)/(59-89) 120/59     Weight: 68 kg (150 lb)  Body mass index is 25.75 kg/m².    Intake/Output Summary (Last 24 hours) at 07/17/18 1304  Last data filed at 07/17/18 0600   Gross per 24 hour   Intake                0 ml   Output              700 ml   Net             -700 ml      Physical Exam   Constitutional: She appears well-developed and well-nourished. No distress.   Cardiovascular: Normal rate.    Pulmonary/Chest: Effort normal. No respiratory distress. She has rales.   Abdominal: Soft. Bowel sounds are normal.   Musculoskeletal: She exhibits no edema.   Neurological: She is alert.   Oriented to person; states she's at a nursing home    Vitals reviewed.      Significant Labs: All pertinent labs within the past 24 hours have been reviewed.    Significant Imaging: I have reviewed all pertinent imaging results/findings within the past 24 hours.    Assessment/Plan:      * Acute on chronic combined systolic and diastolic heart failure    Presented with SOB, resp failure, elevated BNP, pulmonary edema on CXR, crackles on exam  Received 1 L NS in ED  Given dose of IV furosemide 120 mg x 1  Good UOP but still net positive; will give another IV dose today of 80 mg x 1 and monitor   Strict I/O's, daily weights, fluid/sodium restriction  Cont home BB. No ACEI/ARB due to CKD5, according to chart check              Acute respiratory failure with hypoxia    2/2 HF exacerbation as above  Transitioned from BIPAP to 4L in ED; now on RA   Continue with nebs           Elevated troponin    Elevated but likely from renal dysfunction. Denies CP. 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 Cont ASA, statin.           Dementia    No acute issues.  Cont home Aricept.   Delirium precautions.          Essential hypertension    - BP initially elevated on presentation; now improved  - continue amlodipine, carvedilol, and isosorbide-hydralazine              VTE Risk Mitigation         Ordered     heparin (porcine) injection 5,000 Units  Every 8 hours      07/16/18 1222     IP VTE HIGH RISK PATIENT  Once      07/16/18 1222              Itz Salmeron MD  Department of Hospital Medicine   [facility]

## 2018-07-18 ENCOUNTER — PATIENT MESSAGE (OUTPATIENT)
Dept: HEMATOLOGY/ONCOLOGY | Facility: CLINIC | Age: 83
End: 2018-07-18

## 2018-07-18 PROBLEM — E11.22 TYPE 2 DM WITH CKD STAGE 5 AND HYPERTENSION: Chronic | Status: ACTIVE | Noted: 2017-04-13

## 2018-07-18 PROBLEM — N18.5 TYPE 2 DM WITH CKD STAGE 5 AND HYPERTENSION: Chronic | Status: ACTIVE | Noted: 2017-04-13

## 2018-07-18 PROBLEM — I50.43 ACUTE ON CHRONIC COMBINED SYSTOLIC AND DIASTOLIC HEART FAILURE: Chronic | Status: ACTIVE | Noted: 2018-07-16

## 2018-07-18 PROBLEM — I12.0 TYPE 2 DM WITH CKD STAGE 5 AND HYPERTENSION: Chronic | Status: ACTIVE | Noted: 2017-04-13

## 2018-07-18 LAB
ALLENS TEST: ABNORMAL
ANION GAP SERPL CALC-SCNC: 12 MMOL/L
BACTERIA #/AREA URNS AUTO: ABNORMAL /HPF
BACTERIA UR CULT: NORMAL
BASOPHILS # BLD AUTO: 0 K/UL
BASOPHILS NFR BLD: 0 %
BILIRUB UR QL STRIP: NEGATIVE
BUN SERPL-MCNC: 72 MG/DL
CALCIUM SERPL-MCNC: 7.8 MG/DL
CHLORIDE SERPL-SCNC: 103 MMOL/L
CLARITY UR REFRACT.AUTO: ABNORMAL
CO2 SERPL-SCNC: 21 MMOL/L
COLOR UR AUTO: YELLOW
CREAT SERPL-MCNC: 4.5 MG/DL
CREAT UR-MCNC: 95 MG/DL
CREAT UR-MCNC: 95 MG/DL
DELSYS: ABNORMAL
DIFFERENTIAL METHOD: ABNORMAL
EOSINOPHIL # BLD AUTO: 0 K/UL
EOSINOPHIL NFR BLD: 0 %
ERYTHROCYTE [DISTWIDTH] IN BLOOD BY AUTOMATED COUNT: 16 %
EST. GFR  (AFRICAN AMERICAN): 9.7 ML/MIN/1.73 M^2
EST. GFR  (NON AFRICAN AMERICAN): 8.4 ML/MIN/1.73 M^2
FLOW: 1
GLUCOSE SERPL-MCNC: 310 MG/DL
GLUCOSE UR QL STRIP: ABNORMAL
HCO3 UR-SCNC: 23.6 MMOL/L (ref 24–28)
HCT VFR BLD AUTO: 21.5 %
HGB BLD-MCNC: 7.2 G/DL
HGB UR QL STRIP: ABNORMAL
HYALINE CASTS UR QL AUTO: 0 /LPF
IMM GRANULOCYTES # BLD AUTO: 0.02 K/UL
IMM GRANULOCYTES NFR BLD AUTO: 0.3 %
KETONES UR QL STRIP: NEGATIVE
LEUKOCYTE ESTERASE UR QL STRIP: ABNORMAL
LYMPHOCYTES # BLD AUTO: 0.7 K/UL
LYMPHOCYTES NFR BLD: 10.9 %
MAGNESIUM SERPL-MCNC: 1.6 MG/DL
MCH RBC QN AUTO: 29.3 PG
MCHC RBC AUTO-ENTMCNC: 33.5 G/DL
MCV RBC AUTO: 87 FL
MICROSCOPIC COMMENT: ABNORMAL
MODE: ABNORMAL
MONOCYTES # BLD AUTO: 0.4 K/UL
MONOCYTES NFR BLD: 6.4 %
NEUTROPHILS # BLD AUTO: 5.3 K/UL
NEUTROPHILS NFR BLD: 82.4 %
NITRITE UR QL STRIP: NEGATIVE
NRBC BLD-RTO: 0 /100 WBC
PCO2 BLDA: 35.6 MMHG (ref 35–45)
PH SMN: 7.43 [PH] (ref 7.35–7.45)
PH UR STRIP: 5 [PH] (ref 5–8)
PHOSPHATE SERPL-MCNC: 4.3 MG/DL
PLATELET # BLD AUTO: 126 K/UL
PMV BLD AUTO: 10.9 FL
PO2 BLDA: 99 MMHG (ref 80–100)
POC BE: -1 MMOL/L
POC SATURATED O2: 98 % (ref 95–100)
POC TCO2: 25 MMOL/L (ref 23–27)
POCT GLUCOSE: 364 MG/DL (ref 70–110)
POTASSIUM SERPL-SCNC: 4.3 MMOL/L
PROT UR QL STRIP: ABNORMAL
PROT UR-MCNC: 459 MG/DL
PROT UR-MCNC: 459 MG/DL
PROT/CREAT RATIO, UR: 4.83
RBC # BLD AUTO: 2.46 M/UL
RBC #/AREA URNS AUTO: 1 /HPF (ref 0–4)
SAMPLE: ABNORMAL
SITE: ABNORMAL
SODIUM SERPL-SCNC: 136 MMOL/L
SODIUM UR-SCNC: <20 MMOL/L
SP GR UR STRIP: 1.01 (ref 1–1.03)
SQUAMOUS #/AREA URNS AUTO: 2 /HPF
URN SPEC COLLECT METH UR: ABNORMAL
UROBILINOGEN UR STRIP-ACNC: NEGATIVE EU/DL
UUN UR-MCNC: 531 MG/DL
WBC # BLD AUTO: 6.4 K/UL
WBC #/AREA URNS AUTO: 78 /HPF (ref 0–5)

## 2018-07-18 PROCEDURE — 36415 COLL VENOUS BLD VENIPUNCTURE: CPT

## 2018-07-18 PROCEDURE — 27000221 HC OXYGEN, UP TO 24 HOURS

## 2018-07-18 PROCEDURE — 84156 ASSAY OF PROTEIN URINE: CPT

## 2018-07-18 PROCEDURE — 36600 WITHDRAWAL OF ARTERIAL BLOOD: CPT

## 2018-07-18 PROCEDURE — 99900035 HC TECH TIME PER 15 MIN (STAT)

## 2018-07-18 PROCEDURE — 25000003 PHARM REV CODE 250: Performed by: HOSPITALIST

## 2018-07-18 PROCEDURE — 99232 SBSQ HOSP IP/OBS MODERATE 35: CPT | Mod: ,,, | Performed by: HOSPITALIST

## 2018-07-18 PROCEDURE — 84100 ASSAY OF PHOSPHORUS: CPT

## 2018-07-18 PROCEDURE — 83735 ASSAY OF MAGNESIUM: CPT

## 2018-07-18 PROCEDURE — 94640 AIRWAY INHALATION TREATMENT: CPT

## 2018-07-18 PROCEDURE — 94761 N-INVAS EAR/PLS OXIMETRY MLT: CPT

## 2018-07-18 PROCEDURE — 80048 BASIC METABOLIC PNL TOTAL CA: CPT

## 2018-07-18 PROCEDURE — 84540 ASSAY OF URINE/UREA-N: CPT

## 2018-07-18 PROCEDURE — 11000001 HC ACUTE MED/SURG PRIVATE ROOM

## 2018-07-18 PROCEDURE — G0378 HOSPITAL OBSERVATION PER HR: HCPCS

## 2018-07-18 PROCEDURE — 99233 SBSQ HOSP IP/OBS HIGH 50: CPT | Mod: GC,,, | Performed by: INTERNAL MEDICINE

## 2018-07-18 PROCEDURE — 85025 COMPLETE CBC W/AUTO DIFF WBC: CPT

## 2018-07-18 PROCEDURE — 81001 URINALYSIS AUTO W/SCOPE: CPT

## 2018-07-18 PROCEDURE — 63600175 PHARM REV CODE 636 W HCPCS: Performed by: HOSPITALIST

## 2018-07-18 PROCEDURE — 84300 ASSAY OF URINE SODIUM: CPT

## 2018-07-18 PROCEDURE — 25000242 PHARM REV CODE 250 ALT 637 W/ HCPCS: Performed by: HOSPITALIST

## 2018-07-18 RX ADMIN — PREDNISONE 40 MG: 20 TABLET ORAL at 08:07

## 2018-07-18 RX ADMIN — HYDRALAZINE HYDROCHLORIDE AND ISOSORBIDE DINITRATE 1 TABLET: 37.5; 2 TABLET, FILM COATED ORAL at 02:07

## 2018-07-18 RX ADMIN — SODIUM BICARBONATE 650 MG TABLET 650 MG: at 08:07

## 2018-07-18 RX ADMIN — SENNOSIDES AND DOCUSATE SODIUM 1 TABLET: 8.6; 5 TABLET ORAL at 09:07

## 2018-07-18 RX ADMIN — CARVEDILOL 6.25 MG: 6.25 TABLET, FILM COATED ORAL at 08:07

## 2018-07-18 RX ADMIN — IPRATROPIUM BROMIDE AND ALBUTEROL SULFATE 3 ML: .5; 3 SOLUTION RESPIRATORY (INHALATION) at 04:07

## 2018-07-18 RX ADMIN — IPRATROPIUM BROMIDE AND ALBUTEROL SULFATE 3 ML: .5; 3 SOLUTION RESPIRATORY (INHALATION) at 11:07

## 2018-07-18 RX ADMIN — SENNOSIDES AND DOCUSATE SODIUM 1 TABLET: 8.6; 5 TABLET ORAL at 08:07

## 2018-07-18 RX ADMIN — ATORVASTATIN CALCIUM 40 MG: 20 TABLET, FILM COATED ORAL at 10:07

## 2018-07-18 RX ADMIN — HEPARIN SODIUM 5000 UNITS: 5000 INJECTION, SOLUTION INTRAVENOUS; SUBCUTANEOUS at 09:07

## 2018-07-18 RX ADMIN — AMLODIPINE BESYLATE 10 MG: 10 TABLET ORAL at 08:07

## 2018-07-18 RX ADMIN — IPRATROPIUM BROMIDE AND ALBUTEROL SULFATE 3 ML: .5; 3 SOLUTION RESPIRATORY (INHALATION) at 07:07

## 2018-07-18 RX ADMIN — INSULIN ASPART 5 UNITS: 100 INJECTION, SOLUTION INTRAVENOUS; SUBCUTANEOUS at 09:07

## 2018-07-18 RX ADMIN — CEFTRIAXONE SODIUM 2 G: 2 INJECTION, POWDER, FOR SOLUTION INTRAMUSCULAR; INTRAVENOUS at 04:07

## 2018-07-18 RX ADMIN — IPRATROPIUM BROMIDE AND ALBUTEROL SULFATE 3 ML: .5; 3 SOLUTION RESPIRATORY (INHALATION) at 12:07

## 2018-07-18 RX ADMIN — HEPARIN SODIUM 5000 UNITS: 5000 INJECTION, SOLUTION INTRAVENOUS; SUBCUTANEOUS at 06:07

## 2018-07-18 RX ADMIN — IPRATROPIUM BROMIDE AND ALBUTEROL SULFATE 3 ML: .5; 3 SOLUTION RESPIRATORY (INHALATION) at 08:07

## 2018-07-18 RX ADMIN — HYDRALAZINE HYDROCHLORIDE AND ISOSORBIDE DINITRATE 1 TABLET: 37.5; 2 TABLET, FILM COATED ORAL at 08:07

## 2018-07-18 RX ADMIN — DONEPEZIL HYDROCHLORIDE 5 MG: 5 TABLET, FILM COATED ORAL at 08:07

## 2018-07-18 RX ADMIN — ASPIRIN 81 MG CHEWABLE TABLET 81 MG: 81 TABLET CHEWABLE at 08:07

## 2018-07-18 RX ADMIN — SODIUM BICARBONATE 650 MG TABLET 650 MG: at 02:07

## 2018-07-18 RX ADMIN — HEPARIN SODIUM 5000 UNITS: 5000 INJECTION, SOLUTION INTRAVENOUS; SUBCUTANEOUS at 02:07

## 2018-07-18 NOTE — SUBJECTIVE & OBJECTIVE
Interval History: reports SOB today but lying comfortably; sister at bedside     Review of Systems   Constitutional: Negative for fatigue.   Respiratory: Positive for shortness of breath. Negative for cough.    Cardiovascular: Negative for chest pain and palpitations.   Gastrointestinal: Negative for abdominal pain.   Genitourinary: Negative for difficulty urinating and dysuria.   Musculoskeletal: Negative for arthralgias.     Objective:     Vital Signs (Most Recent):  Temp: 98.1 °F (36.7 °C) (07/18/18 1519)  Pulse: 76 (07/18/18 1800)  Resp: 20 (07/18/18 1642)  BP: (!) 118/59 (07/18/18 1519)  SpO2: 97 % (07/18/18 1642) Vital Signs (24h Range):  Temp:  [97.8 °F (36.6 °C)-98.5 °F (36.9 °C)] 98.1 °F (36.7 °C)  Pulse:  [65-77] 76  Resp:  [16-20] 20  SpO2:  [86 %-98 %] 97 %  BP: (118-157)/(55-70) 118/59     Weight: 68 kg (150 lb)  Body mass index is 25.75 kg/m².    Intake/Output Summary (Last 24 hours) at 07/18/18 1856  Last data filed at 07/18/18 1800   Gross per 24 hour   Intake              380 ml   Output             1120 ml   Net             -740 ml      Physical Exam   Constitutional: She appears well-developed and well-nourished. No distress.   Cardiovascular: Normal rate.    Pulmonary/Chest: Effort normal. No respiratory distress. She has rales (diminished bibasilar ).   Abdominal: Soft. Bowel sounds are normal.   Musculoskeletal: She exhibits no edema.   Neurological: She is alert.   Oriented to person; states she's at a nursing home    Vitals reviewed.      Significant Labs: All pertinent labs within the past 24 hours have been reviewed.    Significant Imaging: I have reviewed all pertinent imaging results/findings within the past 24 hours.

## 2018-07-18 NOTE — CONSULTS
[facility]  Nephrology  Consult Note    Patient Name: Misa Yun  MRN: [id number]  Admission Date: 7/16/2018  Hospital Length of Stay: 0 days  Attending Provider: Itz Bryan MD   Primary Care Physician: [facility]  Principal Problem:Acute on chronic combined systolic and diastolic heart failure    Inpatient consult to Nephrology  Consult performed by: MILAGROS SORIANO  Consult ordered by: ITZ BRYAN  Reason for consult: Patient consulted to Nephrology service due to LESLEY on CKD, for evaluation and recommendations.        Subjective:     HPI: 83 y/o woman with IDDM2, HTN, HPL, CKD (CrBaseline 3.4-4.1 estimated GFR 9-10 cc/min from baseline crt of 2.5 little more than a year ago, with regular f/u with nephrologists Dr. 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      Patient consulted to Nephrology service due to LSELEY worsening creat. on CKD, for evaluation and recommendations.    Past Medical History:   Diagnosis Date    Anemia     Anemia     CAD (coronary artery disease)     Cataract     Chemotherapy follow-up examination 11/27/2017    CHF (congestive heart failure)     Chronic kidney disease (CKD), stage IV (severe) 03/27/2017    [facility], no dialysis    Dementia     Encounter for blood transfusion 2006    at time of CABG    Fracture of humerus, proximal, left, closed 1/28/2015    Hypercalcemia 04/10/2017    Hyperlipidemia     Hyperparathyroidism     Hypertension     Mediastinal lymphadenopathy 10/9/2017    MI (myocardial infarction) 10/2016    Nephritis 6/18/2018    Osteoporosis     Ovarian cancer     in the past    Primary lung cancer, right 10/9/2017    Type 2  diabetes mellitus, controlled     UTI (urinary tract infection) 04/11/2017       Past Surgical History:   Procedure Laterality Date    CATARACT EXTRACTION W/  INTRAOCULAR LENS IMPLANT Left 04/17/2017    Dr. Motta    CATARACT EXTRACTION W/  INTRAOCULAR LENS IMPLANT Right 05/06/2017    Dr Motta     CORONARY ARTERY BYPASS GRAFT  2006    EYE SURGERY      HYSTERECTOMY  2002    ORIF HUMERUS FRACTURE Left 1/28/2015       Review of patient's allergies indicates:  No Known Allergies  Current Facility-Administered Medications   Medication Frequency    albuterol-ipratropium 2.5 mg-0.5 mg/3 mL nebulizer solution 3 mL Q4H    amLODIPine tablet 10 mg Daily    aspirin chewable tablet 81 mg Daily    atorvastatin tablet 40 mg Daily    carvedilol tablet 6.25 mg BID    cefTRIAXone (ROCEPHIN) 2 g in dextrose 5 % 50 mL IVPB Q24H    dextrose 50% injection 12.5 g PRN    dextrose 50% injection 25 g PRN    donepezil tablet 5 mg QHS    glucagon (human recombinant) injection 1 mg PRN    glucose chewable tablet 16 g PRN    glucose chewable tablet 24 g PRN    heparin (porcine) injection 5,000 Units Q8H    insulin aspart U-100 pen 1-10 Units QID (AC + HS) PRN    isosorbide-hydrALAZINE 20-37.5 mg per tablet 1 tablet TID    predniSONE tablet 40 mg Daily    senna-docusate 8.6-50 mg per tablet 1 tablet BID    sodium bicarbonate tablet 650 mg TID    sodium chloride 0.9% flush 5 mL PRN     Family History     Problem Relation (Age of Onset)    Blindness Maternal Grandmother        Social History Main Topics    Smoking status: Former Smoker     Packs/day: 2.00     Years: 30.00     Quit date: 1/20/2007    Smokeless tobacco: Never Used    Alcohol use No    Drug use: No    Sexual activity: Not on file     Review of Systems   Unable to perform ROS: Mental status change   Constitutional: Negative for chills and fever.   HENT: Negative for congestion and facial swelling.    Respiratory: Positive for shortness of breath. Negative  for cough, wheezing and stridor.    Cardiovascular: Positive for leg swelling. Negative for chest pain and palpitations.   Gastrointestinal: Negative for abdominal distention, abdominal pain, diarrhea and nausea.   Genitourinary: Negative for dysuria, flank pain, frequency and urgency.   All other systems reviewed and are negative.    Objective:     Vital Signs (Most Recent):  Temp: 98.4 °F (36.9 °C) (07/18/18 1124)  Pulse: 66 (07/18/18 1124)  Resp: 17 (07/18/18 1124)  BP: (!) 119/55 (07/18/18 1124)  SpO2: 96 % (07/18/18 1124)  O2 Device (Oxygen Therapy): nasal cannula (07/18/18 1124) Vital Signs (24h Range):  Temp:  [97.8 °F (36.6 °C)-98.5 °F (36.9 °C)] 98.4 °F (36.9 °C)  Pulse:  [65-78] 66  Resp:  [16-18] 17  SpO2:  [94 %-98 %] 96 %  BP: (113-157)/(54-70) 119/55     Weight: 68 kg (150 lb) (07/16/18 0725)  Body mass index is 25.75 kg/m².  Body surface area is 1.75 meters squared.    I/O last 3 completed shifts:  In: -   Out: 1000 [Urine:1000]    Physical Exam   Constitutional: She appears well-nourished. No distress.   Pleasantly confused, obese, NC in place   HENT:   Head: Normocephalic and atraumatic.   Nose: Nose normal.   Mouth/Throat: Oropharynx is clear and moist. No oropharyngeal exudate.   Eyes: Conjunctivae and EOM are normal. Pupils are equal, round, and reactive to light. Right eye exhibits no discharge. Left eye exhibits no discharge. No scleral icterus.   Neck: Neck supple. No JVD present. No tracheal deviation present.   Cardiovascular: Normal rate, regular rhythm, normal heart sounds and intact distal pulses.  Exam reveals no friction rub.    No murmur heard.  Pulmonary/Chest: Effort normal. No stridor. No respiratory distress. She has no wheezes. She has no rales. She exhibits no tenderness.   Abdominal: Soft. Bowel sounds are normal. She exhibits no distension and no mass. There is no tenderness. There is no rebound and no guarding.   Musculoskeletal: Normal range of motion. She exhibits no edema,  tenderness or deformity.   Lymphadenopathy:     She has no cervical adenopathy.   Neurological: She is alert. She is disoriented (disoriented to time.). No cranial nerve deficit. She exhibits normal muscle tone. Coordination normal.   Skin: Skin is warm and dry. No rash noted. She is not diaphoretic. No erythema. No pallor.   Psychiatric: She has a normal mood and affect. Her behavior is normal.   Vitals reviewed.      Significant Labs:  CBC:   Recent Labs  Lab 07/18/18  0449   WBC 6.40   RBC 2.46*   HGB 7.2*   HCT 21.5*   *   MCV 87   MCH 29.3   MCHC 33.5     CMP:   Recent Labs  Lab 07/17/18  0453 07/18/18  0449   * 310*   CALCIUM 8.2* 7.8*   ALBUMIN 2.0*  --    PROT 5.6*  --     136   K 4.5 4.3   CO2 21* 21*    103   BUN 64* 72*   CREATININE 4.3* 4.5*   ALKPHOS 79  --    ALT 10  --    AST 14  --    BILITOT 0.3  --        Recent Labs  Lab 07/16/18  0809   COLORU Yellow   SPECGRAV 1.010   PHUR 6.0   PROTEINUA 3+*   BACTERIA Moderate*   NITRITE Negative   LEUKOCYTESUR 3+*   UROBILINOGEN Negative   HYALINECASTS 0     All labs within the past 24 hours have been reviewed.    Significant Imaging:  X-Ray: Reviewed  US: Reviewed  Personally reviewed CXR and Renal US:     CXR 7/16/2018  - right upper lobe consolidation vs mass with patchy airspace disease     Renal US 4/30/2018  - no hydronephrosis, left nephrolithiasis -nonobstructing.  Chronic kidney disease changes  Radiologist Review:  FINDINGS:  Right kidney: The right kidney is echogenic and measures 9.9 cm.  There is loss of corticomedullary distinction.  There is decreased perfusion.  Resistive index measures 0.82. No mass. No renal stone. No hydronephrosis.    Left kidney: The left kidney is echogenic and measures 10.5 cm.  There is loss of corticomedullary distinction.  There is decreased perfusion. Resistive index measures 1.0. No mass. Echogenic focus within the mid kidney demonstrating twinkle artifact likely representing a small renal  stone.  No hydronephrosis.    The bladder is partially distended at the time of scanning and has an unremarkable appearance.  Impression   1. Progressive bilateral medical renal disease.  2. Nonobstructing left nephrolithiasis.            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 During hospitalization, patient presented with elevated blood pressures and sudden drop to systolic 130s, which could cause ischemic ATN.  2. Over diuresis for CHF  3. Hb trending down, causing hypoperfusion of kidneys  4. Hypoperfusion with cardiorenal etiology, given her decompensated CHF  5. Progression of her CKD  6. Progression of medication effect    Plan / Recommendations:  1. Hold diuretics  2. U/A  3. Renal US with renal doppler  4. Urine sediment study done by Nephro Team  5. CXR for comparison with the one done during admission  6. Urine spots creatinine and protein  7. Anemia and blood loss workup            Essential hypertension    Maintain MAP > 65, with target Bp <140/90            Thank you for your consult. I will follow-up with patient. Please contact us if you have any additional questions.     Nato Brunner MD  Nephrology  [facility]  [phone]

## 2018-07-18 NOTE — ASSESSMENT & PLAN NOTE
- creatinine worsened from baseline  - likely due to chf exacerbation  - monitor electrolytes with daily labs  - nephrology consult to assist with management

## 2018-07-18 NOTE — ASSESSMENT & PLAN NOTE
LESLEY on CKD Stage IV-V: Patient with Crt baseline of 3.4 - 4.1 (As per previous f/u in nephrology clinics with, her Crt increase could be caused by nephritis by Pembrolizumab (taken for of Lung Ca treatment, which may cause in up to 33% of patients nephrotoxicity)    Differential dxs causing her LESLEY on CKD:  1. During hospitalization, patient presented with elevated blood pressures and sudden drop to systolic 130s, which could cause ischemic ATN.  2. Over diuresis for CHF  3. Hb trending down, causing hypoperfusion of kidneys  4. Hypoperfusion with cardiorenal etiology, given her decompensated CHF  5. Progression of her CKD  6. Progression of medication effect    Plan / Recommendations:  1. Hold diuretics  2. U/A  3. Renal US with renal doppler  4. Urine sediment study done by Nephro Team  5. CXR for comparison with the one done during admission  6. Urine spots creatinine and protein  7. Anemia and blood loss workup

## 2018-07-18 NOTE — HPI
83 y/o woman with IDDM2, HTN, HPL, CKD (CrBaseline 3.4-4.1 estimated GFR 9-10 cc/min from baseline crt of 2.5 little more than a year ago, with regular f/u with nephrologists Dr. 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      Patient consulted to Nephrology service due to LESLEY worsening creat. on CKD, for evaluation and recommendations.

## 2018-07-18 NOTE — PLAN OF CARE
Problem: Patient Care Overview  Goal: Plan of Care Review  Outcome: Ongoing (interventions implemented as appropriate)  Transferred patient via bed during the night to another room, tolerated well, O2 in place via NC, Pure Wick in place- patent to suction, turned during the night using foam wedge, no c/o, patient oriented to self, place. Will monitor until end of shift. Call bell in reach, verbalized understanding to use if assistance is needed.

## 2018-07-18 NOTE — ASSESSMENT & PLAN NOTE
2/2 HF exacerbation as above  Transitioned from BIPAP to 4L in ED; now on RA - 1L  Continue with nebs   Diuresing minimally but creatinine worsening; see marcelino above

## 2018-07-18 NOTE — PROGRESS NOTES
[facility] Hospital Medicine  Progress Note    Patient Name: Misa Yun  MRN: [id number]  Patient Class: OP- Observation   Admission Date: 7/16/2018  Length of Stay: 0 days  Attending Physician: Itz Salmeron MD  Primary Care Provider: [facility] Medicine Team: [facility] Itz Salmeron MD    Subjective:     Principal Problem:Acute on chronic combined systolic and diastolic heart failure    HPI:  History is limited as patient has dementia. History obtained from sister Carlota Tejeda her POA    84F with DM2 A1C 8, HTN, HLD, dementia, CAD s/p CABG '06, combined chronic heart failure (5/18 TTE EF 45-50%, G1DD), ovarian cancer, and lung squamous cell ca (no longer taking chemo due to nephritis)  [facility] reports increased work of breathing and a sat of 89-90% on room air this AM. Was given a breathing treatment and supplemental oxygen with no improvement so she was transferred to the ED. Pt denies cough, chest pain, fever, or chills.     She was initially placed on BIPAP, since has been transitioned to 4 L NC, received Cefepime x 1, a breathing treatment, and a one liter NS bolus in the ED.     Confirmed full code status with sister.   Sister called Bon, who confirmed that there is no report of any fall by patient.     Hospital Course:  No notes on file    Interval History: reports SOB today but lying comfortably; sister at bedside     Review of Systems   Constitutional: Negative for fatigue.   Respiratory: Positive for shortness of breath. Negative for cough.    Cardiovascular: Negative for chest pain and palpitations.   Gastrointestinal: Negative for abdominal pain.   Genitourinary: Negative for difficulty urinating and dysuria.   Musculoskeletal: Negative for arthralgias.     Objective:     Vital Signs (Most Recent):  Temp: 98.1 °F (36.7 °C) (07/18/18 1519)  Pulse: 76 (07/18/18 1800)  Resp: 20 (07/18/18 1642)  BP: (!) 118/59 (07/18/18  1519)  SpO2: 97 % (07/18/18 1642) Vital Signs (24h Range):  Temp:  [97.8 °F (36.6 °C)-98.5 °F (36.9 °C)] 98.1 °F (36.7 °C)  Pulse:  [65-77] 76  Resp:  [16-20] 20  SpO2:  [86 %-98 %] 97 %  BP: (118-157)/(55-70) 118/59     Weight: 68 kg (150 lb)  Body mass index is 25.75 kg/m².    Intake/Output Summary (Last 24 hours) at 07/18/18 1856  Last data filed at 07/18/18 1800   Gross per 24 hour   Intake              380 ml   Output             1120 ml   Net             -740 ml      Physical Exam   Constitutional: She appears well-developed and well-nourished. No distress.   Cardiovascular: Normal rate.    Pulmonary/Chest: Effort normal. No respiratory distress. She has rales (diminished bibasilar ).   Abdominal: Soft. Bowel sounds are normal.   Musculoskeletal: She exhibits no edema.   Neurological: She is alert.   Oriented to person; states she's at a nursing home    Vitals reviewed.      Significant Labs: All pertinent labs within the past 24 hours have been reviewed.    Significant Imaging: I have reviewed all pertinent imaging results/findings within the past 24 hours.    Assessment/Plan:      * Acute on chronic combined systolic and diastolic heart failure    Presented with SOB, resp failure, elevated BNP, pulmonary edema on CXR, crackles on exam  Received 1 L NS in ED  Given dose of IV furosemide 120 mg x 1  Good UOP but still net positive; will give another IV dose today of 80 mg x 1 and monitor   Strict I/O's, daily weights, fluid/sodium restriction  Cont home BB. No ACEI/ARB due to CKD5, according to chart check              Acute respiratory failure with hypoxia    2/2 HF exacerbation as above  Transitioned from BIPAP to 4L in ED; now on RA - 1L  Continue with nebs   Diuresing minimally but creatinine worsening; see marcelino above        Elevated troponin    Elevated but likely from renal dysfunction. Denies CP. 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 Cont ASA, statin.           Dementia    No acute issues.  Cont home Aricept.   Delirium precautions.          Essential hypertension    - BP initially elevated on presentation; now improved  - continue amlodipine, carvedilol, and isosorbide-hydralazine              VTE Risk Mitigation         Ordered     heparin (porcine) injection 5,000 Units  Every 8 hours      07/16/18 1222     IP VTE HIGH RISK PATIENT  Once      07/16/18 1222              Itz Salmeron MD  Department of Hospital Medicine   [facility]

## 2018-07-18 NOTE — PLAN OF CARE
Problem: Patient Care Overview  Goal: Plan of Care Review  Outcome: Ongoing (interventions implemented as appropriate)  Patient alert and oriented to self and place. Attempted to wean patient off of oxygen, did not tolerate well. Put patient back on 1L O2 via nasal cannula. Chest XRay and ABGs ordered. Pure wick in place, with no output noted in cannister, but incontinent pads were wet. Incontinence care provided. In and out cath performed per order of nephrology. Specimens sent to lab and placed at bedside for nephrologist to spin. Patient has good appetite. Repositioned q2H. No skin issues. 2g Ceftriaxone infusing for UTI. New orders placed to check accuchecks AC/HS. Family at bedside, call light in reach.

## 2018-07-18 NOTE — SUBJECTIVE & OBJECTIVE
Past Medical History:   Diagnosis Date    Anemia     Anemia     CAD (coronary artery disease)     Cataract     Chemotherapy follow-up examination 11/27/2017    CHF (congestive heart failure)     Chronic kidney disease (CKD), stage IV (severe) 03/27/2017    Sees  at [facility], no dialysis    Dementia     Encounter for blood transfusion 2006    at time of CABG    Fracture of humerus, proximal, left, closed 1/28/2015    Hypercalcemia 04/10/2017    Hyperlipidemia     Hyperparathyroidism     Hypertension     Mediastinal lymphadenopathy 10/9/2017    MI (myocardial infarction) 10/2016    Nephritis 6/18/2018    Osteoporosis     Ovarian cancer     in the past    Primary lung cancer, right 10/9/2017    Type 2 diabetes mellitus, controlled     UTI (urinary tract infection) 04/11/2017       Past Surgical History:   Procedure Laterality Date    CATARACT EXTRACTION W/  INTRAOCULAR LENS IMPLANT Left 04/17/2017    Dr. Motta    CATARACT EXTRACTION W/  INTRAOCULAR LENS IMPLANT Right 05/06/2017    Dr Motta     CORONARY ARTERY BYPASS GRAFT  2006    EYE SURGERY      HYSTERECTOMY  2002    ORIF HUMERUS FRACTURE Left 1/28/2015       Review of patient's allergies indicates:  No Known Allergies  Current Facility-Administered Medications   Medication Frequency    albuterol-ipratropium 2.5 mg-0.5 mg/3 mL nebulizer solution 3 mL Q4H    amLODIPine tablet 10 mg Daily    aspirin chewable tablet 81 mg Daily    atorvastatin tablet 40 mg Daily    carvedilol tablet 6.25 mg BID    cefTRIAXone (ROCEPHIN) 2 g in dextrose 5 % 50 mL IVPB Q24H    dextrose 50% injection 12.5 g PRN    dextrose 50% injection 25 g PRN    donepezil tablet 5 mg QHS    glucagon (human recombinant) injection 1 mg PRN    glucose chewable tablet 16 g PRN    glucose chewable tablet 24 g PRN    heparin (porcine) injection 5,000 Units Q8H    insulin aspart U-100 pen 1-10 Units QID (AC + HS) PRN    isosorbide-hydrALAZINE 20-37.5  mg per tablet 1 tablet TID    predniSONE tablet 40 mg Daily    senna-docusate 8.6-50 mg per tablet 1 tablet BID    sodium bicarbonate tablet 650 mg TID    sodium chloride 0.9% flush 5 mL PRN     Family History     Problem Relation (Age of Onset)    Blindness Maternal Grandmother        Social History Main Topics    Smoking status: Former Smoker     Packs/day: 2.00     Years: 30.00     Quit date: 1/20/2007    Smokeless tobacco: Never Used    Alcohol use No    Drug use: No    Sexual activity: Not on file     Review of Systems   Unable to perform ROS: Mental status change   Constitutional: Negative for chills and fever.   HENT: Negative for congestion and facial swelling.    Respiratory: Positive for shortness of breath. Negative for cough, wheezing and stridor.    Cardiovascular: Positive for leg swelling. Negative for chest pain and palpitations.   Gastrointestinal: Negative for abdominal distention, abdominal pain, diarrhea and nausea.   Genitourinary: Negative for dysuria, flank pain, frequency and urgency.   All other systems reviewed and are negative.    Objective:     Vital Signs (Most Recent):  Temp: 98.4 °F (36.9 °C) (07/18/18 1124)  Pulse: 66 (07/18/18 1124)  Resp: 17 (07/18/18 1124)  BP: (!) 119/55 (07/18/18 1124)  SpO2: 96 % (07/18/18 1124)  O2 Device (Oxygen Therapy): nasal cannula (07/18/18 1124) Vital Signs (24h Range):  Temp:  [97.8 °F (36.6 °C)-98.5 °F (36.9 °C)] 98.4 °F (36.9 °C)  Pulse:  [65-78] 66  Resp:  [16-18] 17  SpO2:  [94 %-98 %] 96 %  BP: (113-157)/(54-70) 119/55     Weight: 68 kg (150 lb) (07/16/18 0725)  Body mass index is 25.75 kg/m².  Body surface area is 1.75 meters squared.    I/O last 3 completed shifts:  In: -   Out: 1000 [Urine:1000]    Physical Exam   Constitutional: She appears well-nourished. No distress.   Pleasantly confused, obese, NC in place   HENT:   Head: Normocephalic and atraumatic.   Nose: Nose normal.   Mouth/Throat: Oropharynx is clear and moist. No  oropharyngeal exudate.   Eyes: Conjunctivae and EOM are normal. Pupils are equal, round, and reactive to light. Right eye exhibits no discharge. Left eye exhibits no discharge. No scleral icterus.   Neck: Neck supple. No JVD present. No tracheal deviation present.   Cardiovascular: Normal rate, regular rhythm, normal heart sounds and intact distal pulses.  Exam reveals no friction rub.    No murmur heard.  Pulmonary/Chest: Effort normal. No stridor. No respiratory distress. She has no wheezes. She has no rales. She exhibits no tenderness.   Abdominal: Soft. Bowel sounds are normal. She exhibits no distension and no mass. There is no tenderness. There is no rebound and no guarding.   Musculoskeletal: Normal range of motion. She exhibits no edema, tenderness or deformity.   Lymphadenopathy:     She has no cervical adenopathy.   Neurological: She is alert. She is disoriented (disoriented to time.). No cranial nerve deficit. She exhibits normal muscle tone. Coordination normal.   Skin: Skin is warm and dry. No rash noted. She is not diaphoretic. No erythema. No pallor.   Psychiatric: She has a normal mood and affect. Her behavior is normal.   Vitals reviewed.      Significant Labs:  CBC:   Recent Labs  Lab 07/18/18  0449   WBC 6.40   RBC 2.46*   HGB 7.2*   HCT 21.5*   *   MCV 87   MCH 29.3   MCHC 33.5     CMP:   Recent Labs  Lab 07/17/18  0453 07/18/18  0449   * 310*   CALCIUM 8.2* 7.8*   ALBUMIN 2.0*  --    PROT 5.6*  --     136   K 4.5 4.3   CO2 21* 21*    103   BUN 64* 72*   CREATININE 4.3* 4.5*   ALKPHOS 79  --    ALT 10  --    AST 14  --    BILITOT 0.3  --        Recent Labs  Lab 07/16/18  0809   COLORU Yellow   SPECGRAV 1.010   PHUR 6.0   PROTEINUA 3+*   BACTERIA Moderate*   NITRITE Negative   LEUKOCYTESUR 3+*   UROBILINOGEN Negative   HYALINECASTS 0     All labs within the past 24 hours have been reviewed.    Significant Imaging:  X-Ray: Reviewed  US: Reviewed  Personally reviewed CXR  and Renal US:     CXR 7/16/2018  - right upper lobe consolidation vs mass with patchy airspace disease     Renal US 4/30/2018  - no hydronephrosis, left nephrolithiasis -nonobstructing.  Chronic kidney disease changes  Radiologist Review:  FINDINGS:  Right kidney: The right kidney is echogenic and measures 9.9 cm.  There is loss of corticomedullary distinction.  There is decreased perfusion.  Resistive index measures 0.82. No mass. No renal stone. No hydronephrosis.    Left kidney: The left kidney is echogenic and measures 10.5 cm.  There is loss of corticomedullary distinction.  There is decreased perfusion. Resistive index measures 1.0. No mass. Echogenic focus within the mid kidney demonstrating twinkle artifact likely representing a small renal stone.  No hydronephrosis.    The bladder is partially distended at the time of scanning and has an unremarkable appearance.  Impression   1. Progressive bilateral medical renal disease.  2. Nonobstructing left nephrolithiasis.

## 2018-07-19 PROBLEM — D64.9 ANEMIA: Status: ACTIVE | Noted: 2018-07-19

## 2018-07-19 LAB
ANION GAP SERPL CALC-SCNC: 13 MMOL/L
BASOPHILS # BLD AUTO: 0 K/UL
BASOPHILS NFR BLD: 0 %
BUN SERPL-MCNC: 76 MG/DL
CALCIUM SERPL-MCNC: 7.6 MG/DL
CHLORIDE SERPL-SCNC: 103 MMOL/L
CO2 SERPL-SCNC: 21 MMOL/L
CREAT SERPL-MCNC: 5 MG/DL
DIFFERENTIAL METHOD: ABNORMAL
EOSINOPHIL # BLD AUTO: 0 K/UL
EOSINOPHIL NFR BLD: 0 %
ERYTHROCYTE [DISTWIDTH] IN BLOOD BY AUTOMATED COUNT: 16.1 %
EST. GFR  (AFRICAN AMERICAN): 8.5 ML/MIN/1.73 M^2
EST. GFR  (NON AFRICAN AMERICAN): 7.4 ML/MIN/1.73 M^2
GLUCOSE SERPL-MCNC: 283 MG/DL
HCT VFR BLD AUTO: 21.2 %
HGB BLD-MCNC: 7 G/DL
IMM GRANULOCYTES # BLD AUTO: 0.03 K/UL
IMM GRANULOCYTES NFR BLD AUTO: 0.5 %
LYMPHOCYTES # BLD AUTO: 0.8 K/UL
LYMPHOCYTES NFR BLD: 13 %
MAGNESIUM SERPL-MCNC: 1.6 MG/DL
MCH RBC QN AUTO: 29.4 PG
MCHC RBC AUTO-ENTMCNC: 33 G/DL
MCV RBC AUTO: 89 FL
MONOCYTES # BLD AUTO: 0.5 K/UL
MONOCYTES NFR BLD: 7.1 %
NEUTROPHILS # BLD AUTO: 5 K/UL
NEUTROPHILS NFR BLD: 79.4 %
NRBC BLD-RTO: 0 /100 WBC
PHOSPHATE SERPL-MCNC: 4.2 MG/DL
PLATELET # BLD AUTO: 123 K/UL
PMV BLD AUTO: 11.2 FL
POCT GLUCOSE: 198 MG/DL (ref 70–110)
POCT GLUCOSE: 229 MG/DL (ref 70–110)
POCT GLUCOSE: 246 MG/DL (ref 70–110)
POCT GLUCOSE: 279 MG/DL (ref 70–110)
POTASSIUM SERPL-SCNC: 4.1 MMOL/L
RBC # BLD AUTO: 2.38 M/UL
SODIUM SERPL-SCNC: 137 MMOL/L
WBC # BLD AUTO: 6.3 K/UL

## 2018-07-19 PROCEDURE — 63600175 PHARM REV CODE 636 W HCPCS: Performed by: HOSPITALIST

## 2018-07-19 PROCEDURE — 25000242 PHARM REV CODE 250 ALT 637 W/ HCPCS: Performed by: HOSPITALIST

## 2018-07-19 PROCEDURE — 85025 COMPLETE CBC W/AUTO DIFF WBC: CPT

## 2018-07-19 PROCEDURE — 80048 BASIC METABOLIC PNL TOTAL CA: CPT

## 2018-07-19 PROCEDURE — 27000221 HC OXYGEN, UP TO 24 HOURS

## 2018-07-19 PROCEDURE — 82272 OCCULT BLD FECES 1-3 TESTS: CPT

## 2018-07-19 PROCEDURE — 84100 ASSAY OF PHOSPHORUS: CPT

## 2018-07-19 PROCEDURE — 36415 COLL VENOUS BLD VENIPUNCTURE: CPT

## 2018-07-19 PROCEDURE — 94761 N-INVAS EAR/PLS OXIMETRY MLT: CPT

## 2018-07-19 PROCEDURE — 11000001 HC ACUTE MED/SURG PRIVATE ROOM

## 2018-07-19 PROCEDURE — 99232 SBSQ HOSP IP/OBS MODERATE 35: CPT | Mod: ,,, | Performed by: HOSPITALIST

## 2018-07-19 PROCEDURE — 25000003 PHARM REV CODE 250: Performed by: HOSPITALIST

## 2018-07-19 PROCEDURE — 83735 ASSAY OF MAGNESIUM: CPT

## 2018-07-19 PROCEDURE — 94640 AIRWAY INHALATION TREATMENT: CPT

## 2018-07-19 RX ORDER — INSULIN ASPART 100 [IU]/ML
5 INJECTION, SOLUTION INTRAVENOUS; SUBCUTANEOUS
Status: DISCONTINUED | OUTPATIENT
Start: 2018-07-19 | End: 2018-07-20

## 2018-07-19 RX ADMIN — SENNOSIDES AND DOCUSATE SODIUM 1 TABLET: 8.6; 5 TABLET ORAL at 08:07

## 2018-07-19 RX ADMIN — INSULIN ASPART 6 UNITS: 100 INJECTION, SOLUTION INTRAVENOUS; SUBCUTANEOUS at 05:07

## 2018-07-19 RX ADMIN — AMLODIPINE BESYLATE 10 MG: 10 TABLET ORAL at 08:07

## 2018-07-19 RX ADMIN — INSULIN ASPART 5 UNITS: 100 INJECTION, SOLUTION INTRAVENOUS; SUBCUTANEOUS at 12:07

## 2018-07-19 RX ADMIN — HYDRALAZINE HYDROCHLORIDE AND ISOSORBIDE DINITRATE 1 TABLET: 37.5; 2 TABLET, FILM COATED ORAL at 08:07

## 2018-07-19 RX ADMIN — HEPARIN SODIUM 5000 UNITS: 5000 INJECTION, SOLUTION INTRAVENOUS; SUBCUTANEOUS at 08:07

## 2018-07-19 RX ADMIN — INSULIN ASPART 1 UNITS: 100 INJECTION, SOLUTION INTRAVENOUS; SUBCUTANEOUS at 09:07

## 2018-07-19 RX ADMIN — INSULIN ASPART 5 UNITS: 100 INJECTION, SOLUTION INTRAVENOUS; SUBCUTANEOUS at 05:07

## 2018-07-19 RX ADMIN — INSULIN DETEMIR 5 UNITS: 100 INJECTION, SOLUTION SUBCUTANEOUS at 08:07

## 2018-07-19 RX ADMIN — IPRATROPIUM BROMIDE AND ALBUTEROL SULFATE 3 ML: .5; 3 SOLUTION RESPIRATORY (INHALATION) at 08:07

## 2018-07-19 RX ADMIN — ERTAPENEM SODIUM 1 G: 1 INJECTION, POWDER, LYOPHILIZED, FOR SOLUTION INTRAMUSCULAR; INTRAVENOUS at 12:07

## 2018-07-19 RX ADMIN — CARVEDILOL 6.25 MG: 6.25 TABLET, FILM COATED ORAL at 08:07

## 2018-07-19 RX ADMIN — IPRATROPIUM BROMIDE AND ALBUTEROL SULFATE 3 ML: .5; 3 SOLUTION RESPIRATORY (INHALATION) at 11:07

## 2018-07-19 RX ADMIN — IPRATROPIUM BROMIDE AND ALBUTEROL SULFATE 3 ML: .5; 3 SOLUTION RESPIRATORY (INHALATION) at 03:07

## 2018-07-19 RX ADMIN — IPRATROPIUM BROMIDE AND ALBUTEROL SULFATE 3 ML: .5; 3 SOLUTION RESPIRATORY (INHALATION) at 12:07

## 2018-07-19 RX ADMIN — INSULIN ASPART 4 UNITS: 100 INJECTION, SOLUTION INTRAVENOUS; SUBCUTANEOUS at 12:07

## 2018-07-19 RX ADMIN — DONEPEZIL HYDROCHLORIDE 5 MG: 5 TABLET, FILM COATED ORAL at 08:07

## 2018-07-19 RX ADMIN — HEPARIN SODIUM 5000 UNITS: 5000 INJECTION, SOLUTION INTRAVENOUS; SUBCUTANEOUS at 05:07

## 2018-07-19 RX ADMIN — HEPARIN SODIUM 5000 UNITS: 5000 INJECTION, SOLUTION INTRAVENOUS; SUBCUTANEOUS at 02:07

## 2018-07-19 RX ADMIN — INSULIN ASPART 4 UNITS: 100 INJECTION, SOLUTION INTRAVENOUS; SUBCUTANEOUS at 08:07

## 2018-07-19 RX ADMIN — ASPIRIN 81 MG CHEWABLE TABLET 81 MG: 81 TABLET CHEWABLE at 08:07

## 2018-07-19 RX ADMIN — SODIUM BICARBONATE 650 MG TABLET 650 MG: at 08:07

## 2018-07-19 RX ADMIN — IPRATROPIUM BROMIDE AND ALBUTEROL SULFATE 3 ML: .5; 3 SOLUTION RESPIRATORY (INHALATION) at 04:07

## 2018-07-19 RX ADMIN — SODIUM BICARBONATE 650 MG TABLET 650 MG: at 02:07

## 2018-07-19 RX ADMIN — ATORVASTATIN CALCIUM 40 MG: 20 TABLET, FILM COATED ORAL at 08:07

## 2018-07-19 RX ADMIN — HYDRALAZINE HYDROCHLORIDE AND ISOSORBIDE DINITRATE 1 TABLET: 37.5; 2 TABLET, FILM COATED ORAL at 02:07

## 2018-07-19 RX ADMIN — IPRATROPIUM BROMIDE AND ALBUTEROL SULFATE 3 ML: .5; 3 SOLUTION RESPIRATORY (INHALATION) at 07:07

## 2018-07-19 RX ADMIN — PREDNISONE 40 MG: 20 TABLET ORAL at 08:07

## 2018-07-19 NOTE — ASSESSMENT & PLAN NOTE
2/2 HF exacerbation as above  Transitioned from BIPAP to 4L in ED; now on RA - 1L  Continue with nebs   Holding diuresis due to worsening renal function

## 2018-07-19 NOTE — SUBJECTIVE & OBJECTIVE
Interval History: Patient evaluated bedside alongside her sister, found in no acute distress, speaking in complete sentences, stable vital signs.  Night was uneventful.  I/O 80, with .  Patient with ESBL E.coli on UA started on Invanz.  She is accompanied by sister.  Stable vital signs.      Review of patient's allergies indicates:  No Known Allergies  Current Facility-Administered Medications   Medication Frequency    albuterol-ipratropium 2.5 mg-0.5 mg/3 mL nebulizer solution 3 mL Q4H    amLODIPine tablet 10 mg Daily    aspirin chewable tablet 81 mg Daily    atorvastatin tablet 40 mg Daily    carvedilol tablet 6.25 mg BID    dextrose 50% injection 12.5 g PRN    dextrose 50% injection 25 g PRN    donepezil tablet 5 mg QHS    ertapenem (INVANZ) 1 g in sodium chloride 0.9 % 100 mL IVPB (ready to mix system) Q24H    glucagon (human recombinant) injection 1 mg PRN    glucose chewable tablet 16 g PRN    glucose chewable tablet 24 g PRN    heparin (porcine) injection 5,000 Units Q8H    insulin aspart U-100 pen 1-10 Units QID (AC + HS) PRN    insulin aspart U-100 pen 5 Units TIDWM    insulin detemir U-100 pen 5 Units QHS    isosorbide-hydrALAZINE 20-37.5 mg per tablet 1 tablet TID    predniSONE tablet 40 mg Daily    senna-docusate 8.6-50 mg per tablet 1 tablet BID    sodium bicarbonate tablet 650 mg TID    sodium chloride 0.9% flush 5 mL PRN       Objective:     Vital Signs (Most Recent):  Temp: 98.8 °F (37.1 °C) (07/20/18 0728)  Pulse: 79 (07/20/18 0728)  Resp: 18 (07/20/18 0728)  BP: (!) 159/68 (07/20/18 0728)  SpO2: 100 % (07/20/18 0728)  O2 Device (Oxygen Therapy): nasal cannula (07/20/18 0728) Vital Signs (24h Range):  Temp:  [96.9 °F (36.1 °C)-98.8 °F (37.1 °C)] 98.8 °F (37.1 °C)  Pulse:  [62-86] 79  Resp:  [16-22] 18  SpO2:  [90 %-100 %] 100 %  BP: (120-159)/(58-68) 159/68     Weight: 76.4 kg (168 lb 6.4 oz) (07/19/18 1947)  Body mass index is 28.91 kg/m².  Body surface area is 1.86  meters squared.    I/O last 3 completed shifts:  In: 580 [P.O.:580]  Out: 400 [Urine:400]    Physical Exam  Constitutional: She appears well-nourished. No distress.   Pleasantly confused, obese, NC in place   HENT:   Head: Normocephalic and atraumatic.   Nose: Nose normal.   Mouth/Throat: Oropharynx is clear and moist. No oropharyngeal exudate.   Eyes: Conjunctivae and EOM are normal. Pupils are equal, round, and reactive to light. Right eye exhibits no discharge. Left eye exhibits no discharge. No scleral icterus.   Neck: Neck supple. No JVD present. No tracheal deviation present.   Cardiovascular: Normal rate, regular rhythm, normal heart sounds and intact distal pulses.  Exam reveals no friction rub.    No murmur heard.  Pulmonary/Chest: Effort normal. No stridor. No respiratory distress. She has no wheezes. She has no rales. She exhibits no tenderness.   Abdominal: Soft. Bowel sounds are normal. She exhibits no distension and no mass. There is no tenderness. There is no rebound and no guarding.   Musculoskeletal: Normal range of motion. She exhibits no edema, tenderness or deformity.   Lymphadenopathy:     She has no cervical adenopathy.   Neurological: She is alert. She is disoriented (disoriented to time.). No cranial nerve deficit. She exhibits normal muscle tone. Coordination normal.   Skin: Skin is warm and dry. No rash noted. She is not diaphoretic. No erythema. No pallor.   Psychiatric: She has a normal mood and affect. Her behavior is normal.   Vitals reviewed.    Significant Labs:  CBC:     Recent Labs  Lab 07/20/18  0720   WBC 8.16   RBC 2.77*   HGB 7.9*   HCT 24.8*      MCV 90   MCH 28.5   MCHC 31.9*     CMP:   Recent Labs  Lab 07/17/18  0453  07/20/18  0720   *  < > 128*   CALCIUM 8.2*  < > 7.8*   ALBUMIN 2.0*  --   --    PROT 5.6*  --   --      < > 140   K 4.5  < > 3.8   CO2 21*  < > 24     < > 106   BUN 64*  < > 76*   CREATININE 4.3*  < > 4.9*   ALKPHOS 79  --   --    ALT  10  --   --    AST 14  --   --    BILITOT 0.3  --   --    < > = values in this interval not displayed.  All labs within the past 24 hours have been reviewed.     Significant Imaging:  X-Ray: Reviewed  Personally reviewed CXR:no interval changes from prior study; small pleural effusion in right lung base.  Congestive changes bilaterally.

## 2018-07-19 NOTE — ASSESSMENT & PLAN NOTE
Patient improved   On RA to 1 L at the most   Continue medical management  Holding diuresis due to marcelino

## 2018-07-19 NOTE — PLAN OF CARE
Problem: Patient Care Overview  Goal: Plan of Care Review  Outcome: Ongoing (interventions implemented as appropriate)  Pt alert and oriented to self and place. Sleeping between care. Pt now on contact precautions. Now started on Invanz due to ESBL in urine. Rocephin discontinued. Pure wick in place, working properly. Stool specimen ordered, no bowel movement today. Patient turned q2H, no skin breakdown. Family at bedside and assists with feedings. Adjustments made to insulin dosages. O2 at 0.5 L via nasal cannula. O2 sats in the lower 90s. Call light in reach.

## 2018-07-19 NOTE — PLAN OF CARE
Problem: Fall Risk (Adult)  Goal: Absence of Falls  Patient will demonstrate the desired outcomes by discharge/transition of care.   Outcome: Ongoing (interventions implemented as appropriate)   07/19/18 0610   Fall Risk (Adult)   Absence of Falls making progress toward outcome       Problem: Patient Care Overview  Goal: Plan of Care Review  Outcome: Ongoing (interventions implemented as appropriate)   07/19/18 0610   Coping/Psychosocial   Plan Of Care Reviewed With patient       Problem: Pressure Ulcer Risk (Ovidio Scale) (Adult,Obstetrics,Pediatric)  Goal: Skin Integrity  Patient will demonstrate the desired outcomes by discharge/transition of care.   Outcome: Ongoing (interventions implemented as appropriate)   07/19/18 0610   Pressure Ulcer Risk (Ovidio Scale) (Adult,Obstetrics,Pediatric)   Skin Integrity making progress toward outcome

## 2018-07-19 NOTE — ASSESSMENT & PLAN NOTE
- creatinine worsened from baseline  - likely due to chf exacerbation  - monitor electrolytes with daily labs  - nephrology consult to assist with management; renal us negative, just chronic medical renal disease; holding diuresis

## 2018-07-19 NOTE — ASSESSMENT & PLAN NOTE
- most likely due to chronic disease from renal dysfunction  - trended down  - check fobt  - may need transfusion; ? Contributing factor to LESLEY

## 2018-07-19 NOTE — SUBJECTIVE & OBJECTIVE
Interval History: no acute events     Review of Systems   Constitutional: Negative for fatigue.   Respiratory: Positive for shortness of breath. Negative for cough.    Cardiovascular: Negative for chest pain and palpitations.   Gastrointestinal: Negative for abdominal pain.   Genitourinary: Negative for difficulty urinating and dysuria.   Musculoskeletal: Negative for arthralgias.     Objective:     Vital Signs (Most Recent):  Temp: 98.7 °F (37.1 °C) (07/19/18 1120)  Pulse: 66 (07/19/18 1135)  Resp: 16 (07/19/18 1128)  BP: (!) 120/58 (07/19/18 1120)  SpO2: 98 % (07/19/18 1128) Vital Signs (24h Range):  Temp:  [97.7 °F (36.5 °C)-98.7 °F (37.1 °C)] 98.7 °F (37.1 °C)  Pulse:  [62-78] 66  Resp:  [16-20] 16  SpO2:  [86 %-99 %] 98 %  BP: (118-179)/(58-77) 120/58     Weight: 68 kg (150 lb)  Body mass index is 25.75 kg/m².    Intake/Output Summary (Last 24 hours) at 07/19/18 1357  Last data filed at 07/19/18 0600   Gross per 24 hour   Intake              480 ml   Output              120 ml   Net              360 ml      Physical Exam   Constitutional: She appears well-developed and well-nourished. No distress.   Cardiovascular: Normal rate.    Pulmonary/Chest: Effort normal. No respiratory distress. She has rales (diminished).   Abdominal: Soft. Bowel sounds are normal.   Musculoskeletal: She exhibits no edema.   Neurological: She is alert.   Oriented to person; states she's at a nursing home    Vitals reviewed.      Significant Labs: All pertinent labs within the past 24 hours have been reviewed.    Significant Imaging: I have reviewed all pertinent imaging results/findings within the past 24 hours.

## 2018-07-19 NOTE — PROGRESS NOTES
[facility] Hospital Medicine  Progress Note    Patient Name: Misa Yun  MRN: [id number]  Patient Class: IP- Inpatient   Admission Date: 7/16/2018  Length of Stay: 0 days  Attending Physician: Itz Salmeron MD  Primary Care Provider: [facility] Medicine Team: [facility] MED G Itz Salmeron MD    Subjective:     Principal Problem:Acute on chronic combined systolic and diastolic heart failure    HPI:  History is limited as patient has dementia. History obtained from sister Carlota Tejeda her POA    84F with DM2 A1C 8, HTN, HLD, dementia, CAD s/p CABG '06, combined chronic heart failure (5/18 TTE EF 45-50%, G1DD), ovarian cancer, and lung squamous cell ca (no longer taking chemo due to nephritis)  [facility] reports increased work of breathing and a sat of 89-90% on room air this AM. Was given a breathing treatment and supplemental oxygen with no improvement so she was transferred to the ED. Pt denies cough, chest pain, fever, or chills.     She was initially placed on BIPAP, since has been transitioned to 4 L NC, received Cefepime x 1, a breathing treatment, and a one liter NS bolus in the ED.     Confirmed full code status with sister.   Sister called Bon, who confirmed that there is no report of any fall by patient.     Hospital Course:  No notes on file    Interval History: no acute events     Review of Systems   Constitutional: Negative for fatigue.   Respiratory: Positive for shortness of breath. Negative for cough.    Cardiovascular: Negative for chest pain and palpitations.   Gastrointestinal: Negative for abdominal pain.   Genitourinary: Negative for difficulty urinating and dysuria.   Musculoskeletal: Negative for arthralgias.     Objective:     Vital Signs (Most Recent):  Temp: 98.7 °F (37.1 °C) (07/19/18 1120)  Pulse: 66 (07/19/18 1135)  Resp: 16 (07/19/18 1128)  BP: (!) 120/58 (07/19/18 1120)  SpO2: 98 % (07/19/18 1128) Vital Signs  (24h Range):  Temp:  [97.7 °F (36.5 °C)-98.7 °F (37.1 °C)] 98.7 °F (37.1 °C)  Pulse:  [62-78] 66  Resp:  [16-20] 16  SpO2:  [86 %-99 %] 98 %  BP: (118-179)/(58-77) 120/58     Weight: 68 kg (150 lb)  Body mass index is 25.75 kg/m².    Intake/Output Summary (Last 24 hours) at 07/19/18 1357  Last data filed at 07/19/18 0600   Gross per 24 hour   Intake              480 ml   Output              120 ml   Net              360 ml      Physical Exam   Constitutional: She appears well-developed and well-nourished. No distress.   Cardiovascular: Normal rate.    Pulmonary/Chest: Effort normal. No respiratory distress. She has rales (diminished).   Abdominal: Soft. Bowel sounds are normal.   Musculoskeletal: She exhibits no edema.   Neurological: She is alert.   Oriented to person; states she's at a nursing home    Vitals reviewed.      Significant Labs: All pertinent labs within the past 24 hours have been reviewed.    Significant Imaging: I have reviewed all pertinent imaging results/findings within the past 24 hours.    Assessment/Plan:      * Acute on chronic combined systolic and diastolic heart failure    Patient improved   On RA to 1 L at the most   Continue medical management  Holding diuresis due to lesley             Anemia    - most likely due to chronic disease from renal dysfunction  - trended down  - check fobt  - may need transfusion; ? Contributing factor to LESLEY           Acute respiratory failure with hypoxia    2/2 HF exacerbation as above  Transitioned from BIPAP to 4L in ED; now on RA - 1L  Continue with nebs   Holding diuresis due to worsening renal function         Elevated troponin    Elevated but likely from renal dysfunction. Denies CP. No findings on ECG consistent with ischemia  Trending down           Urinary tract infection without hematuria    - esbl ecoli  - dc ceftriaxone; started ertapenem   - afebrile and no leukocytosis           Primary lung cancer, right    Follows with Dr Barnhart with  Oncology  Has appt with him with repeat PET scan on Wed, will need to reschedule if will remain in hospital  Immunotherapy had been discontinued due to concern for nephritis  Cont home prednisone 40 mg daily for mgmt of renal toxicity          Type 2 DM with CKD stage 5 and hypertension    Start basal prandial regimen  Continue diabetic diet           Acute kidney injury superimposed on CKD IV - V    - creatinine worsened from baseline  - likely due to chf exacerbation  - monitor electrolytes with daily labs  - nephrology consult to assist with management; renal us negative, just chronic medical renal disease; holding diuresis          Coronary artery disease    No acute issues. Cont ASA, statin.           Dementia    No acute issues.  Cont home Aricept.   Delirium precautions.          Essential hypertension    - BP initially elevated on presentation; now improved  - continue amlodipine, carvedilol, and isosorbide-hydralazine              VTE Risk Mitigation         Ordered     heparin (porcine) injection 5,000 Units  Every 8 hours      07/16/18 1222     IP VTE HIGH RISK PATIENT  Once      07/16/18 1222              Itz Salmeron MD  Department of Hospital Medicine   [facility]

## 2018-07-20 PROBLEM — N17.0 ACUTE KIDNEY FAILURE WITH LESION OF TUBULAR NECROSIS: Status: ACTIVE | Noted: 2018-07-20

## 2018-07-20 LAB
ANION GAP SERPL CALC-SCNC: 10 MMOL/L
BASOPHILS # BLD AUTO: 0.01 K/UL
BASOPHILS NFR BLD: 0.1 %
BUN SERPL-MCNC: 76 MG/DL
CALCIUM SERPL-MCNC: 7.8 MG/DL
CHLORIDE SERPL-SCNC: 106 MMOL/L
CO2 SERPL-SCNC: 24 MMOL/L
CREAT SERPL-MCNC: 4.9 MG/DL
DIFFERENTIAL METHOD: ABNORMAL
EOSINOPHIL # BLD AUTO: 0 K/UL
EOSINOPHIL NFR BLD: 0 %
ERYTHROCYTE [DISTWIDTH] IN BLOOD BY AUTOMATED COUNT: 15.9 %
EST. GFR  (AFRICAN AMERICAN): 8.8 ML/MIN/1.73 M^2
EST. GFR  (NON AFRICAN AMERICAN): 7.6 ML/MIN/1.73 M^2
GLUCOSE SERPL-MCNC: 128 MG/DL
HCT VFR BLD AUTO: 24.8 %
HGB BLD-MCNC: 7.9 G/DL
IMM GRANULOCYTES # BLD AUTO: 0.09 K/UL
IMM GRANULOCYTES NFR BLD AUTO: 1.1 %
LYMPHOCYTES # BLD AUTO: 1 K/UL
LYMPHOCYTES NFR BLD: 12.7 %
MAGNESIUM SERPL-MCNC: 1.7 MG/DL
MCH RBC QN AUTO: 28.5 PG
MCHC RBC AUTO-ENTMCNC: 31.9 G/DL
MCV RBC AUTO: 90 FL
MONOCYTES # BLD AUTO: 0.7 K/UL
MONOCYTES NFR BLD: 8.5 %
NEUTROPHILS # BLD AUTO: 6.3 K/UL
NEUTROPHILS NFR BLD: 77.6 %
NRBC BLD-RTO: 0 /100 WBC
OB PNL STL: NEGATIVE
PHOSPHATE SERPL-MCNC: 3.5 MG/DL
PLATELET # BLD AUTO: 155 K/UL
PMV BLD AUTO: 10.6 FL
POCT GLUCOSE: 156 MG/DL (ref 70–110)
POCT GLUCOSE: 209 MG/DL (ref 70–110)
POCT GLUCOSE: 237 MG/DL (ref 70–110)
POCT GLUCOSE: 320 MG/DL (ref 70–110)
POTASSIUM SERPL-SCNC: 3.8 MMOL/L
RBC # BLD AUTO: 2.77 M/UL
SODIUM SERPL-SCNC: 140 MMOL/L
WBC # BLD AUTO: 8.16 K/UL

## 2018-07-20 PROCEDURE — 25000003 PHARM REV CODE 250: Performed by: HOSPITALIST

## 2018-07-20 PROCEDURE — 94640 AIRWAY INHALATION TREATMENT: CPT

## 2018-07-20 PROCEDURE — 94761 N-INVAS EAR/PLS OXIMETRY MLT: CPT

## 2018-07-20 PROCEDURE — 63600175 PHARM REV CODE 636 W HCPCS: Performed by: HOSPITALIST

## 2018-07-20 PROCEDURE — 83735 ASSAY OF MAGNESIUM: CPT

## 2018-07-20 PROCEDURE — 99232 SBSQ HOSP IP/OBS MODERATE 35: CPT | Mod: ,,, | Performed by: HOSPITALIST

## 2018-07-20 PROCEDURE — 80048 BASIC METABOLIC PNL TOTAL CA: CPT

## 2018-07-20 PROCEDURE — 36415 COLL VENOUS BLD VENIPUNCTURE: CPT

## 2018-07-20 PROCEDURE — 27000221 HC OXYGEN, UP TO 24 HOURS

## 2018-07-20 PROCEDURE — 11000001 HC ACUTE MED/SURG PRIVATE ROOM

## 2018-07-20 PROCEDURE — 84100 ASSAY OF PHOSPHORUS: CPT

## 2018-07-20 PROCEDURE — 85025 COMPLETE CBC W/AUTO DIFF WBC: CPT

## 2018-07-20 PROCEDURE — 99900035 HC TECH TIME PER 15 MIN (STAT)

## 2018-07-20 PROCEDURE — 25000242 PHARM REV CODE 250 ALT 637 W/ HCPCS: Performed by: HOSPITALIST

## 2018-07-20 RX ORDER — IPRATROPIUM BROMIDE AND ALBUTEROL SULFATE 2.5; .5 MG/3ML; MG/3ML
3 SOLUTION RESPIRATORY (INHALATION) EVERY 4 HOURS PRN
Status: DISCONTINUED | OUTPATIENT
Start: 2018-07-20 | End: 2018-07-25 | Stop reason: HOSPADM

## 2018-07-20 RX ORDER — INSULIN ASPART 100 [IU]/ML
8 INJECTION, SOLUTION INTRAVENOUS; SUBCUTANEOUS
Status: DISCONTINUED | OUTPATIENT
Start: 2018-07-20 | End: 2018-07-21

## 2018-07-20 RX ADMIN — HEPARIN SODIUM 5000 UNITS: 5000 INJECTION, SOLUTION INTRAVENOUS; SUBCUTANEOUS at 10:07

## 2018-07-20 RX ADMIN — SODIUM BICARBONATE 650 MG TABLET 650 MG: at 03:07

## 2018-07-20 RX ADMIN — IPRATROPIUM BROMIDE AND ALBUTEROL SULFATE 3 ML: .5; 3 SOLUTION RESPIRATORY (INHALATION) at 11:07

## 2018-07-20 RX ADMIN — SODIUM CHLORIDE 0.5 G: 9 INJECTION, SOLUTION INTRAVENOUS at 03:07

## 2018-07-20 RX ADMIN — INSULIN ASPART 8 UNITS: 100 INJECTION, SOLUTION INTRAVENOUS; SUBCUTANEOUS at 06:07

## 2018-07-20 RX ADMIN — INSULIN ASPART 2 UNITS: 100 INJECTION, SOLUTION INTRAVENOUS; SUBCUTANEOUS at 10:07

## 2018-07-20 RX ADMIN — DONEPEZIL HYDROCHLORIDE 5 MG: 5 TABLET, FILM COATED ORAL at 10:07

## 2018-07-20 RX ADMIN — HEPARIN SODIUM 5000 UNITS: 5000 INJECTION, SOLUTION INTRAVENOUS; SUBCUTANEOUS at 02:07

## 2018-07-20 RX ADMIN — PREDNISONE 40 MG: 20 TABLET ORAL at 10:07

## 2018-07-20 RX ADMIN — CARVEDILOL 6.25 MG: 6.25 TABLET, FILM COATED ORAL at 10:07

## 2018-07-20 RX ADMIN — HYDRALAZINE HYDROCHLORIDE AND ISOSORBIDE DINITRATE 1 TABLET: 37.5; 2 TABLET, FILM COATED ORAL at 03:07

## 2018-07-20 RX ADMIN — HEPARIN SODIUM 5000 UNITS: 5000 INJECTION, SOLUTION INTRAVENOUS; SUBCUTANEOUS at 05:07

## 2018-07-20 RX ADMIN — SODIUM BICARBONATE 650 MG TABLET 650 MG: at 10:07

## 2018-07-20 RX ADMIN — HYDRALAZINE HYDROCHLORIDE AND ISOSORBIDE DINITRATE 1 TABLET: 37.5; 2 TABLET, FILM COATED ORAL at 10:07

## 2018-07-20 RX ADMIN — INSULIN ASPART 4 UNITS: 100 INJECTION, SOLUTION INTRAVENOUS; SUBCUTANEOUS at 01:07

## 2018-07-20 RX ADMIN — ASPIRIN 81 MG CHEWABLE TABLET 81 MG: 81 TABLET CHEWABLE at 10:07

## 2018-07-20 RX ADMIN — AMLODIPINE BESYLATE 10 MG: 10 TABLET ORAL at 10:07

## 2018-07-20 RX ADMIN — IPRATROPIUM BROMIDE AND ALBUTEROL SULFATE 3 ML: .5; 3 SOLUTION RESPIRATORY (INHALATION) at 01:07

## 2018-07-20 RX ADMIN — ATORVASTATIN CALCIUM 40 MG: 20 TABLET, FILM COATED ORAL at 10:07

## 2018-07-20 RX ADMIN — INSULIN DETEMIR 5 UNITS: 100 INJECTION, SOLUTION SUBCUTANEOUS at 10:07

## 2018-07-20 RX ADMIN — IPRATROPIUM BROMIDE AND ALBUTEROL SULFATE 3 ML: .5; 3 SOLUTION RESPIRATORY (INHALATION) at 07:07

## 2018-07-20 NOTE — ASSESSMENT & PLAN NOTE
- creatinine worsened from baseline but seems to be stabilizing  - appreciate nephrology consult  - most likely multifactorial at this time   - continue to monitor

## 2018-07-20 NOTE — ASSESSMENT & PLAN NOTE
- most likely due to chronic disease from renal dysfunction  - trended down but then back up today  - fobt negative  - will continue to monitor

## 2018-07-20 NOTE — PROGRESS NOTES
[facility]  Nephrology  Progress Note    Patient Name: Misa Yun  MRN: [id number]  Admission Date: 7/16/2018  Hospital Length of Stay: 1 days  Attending Provider: Itz Salmeron MD   Primary Care Physician: [facility]  Principal Problem:Acute on chronic combined systolic and diastolic heart failure    Subjective:     HPI: 83 y/o woman with IDDM2, HTN, HPL, CKD (CrBaseline 3.4-4.1 estimated GFR 9-10 cc/min from baseline crt of 2.5 little more than a year ago, with regular f/u with nephrologists Dr. 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      Patient consulted to Nephrology service due to LESLEY worsening creat. on CKD, for evaluation and recommendations.    Interval History: Patient evaluated bedside alongside her sister, found in no acute distress, speaking in complete sentences, stable vital signs.  Night was uneventful.  I/O 80, with .  Patient with ESBL E.coli on UA started on Invanz.  She is accompanied by sister.  Stable vital signs.      Review of patient's allergies indicates:  No Known Allergies  Current Facility-Administered Medications   Medication Frequency    albuterol-ipratropium 2.5 mg-0.5 mg/3 mL nebulizer solution 3 mL Q4H    amLODIPine tablet 10 mg Daily    aspirin chewable tablet 81 mg Daily    atorvastatin tablet 40 mg Daily    carvedilol tablet 6.25 mg BID    dextrose 50% injection 12.5 g PRN    dextrose 50% injection 25 g PRN    donepezil tablet 5 mg QHS    ertapenem (INVANZ) 1 g in sodium chloride 0.9 % 100 mL IVPB (ready to mix system) Q24H    glucagon (human recombinant) injection 1 mg PRN    glucose chewable tablet 16 g PRN    glucose chewable tablet 24 g PRN    heparin (porcine) injection  5,000 Units Q8H    insulin aspart U-100 pen 1-10 Units QID (AC + HS) PRN    insulin aspart U-100 pen 5 Units TIDWM    insulin detemir U-100 pen 5 Units QHS    isosorbide-hydrALAZINE 20-37.5 mg per tablet 1 tablet TID    predniSONE tablet 40 mg Daily    senna-docusate 8.6-50 mg per tablet 1 tablet BID    sodium bicarbonate tablet 650 mg TID    sodium chloride 0.9% flush 5 mL PRN       Objective:     Vital Signs (Most Recent):  Temp: 98.8 °F (37.1 °C) (07/20/18 0728)  Pulse: 79 (07/20/18 0728)  Resp: 18 (07/20/18 0728)  BP: (!) 159/68 (07/20/18 0728)  SpO2: 100 % (07/20/18 0728)  O2 Device (Oxygen Therapy): nasal cannula (07/20/18 0728) Vital Signs (24h Range):  Temp:  [96.9 °F (36.1 °C)-98.8 °F (37.1 °C)] 98.8 °F (37.1 °C)  Pulse:  [62-86] 79  Resp:  [16-22] 18  SpO2:  [90 %-100 %] 100 %  BP: (120-159)/(58-68) 159/68     Weight: 76.4 kg (168 lb 6.4 oz) (07/19/18 1947)  Body mass index is 28.91 kg/m².  Body surface area is 1.86 meters squared.    I/O last 3 completed shifts:  In: 580 [P.O.:580]  Out: 400 [Urine:400]    Physical Exam  Constitutional: She appears well-nourished. No distress.   Pleasantly confused, obese, NC in place   HENT:   Head: Normocephalic and atraumatic.   Nose: Nose normal.   Mouth/Throat: Oropharynx is clear and moist. No oropharyngeal exudate.   Eyes: Conjunctivae and EOM are normal. Pupils are equal, round, and reactive to light. Right eye exhibits no discharge. Left eye exhibits no discharge. No scleral icterus.   Neck: Neck supple. No JVD present. No tracheal deviation present.   Cardiovascular: Normal rate, regular rhythm, normal heart sounds and intact distal pulses.  Exam reveals no friction rub.    No murmur heard.  Pulmonary/Chest: Effort normal. No stridor. No respiratory distress. She has no wheezes. She has no rales. She exhibits no tenderness.   Abdominal: Soft. Bowel sounds are normal. She exhibits no distension and no mass. There is no tenderness. There is no rebound and  no guarding.   Musculoskeletal: Normal range of motion. She exhibits no edema, tenderness or deformity.   Lymphadenopathy:     She has no cervical adenopathy.   Neurological: She is alert. She is disoriented (disoriented to time.). No cranial nerve deficit. She exhibits normal muscle tone. Coordination normal.   Skin: Skin is warm and dry. No rash noted. She is not diaphoretic. No erythema. No pallor.   Psychiatric: She has a normal mood and affect. Her behavior is normal.   Vitals reviewed.    Significant Labs:  CBC:     Recent Labs  Lab 07/20/18  0720   WBC 8.16   RBC 2.77*   HGB 7.9*   HCT 24.8*      MCV 90   MCH 28.5   MCHC 31.9*     CMP:   Recent Labs  Lab 07/17/18  0453  07/20/18  0720   *  < > 128*   CALCIUM 8.2*  < > 7.8*   ALBUMIN 2.0*  --   --    PROT 5.6*  --   --      < > 140   K 4.5  < > 3.8   CO2 21*  < > 24     < > 106   BUN 64*  < > 76*   CREATININE 4.3*  < > 4.9*   ALKPHOS 79  --   --    ALT 10  --   --    AST 14  --   --    BILITOT 0.3  --   --    < > = values in this interval not displayed.  All labs within the past 24 hours have been reviewed.     Significant Imaging:  X-Ray: Reviewed  Personally reviewed CXR:no interval changes from prior study; small pleural effusion in right lung base.  Congestive changes bilaterally.     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 During hospitalization, patient presented with elevated blood pressures and sudden drop to systolic 130s, which could cause ischemic ATN.  2. Over diuresis for CHF  3. Hb trending down, causing hypoperfusion of kidneys  4. Hypoperfusion with cardiorenal etiology, given her  decompensated CHF  5. Progression of her CKD  6. Progression of medication effect    Plan / Recommendations:  1. Continue holding diuretics  2. Medications to renal parameters (Invanz has renal adjustment to 500mg qd)  3. Anemia and blood loss workup (was in decreasing trend, today at 7.9)  4. Creatinine shows slight improvement to 4.9.  Continue monitoring.    5. Labs in AM.  6. Strict I/O and chart.  7. Avoid nephrotoxic medications.  8. Maintain MAP > 65.  9. Hb > 7gm/dL.              Essential hypertension    Maintain MAP > 65, with target Bp <140/90            Thank you for your consult. I will follow-up with patient. Please contact us if you have any additional questions.     Nato Brunner MD  Nephrology  [facility]  [phone]

## 2018-07-20 NOTE — ASSESSMENT & PLAN NOTE
LESLEY on CKD Stage IV-V: Patient with Crt baseline of 3.4 - 4.1 (As per previous f/u in nephrology clinics with, her Crt increase could be caused by nephritis by Pembrolizumab (taken for of Lung Ca treatment, which may cause in up to 33% of patients nephrotoxicity)    Differential dxs causing her LESLEY on CKD:  1. During hospitalization, patient presented with elevated blood pressures and sudden drop to systolic 130s, which could cause ischemic ATN.  2. Over diuresis for CHF  3. Hb trending down, causing hypoperfusion of kidneys  4. Hypoperfusion with cardiorenal etiology, given her decompensated CHF  5. Progression of her CKD  6. Progression of medication effect    Plan / Recommendations:  1. Continue holding diuretics  2. Medications to renal parameters (Invanz has renal adjustment to 500mg qd)  3. Anemia and blood loss workup (was in decreasing trend, today at 7.9)  4. Creatinine shows slight improvement to 4.9.  Continue monitoring.    5. Labs in AM.  6. Strict I/O and chart.  7. Avoid nephrotoxic medications.  8. Maintain MAP > 65.  9. Hb > 7gm/dL.

## 2018-07-20 NOTE — PLAN OF CARE
07/20/18 1436   Discharge Reassessment   Assessment Type Discharge Planning Reassessment   Do you have any problems affording any of your prescribed medications? No   Discharge Plan A Return to nursing home   Discharge Plan B Skilled Nursing Facility   Can the patient answer the patient profile reliably? No, cognitively impaired   How does the patient rate their overall health at the present time? Fair   Describe the patient's ability to walk at the present time. Major restrictions/daily assistance from another person   How often would a person be available to care for the patient? Whenever needed   Number of comorbid conditions (as recorded on the chart) Five or more

## 2018-07-20 NOTE — PROGRESS NOTES
[facility] Hospital Medicine  Progress Note    Patient Name: Misa Yun  MRN: [id number]  Patient Class: IP- Inpatient   Admission Date: 7/16/2018  Length of Stay: 1 days  Attending Physician: Itz Salmeron MD  Primary Care Provider: [facility] Medicine Team: [facility] Itz Salmeron MD    Subjective:     Principal Problem:Acute on chronic combined systolic and diastolic heart failure    HPI:  History is limited as patient has dementia. History obtained from sister Carlota Tejeda her POA    84F with DM2 A1C 8, HTN, HLD, dementia, CAD s/p CABG '06, combined chronic heart failure (5/18 TTE EF 45-50%, G1DD), ovarian cancer, and lung squamous cell ca (no longer taking chemo due to nephritis)  [facility] reports increased work of breathing and a sat of 89-90% on room air this AM. Was given a breathing treatment and supplemental oxygen with no improvement so she was transferred to the ED. Pt denies cough, chest pain, fever, or chills.     She was initially placed on BIPAP, since has been transitioned to 4 L NC, received Cefepime x 1, a breathing treatment, and a one liter NS bolus in the ED.     Confirmed full code status with sister.   Sister called Bon, who confirmed that there is no report of any fall by patient.     Hospital Course:  No notes on file    No new subjective & objective note has been filed under this hospital service since the last note was generated.    Assessment/Plan:      * Acute on chronic combined systolic and diastolic heart failure    Patient improved   On RA to 1 L at the most   Continue medical management  Holding diuresis due to marcelino             Anemia    - most likely due to chronic disease from renal dysfunction  - trended down but then back up today  - fobt negative  - will continue to monitor           Acute respiratory failure with hypoxia    2/2 HF exacerbation as above  Transitioned from BIPAP to 4L in ED; now  on RA - 1L  Continue with nebs   Holding diuresis due to worsening renal function         Elevated troponin    Elevated but likely from renal dysfunction. Denies CP. No findings on ECG consistent with ischemia  Trending down           Urinary tract infection without hematuria    - esbl ecoli  - dc ceftriaxone; started ertapenem x 7 days   - afebrile and no leukocytosis           Primary lung cancer, right    Follows with Dr Barnhart with Oncology  Has appt with him with repeat PET scan on Wed, will need to reschedule if will remain in hospital  Immunotherapy had been discontinued due to concern for nephritis  Cont home prednisone 40 mg daily for mgmt of renal toxicity          Type 2 DM with CKD stage 5 and hypertension    Increased aspart coverage to 8 units   Continue diabetic diet           Acute kidney injury superimposed on CKD IV - V    - creatinine worsened from baseline but seems to be stabilizing  - appreciate nephrology consult  - most likely multifactorial at this time   - continue to monitor           Coronary artery disease    No acute issues. Cont ASA, statin.           Dementia    No acute issues.  Cont home Aricept.   Delirium precautions.          Essential hypertension    - BP initially elevated on presentation; now improved  - continue amlodipine, carvedilol, and isosorbide-hydralazine              VTE Risk Mitigation         Ordered     heparin (porcine) injection 5,000 Units  Every 8 hours      07/16/18 1222     IP VTE HIGH RISK PATIENT  Once      07/16/18 1222              Itz Salmeron MD  Department of Hospital Medicine   [facility]

## 2018-07-21 PROBLEM — J96.01 ACUTE RESPIRATORY FAILURE WITH HYPOXIA: Status: RESOLVED | Noted: 2018-07-16 | Resolved: 2018-07-21

## 2018-07-21 LAB
ANION GAP SERPL CALC-SCNC: 12 MMOL/L
BACTERIA BLD CULT: NORMAL
BACTERIA BLD CULT: NORMAL
BASOPHILS # BLD AUTO: 0.01 K/UL
BASOPHILS NFR BLD: 0.1 %
BUN SERPL-MCNC: 79 MG/DL
CALCIUM SERPL-MCNC: 7.9 MG/DL
CHLORIDE SERPL-SCNC: 107 MMOL/L
CO2 SERPL-SCNC: 21 MMOL/L
CREAT SERPL-MCNC: 4.8 MG/DL
DIFFERENTIAL METHOD: ABNORMAL
EOSINOPHIL # BLD AUTO: 0 K/UL
EOSINOPHIL NFR BLD: 0 %
ERYTHROCYTE [DISTWIDTH] IN BLOOD BY AUTOMATED COUNT: 16.2 %
EST. GFR  (AFRICAN AMERICAN): 9 ML/MIN/1.73 M^2
EST. GFR  (NON AFRICAN AMERICAN): 7.8 ML/MIN/1.73 M^2
GLUCOSE SERPL-MCNC: 157 MG/DL
HCT VFR BLD AUTO: 25.1 %
HGB BLD-MCNC: 7.9 G/DL
IMM GRANULOCYTES # BLD AUTO: 0.09 K/UL
IMM GRANULOCYTES NFR BLD AUTO: 1.3 %
LYMPHOCYTES # BLD AUTO: 0.8 K/UL
LYMPHOCYTES NFR BLD: 11.3 %
MAGNESIUM SERPL-MCNC: 1.8 MG/DL
MCH RBC QN AUTO: 28.9 PG
MCHC RBC AUTO-ENTMCNC: 31.5 G/DL
MCV RBC AUTO: 92 FL
MONOCYTES # BLD AUTO: 0.4 K/UL
MONOCYTES NFR BLD: 6.2 %
NEUTROPHILS # BLD AUTO: 5.5 K/UL
NEUTROPHILS NFR BLD: 81.1 %
NRBC BLD-RTO: 0 /100 WBC
PHOSPHATE SERPL-MCNC: 3.9 MG/DL
PLATELET # BLD AUTO: 142 K/UL
PMV BLD AUTO: 11.5 FL
POCT GLUCOSE: 130 MG/DL (ref 70–110)
POCT GLUCOSE: 141 MG/DL (ref 70–110)
POCT GLUCOSE: 197 MG/DL (ref 70–110)
POCT GLUCOSE: 222 MG/DL (ref 70–110)
POTASSIUM SERPL-SCNC: 4.3 MMOL/L
RBC # BLD AUTO: 2.73 M/UL
SODIUM SERPL-SCNC: 140 MMOL/L
WBC # BLD AUTO: 6.73 K/UL

## 2018-07-21 PROCEDURE — 84100 ASSAY OF PHOSPHORUS: CPT

## 2018-07-21 PROCEDURE — 83735 ASSAY OF MAGNESIUM: CPT

## 2018-07-21 PROCEDURE — 63600175 PHARM REV CODE 636 W HCPCS: Performed by: HOSPITALIST

## 2018-07-21 PROCEDURE — 36415 COLL VENOUS BLD VENIPUNCTURE: CPT

## 2018-07-21 PROCEDURE — 99232 SBSQ HOSP IP/OBS MODERATE 35: CPT | Mod: ,,, | Performed by: HOSPITALIST

## 2018-07-21 PROCEDURE — 11000001 HC ACUTE MED/SURG PRIVATE ROOM

## 2018-07-21 PROCEDURE — 25000003 PHARM REV CODE 250: Performed by: HOSPITALIST

## 2018-07-21 PROCEDURE — 85025 COMPLETE CBC W/AUTO DIFF WBC: CPT

## 2018-07-21 PROCEDURE — 80048 BASIC METABOLIC PNL TOTAL CA: CPT

## 2018-07-21 RX ORDER — CARVEDILOL 6.25 MG/1
6.25 TABLET ORAL ONCE
Status: COMPLETED | OUTPATIENT
Start: 2018-07-21 | End: 2018-07-21

## 2018-07-21 RX ORDER — CARVEDILOL 12.5 MG/1
12.5 TABLET ORAL 2 TIMES DAILY
Status: DISCONTINUED | OUTPATIENT
Start: 2018-07-21 | End: 2018-07-24

## 2018-07-21 RX ORDER — INSULIN ASPART 100 [IU]/ML
12 INJECTION, SOLUTION INTRAVENOUS; SUBCUTANEOUS
Status: DISCONTINUED | OUTPATIENT
Start: 2018-07-21 | End: 2018-07-23

## 2018-07-21 RX ADMIN — HEPARIN SODIUM 5000 UNITS: 5000 INJECTION, SOLUTION INTRAVENOUS; SUBCUTANEOUS at 02:07

## 2018-07-21 RX ADMIN — HEPARIN SODIUM 5000 UNITS: 5000 INJECTION, SOLUTION INTRAVENOUS; SUBCUTANEOUS at 06:07

## 2018-07-21 RX ADMIN — INSULIN ASPART 12 UNITS: 100 INJECTION, SOLUTION INTRAVENOUS; SUBCUTANEOUS at 05:07

## 2018-07-21 RX ADMIN — AMLODIPINE BESYLATE 10 MG: 10 TABLET ORAL at 08:07

## 2018-07-21 RX ADMIN — CARVEDILOL 6.25 MG: 6.25 TABLET, FILM COATED ORAL at 10:07

## 2018-07-21 RX ADMIN — SODIUM BICARBONATE 650 MG TABLET 650 MG: at 03:07

## 2018-07-21 RX ADMIN — PREDNISONE 40 MG: 20 TABLET ORAL at 08:07

## 2018-07-21 RX ADMIN — ASPIRIN 81 MG CHEWABLE TABLET 81 MG: 81 TABLET CHEWABLE at 08:07

## 2018-07-21 RX ADMIN — SODIUM CHLORIDE 0.5 G: 9 INJECTION, SOLUTION INTRAVENOUS at 03:07

## 2018-07-21 RX ADMIN — DONEPEZIL HYDROCHLORIDE 5 MG: 5 TABLET, FILM COATED ORAL at 09:07

## 2018-07-21 RX ADMIN — INSULIN DETEMIR 5 UNITS: 100 INJECTION, SOLUTION SUBCUTANEOUS at 09:07

## 2018-07-21 RX ADMIN — SENNOSIDES AND DOCUSATE SODIUM 1 TABLET: 8.6; 5 TABLET ORAL at 08:07

## 2018-07-21 RX ADMIN — CARVEDILOL 6.25 MG: 6.25 TABLET, FILM COATED ORAL at 08:07

## 2018-07-21 RX ADMIN — SENNOSIDES AND DOCUSATE SODIUM 1 TABLET: 8.6; 5 TABLET ORAL at 09:07

## 2018-07-21 RX ADMIN — HYDRALAZINE HYDROCHLORIDE AND ISOSORBIDE DINITRATE 1 TABLET: 37.5; 2 TABLET, FILM COATED ORAL at 09:07

## 2018-07-21 RX ADMIN — HYDRALAZINE HYDROCHLORIDE AND ISOSORBIDE DINITRATE 1 TABLET: 37.5; 2 TABLET, FILM COATED ORAL at 08:07

## 2018-07-21 RX ADMIN — SODIUM BICARBONATE 650 MG TABLET 650 MG: at 09:07

## 2018-07-21 RX ADMIN — SODIUM BICARBONATE 650 MG TABLET 650 MG: at 08:07

## 2018-07-21 RX ADMIN — INSULIN ASPART 2 UNITS: 100 INJECTION, SOLUTION INTRAVENOUS; SUBCUTANEOUS at 05:07

## 2018-07-21 RX ADMIN — INSULIN ASPART 2 UNITS: 100 INJECTION, SOLUTION INTRAVENOUS; SUBCUTANEOUS at 09:07

## 2018-07-21 RX ADMIN — HYDRALAZINE HYDROCHLORIDE AND ISOSORBIDE DINITRATE 1 TABLET: 37.5; 2 TABLET, FILM COATED ORAL at 03:07

## 2018-07-21 RX ADMIN — ATORVASTATIN CALCIUM 40 MG: 20 TABLET, FILM COATED ORAL at 08:07

## 2018-07-21 RX ADMIN — HEPARIN SODIUM 5000 UNITS: 5000 INJECTION, SOLUTION INTRAVENOUS; SUBCUTANEOUS at 09:07

## 2018-07-21 RX ADMIN — CARVEDILOL 12.5 MG: 12.5 TABLET, FILM COATED ORAL at 09:07

## 2018-07-21 RX ADMIN — INSULIN ASPART 8 UNITS: 100 INJECTION, SOLUTION INTRAVENOUS; SUBCUTANEOUS at 08:07

## 2018-07-21 NOTE — ASSESSMENT & PLAN NOTE
- most likely due to chronic disease from renal dysfunction  - trended down but then back up   - fobt negative  - will continue to monitor

## 2018-07-21 NOTE — PLAN OF CARE
Problem: Fall Risk (Adult)  Goal: Absence of Falls  Patient will demonstrate the desired outcomes by discharge/transition of care.   Outcome: Ongoing (interventions implemented as appropriate)   07/20/18 1904   Fall Risk (Adult)   Absence of Falls making progress toward outcome       Problem: Pressure Ulcer Risk (Ovidio Scale) (Adult,Obstetrics,Pediatric)  Goal: Skin Integrity  Patient will demonstrate the desired outcomes by discharge/transition of care.   Outcome: Ongoing (interventions implemented as appropriate)   07/20/18 1904   Pressure Ulcer Risk (Ovidio Scale) (Adult,Obstetrics,Pediatric)   Skin Integrity making progress toward outcome

## 2018-07-21 NOTE — PROGRESS NOTES
[facility] Hospital Medicine  Progress Note    Patient Name: Misa Yun  MRN: [id number]  Patient Class: IP- Inpatient   Admission Date: 7/16/2018  Length of Stay: 2 days  Attending Physician: Itz Salmeron MD  Primary Care Provider: [facility] Medicine Team: [facility]OS[facility] MED G Itz Salmeron MD    Subjective:     Principal Problem:Acute on chronic combined systolic and diastolic heart failure    HPI:  History is limited as patient has dementia. History obtained from sister Carlota Tejeda her POA    84F with DM2 A1C 8, HTN, HLD, dementia, CAD s/p CABG '06, combined chronic heart failure (5/18 TTE EF 45-50%, G1DD), ovarian cancer, and lung squamous cell ca (no longer taking chemo due to nephritis)  [facility] reports increased work of breathing and a sat of 89-90% on room air this AM. Was given a breathing treatment and supplemental oxygen with no improvement so she was transferred to the ED. Pt denies cough, chest pain, fever, or chills.     She was initially placed on BIPAP, since has been transitioned to 4 L NC, received Cefepime x 1, a breathing treatment, and a one liter NS bolus in the ED.     Confirmed full code status with sister.   Sister called Bon, who confirmed that there is no report of any fall by patient.     Hospital Course:  No notes on file    Interval History: no acute events     Review of Systems   Constitutional: Negative for fatigue.   Respiratory: Negative for cough and shortness of breath.    Cardiovascular: Negative for chest pain and palpitations.   Gastrointestinal: Negative for abdominal pain.   Genitourinary: Negative for difficulty urinating and dysuria.   Musculoskeletal: Negative for arthralgias.     Objective:     Vital Signs (Most Recent):  Temp: 97.9 °F (36.6 °C) (07/21/18 1240)  Pulse: 68 (07/21/18 1240)  Resp: 20 (07/21/18 1240)  BP: 127/61 (07/21/18 1240)  SpO2: (!) 93 % (07/21/18 1240) Vital Signs (24h  Range):  Temp:  [97.8 °F (36.6 °C)-98.9 °F (37.2 °C)] 97.9 °F (36.6 °C)  Pulse:  [68-85] 68  Resp:  [17-20] 20  SpO2:  [90 %-98 %] 93 %  BP: (127-195)/(59-77) 127/61     Weight: 76.4 kg (168 lb 6.4 oz)  Body mass index is 28.91 kg/m².    Intake/Output Summary (Last 24 hours) at 07/21/18 1329  Last data filed at 07/21/18 1000   Gross per 24 hour   Intake              240 ml   Output                0 ml   Net              240 ml      Physical Exam   Constitutional: She appears well-developed and well-nourished. No distress.   Cardiovascular: Normal rate.    Pulmonary/Chest: Effort normal. No respiratory distress. She has no wheezes. She has no rales.   Abdominal: Soft. Bowel sounds are normal.   Musculoskeletal: She exhibits no edema.   Neurological: She is alert.   Oriented to person; states she's at a nursing home    Vitals reviewed.      Significant Labs: All pertinent labs within the past 24 hours have been reviewed.    Significant Imaging: I have reviewed all pertinent imaging results/findings within the past 24 hours.    Assessment/Plan:      * Acute on chronic combined systolic and diastolic heart failure    Patient improved   On RA to 1 L at the most   Continue medical management  Holding diuresis due to marcelino   Resolved             Anemia    - most likely due to chronic disease from renal dysfunction  - trended down but then back up   - fobt negative  - will continue to monitor           Elevated troponin    Elevated but likely from renal dysfunction. Denies CP. No findings on ECG consistent with ischemia  Trending down           Urinary tract infection without hematuria    - esbl ecoli  - dc ceftriaxone; started ertapenem x 7 days   - afebrile and no leukocytosis           Primary lung cancer, right    Follows with Dr Barnhart with Oncology  Has appt with him with repeat PET scan on Wed, will need to reschedule if will remain in hospital  Immunotherapy had been discontinued due to concern for nephritis  Cont  home prednisone 40 mg daily for mgmt of renal toxicity          Type 2 DM with CKD stage 5 and hypertension    Increased aspart coverage to 12 units   Continue basal coverage   Continue diabetic diet           Acute kidney injury superimposed on CKD IV - V    - creatinine peaked at 5 and now very slowly trending down but stable for the most part  - appreciate nephrology consult  - most likely multifactorial at this time   - continue to monitor           Coronary artery disease    No acute issues. Cont ASA, statin.           Dementia    No acute issues.  Cont home Aricept.   Delirium precautions.          Essential hypertension    - BP initially elevated   - increase carvedilol to 12.5 mg BID   - continue amlodipine and isosorbide-hydralazine              VTE Risk Mitigation         Ordered     heparin (porcine) injection 5,000 Units  Every 8 hours      07/16/18 1222     IP VTE HIGH RISK PATIENT  Once      07/16/18 1222              Itz Salmeron MD  Department of Hospital Medicine   [facility]

## 2018-07-21 NOTE — SUBJECTIVE & OBJECTIVE
Interval History: no acute events     Review of Systems   Constitutional: Negative for fatigue.   Respiratory: Negative for cough and shortness of breath.    Cardiovascular: Negative for chest pain and palpitations.   Gastrointestinal: Negative for abdominal pain.   Genitourinary: Negative for difficulty urinating and dysuria.   Musculoskeletal: Negative for arthralgias.     Objective:     Vital Signs (Most Recent):  Temp: 97.9 °F (36.6 °C) (07/21/18 1240)  Pulse: 68 (07/21/18 1240)  Resp: 20 (07/21/18 1240)  BP: 127/61 (07/21/18 1240)  SpO2: (!) 93 % (07/21/18 1240) Vital Signs (24h Range):  Temp:  [97.8 °F (36.6 °C)-98.9 °F (37.2 °C)] 97.9 °F (36.6 °C)  Pulse:  [68-85] 68  Resp:  [17-20] 20  SpO2:  [90 %-98 %] 93 %  BP: (127-195)/(59-77) 127/61     Weight: 76.4 kg (168 lb 6.4 oz)  Body mass index is 28.91 kg/m².    Intake/Output Summary (Last 24 hours) at 07/21/18 1329  Last data filed at 07/21/18 1000   Gross per 24 hour   Intake              240 ml   Output                0 ml   Net              240 ml      Physical Exam   Constitutional: She appears well-developed and well-nourished. No distress.   Cardiovascular: Normal rate.    Pulmonary/Chest: Effort normal. No respiratory distress. She has no wheezes. She has no rales.   Abdominal: Soft. Bowel sounds are normal.   Musculoskeletal: She exhibits no edema.   Neurological: She is alert.   Oriented to person; states she's at a nursing home    Vitals reviewed.      Significant Labs: All pertinent labs within the past 24 hours have been reviewed.    Significant Imaging: I have reviewed all pertinent imaging results/findings within the past 24 hours.

## 2018-07-21 NOTE — ASSESSMENT & PLAN NOTE
- BP initially elevated   - increase carvedilol to 12.5 mg BID   - continue amlodipine and isosorbide-hydralazine

## 2018-07-21 NOTE — ASSESSMENT & PLAN NOTE
Patient improved   On RA to 1 L at the most   Continue medical management  Holding diuresis due to marcelino   Resolved

## 2018-07-21 NOTE — ASSESSMENT & PLAN NOTE
- creatinine peaked at 5 and now very slowly trending down but stable for the most part  - appreciate nephrology consult  - most likely multifactorial at this time   - continue to monitor

## 2018-07-22 LAB
ANION GAP SERPL CALC-SCNC: 9 MMOL/L
BASOPHILS # BLD AUTO: 0.01 K/UL
BASOPHILS NFR BLD: 0.1 %
BUN SERPL-MCNC: 81 MG/DL
CALCIUM SERPL-MCNC: 8 MG/DL
CHLORIDE SERPL-SCNC: 108 MMOL/L
CO2 SERPL-SCNC: 26 MMOL/L
CREAT SERPL-MCNC: 4.8 MG/DL
DIFFERENTIAL METHOD: ABNORMAL
EOSINOPHIL # BLD AUTO: 0 K/UL
EOSINOPHIL NFR BLD: 0 %
ERYTHROCYTE [DISTWIDTH] IN BLOOD BY AUTOMATED COUNT: 16 %
EST. GFR  (AFRICAN AMERICAN): 9 ML/MIN/1.73 M^2
EST. GFR  (NON AFRICAN AMERICAN): 7.8 ML/MIN/1.73 M^2
GLUCOSE SERPL-MCNC: 93 MG/DL
HCT VFR BLD AUTO: 23.5 %
HGB BLD-MCNC: 7.7 G/DL
IMM GRANULOCYTES # BLD AUTO: 0.09 K/UL
IMM GRANULOCYTES NFR BLD AUTO: 1 %
LYMPHOCYTES # BLD AUTO: 1 K/UL
LYMPHOCYTES NFR BLD: 12.1 %
MAGNESIUM SERPL-MCNC: 1.9 MG/DL
MCH RBC QN AUTO: 29.5 PG
MCHC RBC AUTO-ENTMCNC: 32.8 G/DL
MCV RBC AUTO: 90 FL
MONOCYTES # BLD AUTO: 0.6 K/UL
MONOCYTES NFR BLD: 7.4 %
NEUTROPHILS # BLD AUTO: 6.8 K/UL
NEUTROPHILS NFR BLD: 79.4 %
NRBC BLD-RTO: 0 /100 WBC
PHOSPHATE SERPL-MCNC: 4.4 MG/DL
PLATELET # BLD AUTO: 182 K/UL
PMV BLD AUTO: 11.1 FL
POCT GLUCOSE: 128 MG/DL (ref 70–110)
POCT GLUCOSE: 157 MG/DL (ref 70–110)
POCT GLUCOSE: 157 MG/DL (ref 70–110)
POCT GLUCOSE: 172 MG/DL (ref 70–110)
POCT GLUCOSE: 95 MG/DL (ref 70–110)
POTASSIUM SERPL-SCNC: 4.4 MMOL/L
RBC # BLD AUTO: 2.61 M/UL
SODIUM SERPL-SCNC: 143 MMOL/L
WBC # BLD AUTO: 8.61 K/UL

## 2018-07-22 PROCEDURE — 11000001 HC ACUTE MED/SURG PRIVATE ROOM

## 2018-07-22 PROCEDURE — 25000003 PHARM REV CODE 250: Performed by: HOSPITALIST

## 2018-07-22 PROCEDURE — 85025 COMPLETE CBC W/AUTO DIFF WBC: CPT

## 2018-07-22 PROCEDURE — 36415 COLL VENOUS BLD VENIPUNCTURE: CPT

## 2018-07-22 PROCEDURE — 84100 ASSAY OF PHOSPHORUS: CPT

## 2018-07-22 PROCEDURE — 63600175 PHARM REV CODE 636 W HCPCS: Performed by: HOSPITALIST

## 2018-07-22 PROCEDURE — 80048 BASIC METABOLIC PNL TOTAL CA: CPT

## 2018-07-22 PROCEDURE — 99232 SBSQ HOSP IP/OBS MODERATE 35: CPT | Mod: ,,, | Performed by: HOSPITALIST

## 2018-07-22 PROCEDURE — 83735 ASSAY OF MAGNESIUM: CPT

## 2018-07-22 RX ADMIN — INSULIN ASPART 12 UNITS: 100 INJECTION, SOLUTION INTRAVENOUS; SUBCUTANEOUS at 12:07

## 2018-07-22 RX ADMIN — HEPARIN SODIUM 5000 UNITS: 5000 INJECTION, SOLUTION INTRAVENOUS; SUBCUTANEOUS at 05:07

## 2018-07-22 RX ADMIN — SODIUM BICARBONATE 650 MG TABLET 650 MG: at 09:07

## 2018-07-22 RX ADMIN — HEPARIN SODIUM 5000 UNITS: 5000 INJECTION, SOLUTION INTRAVENOUS; SUBCUTANEOUS at 02:07

## 2018-07-22 RX ADMIN — HEPARIN SODIUM 5000 UNITS: 5000 INJECTION, SOLUTION INTRAVENOUS; SUBCUTANEOUS at 10:07

## 2018-07-22 RX ADMIN — ATORVASTATIN CALCIUM 40 MG: 20 TABLET, FILM COATED ORAL at 08:07

## 2018-07-22 RX ADMIN — AMLODIPINE BESYLATE 10 MG: 10 TABLET ORAL at 08:07

## 2018-07-22 RX ADMIN — SODIUM BICARBONATE 650 MG TABLET 650 MG: at 02:07

## 2018-07-22 RX ADMIN — CARVEDILOL 12.5 MG: 12.5 TABLET, FILM COATED ORAL at 09:07

## 2018-07-22 RX ADMIN — HYDRALAZINE HYDROCHLORIDE AND ISOSORBIDE DINITRATE 1 TABLET: 37.5; 2 TABLET, FILM COATED ORAL at 08:07

## 2018-07-22 RX ADMIN — INSULIN ASPART 2 UNITS: 100 INJECTION, SOLUTION INTRAVENOUS; SUBCUTANEOUS at 12:07

## 2018-07-22 RX ADMIN — PREDNISONE 40 MG: 20 TABLET ORAL at 08:07

## 2018-07-22 RX ADMIN — SODIUM CHLORIDE 0.5 G: 9 INJECTION, SOLUTION INTRAVENOUS at 02:07

## 2018-07-22 RX ADMIN — SODIUM BICARBONATE 650 MG TABLET 650 MG: at 08:07

## 2018-07-22 RX ADMIN — INSULIN ASPART 12 UNITS: 100 INJECTION, SOLUTION INTRAVENOUS; SUBCUTANEOUS at 07:07

## 2018-07-22 RX ADMIN — SENNOSIDES AND DOCUSATE SODIUM 1 TABLET: 8.6; 5 TABLET ORAL at 08:07

## 2018-07-22 RX ADMIN — CARVEDILOL 12.5 MG: 12.5 TABLET, FILM COATED ORAL at 08:07

## 2018-07-22 RX ADMIN — SENNOSIDES AND DOCUSATE SODIUM 1 TABLET: 8.6; 5 TABLET ORAL at 09:07

## 2018-07-22 RX ADMIN — ASPIRIN 81 MG CHEWABLE TABLET 81 MG: 81 TABLET CHEWABLE at 08:07

## 2018-07-22 RX ADMIN — HYDRALAZINE HYDROCHLORIDE AND ISOSORBIDE DINITRATE 1 TABLET: 37.5; 2 TABLET, FILM COATED ORAL at 09:07

## 2018-07-22 RX ADMIN — DONEPEZIL HYDROCHLORIDE 5 MG: 5 TABLET, FILM COATED ORAL at 09:07

## 2018-07-22 RX ADMIN — HYDRALAZINE HYDROCHLORIDE AND ISOSORBIDE DINITRATE 1 TABLET: 37.5; 2 TABLET, FILM COATED ORAL at 02:07

## 2018-07-22 RX ADMIN — INSULIN DETEMIR 5 UNITS: 100 INJECTION, SOLUTION SUBCUTANEOUS at 09:07

## 2018-07-22 NOTE — ASSESSMENT & PLAN NOTE
- creatinine peaked at 5 and now seems to be stabilizing at 4.8  - patient still oliguric   - electrolytes wnl   - appreciate nephrology consult  - most likely multifactorial at this time   - continue to monitor

## 2018-07-22 NOTE — ASSESSMENT & PLAN NOTE
- esbl ecoli  - dc ceftriaxone; started ertapenem x 7 days until 7/25  - afebrile and no leukocytosis

## 2018-07-22 NOTE — PROGRESS NOTES
[facility] Hospital Medicine  Progress Note    Patient Name: Misa Yun  MRN: [id number]  Patient Class: IP- Inpatient   Admission Date: 7/16/2018  Length of Stay: 3 days  Attending Physician: Itz Salmeron MD  Primary Care Provider: [facility] Medicine Team: [facility] HOSP MED G Itz Salmeron MD    Subjective:     Principal Problem:Acute on chronic combined systolic and diastolic heart failure    HPI:  History is limited as patient has dementia. 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 Was given a breathing treatment and supplemental oxygen with no improvement so she was transferred to the ED. Pt denies cough, chest pain, fever, or chills.     She was initially placed on BIPAP, since has been transitioned to 4 L NC, received Cefepime x 1, a breathing treatment, and a one liter NS bolus in the ED.     Confirmed full code status with sister.   Sister called Bon, who confirmed that there is no report of any fall by patient.     Hospital Course:  No notes on file    Interval History: no acute events     Review of Systems   Constitutional: Negative for fatigue.   Respiratory: Negative for cough and shortness of breath.    Cardiovascular: Negative for chest pain and palpitations.   Gastrointestinal: Negative for abdominal pain.   Genitourinary: Negative for difficulty urinating and dysuria.     Objective:     Vital Signs (Most Recent):  Temp: 97.6 °F (36.4 °C) (07/22/18 1703)  Pulse: 86 (07/22/18 1703)  Resp: 20 (07/22/18 1703)  BP: (!) 159/70 (07/22/18 1703)  SpO2: (!) 93 % (07/22/18 1703) Vital Signs (24h Range):  Temp:  [97.6 °F (36.4 °C)-98.4 °F (36.9  °C)] 97.6 °F (36.4 °C)  Pulse:  [70-86] 86  Resp:  [18-20] 20  SpO2:  [93 %-99 %] 93 %  BP: (131-165)/(63-73) 159/70     Weight: 76.4 kg (168 lb 6.4 oz)  Body mass index is 28.91 kg/m².    Intake/Output Summary (Last 24 hours) at 07/22/18 1704  Last data filed at 07/22/18 0900   Gross per 24 hour   Intake              480 ml   Output              300 ml   Net              180 ml      Physical Exam   Constitutional: She appears well-developed and well-nourished. No distress.   Cardiovascular: Normal rate.    Pulmonary/Chest: Effort normal and breath sounds normal.   Abdominal: Soft. Bowel sounds are normal.   Musculoskeletal: She exhibits no edema.   Neurological: She is alert.   Oriented to person   Vitals reviewed.      Significant Labs: All pertinent labs within the past 24 hours have been reviewed.    Significant Imaging: I have reviewed all pertinent imaging results/findings within the past 24 hours.    Assessment/Plan:      * Acute on chronic combined systolic and diastolic heart failure    Patient improved   On RA to 1 L at the most   Continue medical management  Holding diuresis due to marcelino   Resolved             Anemia    - most likely due to chronic disease from renal dysfunction  - stable in the high 7s; no need for transfusion at this time   - fobt negative  - will continue to monitor           Elevated troponin    Elevated but likely from renal dysfunction. Denies CP. 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 Cont ASA, statin.           Dementia    No acute issues.  Cont home Aricept.   Delirium precautions.          Essential hypertension    - BP elevated at times but not sustained   - increased carvedilol to 12.5 mg BID on 7/21  - continue amlodipine and isosorbide-hydralazine  - continue to monitor             VTE Risk Mitigation         Ordered     heparin (porcine) injection 5,000 Units  Every 8 hours      07/16/18 1222     IP VTE HIGH RISK PATIENT  Once      07/16/18 1222              Itz Salmeron MD  Department of Hospital Medicine   [facility]

## 2018-07-22 NOTE — ASSESSMENT & PLAN NOTE
- most likely due to chronic disease from renal dysfunction  - stable in the high 7s; no need for transfusion at this time   - fobt negative  - will continue to monitor

## 2018-07-22 NOTE — PLAN OF CARE
Problem: Diabetes, Type 2 (Adult)  Goal: Signs and Symptoms of Listed Potential Problems Will be Absent, Minimized or Managed (Diabetes, Type 2)  Signs and symptoms of listed potential problems will be absent, minimized or managed by discharge/transition of care (reference Diabetes, Type 2 (Adult) CPG).   Outcome: Ongoing (interventions implemented as appropriate)   07/22/18 0054   Diabetes, Type 2   Problems Assessed (Type 2 Diabetes) all   Problems Present (Type 2 Diabetes) hyperglycemia       Problem: Patient Care Overview  Goal: Plan of Care Review  Outcome: Ongoing (interventions implemented as appropriate)   07/22/18 0054   Coping/Psychosocial   Plan Of Care Reviewed With patient

## 2018-07-22 NOTE — SUBJECTIVE & OBJECTIVE
Interval History: no acute events     Review of Systems   Constitutional: Negative for fatigue.   Respiratory: Negative for cough and shortness of breath.    Cardiovascular: Negative for chest pain and palpitations.   Gastrointestinal: Negative for abdominal pain.   Genitourinary: Negative for difficulty urinating and dysuria.     Objective:     Vital Signs (Most Recent):  Temp: 97.6 °F (36.4 °C) (07/22/18 1703)  Pulse: 86 (07/22/18 1703)  Resp: 20 (07/22/18 1703)  BP: (!) 159/70 (07/22/18 1703)  SpO2: (!) 93 % (07/22/18 1703) Vital Signs (24h Range):  Temp:  [97.6 °F (36.4 °C)-98.4 °F (36.9 °C)] 97.6 °F (36.4 °C)  Pulse:  [70-86] 86  Resp:  [18-20] 20  SpO2:  [93 %-99 %] 93 %  BP: (131-165)/(63-73) 159/70     Weight: 76.4 kg (168 lb 6.4 oz)  Body mass index is 28.91 kg/m².    Intake/Output Summary (Last 24 hours) at 07/22/18 1704  Last data filed at 07/22/18 0900   Gross per 24 hour   Intake              480 ml   Output              300 ml   Net              180 ml      Physical Exam   Constitutional: She appears well-developed and well-nourished. No distress.   Cardiovascular: Normal rate.    Pulmonary/Chest: Effort normal and breath sounds normal.   Abdominal: Soft. Bowel sounds are normal.   Musculoskeletal: She exhibits no edema.   Neurological: She is alert.   Oriented to person   Vitals reviewed.      Significant Labs: All pertinent labs within the past 24 hours have been reviewed.    Significant Imaging: I have reviewed all pertinent imaging results/findings within the past 24 hours.

## 2018-07-22 NOTE — ASSESSMENT & PLAN NOTE
- BP elevated at times but not sustained   - increased carvedilol to 12.5 mg BID on 7/21  - continue amlodipine and isosorbide-hydralazine  - continue to monitor

## 2018-07-23 LAB
ANION GAP SERPL CALC-SCNC: 9 MMOL/L
BASOPHILS # BLD AUTO: 0 K/UL
BASOPHILS NFR BLD: 0 %
BUN SERPL-MCNC: 85 MG/DL
CALCIUM SERPL-MCNC: 7.9 MG/DL
CHLORIDE SERPL-SCNC: 110 MMOL/L
CO2 SERPL-SCNC: 26 MMOL/L
CREAT SERPL-MCNC: 4.6 MG/DL
DIFFERENTIAL METHOD: ABNORMAL
EOSINOPHIL # BLD AUTO: 0 K/UL
EOSINOPHIL NFR BLD: 0 %
ERYTHROCYTE [DISTWIDTH] IN BLOOD BY AUTOMATED COUNT: 16.3 %
EST. GFR  (AFRICAN AMERICAN): 9.4 ML/MIN/1.73 M^2
EST. GFR  (NON AFRICAN AMERICAN): 8.2 ML/MIN/1.73 M^2
GLUCOSE SERPL-MCNC: 76 MG/DL
HCT VFR BLD AUTO: 22.5 %
HGB BLD-MCNC: 7.3 G/DL
IMM GRANULOCYTES # BLD AUTO: 0.07 K/UL
IMM GRANULOCYTES NFR BLD AUTO: 1 %
LYMPHOCYTES # BLD AUTO: 0.9 K/UL
LYMPHOCYTES NFR BLD: 12.9 %
MAGNESIUM SERPL-MCNC: 1.9 MG/DL
MCH RBC QN AUTO: 29.4 PG
MCHC RBC AUTO-ENTMCNC: 32.4 G/DL
MCV RBC AUTO: 91 FL
MONOCYTES # BLD AUTO: 0.5 K/UL
MONOCYTES NFR BLD: 7 %
NEUTROPHILS # BLD AUTO: 5.6 K/UL
NEUTROPHILS NFR BLD: 79.1 %
NRBC BLD-RTO: 0 /100 WBC
PHOSPHATE SERPL-MCNC: 5.2 MG/DL
PLATELET # BLD AUTO: 184 K/UL
PMV BLD AUTO: 11 FL
POCT GLUCOSE: 196 MG/DL (ref 70–110)
POCT GLUCOSE: 213 MG/DL (ref 70–110)
POCT GLUCOSE: 74 MG/DL (ref 70–110)
POCT GLUCOSE: 74 MG/DL (ref 70–110)
POTASSIUM SERPL-SCNC: 4.7 MMOL/L
RBC # BLD AUTO: 2.48 M/UL
SODIUM SERPL-SCNC: 145 MMOL/L
WBC # BLD AUTO: 7.12 K/UL

## 2018-07-23 PROCEDURE — 11000001 HC ACUTE MED/SURG PRIVATE ROOM

## 2018-07-23 PROCEDURE — 99232 SBSQ HOSP IP/OBS MODERATE 35: CPT | Mod: ,,, | Performed by: HOSPITALIST

## 2018-07-23 PROCEDURE — 84100 ASSAY OF PHOSPHORUS: CPT

## 2018-07-23 PROCEDURE — 25000003 PHARM REV CODE 250: Performed by: HOSPITALIST

## 2018-07-23 PROCEDURE — 85025 COMPLETE CBC W/AUTO DIFF WBC: CPT

## 2018-07-23 PROCEDURE — 63600175 PHARM REV CODE 636 W HCPCS: Performed by: HOSPITALIST

## 2018-07-23 PROCEDURE — 80048 BASIC METABOLIC PNL TOTAL CA: CPT

## 2018-07-23 PROCEDURE — 83735 ASSAY OF MAGNESIUM: CPT

## 2018-07-23 PROCEDURE — 36415 COLL VENOUS BLD VENIPUNCTURE: CPT

## 2018-07-23 RX ORDER — INSULIN ASPART 100 [IU]/ML
10 INJECTION, SOLUTION INTRAVENOUS; SUBCUTANEOUS
Status: DISCONTINUED | OUTPATIENT
Start: 2018-07-24 | End: 2018-07-24

## 2018-07-23 RX ADMIN — CARVEDILOL 12.5 MG: 12.5 TABLET, FILM COATED ORAL at 09:07

## 2018-07-23 RX ADMIN — HYDRALAZINE HYDROCHLORIDE AND ISOSORBIDE DINITRATE 1 TABLET: 37.5; 2 TABLET, FILM COATED ORAL at 09:07

## 2018-07-23 RX ADMIN — ASPIRIN 81 MG CHEWABLE TABLET 81 MG: 81 TABLET CHEWABLE at 09:07

## 2018-07-23 RX ADMIN — INSULIN DETEMIR 5 UNITS: 100 INJECTION, SOLUTION SUBCUTANEOUS at 10:07

## 2018-07-23 RX ADMIN — INSULIN ASPART 4 UNITS: 100 INJECTION, SOLUTION INTRAVENOUS; SUBCUTANEOUS at 05:07

## 2018-07-23 RX ADMIN — ATORVASTATIN CALCIUM 40 MG: 20 TABLET, FILM COATED ORAL at 09:07

## 2018-07-23 RX ADMIN — HYDRALAZINE HYDROCHLORIDE AND ISOSORBIDE DINITRATE 1 TABLET: 37.5; 2 TABLET, FILM COATED ORAL at 03:07

## 2018-07-23 RX ADMIN — AMLODIPINE BESYLATE 10 MG: 10 TABLET ORAL at 09:07

## 2018-07-23 RX ADMIN — HEPARIN SODIUM 5000 UNITS: 5000 INJECTION, SOLUTION INTRAVENOUS; SUBCUTANEOUS at 05:07

## 2018-07-23 RX ADMIN — SODIUM BICARBONATE 650 MG TABLET 650 MG: at 09:07

## 2018-07-23 RX ADMIN — SODIUM BICARBONATE 650 MG TABLET 650 MG: at 03:07

## 2018-07-23 RX ADMIN — HEPARIN SODIUM 5000 UNITS: 5000 INJECTION, SOLUTION INTRAVENOUS; SUBCUTANEOUS at 03:07

## 2018-07-23 RX ADMIN — PREDNISONE 40 MG: 20 TABLET ORAL at 09:07

## 2018-07-23 RX ADMIN — SENNOSIDES AND DOCUSATE SODIUM 1 TABLET: 8.6; 5 TABLET ORAL at 09:07

## 2018-07-23 RX ADMIN — DONEPEZIL HYDROCHLORIDE 5 MG: 5 TABLET, FILM COATED ORAL at 10:07

## 2018-07-23 RX ADMIN — INSULIN ASPART 12 UNITS: 100 INJECTION, SOLUTION INTRAVENOUS; SUBCUTANEOUS at 05:07

## 2018-07-23 RX ADMIN — SODIUM BICARBONATE 650 MG TABLET 650 MG: at 10:07

## 2018-07-23 RX ADMIN — INSULIN ASPART 12 UNITS: 100 INJECTION, SOLUTION INTRAVENOUS; SUBCUTANEOUS at 09:07

## 2018-07-23 RX ADMIN — HYDRALAZINE HYDROCHLORIDE AND ISOSORBIDE DINITRATE 1 TABLET: 37.5; 2 TABLET, FILM COATED ORAL at 10:07

## 2018-07-23 RX ADMIN — INSULIN ASPART 1 UNITS: 100 INJECTION, SOLUTION INTRAVENOUS; SUBCUTANEOUS at 10:07

## 2018-07-23 RX ADMIN — HEPARIN SODIUM 5000 UNITS: 5000 INJECTION, SOLUTION INTRAVENOUS; SUBCUTANEOUS at 10:07

## 2018-07-23 RX ADMIN — CARVEDILOL 12.5 MG: 12.5 TABLET, FILM COATED ORAL at 10:07

## 2018-07-23 RX ADMIN — SODIUM CHLORIDE 0.5 G: 9 INJECTION, SOLUTION INTRAVENOUS at 02:07

## 2018-07-23 NOTE — PLAN OF CARE
Problem: Patient Care Overview  Goal: Plan of Care Review  Outcome: Ongoing (interventions implemented as appropriate)   07/23/18 0626   Coping/Psychosocial   Plan Of Care Reviewed With sibling

## 2018-07-23 NOTE — ASSESSMENT & PLAN NOTE
LESLEY on CKD Stage IV-V: Patient with Crt baseline of 3.4 - 4.1 (As per previous f/u in nephrology clinics with, her Crt increase could be caused by nephritis by Pembrolizumab (taken for of Lung Ca treatment, which may cause in up to 33% of patients nephrotoxicity)    Differential dxs causing her LESLEY on CKD:  1. During hospitalization, patient presented with elevated blood pressures and sudden drop to systolic 130s, which could cause ischemic ATN.  2. Over diuresis for CHF  3. Hb trending down, causing hypoperfusion of kidneys  4. Hypoperfusion with cardiorenal etiology, given her decompensated CHF  5. Progression of her CKD  6. Progression of medication effect    Plan / Recommendations:  1. Continue holding diuretics  2. Medications to renal parameters  3. Anemia and blood loss workup  4. Creatinine slightly improving.  Continue monitoring.    5. Labs in AM.  6. Strict I/O and chart.  7. Avoid nephrotoxic medications.  8. Maintain MAP > 65.  9. Hb > 7gm/dL.  10. Encourage water intake.  Consider H2O 250 mL q 6-8 hrs.

## 2018-07-23 NOTE — SUBJECTIVE & OBJECTIVE
Interval History: Patient evaluated bedside found in no acute distress.  Refers feeling better.  Night was uneventful.      Review of patient's allergies indicates:  No Known Allergies  Current Facility-Administered Medications   Medication Frequency    albuterol-ipratropium 2.5 mg-0.5 mg/3 mL nebulizer solution 3 mL Q4H PRN    amLODIPine tablet 10 mg Daily    aspirin chewable tablet 81 mg Daily    atorvastatin tablet 40 mg Daily    carvedilol tablet 12.5 mg BID    dextrose 50% injection 12.5 g PRN    dextrose 50% injection 25 g PRN    donepezil tablet 5 mg QHS    ertapenem (INVANZ) 0.5 g in sodium chloride 0.9% 100 mL IVPB Q24H    glucagon (human recombinant) injection 1 mg PRN    glucose chewable tablet 16 g PRN    glucose chewable tablet 24 g PRN    heparin (porcine) injection 5,000 Units Q8H    insulin aspart U-100 pen 1-10 Units QID (AC + HS) PRN    insulin aspart U-100 pen 12 Units TIDWM    insulin detemir U-100 pen 5 Units QHS    isosorbide-hydrALAZINE 20-37.5 mg per tablet 1 tablet TID    predniSONE tablet 40 mg Daily    senna-docusate 8.6-50 mg per tablet 1 tablet BID    sodium bicarbonate tablet 650 mg TID    sodium chloride 0.9% flush 5 mL PRN       Objective:     Vital Signs (Most Recent):  Temp: 97.8 °F (36.6 °C) (07/23/18 1147)  Pulse: 73 (07/23/18 1147)  Resp: 17 (07/23/18 1147)  BP: (!) 144/65 (07/23/18 1147)  SpO2: (!) 91 % (07/23/18 1147)  O2 Device (Oxygen Therapy): nasal cannula (07/23/18 1100) Vital Signs (24h Range):  Temp:  [97.6 °F (36.4 °C)-99 °F (37.2 °C)] 97.8 °F (36.6 °C)  Pulse:  [68-86] 73  Resp:  [16-20] 17  SpO2:  [91 %-100 %] 91 %  BP: (144-167)/(65-71) 144/65     Weight: 76.4 kg (168 lb 6.4 oz) (07/19/18 1947)  Body mass index is 28.91 kg/m².  Body surface area is 1.86 meters squared.    I/O last 3 completed shifts:  In: 480 [P.O.:480]  Out: 600 [Urine:600]    Physical Exam  Constitutional: She appears well-nourished. No distress.   Pleasantly confused,  obese   HENT:   Head: Normocephalic and atraumatic.   Nose: Nose normal.   Mouth/Throat: Oropharynx is clear and moist. No oropharyngeal exudate.   Eyes: Conjunctivae and EOM are normal. Pupils are equal, round, and reactive to light. Right eye exhibits no discharge. Left eye exhibits no discharge. No scleral icterus.   Neck: Neck supple. No JVD present. No tracheal deviation present.   Cardiovascular: Normal rate, regular rhythm, normal heart sounds and intact distal pulses.  Exam reveals no friction rub.    No murmur heard.  Pulmonary/Chest: Effort normal. No stridor. No respiratory distress. She has no wheezes. She has no rales. She exhibits no tenderness.   Abdominal: Soft. Bowel sounds are normal. She exhibits no distension and no mass. There is no tenderness. There is no rebound and no guarding.   Musculoskeletal: Normal range of motion. She exhibits no edema, tenderness or deformity.   Lymphadenopathy:     She has no cervical adenopathy.   Neurological: She is alert. She is disoriented (disoriented to time.). No cranial nerve deficit. She exhibits normal muscle tone. Coordination normal.   Skin: Skin is warm and dry. No rash noted. She is not diaphoretic. No erythema. No pallor.   Psychiatric: She has a normal mood and affect. Her behavior is normal.   Vitals reviewed.    Significant Labs:  CBC:   Recent Labs  Lab 07/23/18  0755   WBC 7.12   RBC 2.48*   HGB 7.3*   HCT 22.5*      MCV 91   MCH 29.4   MCHC 32.4     CMP:   Recent Labs  Lab 07/17/18  0453  07/23/18  0755   *  < > 76   CALCIUM 8.2*  < > 7.9*   ALBUMIN 2.0*  --   --    PROT 5.6*  --   --      < > 145   K 4.5  < > 4.7   CO2 21*  < > 26     < > 110   BUN 64*  < > 85*   CREATININE 4.3*  < > 4.6*   ALKPHOS 79  --   --    ALT 10  --   --    AST 14  --   --    BILITOT 0.3  --   --    < > = values in this interval not displayed.  All labs within the past 24 hours have been reviewed.

## 2018-07-23 NOTE — PROGRESS NOTES
[facility]  Nephrology  Progress Note    Patient Name: Misa Yun  MRN: [id number]  Admission Date: 7/16/2018  Hospital Length of Stay: 4 days  Attending Provider: Itz Salmeron MD   Primary Care Physician: [facility]  Principal Problem:Acute on chronic combined systolic and diastolic heart failure    Subjective:     HPI: 83 y/o woman with IDDM2, HTN, HPL, CKD (CrBaseline 3.4-4.1 estimated GFR 9-10 cc/min from baseline crt of 2.5 little more than a year ago, with regular f/u with nephrologists Dr. 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      Patient consulted to Nephrology service due to LESLEY worsening creat. on CKD, for evaluation and recommendations.    Interval History: Patient evaluated bedside found in no acute distress.  Refers feeling better.  Night was uneventful.      Review of patient's allergies indicates:  No Known Allergies  Current Facility-Administered Medications   Medication Frequency    albuterol-ipratropium 2.5 mg-0.5 mg/3 mL nebulizer solution 3 mL Q4H PRN    amLODIPine tablet 10 mg Daily    aspirin chewable tablet 81 mg Daily    atorvastatin tablet 40 mg Daily    carvedilol tablet 12.5 mg BID    dextrose 50% injection 12.5 g PRN    dextrose 50% injection 25 g PRN    donepezil tablet 5 mg QHS    ertapenem (INVANZ) 0.5 g in sodium chloride 0.9% 100 mL IVPB Q24H    glucagon (human recombinant) injection 1 mg PRN    glucose chewable tablet 16 g PRN    glucose chewable tablet 24 g PRN    heparin (porcine) injection 5,000 Units Q8H    insulin aspart U-100 pen 1-10 Units QID (AC + HS) PRN    insulin aspart U-100 pen 12 Units TIDWM    insulin detemir U-100 pen 5 Units QHS    isosorbide-hydrALAZINE  20-37.5 mg per tablet 1 tablet TID    predniSONE tablet 40 mg Daily    senna-docusate 8.6-50 mg per tablet 1 tablet BID    sodium bicarbonate tablet 650 mg TID    sodium chloride 0.9% flush 5 mL PRN       Objective:     Vital Signs (Most Recent):  Temp: 97.8 °F (36.6 °C) (07/23/18 1147)  Pulse: 73 (07/23/18 1147)  Resp: 17 (07/23/18 1147)  BP: (!) 144/65 (07/23/18 1147)  SpO2: (!) 91 % (07/23/18 1147)  O2 Device (Oxygen Therapy): nasal cannula (07/23/18 1100) Vital Signs (24h Range):  Temp:  [97.6 °F (36.4 °C)-99 °F (37.2 °C)] 97.8 °F (36.6 °C)  Pulse:  [68-86] 73  Resp:  [16-20] 17  SpO2:  [91 %-100 %] 91 %  BP: (144-167)/(65-71) 144/65     Weight: 76.4 kg (168 lb 6.4 oz) (07/19/18 1947)  Body mass index is 28.91 kg/m².  Body surface area is 1.86 meters squared.    I/O last 3 completed shifts:  In: 480 [P.O.:480]  Out: 600 [Urine:600]    Physical Exam  Constitutional: She appears well-nourished. No distress.   Pleasantly confused, obese   HENT:   Head: Normocephalic and atraumatic.   Nose: Nose normal.   Mouth/Throat: Oropharynx is clear and moist. No oropharyngeal exudate.   Eyes: Conjunctivae and EOM are normal. Pupils are equal, round, and reactive to light. Right eye exhibits no discharge. Left eye exhibits no discharge. No scleral icterus.   Neck: Neck supple. No JVD present. No tracheal deviation present.   Cardiovascular: Normal rate, regular rhythm, normal heart sounds and intact distal pulses.  Exam reveals no friction rub.    No murmur heard.  Pulmonary/Chest: Effort normal. No stridor. No respiratory distress. She has no wheezes. She has no rales. She exhibits no tenderness.   Abdominal: Soft. Bowel sounds are normal. She exhibits no distension and no mass. There is no tenderness. There is no rebound and no guarding.   Musculoskeletal: Normal range of motion. She exhibits no edema, tenderness or deformity.   Lymphadenopathy:     She has no cervical adenopathy.   Neurological: She is alert. She is  disoriented (disoriented to time.). No cranial nerve deficit. She exhibits normal muscle tone. Coordination normal.   Skin: Skin is warm and dry. No rash noted. She is not diaphoretic. No erythema. No pallor.   Psychiatric: She has a normal mood and affect. Her behavior is normal.   Vitals reviewed.    Significant Labs:  CBC:   Recent Labs  Lab 07/23/18  0755   WBC 7.12   RBC 2.48*   HGB 7.3*   HCT 22.5*      MCV 91   MCH 29.4   MCHC 32.4     CMP:   Recent Labs  Lab 07/17/18  0453  07/23/18  0755   *  < > 76   CALCIUM 8.2*  < > 7.9*   ALBUMIN 2.0*  --   --    PROT 5.6*  --   --      < > 145   K 4.5  < > 4.7   CO2 21*  < > 26     < > 110   BUN 64*  < > 85*   CREATININE 4.3*  < > 4.6*   ALKPHOS 79  --   --    ALT 10  --   --    AST 14  --   --    BILITOT 0.3  --   --    < > = values in this interval not displayed.  All labs within the past 24 hours have been reviewed.         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 During hospitalization, patient presented with elevated blood pressures and sudden drop to systolic 130s, which could cause ischemic ATN.  2. Over diuresis for CHF  3. Hb trending down, causing hypoperfusion of kidneys  4. Hypoperfusion with cardiorenal etiology, given her decompensated CHF  5. Progression of her CKD  6. Progression of medication effect    Plan / Recommendations:  1. Continue holding diuretics  2. Medications to renal parameters  3. Anemia and blood loss workup  4. Creatinine slightly improving.  Continue monitoring.    5. Labs in AM.  6. Strict I/O and chart.  7. Avoid nephrotoxic medications.  8. Maintain MAP > 65.  9. Hb >  7gm/dL.  10. Encourage water intake.  Consider H2O 250 mL q 6-8 hrs.                Essential hypertension    Maintain MAP > 65, with target Bp <140/90            Thank you for your consult. I will follow-up with patient. Please contact us if you have any additional questions.    Albino Teixeira MD  Nephrology  [facility]

## 2018-07-24 PROBLEM — E53.8 FOLATE DEFICIENCY: Status: ACTIVE | Noted: 2018-07-24

## 2018-07-24 LAB
ANION GAP SERPL CALC-SCNC: 11 MMOL/L
BASOPHILS # BLD AUTO: 0.01 K/UL
BASOPHILS NFR BLD: 0.1 %
BUN SERPL-MCNC: 91 MG/DL
CALCIUM SERPL-MCNC: 8 MG/DL
CHLORIDE SERPL-SCNC: 107 MMOL/L
CO2 SERPL-SCNC: 24 MMOL/L
CREAT SERPL-MCNC: 4.9 MG/DL
DIFFERENTIAL METHOD: ABNORMAL
EOSINOPHIL # BLD AUTO: 0 K/UL
EOSINOPHIL NFR BLD: 0 %
ERYTHROCYTE [DISTWIDTH] IN BLOOD BY AUTOMATED COUNT: 16 %
EST. GFR  (AFRICAN AMERICAN): 8.8 ML/MIN/1.73 M^2
EST. GFR  (NON AFRICAN AMERICAN): 7.6 ML/MIN/1.73 M^2
FERRITIN SERPL-MCNC: 160 NG/ML
FOLATE SERPL-MCNC: 2.1 NG/ML
GLUCOSE SERPL-MCNC: 148 MG/DL
HCT VFR BLD AUTO: 23.3 %
HGB BLD-MCNC: 7.5 G/DL
IMM GRANULOCYTES # BLD AUTO: 0.07 K/UL
IMM GRANULOCYTES NFR BLD AUTO: 1 %
IRON SERPL-MCNC: 40 UG/DL
LYMPHOCYTES # BLD AUTO: 0.8 K/UL
LYMPHOCYTES NFR BLD: 11.7 %
MAGNESIUM SERPL-MCNC: 2.1 MG/DL
MCH RBC QN AUTO: 29.3 PG
MCHC RBC AUTO-ENTMCNC: 32.2 G/DL
MCV RBC AUTO: 91 FL
MONOCYTES # BLD AUTO: 0.5 K/UL
MONOCYTES NFR BLD: 7 %
NEUTROPHILS # BLD AUTO: 5.7 K/UL
NEUTROPHILS NFR BLD: 80.2 %
NRBC BLD-RTO: 0 /100 WBC
PHOSPHATE SERPL-MCNC: 5.1 MG/DL
PLATELET # BLD AUTO: 176 K/UL
PMV BLD AUTO: 10.6 FL
POCT GLUCOSE: 120 MG/DL (ref 70–110)
POCT GLUCOSE: 135 MG/DL (ref 70–110)
POCT GLUCOSE: 192 MG/DL (ref 70–110)
POCT GLUCOSE: 252 MG/DL (ref 70–110)
POTASSIUM SERPL-SCNC: 4.4 MMOL/L
RBC # BLD AUTO: 2.56 M/UL
SATURATED IRON: 22 %
SODIUM SERPL-SCNC: 142 MMOL/L
TOTAL IRON BINDING CAPACITY: 178 UG/DL
TRANSFERRIN SERPL-MCNC: 120 MG/DL
VIT B12 SERPL-MCNC: 440 PG/ML
WBC # BLD AUTO: 7.15 K/UL

## 2018-07-24 PROCEDURE — 63600175 PHARM REV CODE 636 W HCPCS: Performed by: HOSPITALIST

## 2018-07-24 PROCEDURE — 82746 ASSAY OF FOLIC ACID SERUM: CPT

## 2018-07-24 PROCEDURE — 82728 ASSAY OF FERRITIN: CPT

## 2018-07-24 PROCEDURE — 25000003 PHARM REV CODE 250: Performed by: HOSPITALIST

## 2018-07-24 PROCEDURE — 25000242 PHARM REV CODE 250 ALT 637 W/ HCPCS: Performed by: EMERGENCY MEDICINE

## 2018-07-24 PROCEDURE — 80048 BASIC METABOLIC PNL TOTAL CA: CPT

## 2018-07-24 PROCEDURE — 83735 ASSAY OF MAGNESIUM: CPT

## 2018-07-24 PROCEDURE — 27000221 HC OXYGEN, UP TO 24 HOURS

## 2018-07-24 PROCEDURE — 84100 ASSAY OF PHOSPHORUS: CPT

## 2018-07-24 PROCEDURE — 36415 COLL VENOUS BLD VENIPUNCTURE: CPT

## 2018-07-24 PROCEDURE — 99233 SBSQ HOSP IP/OBS HIGH 50: CPT | Mod: ,,, | Performed by: HOSPITALIST

## 2018-07-24 PROCEDURE — 11000001 HC ACUTE MED/SURG PRIVATE ROOM

## 2018-07-24 PROCEDURE — 83540 ASSAY OF IRON: CPT

## 2018-07-24 PROCEDURE — 25000242 PHARM REV CODE 250 ALT 637 W/ HCPCS: Performed by: HOSPITALIST

## 2018-07-24 PROCEDURE — 94761 N-INVAS EAR/PLS OXIMETRY MLT: CPT

## 2018-07-24 PROCEDURE — 82607 VITAMIN B-12: CPT

## 2018-07-24 PROCEDURE — 94640 AIRWAY INHALATION TREATMENT: CPT

## 2018-07-24 PROCEDURE — 85025 COMPLETE CBC W/AUTO DIFF WBC: CPT

## 2018-07-24 RX ORDER — CARVEDILOL 25 MG/1
25 TABLET ORAL 2 TIMES DAILY
Status: DISCONTINUED | OUTPATIENT
Start: 2018-07-24 | End: 2018-07-25 | Stop reason: HOSPADM

## 2018-07-24 RX ORDER — INSULIN ASPART 100 [IU]/ML
5 INJECTION, SOLUTION INTRAVENOUS; SUBCUTANEOUS
Status: DISCONTINUED | OUTPATIENT
Start: 2018-07-24 | End: 2018-07-25 | Stop reason: HOSPADM

## 2018-07-24 RX ORDER — FOLIC ACID 1 MG/1
2 TABLET ORAL DAILY
Status: DISCONTINUED | OUTPATIENT
Start: 2018-07-24 | End: 2018-07-25 | Stop reason: HOSPADM

## 2018-07-24 RX ORDER — ALBUTEROL SULFATE 0.83 MG/ML
2.5 SOLUTION RESPIRATORY (INHALATION) EVERY 4 HOURS
Status: DISCONTINUED | OUTPATIENT
Start: 2018-07-24 | End: 2018-07-25

## 2018-07-24 RX ADMIN — SENNOSIDES AND DOCUSATE SODIUM 1 TABLET: 8.6; 5 TABLET ORAL at 10:07

## 2018-07-24 RX ADMIN — ALBUTEROL SULFATE 2.5 MG: 2.5 SOLUTION RESPIRATORY (INHALATION) at 12:07

## 2018-07-24 RX ADMIN — SODIUM CHLORIDE 0.5 G: 9 INJECTION, SOLUTION INTRAVENOUS at 01:07

## 2018-07-24 RX ADMIN — ASPIRIN 81 MG CHEWABLE TABLET 81 MG: 81 TABLET CHEWABLE at 09:07

## 2018-07-24 RX ADMIN — SODIUM BICARBONATE 650 MG TABLET 650 MG: at 10:07

## 2018-07-24 RX ADMIN — AMLODIPINE BESYLATE 10 MG: 10 TABLET ORAL at 09:07

## 2018-07-24 RX ADMIN — ALBUTEROL SULFATE 2.5 MG: 2.5 SOLUTION RESPIRATORY (INHALATION) at 03:07

## 2018-07-24 RX ADMIN — ALBUTEROL SULFATE 2.5 MG: 2.5 SOLUTION RESPIRATORY (INHALATION) at 11:07

## 2018-07-24 RX ADMIN — DONEPEZIL HYDROCHLORIDE 5 MG: 5 TABLET, FILM COATED ORAL at 10:07

## 2018-07-24 RX ADMIN — HEPARIN SODIUM 5000 UNITS: 5000 INJECTION, SOLUTION INTRAVENOUS; SUBCUTANEOUS at 01:07

## 2018-07-24 RX ADMIN — HYDRALAZINE HYDROCHLORIDE AND ISOSORBIDE DINITRATE 1 TABLET: 37.5; 2 TABLET, FILM COATED ORAL at 09:07

## 2018-07-24 RX ADMIN — SENNOSIDES AND DOCUSATE SODIUM 1 TABLET: 8.6; 5 TABLET ORAL at 09:07

## 2018-07-24 RX ADMIN — FOLIC ACID 2 MG: 1 TABLET ORAL at 01:07

## 2018-07-24 RX ADMIN — INSULIN ASPART 2 UNITS: 100 INJECTION, SOLUTION INTRAVENOUS; SUBCUTANEOUS at 05:07

## 2018-07-24 RX ADMIN — IPRATROPIUM BROMIDE AND ALBUTEROL SULFATE 3 ML: .5; 3 SOLUTION RESPIRATORY (INHALATION) at 05:07

## 2018-07-24 RX ADMIN — INSULIN ASPART 10 UNITS: 100 INJECTION, SOLUTION INTRAVENOUS; SUBCUTANEOUS at 01:07

## 2018-07-24 RX ADMIN — PREDNISONE 40 MG: 20 TABLET ORAL at 09:07

## 2018-07-24 RX ADMIN — ALBUTEROL SULFATE 2.5 MG: 2.5 SOLUTION RESPIRATORY (INHALATION) at 08:07

## 2018-07-24 RX ADMIN — HEPARIN SODIUM 5000 UNITS: 5000 INJECTION, SOLUTION INTRAVENOUS; SUBCUTANEOUS at 06:07

## 2018-07-24 RX ADMIN — ATORVASTATIN CALCIUM 40 MG: 20 TABLET, FILM COATED ORAL at 09:07

## 2018-07-24 RX ADMIN — SODIUM BICARBONATE 650 MG TABLET 650 MG: at 02:07

## 2018-07-24 RX ADMIN — CARVEDILOL 12.5 MG: 12.5 TABLET, FILM COATED ORAL at 09:07

## 2018-07-24 RX ADMIN — HYDRALAZINE HYDROCHLORIDE AND ISOSORBIDE DINITRATE 1 TABLET: 37.5; 2 TABLET, FILM COATED ORAL at 02:07

## 2018-07-24 RX ADMIN — HEPARIN SODIUM 5000 UNITS: 5000 INJECTION, SOLUTION INTRAVENOUS; SUBCUTANEOUS at 10:07

## 2018-07-24 RX ADMIN — ALBUTEROL SULFATE 2.5 MG: 2.5 SOLUTION RESPIRATORY (INHALATION) at 07:07

## 2018-07-24 RX ADMIN — HYDRALAZINE HYDROCHLORIDE AND ISOSORBIDE DINITRATE 1 TABLET: 37.5; 2 TABLET, FILM COATED ORAL at 10:07

## 2018-07-24 RX ADMIN — CARVEDILOL 25 MG: 25 TABLET, FILM COATED ORAL at 10:07

## 2018-07-24 RX ADMIN — INSULIN ASPART 2 UNITS: 100 INJECTION, SOLUTION INTRAVENOUS; SUBCUTANEOUS at 10:07

## 2018-07-24 RX ADMIN — INSULIN ASPART 10 UNITS: 100 INJECTION, SOLUTION INTRAVENOUS; SUBCUTANEOUS at 05:07

## 2018-07-24 RX ADMIN — INSULIN ASPART 10 UNITS: 100 INJECTION, SOLUTION INTRAVENOUS; SUBCUTANEOUS at 09:07

## 2018-07-24 RX ADMIN — SODIUM BICARBONATE 650 MG TABLET 650 MG: at 09:07

## 2018-07-24 NOTE — ASSESSMENT & PLAN NOTE
Folate deficiency  - most likely due to chronic disease from renal dysfunction. Also folate deficient  - fluctuates in the 7s  - fobt negative  - will continue to monitor   - start folate supplementation

## 2018-07-24 NOTE — ASSESSMENT & PLAN NOTE
"- creatinine peaked at 5 and now stable  - monitor with daily labs  - avoid further diuresis and hold nephrotoxic meds    Per Renal:  "LESLEY on CKD Stage IV-V: Patient with Crt baseline of 3.4 - 4.1 (As per previous f/u in nephrology clinics with, her Crt increase could be caused by nephritis by Pembrolizumab (taken for of Lung Ca treatment, which may cause in up to 33% of patients nephrotoxicity)     Differential dxs causing her LESLEY on CKD:  1. During hospitalization, patient presented with elevated blood pressures and sudden drop to systolic 130s, which could cause ischemic ATN.  2. Over diuresis for CHF  3. Hb trending down, causing hypoperfusion of kidneys  4. Hypoperfusion with cardiorenal etiology, given her decompensated CHF  5. Progression of her CKD  6. Progression of medication effect"     "

## 2018-07-24 NOTE — ASSESSMENT & PLAN NOTE
- most likely due to chronic disease from renal dysfunction  - fluctuates in the 7s  - fobt negative  - will continue to monitor

## 2018-07-24 NOTE — SUBJECTIVE & OBJECTIVE
Interval History: no acute events. satting > 92% on room air. No complaints, asking to eat lunch.      Review of Systems   Constitutional: Negative for fatigue.   Respiratory: Negative for cough and shortness of breath.    Cardiovascular: Negative for chest pain and palpitations.   Gastrointestinal: Negative for abdominal pain.   Genitourinary: Negative for difficulty urinating and dysuria.     Objective:     Vital Signs (Most Recent):  Temp: 97 °F (36.1 °C) (07/24/18 1611)  Pulse: 73 (07/24/18 1611)  Resp: 16 (07/24/18 1611)  BP: (!) 161/70 (07/24/18 1611)  SpO2: (!) 93 % (07/24/18 1611) Vital Signs (24h Range):  Temp:  [96.7 °F (35.9 °C)-97.4 °F (36.3 °C)] 97 °F (36.1 °C)  Pulse:  [66-73] 73  Resp:  [16-20] 16  SpO2:  [92 %-98 %] 93 %  BP: (130-195)/(61-77) 161/70     Weight: 76.4 kg (168 lb 6.4 oz)  Body mass index is 28.91 kg/m².    Intake/Output Summary (Last 24 hours) at 07/24/18 1726  Last data filed at 07/24/18 0600   Gross per 24 hour   Intake              600 ml   Output              500 ml   Net              100 ml      Physical Exam   Constitutional: She appears well-developed and well-nourished. No distress.   Cardiovascular: Normal rate.    Pulmonary/Chest: Effort normal. She has no rales.   Abdominal: Soft.   Musculoskeletal: She exhibits no edema.   Neurological: She is alert.   Oriented to person   Vitals reviewed.      Significant Labs: All pertinent labs within the past 24 hours have been reviewed.    Significant Imaging: I have reviewed all pertinent imaging results/findings within the past 24 hours.

## 2018-07-24 NOTE — ASSESSMENT & PLAN NOTE
Patient improved   On RA  Continue medical management  Holding diuresis due to LESLEY   Euvolemic

## 2018-07-24 NOTE — ASSESSMENT & PLAN NOTE
- BPs not controlled  - increased carvedilol to 25 mg BID on 7/21  - continue amlodipine and isosorbide-hydralazine  - continue to monitor

## 2018-07-24 NOTE — PROGRESS NOTES
[facility] Hospital Medicine  Progress Note    Patient Name: Misa Yun  MRN: [id number]  Patient Class: IP- Inpatient   Admission Date: 7/16/2018  Length of Stay: 4 days  Attending Physician: Itz Salmeron MD  Primary Care Provider: [facility] Medicine Team: [facility] HOSP MED G Itz Salmeron MD    Subjective:     Principal Problem:Acute on chronic combined systolic and diastolic heart failure    HPI:  History is limited as patient has dementia. History obtained from sister Carlota Tejeda her POA    84F with DM2 A1C 8, HTN, HLD, dementia, CAD s/p CABG '06, combined chronic heart failure (5/18 TTE EF 45-50%, G1DD), ovarian cancer, and lung squamous cell ca (no longer taking chemo due to nephritis)  [facility] reports increased work of breathing and a sat of 89-90% on room air this AM. Was given a breathing treatment and supplemental oxygen with no improvement so she was transferred to the ED. Pt denies cough, chest pain, fever, or chills.     She was initially placed on BIPAP, since has been transitioned to 4 L NC, received Cefepime x 1, a breathing treatment, and a one liter NS bolus in the ED.     Confirmed full code status with sister.   Sister called [facility], who confirmed that there is no report of any fall by patient.     Hospital Course:  No notes on file    Interval History: no acute events; sister at bedside     Review of Systems   Constitutional: Negative for fatigue.   Respiratory: Negative for cough and shortness of breath.    Cardiovascular: Negative for chest pain and palpitations.   Gastrointestinal: Negative for abdominal pain.   Genitourinary: Negative for difficulty urinating and dysuria.     Objective:     Vital Signs (Most Recent):  Temp: 97.4 °F (36.3 °C) (07/23/18 2006)  Pulse: 69 (07/23/18 2006)  Resp: 18 (07/23/18 2006)  BP: 130/61 (07/23/18 2006)  SpO2: 98 % (07/23/18 2006) Vital Signs (24h Range):  Temp:  [97.4 °F (36.3 °C)-99  °F (37.2 °C)] 97.4 °F (36.3 °C)  Pulse:  [68-73] 69  Resp:  [16-18] 18  SpO2:  [91 %-98 %] 98 %  BP: (130-163)/(61-71) 130/61     Weight: 76.4 kg (168 lb 6.4 oz)  Body mass index is 28.91 kg/m².    Intake/Output Summary (Last 24 hours) at 07/23/18 2233  Last data filed at 07/23/18 1741   Gross per 24 hour   Intake              800 ml   Output                0 ml   Net              800 ml      Physical Exam   Constitutional: She appears well-developed and well-nourished. No distress.   Cardiovascular: Normal rate.    Pulmonary/Chest: Effort normal. She has no rales.   Abdominal: Soft.   Musculoskeletal: She exhibits no edema.   Neurological: She is alert.   Oriented to person   Vitals reviewed.      Significant Labs: All pertinent labs within the past 24 hours have been reviewed.    Significant Imaging: I have reviewed all pertinent imaging results/findings within the past 24 hours.    Assessment/Plan:      * Acute on chronic combined systolic and diastolic heart failure    Patient improved   On RA - 2L   Continue medical management  Holding diuresis due to amrcelino   Resolved             Anemia    - most likely due to chronic disease from renal dysfunction  - fluctuates in the 7s  - fobt negative  - will continue to monitor           Elevated troponin    Elevated but likely from renal dysfunction. Denies CP. 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 Cont ASA, statin.           Dementia    No acute issues.  Cont home Aricept.   Delirium precautions.          Essential hypertension    - BP elevated at times but not sustained   - increased carvedilol to 12.5 mg BID on 7/21  - continue amlodipine and isosorbide-hydralazine  - continue to monitor             VTE Risk Mitigation         Ordered     heparin (porcine) injection 5,000 Units  Every 8 hours      07/16/18 1222     IP VTE HIGH RISK PATIENT  Once      07/16/18 1222              Itz Salmeron MD  Department of Hospital Medicine   [facility]

## 2018-07-24 NOTE — SUBJECTIVE & OBJECTIVE
Interval History: no acute events; sister at bedside     Review of Systems   Constitutional: Negative for fatigue.   Respiratory: Negative for cough and shortness of breath.    Cardiovascular: Negative for chest pain and palpitations.   Gastrointestinal: Negative for abdominal pain.   Genitourinary: Negative for difficulty urinating and dysuria.     Objective:     Vital Signs (Most Recent):  Temp: 97.4 °F (36.3 °C) (07/23/18 2006)  Pulse: 69 (07/23/18 2006)  Resp: 18 (07/23/18 2006)  BP: 130/61 (07/23/18 2006)  SpO2: 98 % (07/23/18 2006) Vital Signs (24h Range):  Temp:  [97.4 °F (36.3 °C)-99 °F (37.2 °C)] 97.4 °F (36.3 °C)  Pulse:  [68-73] 69  Resp:  [16-18] 18  SpO2:  [91 %-98 %] 98 %  BP: (130-163)/(61-71) 130/61     Weight: 76.4 kg (168 lb 6.4 oz)  Body mass index is 28.91 kg/m².    Intake/Output Summary (Last 24 hours) at 07/23/18 2233  Last data filed at 07/23/18 1741   Gross per 24 hour   Intake              800 ml   Output                0 ml   Net              800 ml      Physical Exam   Constitutional: She appears well-developed and well-nourished. No distress.   Cardiovascular: Normal rate.    Pulmonary/Chest: Effort normal. She has no rales.   Abdominal: Soft.   Musculoskeletal: She exhibits no edema.   Neurological: She is alert.   Oriented to person   Vitals reviewed.      Significant Labs: All pertinent labs within the past 24 hours have been reviewed.    Significant Imaging: I have reviewed all pertinent imaging results/findings within the past 24 hours.

## 2018-07-24 NOTE — ASSESSMENT & PLAN NOTE
LESLEY on CKD Stage IV-V: Patient with Crt baseline of 3.4 - 4.1 (As per previous f/u in nephrology clinics with, her Crt increase could be caused by nephritis by Pembrolizumab (taken for of Lung Ca treatment, which may cause in up to 33% of patients nephrotoxicity)    Differential dxs causing her LESLEY on CKD:  1. During hospitalization, patient presented with elevated blood pressures and sudden drop to systolic 130s, which could cause ischemic ATN.  2. Over diuresis for CHF  3. Hb trending down, causing hypoperfusion of kidneys  4. Hypoperfusion with cardiorenal etiology, given her decompensated CHF  5. Progression of her CKD  6. Progression of medication effect    Plan / Recommendations:  1. Continue holding diuretics  2. Medications to renal parameters  3. Anemia and blood loss workup  4. Creatinine has plateau in high 4s; monitor carefully.  If discharged close follow up with Nephrologist outpatient.  5. Labs in AM.  6. Strict I/O and chart.  7. Avoid nephrotoxic medications.  8. Maintain MAP > 65.   9. Hb > 7gm/dL.  10. Encourage water intake.  Consider H2O 250 mL q 6-8 hrs.

## 2018-07-24 NOTE — PT/OT/SLP PROGRESS
Physical Therapy      Patient Name:  Misa Yun   MRN:  [id number]    PT orders acknowledged and discontinued. OT spoke with pt and pt daughter. Pt currently at baseline and pt daughter reports pt without needs for therapy at this time. Pt current d/c plan to return to NH.     PRINCE CARRASCO, PT   7/24/2018

## 2018-07-24 NOTE — SUBJECTIVE & OBJECTIVE
Interval History: Patient evaluated bedside found in no acute distress, speaking in full sentences, supine with no SOB.  Accompanied by family member.  Night was uneventful.    Review of patient's allergies indicates:  No Known Allergies  Current Facility-Administered Medications   Medication Frequency    albuterol nebulizer solution 2.5 mg Q4H    albuterol-ipratropium 2.5 mg-0.5 mg/3 mL nebulizer solution 3 mL Q4H PRN    amLODIPine tablet 10 mg Daily    aspirin chewable tablet 81 mg Daily    atorvastatin tablet 40 mg Daily    carvedilol tablet 12.5 mg BID    dextrose 50% injection 12.5 g PRN    dextrose 50% injection 25 g PRN    donepezil tablet 5 mg QHS    ertapenem (INVANZ) 0.5 g in sodium chloride 0.9% 100 mL IVPB Q24H    folic acid tablet 2 mg Daily    glucagon (human recombinant) injection 1 mg PRN    glucose chewable tablet 16 g PRN    glucose chewable tablet 24 g PRN    heparin (porcine) injection 5,000 Units Q8H    insulin aspart U-100 pen 1-10 Units QID (AC + HS) PRN    insulin aspart U-100 pen 10 Units TIDWM    insulin detemir U-100 pen 5 Units QHS    isosorbide-hydrALAZINE 20-37.5 mg per tablet 1 tablet TID    predniSONE tablet 40 mg Daily    senna-docusate 8.6-50 mg per tablet 1 tablet BID    sodium bicarbonate tablet 650 mg TID    sodium chloride 0.9% flush 5 mL PRN       Objective:     Vital Signs (Most Recent):  Temp: 96.7 °F (35.9 °C) (07/24/18 0840)  Pulse: 66 (07/24/18 1235)  Resp: 18 (07/24/18 1235)  BP: (!) 195/77 (07/24/18 0840)  SpO2: (!) 93 % (07/24/18 1235)  O2 Device (Oxygen Therapy): room air (07/24/18 1235) Vital Signs (24h Range):  Temp:  [96.7 °F (35.9 °C)-98.5 °F (36.9 °C)] 96.7 °F (35.9 °C)  Pulse:  [66-72] 66  Resp:  [16-18] 18  SpO2:  [93 %-98 %] 93 %  BP: (130-195)/(61-77) 195/77     Weight: 76.4 kg (168 lb 6.4 oz) (07/19/18 1947)  Body mass index is 28.91 kg/m².  Body surface area is 1.86 meters squared.    I/O last 3 completed shifts:  In: 1040  [P.O.:1040]  Out: 500 [Urine:500]    Physical Exam  Constitutional: She appears well-nourished. No distress. Oriented to person and place.   HENT:   Head: Normocephalic and atraumatic.   Nose: Nose normal.   Mouth/Throat: Oropharynx is clear and moist. No oropharyngeal exudate.   Eyes: Conjunctivae and EOM are normal. Pupils are equal, round, and reactive to light. Right eye exhibits no discharge. Left eye exhibits no discharge. No scleral icterus.   Neck: Neck supple. No JVD present. No tracheal deviation present.   Cardiovascular: Normal rate, regular rhythm, normal heart sounds and intact distal pulses.  Exam reveals no friction rub.    No murmur heard.  Pulmonary/Chest: Effort normal. No stridor. No respiratory distress. She has no wheezes. She has no rales. She exhibits no tenderness.   Abdominal: Soft. Bowel sounds are normal. She exhibits no distension and no mass. There is no tenderness. There is no rebound and no guarding.   Musculoskeletal: Normal range of motion. She exhibits no edema, tenderness or deformity.   Lymphadenopathy:     She has no cervical adenopathy.   Neurological: She is alert. She is disoriented (disoriented to time.). No cranial nerve deficit. She exhibits normal muscle tone. Coordination normal.   Skin: Skin is warm and dry. No rash noted. She is not diaphoretic. No erythema. No pallor.   Psychiatric: She has a normal mood and affect. Her behavior is normal.   Vitals reviewed.    Significant Labs:  CBC:   Recent Labs  Lab 07/24/18  0601   WBC 7.15   RBC 2.56*   HGB 7.5*   HCT 23.3*      MCV 91   MCH 29.3   MCHC 32.2     CMP:   Recent Labs  Lab 07/24/18  0601   *   CALCIUM 8.0*      K 4.4   CO2 24      BUN 91*   CREATININE 4.9*     All labs within the past 24 hours have been reviewed.

## 2018-07-24 NOTE — PROGRESS NOTES
[facility] Hospital Medicine  Progress Note    Patient Name: Misa Yun  MRN: [id number]  Patient Class: IP- Inpatient   Admission Date: 7/16/2018  Length of Stay: 5 days  Attending Physician: Margarita Cason MD  Primary Care Provider: [facility] Medicine Team: [facility] Margarita Cason MD    Subjective:     Principal Problem:Acute kidney injury superimposed on CKD    HPI:  History is limited as patient has dementia. History obtained from sister Carlota Tejeda her POA    84F with DM2 A1C 8, HTN, HLD, dementia, CAD s/p CABG '06, combined chronic heart failure (5/18 TTE EF 45-50%, G1DD), ovarian cancer, and lung squamous cell ca (no longer taking chemo due to nephritis)  [facility] reports increased work of breathing and a sat of 89-90% on room air this AM. Was given a breathing treatment and supplemental oxygen with no improvement so she was transferred to the ED. Pt denies cough, chest pain, fever, or chills.     She was initially placed on BIPAP, since has been transitioned to 4 L NC, received Cefepime x 1, a breathing treatment, and a one liter NS bolus in the ED.     Confirmed full code status with sister.   Sister called [facility], who confirmed that there is no report of any fall by patient.     Hospital Course:  No notes on file    Interval History: no acute events. satting > 92% on room air. No complaints, asking to eat lunch.      Review of Systems   Constitutional: Negative for fatigue.   Respiratory: Negative for cough and shortness of breath.    Cardiovascular: Negative for chest pain and palpitations.   Gastrointestinal: Negative for abdominal pain.   Genitourinary: Negative for difficulty urinating and dysuria.     Objective:     Vital Signs (Most Recent):  Temp: 97 °F (36.1 °C) (07/24/18 1611)  Pulse: 73 (07/24/18 1611)  Resp: 16 (07/24/18 1611)  BP: (!) 161/70 (07/24/18 1611)  SpO2: (!) 93 % (07/24/18 1611) Vital Signs (24h Range):  Temp:  " [96.7 °F (35.9 °C)-97.4 °F (36.3 °C)] 97 °F (36.1 °C)  Pulse:  [66-73] 73  Resp:  [16-20] 16  SpO2:  [92 %-98 %] 93 %  BP: (130-195)/(61-77) 161/70     Weight: 76.4 kg (168 lb 6.4 oz)  Body mass index is 28.91 kg/m².    Intake/Output Summary (Last 24 hours) at 07/24/18 1726  Last data filed at 07/24/18 0600   Gross per 24 hour   Intake              600 ml   Output              500 ml   Net              100 ml      Physical Exam   Constitutional: She appears well-developed and well-nourished. No distress.   Cardiovascular: Normal rate.    Pulmonary/Chest: Effort normal. She has no rales.   Abdominal: Soft.   Musculoskeletal: She exhibits no edema.   Neurological: She is alert.   Oriented to person   Vitals reviewed.      Significant Labs: All pertinent labs within the past 24 hours have been reviewed.    Significant Imaging: I have reviewed all pertinent imaging results/findings within the past 24 hours.    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 During hospitalization, patient presented with elevated blood pressures and sudden drop to systolic 130s, which could cause ischemic ATN.  2. Over diuresis for CHF  3. Hb trending down, causing hypoperfusion of kidneys  4. Hypoperfusion with cardiorenal etiology, given her decompensated CHF  5. Progression of her CKD  6. Progression of medication effect"           Acute on chronic combined systolic and diastolic heart failure    Patient improved   On RA  Continue medical management  Holding diuresis " due to LESLEY   Euvolemic            Urinary tract infection without hematuria    - esbl ecoli  - dc ceftriaxone; started ertapenem x 7 days until 7/25  - afebrile and no leukocytosis           Anemia    Folate deficiency  - most likely due to chronic disease from renal dysfunction. Also folate deficient  - fluctuates in the 7s  - fobt negative  - will continue to monitor   - start folate supplementation          Elevated troponin    Elevated but likely from renal dysfunction. Denies CP. 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 Cont ASA, statin.           Dementia    No acute issues.  Cont home Aricept.   Delirium precautions.          Essential hypertension    - BPs not controlled  - increased carvedilol to 25 mg BID on 7/21  - continue amlodipine and isosorbide-hydralazine  - continue to monitor             VTE Risk Mitigation         Ordered     IP VTE HIGH RISK PATIENT  Once      07/24/18 0700     Place PERI hose  Until discontinued      07/24/18 0700     heparin (porcine) injection 5,000 Units  Every 8 hours      07/16/18 1222              Margarita Cason MD  Department of Hospital Medicine   [facility]

## 2018-07-24 NOTE — ASSESSMENT & PLAN NOTE
- creatinine peaked at 5 and now seems to be improving  - appreciate nephrology consult  - most likely multifactorial at this time with  - continue to hold diuretics and encourage PO intake   - continue to monitor

## 2018-07-24 NOTE — ASSESSMENT & PLAN NOTE
Maintain MAP > 65, with target Bp <140/90.  Consider increasing hydralazine dose.  Might have to give isosorbide and hydralazine as separate mediations to be able to give more hydralazine (as joined tab is only available in in 20/37.5 mg doses)

## 2018-07-24 NOTE — ASSESSMENT & PLAN NOTE
A1C 8  Started on insulin - aspart 5 units qac, levemir 10 units qhs  SSI, accuchecks  Continue diabetic diet

## 2018-07-24 NOTE — PROGRESS NOTES
[facility]  Nephrology  Progress Note    Patient Name: Misa Yun  MRN: [id number]  Admission Date: 7/16/2018  Hospital Length of Stay: 5 days  Attending Provider: Margarita Cason MD   Primary Care Physician: [facility]  Principal Problem:Acute on chronic combined systolic and diastolic heart failure    Subjective:     HPI: 85 y/o woman with IDDM2, HTN, HPL, CKD (CrBaseline 3.4-4.1 estimated GFR 9-10 cc/min from baseline crt of 2.5 little more than a year ago, with regular f/u with nephrologists Dr. 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      Patient consulted to Nephrology service due to LESLEY worsening creat. on CKD, for evaluation and recommendations.    Interval History: Patient evaluated bedside found in no acute distress, speaking in full sentences, supine with no SOB.  Accompanied by family member.  Night was uneventful.    Review of patient's allergies indicates:  No Known Allergies  Current Facility-Administered Medications   Medication Frequency    albuterol nebulizer solution 2.5 mg Q4H    albuterol-ipratropium 2.5 mg-0.5 mg/3 mL nebulizer solution 3 mL Q4H PRN    amLODIPine tablet 10 mg Daily    aspirin chewable tablet 81 mg Daily    atorvastatin tablet 40 mg Daily    carvedilol tablet 12.5 mg BID    dextrose 50% injection 12.5 g PRN    dextrose 50% injection 25 g PRN    donepezil tablet 5 mg QHS    ertapenem (INVANZ) 0.5 g in sodium chloride 0.9% 100 mL IVPB Q24H    folic acid tablet 2 mg Daily    glucagon (human recombinant) injection 1 mg PRN    glucose chewable tablet 16 g PRN    glucose chewable tablet 24 g PRN    heparin (porcine) injection 5,000 Units Q8H    insulin aspart U-100 pen 1-10 Units QID (AC  + HS) PRN    insulin aspart U-100 pen 10 Units TIDWM    insulin detemir U-100 pen 5 Units QHS    isosorbide-hydrALAZINE 20-37.5 mg per tablet 1 tablet TID    predniSONE tablet 40 mg Daily    senna-docusate 8.6-50 mg per tablet 1 tablet BID    sodium bicarbonate tablet 650 mg TID    sodium chloride 0.9% flush 5 mL PRN       Objective:     Vital Signs (Most Recent):  Temp: 96.7 °F (35.9 °C) (07/24/18 0840)  Pulse: 66 (07/24/18 1235)  Resp: 18 (07/24/18 1235)  BP: (!) 195/77 (07/24/18 0840)  SpO2: (!) 93 % (07/24/18 1235)  O2 Device (Oxygen Therapy): room air (07/24/18 1235) Vital Signs (24h Range):  Temp:  [96.7 °F (35.9 °C)-98.5 °F (36.9 °C)] 96.7 °F (35.9 °C)  Pulse:  [66-72] 66  Resp:  [16-18] 18  SpO2:  [93 %-98 %] 93 %  BP: (130-195)/(61-77) 195/77     Weight: 76.4 kg (168 lb 6.4 oz) (07/19/18 1947)  Body mass index is 28.91 kg/m².  Body surface area is 1.86 meters squared.    I/O last 3 completed shifts:  In: 1040 [P.O.:1040]  Out: 500 [Urine:500]    Physical Exam  Constitutional: She appears well-nourished. No distress. Oriented to person and place.   HENT:   Head: Normocephalic and atraumatic.   Nose: Nose normal.   Mouth/Throat: Oropharynx is clear and moist. No oropharyngeal exudate.   Eyes: Conjunctivae and EOM are normal. Pupils are equal, round, and reactive to light. Right eye exhibits no discharge. Left eye exhibits no discharge. No scleral icterus.   Neck: Neck supple. No JVD present. No tracheal deviation present.   Cardiovascular: Normal rate, regular rhythm, normal heart sounds and intact distal pulses.  Exam reveals no friction rub.    No murmur heard.  Pulmonary/Chest: Effort normal. No stridor. No respiratory distress. She has no wheezes. She has no rales. She exhibits no tenderness.   Abdominal: Soft. Bowel sounds are normal. She exhibits no distension and no mass. There is no tenderness. There is no rebound and no guarding.   Musculoskeletal: Normal range of motion. She exhibits no  edema, tenderness or deformity.   Lymphadenopathy:     She has no cervical adenopathy.   Neurological: She is alert. She is disoriented (disoriented to time.). No cranial nerve deficit. She exhibits normal muscle tone. Coordination normal.   Skin: Skin is warm and dry. No rash noted. She is not diaphoretic. No erythema. No pallor.   Psychiatric: She has a normal mood and affect. Her behavior is normal.   Vitals reviewed.    Significant Labs:  CBC:   Recent Labs  Lab 07/24/18  0601   WBC 7.15   RBC 2.56*   HGB 7.5*   HCT 23.3*      MCV 91   MCH 29.3   MCHC 32.2     CMP:   Recent Labs  Lab 07/24/18  0601   *   CALCIUM 8.0*      K 4.4   CO2 24      BUN 91*   CREATININE 4.9*     All labs within the past 24 hours have been reviewed.         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 During hospitalization, patient presented with elevated blood pressures and sudden drop to systolic 130s, which could cause ischemic ATN.  2. Over diuresis for CHF  3. Hb trending down, causing hypoperfusion of kidneys  4. Hypoperfusion with cardiorenal etiology, given her decompensated CHF  5. Progression of her CKD  6. Progression of medication effect    Plan / Recommendations:  1. Continue holding diuretics  2. Medications to renal parameters  3. Anemia and blood loss workup  4. Creatinine has plateau in high 4s; monitor carefully.  If discharged close follow up with Nephrologist outpatient.  5. Labs in AM.  6. Strict I/O and chart.  7. Avoid nephrotoxic medications.  8. Maintain MAP > 65.   9. Hb > 7gm/dL.  10. Encourage water intake.  Consider H2O 250 mL q 6-8 hrs.                 Essential hypertension    Maintain MAP > 65, with target Bp <140/90.  Consider increasing hydralazine dose.  Might have to give isosorbide and hydralazine as separate mediations to be able to give more hydralazine (as joined tab is only available in in 20/37.5 mg doses)            Thank you for your consult. I will follow-up with patient. Please contact us if you have any additional questions.    Albino Teixeira MD  Nephrology  [facility]

## 2018-07-24 NOTE — ASSESSMENT & PLAN NOTE
Patient improved   On RA - 2L   Continue medical management  Holding diuresis due to marcelino   Resolved

## 2018-07-25 VITALS
RESPIRATION RATE: 16 BRPM | SYSTOLIC BLOOD PRESSURE: 138 MMHG | TEMPERATURE: 98 F | BODY MASS INDEX: 28.75 KG/M2 | OXYGEN SATURATION: 98 % | HEART RATE: 72 BPM | DIASTOLIC BLOOD PRESSURE: 63 MMHG | WEIGHT: 168.38 LBS | HEIGHT: 64 IN

## 2018-07-25 LAB
ANION GAP SERPL CALC-SCNC: 10 MMOL/L
BASOPHILS # BLD AUTO: 0 K/UL
BASOPHILS NFR BLD: 0 %
BUN SERPL-MCNC: 91 MG/DL
CALCIUM SERPL-MCNC: 8.3 MG/DL
CHLORIDE SERPL-SCNC: 109 MMOL/L
CO2 SERPL-SCNC: 26 MMOL/L
CREAT SERPL-MCNC: 4.6 MG/DL
DIFFERENTIAL METHOD: ABNORMAL
EOSINOPHIL # BLD AUTO: 0 K/UL
EOSINOPHIL NFR BLD: 0 %
ERYTHROCYTE [DISTWIDTH] IN BLOOD BY AUTOMATED COUNT: 16.3 %
EST. GFR  (AFRICAN AMERICAN): 9.4 ML/MIN/1.73 M^2
EST. GFR  (NON AFRICAN AMERICAN): 8.2 ML/MIN/1.73 M^2
GLUCOSE SERPL-MCNC: 157 MG/DL
HCT VFR BLD AUTO: 22.9 %
HGB BLD-MCNC: 7.4 G/DL
IMM GRANULOCYTES # BLD AUTO: 0.11 K/UL
IMM GRANULOCYTES NFR BLD AUTO: 1.8 %
LYMPHOCYTES # BLD AUTO: 0.7 K/UL
LYMPHOCYTES NFR BLD: 12.1 %
MAGNESIUM SERPL-MCNC: 1.9 MG/DL
MCH RBC QN AUTO: 29.5 PG
MCHC RBC AUTO-ENTMCNC: 32.3 G/DL
MCV RBC AUTO: 91 FL
MONOCYTES # BLD AUTO: 0.4 K/UL
MONOCYTES NFR BLD: 6.5 %
NEUTROPHILS # BLD AUTO: 4.9 K/UL
NEUTROPHILS NFR BLD: 79.6 %
NRBC BLD-RTO: 0 /100 WBC
PHOSPHATE SERPL-MCNC: 4.6 MG/DL
PLATELET # BLD AUTO: 184 K/UL
PMV BLD AUTO: 10.8 FL
POCT GLUCOSE: 119 MG/DL (ref 70–110)
POCT GLUCOSE: 138 MG/DL (ref 70–110)
POTASSIUM SERPL-SCNC: 4.6 MMOL/L
RBC # BLD AUTO: 2.51 M/UL
SODIUM SERPL-SCNC: 145 MMOL/L
WBC # BLD AUTO: 6.12 K/UL

## 2018-07-25 PROCEDURE — 84100 ASSAY OF PHOSPHORUS: CPT

## 2018-07-25 PROCEDURE — 83735 ASSAY OF MAGNESIUM: CPT

## 2018-07-25 PROCEDURE — 94761 N-INVAS EAR/PLS OXIMETRY MLT: CPT

## 2018-07-25 PROCEDURE — 25000003 PHARM REV CODE 250: Performed by: HOSPITALIST

## 2018-07-25 PROCEDURE — 63600175 PHARM REV CODE 636 W HCPCS: Performed by: HOSPITALIST

## 2018-07-25 PROCEDURE — 25000242 PHARM REV CODE 250 ALT 637 W/ HCPCS: Performed by: EMERGENCY MEDICINE

## 2018-07-25 PROCEDURE — 27000221 HC OXYGEN, UP TO 24 HOURS

## 2018-07-25 PROCEDURE — 99238 HOSP IP/OBS DSCHRG MGMT 30/<: CPT | Mod: ,,, | Performed by: HOSPITALIST

## 2018-07-25 PROCEDURE — 94640 AIRWAY INHALATION TREATMENT: CPT

## 2018-07-25 PROCEDURE — 80048 BASIC METABOLIC PNL TOTAL CA: CPT

## 2018-07-25 PROCEDURE — 85025 COMPLETE CBC W/AUTO DIFF WBC: CPT

## 2018-07-25 PROCEDURE — 36415 COLL VENOUS BLD VENIPUNCTURE: CPT

## 2018-07-25 RX ORDER — ISOSORBIDE MONONITRATE 60 MG/1
60 TABLET, EXTENDED RELEASE ORAL DAILY
Qty: 30 TABLET | Refills: 11 | Status: ON HOLD
Start: 2018-07-26 | End: 2018-08-02 | Stop reason: HOSPADM

## 2018-07-25 RX ORDER — CARVEDILOL 25 MG/1
25 TABLET ORAL 2 TIMES DAILY
Qty: 60 TABLET | Refills: 11
Start: 2018-07-25 | End: 2019-07-25

## 2018-07-25 RX ORDER — HYDRALAZINE HYDROCHLORIDE 50 MG/1
50 TABLET, FILM COATED ORAL EVERY 8 HOURS
Status: DISCONTINUED | OUTPATIENT
Start: 2018-07-25 | End: 2018-07-25 | Stop reason: HOSPADM

## 2018-07-25 RX ORDER — INSULIN ASPART 100 [IU]/ML
5 INJECTION, SOLUTION INTRAVENOUS; SUBCUTANEOUS 3 TIMES DAILY
Refills: 0
Start: 2018-07-25 | End: 2019-07-25

## 2018-07-25 RX ORDER — ISOSORBIDE MONONITRATE 30 MG/1
60 TABLET, EXTENDED RELEASE ORAL DAILY
Status: DISCONTINUED | OUTPATIENT
Start: 2018-07-25 | End: 2018-07-25 | Stop reason: HOSPADM

## 2018-07-25 RX ORDER — FOLIC ACID 1 MG/1
2 TABLET ORAL DAILY
Refills: 0
Start: 2018-07-26 | End: 2019-07-26

## 2018-07-25 RX ORDER — HYDRALAZINE HYDROCHLORIDE 50 MG/1
50 TABLET, FILM COATED ORAL EVERY 8 HOURS
Qty: 90 TABLET | Refills: 11
Start: 2018-07-25 | End: 2019-07-25

## 2018-07-25 RX ADMIN — HYDRALAZINE HYDROCHLORIDE 50 MG: 50 TABLET, FILM COATED ORAL at 02:07

## 2018-07-25 RX ADMIN — PREDNISONE 40 MG: 20 TABLET ORAL at 09:07

## 2018-07-25 RX ADMIN — FOLIC ACID 2 MG: 1 TABLET ORAL at 09:07

## 2018-07-25 RX ADMIN — INSULIN ASPART 5 UNITS: 100 INJECTION, SOLUTION INTRAVENOUS; SUBCUTANEOUS at 02:07

## 2018-07-25 RX ADMIN — CARVEDILOL 25 MG: 25 TABLET, FILM COATED ORAL at 09:07

## 2018-07-25 RX ADMIN — ASPIRIN 81 MG CHEWABLE TABLET 81 MG: 81 TABLET CHEWABLE at 09:07

## 2018-07-25 RX ADMIN — INSULIN ASPART 5 UNITS: 100 INJECTION, SOLUTION INTRAVENOUS; SUBCUTANEOUS at 09:07

## 2018-07-25 RX ADMIN — SODIUM BICARBONATE 650 MG TABLET 650 MG: at 09:07

## 2018-07-25 RX ADMIN — ALBUTEROL SULFATE 2.5 MG: 2.5 SOLUTION RESPIRATORY (INHALATION) at 03:07

## 2018-07-25 RX ADMIN — SODIUM CHLORIDE 0.5 G: 9 INJECTION, SOLUTION INTRAVENOUS at 02:07

## 2018-07-25 RX ADMIN — HYDRALAZINE HYDROCHLORIDE 50 MG: 50 TABLET, FILM COATED ORAL at 09:07

## 2018-07-25 RX ADMIN — AMLODIPINE BESYLATE 10 MG: 10 TABLET ORAL at 09:07

## 2018-07-25 RX ADMIN — SENNOSIDES AND DOCUSATE SODIUM 1 TABLET: 8.6; 5 TABLET ORAL at 09:07

## 2018-07-25 RX ADMIN — ALBUTEROL SULFATE 2.5 MG: 2.5 SOLUTION RESPIRATORY (INHALATION) at 08:07

## 2018-07-25 RX ADMIN — HEPARIN SODIUM 5000 UNITS: 5000 INJECTION, SOLUTION INTRAVENOUS; SUBCUTANEOUS at 06:07

## 2018-07-25 RX ADMIN — SODIUM BICARBONATE 650 MG TABLET 650 MG: at 02:07

## 2018-07-25 RX ADMIN — ISOSORBIDE MONONITRATE 60 MG: 30 TABLET, EXTENDED RELEASE ORAL at 09:07

## 2018-07-25 RX ADMIN — ATORVASTATIN CALCIUM 40 MG: 20 TABLET, FILM COATED ORAL at 09:07

## 2018-07-25 RX ADMIN — HEPARIN SODIUM 5000 UNITS: 5000 INJECTION, SOLUTION INTRAVENOUS; SUBCUTANEOUS at 02:07

## 2018-07-25 NOTE — HOSPITAL COURSE
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 Creatinine peaked at 5 and now stable  - Avoid further diuresis and hold nephrotoxic meds  - Close Nephrology outpatient f/u     Per Renal consult:  "LESLEY on CKD Stage IV-V: Patient with Crt baseline of 3.4 - 4.1 (As per previous f/u in nephrology clinics with, her Crt increase could be caused by nephritis by Pembrolizumab (taken for of Lung Ca treatment, which may cause in up to 33% of patients nephrotoxicity)     Differential dxs causing her LESLEY on CKD:  1. During hospitalization, patient presented with elevated blood pressures and sudden drop to systolic 130s, which could cause ischemic ATN.  2. Over diuresis for CHF  3. Hb trending down, causing hypoperfusion of kidneys  4. Hypoperfusion with cardiorenal etiology, given her decompensated CHF  5. Progression of her CKD  6. Progression of medication effect"             Acute on chronic combined systolic and diastolic heart failure     Patient improved. Received IV diuresis.   Continue BB  Stop Lasix due to LESLEY, per Nephrology recs. Unable to use ACEI/ARB due to renal dysfunction  Euvolemic at time of discharge  Cont Cards outpatient f/u                Urinary tract infection without hematuria     - esbl ecoli  - completed ertapenem x 7 days, end date 7/25  - afebrile and no leukocytosis              Anemia     Folate deficiency  Anemia of renal dysfunction  - most likely due to chronic disease from renal dysfunction. Also folate deficient  - stable  - fobt negative  - started folate supplementation  - monitor as outpatient          Elevated troponin     Elevated but likely from renal dysfunction. Denies CP. No findings on ECG consistent with ischemia  Trended down              Primary lung cancer, right     Follows with Dr Barnhart with Oncology  Has appt with him with repeat PET scan on Wed, will need to reschedule if will remain in hospital  Immunotherapy had been " discontinued due to concern for nephritis  Cont home prednisone 40 mg daily for mgmt of renal toxicity             Type 2 DM with CKD stage 5 and hypertension     A1C 8  Started on insulin - aspart 5 units qac, levemir 10 units qhs  Caution w/ insulin stacking in setting of renal dysfunction  May be related to prednisone  SSI, accuchecks  Continue diabetic diet              Coronary artery disease     No acute issues. Cont ASA, statin.              Dementia     No acute issues.  Cont home Aricept.   Delirium precautions.             Essential hypertension     - BPs not controlled in the hospital  - increased carvedilol to 25 mg BID on 7/21  - continue amlodipine 10 mg daily  - discontinued isosorbide 20 mg-hydralazine 25 mg TID (bidil), and added isosorbide 60 mg ER daily and hydralazine 50 mg TID  - may need further titration as outpatient

## 2018-07-25 NOTE — PLAN OF CARE
Pt accepted back to [facility] as per Yolanda in admissions.  SW spoke with C RN, Cj, and gave him information to call report to nurse for station 3, room 400B at [phone].  [facility] stretcher transport arranged through [facility] for 5pm with PHN auth of [id number].  Transport envelope printed and brought to 9th floor nurses station.  ASMITA Gasca, notified.            Yamile Guardado LMSW      .

## 2018-07-25 NOTE — PLAN OF CARE
LORRAINE F/U on DC orders that were sent via [facility].  LORRAINE spoke with Ms. Chikis and was told that admissions was not in their office at the time. LORRAINE contact information was taken down and LORRAINE was told that admissions would call back as soon as they returned.        Yamile Guardado LMSW

## 2018-07-25 NOTE — DISCHARGE SUMMARY
"[facility] Hospital Medicine  Discharge Summary      Patient Name: Misa Yun  MRN: [id number]  Admission Date: 7/16/2018  Hospital Length of Stay: 6 days  Discharge Date and Time: No discharge date for patient encounter.  Attending Physician: Margarita Cason MD   Discharging Provider: Margarita Cason MD  Primary Care Provider: [facility] Medicine Team: [facility] HOSP MED G Margarita Cason MD    HPI:   History is limited as patient has dementia. History obtained from sister Carlota Tejeda her POA    84F with DM2 A1C 8, HTN, HLD, dementia, CAD s/p CABG '06, combined chronic heart failure (5/18 TTE EF 45-50%, G1DD), ovarian cancer, and lung squamous cell ca (no longer taking chemo due to nephritis)  [facility] reports increased work of breathing and a sat of 89-90% on room air this AM. Was given a breathing treatment and supplemental oxygen with no improvement so she was transferred to the ED. Pt denies cough, chest pain, fever, or chills.     She was initially placed on BIPAP, since has been transitioned to 4 L NC, received Cefepime x 1, a breathing treatment, and a one liter NS bolus in the ED.     Confirmed full code status with sister.   Sister called Bon, who confirmed that there is no report of any fall by patient.     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 Creatinine peaked at 5 and now stable  - Avoid further diuresis and hold nephrotoxic meds  - Close Nephrology outpatient f/u     Per Renal consult:  "LESLEY on CKD Stage IV-V: Patient with Crt baseline of 3.4 - 4.1 (As per previous f/u in nephrology clinics with, her Crt increase could be caused by nephritis by Pembrolizumab (taken for of Lung Ca treatment, which may cause in up to 33% of patients nephrotoxicity)     Differential dxs causing her LESLEY on CKD:  1. During hospitalization, patient presented with " "elevated blood pressures and sudden drop to systolic 130s, which could cause ischemic ATN.  2. Over diuresis for CHF  3. Hb trending down, causing hypoperfusion of kidneys  4. Hypoperfusion with cardiorenal etiology, given her decompensated CHF  5. Progression of her CKD  6. Progression of medication effect"             Acute on chronic combined systolic and diastolic heart failure     Patient improved. Received IV diuresis.   Continue BB  Stop Lasix due to LESLEY, per Nephrology recs. Unable to use ACEI/ARB due to renal dysfunction  Euvolemic at time of discharge  Cont Cards outpatient f/u                Urinary tract infection without hematuria     - esbl ecoli  - completed ertapenem x 7 days, end date 7/25  - afebrile and no leukocytosis              Anemia     Folate deficiency  Anemia of renal dysfunction  - most likely due to chronic disease from renal dysfunction. Also folate deficient  - stable  - fobt negative  - started folate supplementation  - monitor as outpatient          Elevated troponin     Elevated but likely from renal dysfunction. Denies CP. 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 Cont ASA, statin.              Dementia     No acute issues.  Cont home Aricept.   Delirium precautions.             Essential hypertension     - BPs not controlled in the hospital  - " increased carvedilol to 25 mg BID on 7/21  - continue amlodipine 10 mg daily  - discontinued isosorbide 20 mg-hydralazine 25 mg TID (bidil), and added isosorbide 60 mg ER daily and hydralazine 50 mg TID  - may need further titration as outpatient        Consults:   Consults         Status Ordering Provider     Inpatient consult to Nephrology  Once     Provider:  (Not yet assigned)    Completed REVA BRYAN     Inpatient consult to Registered Dietitian/Nutritionist  Once     Provider:  (Not yet assigned)    Completed REVA BRYAN          No new Assessment & Plan notes have been filed under this hospital service since the last note was generated.  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, N18.9] 11/04/2016 Yes     Chronic    Acute on chronic combined systolic and diastolic heart failure [I50.43] 07/16/2018 Yes     Chronic    Urinary tract infection without hematuria [N39.0] 07/16/2018 Yes    Anemia [D64.9] 07/19/2018 Yes    Folate deficiency [E53.8] 07/24/2018 Yes    Acute kidney failure with lesion of tubular necrosis [N17.0] 07/20/2018 Yes    Primary lung cancer, right [C34.91] 10/09/2017 Yes    Type 2 DM with CKD stage 5 and hypertension [E11.22, I12.0, N18.5] 04/13/2017 Yes     Chronic    Dementia [F03.90]  Yes     Chronic    Coronary artery disease [I25.10]  Yes    Essential hypertension [I10]  Yes     Chronic      Problems Resolved During this Admission:    Diagnosis Date Noted Date Resolved POA    Acute respiratory failure with hypoxia [J96.01] 07/16/2018 07/21/2018 Yes       Discharged Condition: stable    Disposition: Home or Self Care    Follow Up:  Follow-up Information     Esmer Ahumada MD.    Specialty:  Nephrology  Why:  MD's office will call with appointment date and time.  Contact information:  [facility]  [facility] [address] [phone]                  Patient Instructions:   No discharge procedures on file.    Significant Diagnostic Studies: Labs:   CMP   Recent Labs  Lab 07/24/18  0601 07/25/18  0508    145   K 4.4 4.6    109   CO2 24 26   * 157*   BUN 91* 91*   CREATININE 4.9* 4.6*   CALCIUM 8.0* 8.3*   ANIONGAP 11 10   ESTGFRAFRICA 8.8* 9.4*   EGFRNONAA 7.6* 8.2*   , CBC   Recent Labs  Lab 07/24/18  0601 07/25/18  0508   WBC 7.15 6.12   HGB 7.5* 7.4*   HCT 23.3* 22.9*    184    and A1C:   Recent Labs  Lab 07/16/18  0750   HGBA1C 8.0*       Pending Diagnostic Studies:     None         Medications:  Reconciled Home Medications:      Medication List      START taking these medications    folic acid 1 MG tablet  Commonly known as:  FOLVITE  Take 2 tablets (2 mg total) by mouth once daily.  Start taking on:  7/26/2018     hydrALAZINE 50 MG tablet  Commonly known as:  APRESOLINE  Take 1 tablet (50 mg total) by mouth every 8 (eight) hours.     insulin aspart U-100 100 unit/mL Inpn pen  Commonly known as:  NovoLOG  Inject 5 Units into the skin 3 (three) times daily.     insulin detemir U-100 100 unit/mL (3 mL) Inpn pen  Commonly known as:  LEVEMIR FLEXTOUCH  Inject 10 Units into the skin every evening.     isosorbide mononitrate 60 MG 24 hr tablet  Commonly known as:  IMDUR  Take 1 tablet (60 mg total) by mouth once daily.  Start taking on:  7/26/2018        CHANGE how you take these medications    carvedilol 25 MG tablet  Commonly known as:  COREG  Take 1 tablet (25 mg total) by mouth 2 (two) times daily.  What changed:  · medication strength  · how much to take        CONTINUE taking these medications    amLODIPine 10 MG tablet  Commonly known as:  NORVASC  Take 10 mg by mouth once daily.     aspirin 81 MG Chew  Take 1 tablet (81 mg total) by mouth once daily. RESUME ON 2/14/15     atorvastatin 40 MG tablet  Commonly known as:  LIPITOR  Take 1 tablet (40 mg total) by mouth once daily.     donepezil 5 MG tablet  Commonly known as:   ARICEPT  Take 5 mg by mouth every evening.     ergocalciferol 50,000 unit Cap  Commonly known as:  ERGOCALCIFEROL  Take 50,000 Units by mouth every 7 days.     nitroGLYCERIN 0.4 MG SL tablet  Commonly known as:  NITROSTAT  Place 1 tablet (0.4 mg total) under the tongue every 5 (five) minutes as needed for Chest pain.     predniSONE 20 MG tablet  Commonly known as:  DELTASONE  Take 40 mg by mouth once daily.     senna-docusate 8.6-50 mg 8.6-50 mg per tablet  Commonly known as:  PERICOLACE  Take 1 tablet by mouth 2 (two) times daily.     sodium bicarbonate 650 MG tablet  Take 1 tablet (650 mg total) by mouth 3 (three) times daily.        STOP taking these medications    BENADRYL ALLERGY 25 mg tablet  Generic drug:  diphenhydrAMINE     furosemide 20 MG tablet  Commonly known as:  LASIX     isosorbide-hydrALAZINE 20-37.5 mg 20-37.5 mg Tab  Commonly known as:  BIDIL     traMADol 50 mg tablet  Commonly known as:  ULTRAM            Indwelling Lines/Drains at time of discharge:   Lines/Drains/Airways     Airway                 Airway - Non-Surgical 04/17/17 1304 Nasal Cannula 464 days                Time spent on the discharge of patient: 29 minutes  Patient was seen and examined on the date of discharge and determined to be suitable for discharge.         Margarita Cason MD  Department of Hospital Medicine  [facility]

## 2018-07-25 NOTE — PLAN OF CARE
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, N18.9]  Yes     Priority: 1 - High     Chronic               Acute on chronic combined systolic and diastolic heart failure [I50.43]  Yes     Priority: 2      Chronic    Urinary tract infection without hematuria [N39.0]  Yes     Priority: 3     Anemia [D64.9]  Yes     Priority: 4     Folate deficiency [E53.8]  Yes     Priority: 5     Acute kidney failure with lesion of tubular necrosis [N17.0]  Yes    Primary lung cancer, right [C34.91]  Yes    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 Fluid restriction 2 L daily. Sodium restriction 2 grams daily.    Supplement:  1 can every three times a day with meals                         Type:    Nepro         Acitivities:  - Up in a chair each morning as tolerated   - Ambulate with assistance to bathroom        LABS:  CBC/CMP every other day x 2 weeks, then per facility protocol    Nursing Precautions:    - Aspiration precautions:             - Total assistance with meals            -  Upright 90 degrees befor during and after meals             -  Suction at bedside           - Fall precautions per nursing home protocol     - Decubitus precautions:        -  for positioning   - Pressure reducing foam mattress   - Turn patient every two hours. Use wedge pillows to anchor patient        MISCELLANEOUS CARE:               Routine Skin for Bedridden Patients:  Apply moisture barrier cream to all    skin folds and wet areas in perineal area daily and after baths and                           all bowel movements.        Heart Failure Home Health Instructions:     Oxygen: Instruct on safety precautions in use of oxygen as follows:             Oxygen at  2 L/min nasal canula to be used:  Continuously As needed for SOB   Assess oxygen saturation via pulse oximeter as needed for increase in SOB.   Notify physician if Oxygen saturation less than 88%    For Weight Gain > 2-3 lbs in 1 day or 4-6 lbs over 1 week:       Obtain BMP lab test in 3 days    Notify MD                       DIABETES CARE:      Check blood sugar:        Fingerstick blood sugar AC and HS         Report CBG < 60 or > 400 to physician.                                          Insulin Sliding Scale          Glucose  Novolog Insulin Subcutaneous        0 - 60   Orange juice or glucose tablet, hold insulin      No insulin   201-250  2 units   251-300  4 units   301-350  6 units   351-400  8 units   >400   10 units then call physician      Medications: Discontinue all previous medication orders, if any. See new list below.     Misa Yun   Home Medication Instructions TRACEY:[id number]    Printed on:07/25/18 1047   Medication Information                      amlodipine (NORVASC) 10 MG tablet  Take 10 mg by mouth once daily.             aspirin 81 MG Chew  Take 1 tablet (81 mg total) by mouth once daily. RESUME ON 2/14/15             atorvastatin (LIPITOR) 40 MG tablet  Take 1 tablet (40 mg total) by mouth once daily.             carvedilol (COREG) 25 MG tablet  Take 1 tablet (25 mg total) by mouth 2 (two)  times daily.             donepezil (ARICEPT) 5 MG tablet  Take 5 mg by mouth every evening.             ergocalciferol (ERGOCALCIFEROL) 50,000 unit Cap  Take 50,000 Units by mouth every 7 days.             folic acid (FOLVITE) 1 MG tablet  Take 2 tablets (2 mg total) by mouth once daily.             hydrALAZINE (APRESOLINE) 50 MG tablet  Take 1 tablet (50 mg total) by mouth every 8 (eight) hours.             insulin aspart U-100 (NOVOLOG) 100 unit/mL InPn pen  Inject 5 Units into the skin 3 (three) times daily.             insulin detemir U-100 (LEVEMIR FLEXTOUCH) 100 unit/mL (3 mL) SubQ InPn pen  Inject 10 Units into the skin every evening.             isosorbide mononitrate (IMDUR) 60 MG 24 hr tablet  Take 1 tablet (60 mg total) by mouth once daily.             nitroGLYCERIN (NITROSTAT) 0.4 MG SL tablet  Place 1 tablet (0.4 mg total) under the tongue every 5 (five) minutes as needed for Chest pain.             predniSONE (DELTASONE) 20 MG tablet  Take 40 mg by mouth once daily.             senna-docusate 8.6-50 mg (PERICOLACE) 8.6-50 mg per tablet  Take 1 tablet by mouth 2 (two) times daily.             sodium bicarbonate 650 MG tablet  Take 1 tablet (650 mg total) by mouth 3 (three) times daily.                   _________________________________  Margarita Cason MD  07/25/2018

## 2018-07-25 NOTE — PROGRESS NOTES
[facility]  Nephrology  Progress Note    Patient Name: Misa Yun  MRN: [id number]  Admission Date: 7/16/2018  Hospital Length of Stay: 6 days  Attending Provider: Margarita Cason MD   Primary Care Physician: [facility]  Principal Problem:Acute kidney injury superimposed on CKD    Subjective:     HPI: 83 y/o woman with IDDM2, HTN, HPL, CKD (CrBaseline 3.4-4.1 estimated GFR 9-10 cc/min from baseline crt of 2.5 little more than a year ago, with regular f/u with nephrologists Dr. 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      Patient consulted to Nephrology service due to LESLEY worsening creat. on CKD, for evaluation and recommendations.    Interval History: Patient evaluated bedside found in no acute distress.  Accompanied by sister.  She feels better.  Night was uneventful.    Review of patient's allergies indicates:  No Known Allergies  Current Facility-Administered Medications   Medication Frequency    albuterol-ipratropium 2.5 mg-0.5 mg/3 mL nebulizer solution 3 mL Q4H PRN    amLODIPine tablet 10 mg Daily    aspirin chewable tablet 81 mg Daily    atorvastatin tablet 40 mg Daily    carvedilol tablet 25 mg BID    dextrose 50% injection 12.5 g PRN    dextrose 50% injection 25 g PRN    donepezil tablet 5 mg QHS    ertapenem (INVANZ) 0.5 g in sodium chloride 0.9% 100 mL IVPB Q24H    folic acid tablet 2 mg Daily    glucagon (human recombinant) injection 1 mg PRN    glucose chewable tablet 16 g PRN    glucose chewable tablet 24 g PRN    heparin (porcine) injection 5,000 Units Q8H    hydrALAZINE tablet 50 mg Q8H    insulin aspart U-100 pen 1-10 Units QID (AC + HS) PRN    insulin aspart U-100 pen 5 Units TIDWM    insulin detemir  U-100 pen 10 Units QHS    isosorbide mononitrate 24 hr tablet 60 mg Daily    predniSONE tablet 40 mg Daily    senna-docusate 8.6-50 mg per tablet 1 tablet BID    sodium bicarbonate tablet 650 mg TID    sodium chloride 0.9% flush 5 mL PRN       Objective:     Vital Signs (Most Recent):  Temp: 98.8 °F (37.1 °C) (07/25/18 1125)  Pulse: 68 (07/25/18 1125)  Resp: 18 (07/25/18 1125)  BP: (!) 154/67 (07/25/18 1125)  SpO2: (!) 92 % (07/25/18 1125)  O2 Device (Oxygen Therapy): nasal cannula (07/25/18 0818) Vital Signs (24h Range):  Temp:  [97 °F (36.1 °C)-98.8 °F (37.1 °C)] 98.8 °F (37.1 °C)  Pulse:  [68-96] 68  Resp:  [16-22] 18  SpO2:  [90 %-98 %] 92 %  BP: (154-187)/(67-83) 154/67     Weight: 76.4 kg (168 lb 6.4 oz) (07/19/18 1947)  Body mass index is 28.91 kg/m².  Body surface area is 1.86 meters squared.    I/O last 3 completed shifts:  In: 240 [P.O.:240]  Out: 500 [Urine:500]    Physical Exam  Constitutional: She appears well-nourished. No distress. Oriented to person and place.   HENT:   Head: Normocephalic and atraumatic.   Nose: Nose normal.   Mouth/Throat: Oropharynx is clear and moist. No oropharyngeal exudate.   Eyes: Conjunctivae and EOM are normal. Pupils are equal, round, and reactive to light. Right eye exhibits no discharge. Left eye exhibits no discharge. No scleral icterus.   Neck: Neck supple. No JVD present. No tracheal deviation present.   Cardiovascular: Normal rate, regular rhythm, normal heart sounds and intact distal pulses.  Exam reveals no friction rub.    No murmur heard.  Pulmonary/Chest: Effort normal. No stridor. No respiratory distress. She has no wheezes. She has no rales. She exhibits no tenderness.   Abdominal: Soft. Bowel sounds are normal. She exhibits no distension and no mass. There is no tenderness. There is no rebound and no guarding.   Musculoskeletal: Normal range of motion. She exhibits no edema, tenderness or deformity.   Lymphadenopathy:     She has no cervical adenopathy.    Neurological: She is alert. She is disoriented (disoriented to time.). No cranial nerve deficit. She exhibits normal muscle tone. Coordination normal.   Skin: Skin is warm and dry. No rash noted. She is not diaphoretic. No erythema. No pallor.   Psychiatric: She has a normal mood and affect. Her behavior is normal.   Vitals reviewed.    Significant Labs:  CBC:   Recent Labs  Lab 07/25/18  0508   WBC 6.12   RBC 2.51*   HGB 7.4*   HCT 22.9*      MCV 91   MCH 29.5   MCHC 32.3     CMP:   Recent Labs  Lab 07/25/18  0508   *   CALCIUM 8.3*      K 4.6   CO2 26      BUN 91*   CREATININE 4.6*     All labs within the past 24 hours have been reviewed.         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 During hospitalization, patient presented with elevated blood pressures and sudden drop to systolic 130s, which could cause ischemic ATN.  2. Over diuresis for CHF  3. Hb trending down, causing hypoperfusion of kidneys  4. Hypoperfusion with cardiorenal etiology, given her decompensated CHF  5. Progression of her CKD  6. Progression of medication effect    Plan / Recommendations:  1. Continue holding diuretics.  2. Medications to renal parameters  3. Follow-up with Nephrology outpatient within 2 weeks with Renal Panel Labs.  4. Strict I/O and chart.  5. Avoid nephrotoxic medications.  6. Maintain MAP > 65.   7. Hb > 7gm/dL.                  Thank you for your consult. I will sign off. Please contact us if you have any additional questions.  Please follow up closely at Nephrology clinics outpatient with renal panel within 2 weeks of discharge.    Albino GUTIÉRREZ  MD Puneet  Nephrology  [facility]

## 2018-07-25 NOTE — SUBJECTIVE & OBJECTIVE
Interval History: Patient evaluated bedside found in no acute distress.  Accompanied by sister.  She feels better.  Night was uneventful.    Review of patient's allergies indicates:  No Known Allergies  Current Facility-Administered Medications   Medication Frequency    albuterol-ipratropium 2.5 mg-0.5 mg/3 mL nebulizer solution 3 mL Q4H PRN    amLODIPine tablet 10 mg Daily    aspirin chewable tablet 81 mg Daily    atorvastatin tablet 40 mg Daily    carvedilol tablet 25 mg BID    dextrose 50% injection 12.5 g PRN    dextrose 50% injection 25 g PRN    donepezil tablet 5 mg QHS    ertapenem (INVANZ) 0.5 g in sodium chloride 0.9% 100 mL IVPB Q24H    folic acid tablet 2 mg Daily    glucagon (human recombinant) injection 1 mg PRN    glucose chewable tablet 16 g PRN    glucose chewable tablet 24 g PRN    heparin (porcine) injection 5,000 Units Q8H    hydrALAZINE tablet 50 mg Q8H    insulin aspart U-100 pen 1-10 Units QID (AC + HS) PRN    insulin aspart U-100 pen 5 Units TIDWM    insulin detemir U-100 pen 10 Units QHS    isosorbide mononitrate 24 hr tablet 60 mg Daily    predniSONE tablet 40 mg Daily    senna-docusate 8.6-50 mg per tablet 1 tablet BID    sodium bicarbonate tablet 650 mg TID    sodium chloride 0.9% flush 5 mL PRN       Objective:     Vital Signs (Most Recent):  Temp: 98.8 °F (37.1 °C) (07/25/18 1125)  Pulse: 68 (07/25/18 1125)  Resp: 18 (07/25/18 1125)  BP: (!) 154/67 (07/25/18 1125)  SpO2: (!) 92 % (07/25/18 1125)  O2 Device (Oxygen Therapy): nasal cannula (07/25/18 0818) Vital Signs (24h Range):  Temp:  [97 °F (36.1 °C)-98.8 °F (37.1 °C)] 98.8 °F (37.1 °C)  Pulse:  [68-96] 68  Resp:  [16-22] 18  SpO2:  [90 %-98 %] 92 %  BP: (154-187)/(67-83) 154/67     Weight: 76.4 kg (168 lb 6.4 oz) (07/19/18 1947)  Body mass index is 28.91 kg/m².  Body surface area is 1.86 meters squared.    I/O last 3 completed shifts:  In: 240 [P.O.:240]  Out: 500 [Urine:500]    Physical Exam  Constitutional:  She appears well-nourished. No distress. Oriented to person and place.   HENT:   Head: Normocephalic and atraumatic.   Nose: Nose normal.   Mouth/Throat: Oropharynx is clear and moist. No oropharyngeal exudate.   Eyes: Conjunctivae and EOM are normal. Pupils are equal, round, and reactive to light. Right eye exhibits no discharge. Left eye exhibits no discharge. No scleral icterus.   Neck: Neck supple. No JVD present. No tracheal deviation present.   Cardiovascular: Normal rate, regular rhythm, normal heart sounds and intact distal pulses.  Exam reveals no friction rub.    No murmur heard.  Pulmonary/Chest: Effort normal. No stridor. No respiratory distress. She has no wheezes. She has no rales. She exhibits no tenderness.   Abdominal: Soft. Bowel sounds are normal. She exhibits no distension and no mass. There is no tenderness. There is no rebound and no guarding.   Musculoskeletal: Normal range of motion. She exhibits no edema, tenderness or deformity.   Lymphadenopathy:     She has no cervical adenopathy.   Neurological: She is alert. She is disoriented (disoriented to time.). No cranial nerve deficit. She exhibits normal muscle tone. Coordination normal.   Skin: Skin is warm and dry. No rash noted. She is not diaphoretic. No erythema. No pallor.   Psychiatric: She has a normal mood and affect. Her behavior is normal.   Vitals reviewed.    Significant Labs:  CBC:   Recent Labs  Lab 07/25/18  0508   WBC 6.12   RBC 2.51*   HGB 7.4*   HCT 22.9*      MCV 91   MCH 29.5   MCHC 32.3     CMP:   Recent Labs  Lab 07/25/18  0508   *   CALCIUM 8.3*      K 4.6   CO2 26      BUN 91*   CREATININE 4.6*     All labs within the past 24 hours have been reviewed.

## 2018-07-26 DIAGNOSIS — N18.9 ACUTE KIDNEY INJURY SUPERIMPOSED ON CKD: Chronic | ICD-10-CM

## 2018-07-26 DIAGNOSIS — N17.9 ACUTE KIDNEY INJURY SUPERIMPOSED ON CKD: Chronic | ICD-10-CM

## 2018-07-26 DIAGNOSIS — N17.0 ACUTE KIDNEY FAILURE WITH LESION OF TUBULAR NECROSIS: Primary | ICD-10-CM

## 2018-07-26 NOTE — PLAN OF CARE
Plan is to discharge to [facility] via [facility] transport.    Future Appointments  Date Time Provider Department Center   7/30/2018 8:00 AM [facility] PET CT LIMIT 500 LBS [facility] PET CT [facility]   8/6/2018 9:40 AM LAB, HEMONC CANCER BLDG [facility] LAB HO [facility]   8/6/2018 10:40 AM Arnav Barnhart DO, FACP [facility] HEM ONC [place] [facility]   8/13/2018 9:20 AM Cici Clemens MD [facility] CARDIO [facility]        07/26/18 0753   Final Note   Assessment Type Final Discharge Note   Discharge Disposition [facility]   Hospital Follow Up  Appt(s) scheduled? Yes   Discharge plans and expectations educations in teach back method with documentation complete? Yes   Right Care Referral Info   Post Acute Recommendation Other   Referral Type Nursing Home   Facility Name [facility]

## 2018-07-27 DIAGNOSIS — N17.9 ACUTE KIDNEY INJURY SUPERIMPOSED ON CKD: Primary | Chronic | ICD-10-CM

## 2018-07-27 DIAGNOSIS — N18.9 ACUTE KIDNEY INJURY SUPERIMPOSED ON CKD: Primary | Chronic | ICD-10-CM

## 2018-07-29 ENCOUNTER — HOSPITAL ENCOUNTER (INPATIENT)
Facility: HOSPITAL | Age: 83
LOS: 4 days | Discharge: HOSPICE | DRG: 291 | End: 2018-08-02
Attending: EMERGENCY MEDICINE | Admitting: EMERGENCY MEDICINE
Payer: MEDICARE

## 2018-07-29 DIAGNOSIS — J81.0 ACUTE PULMONARY EDEMA: Primary | ICD-10-CM

## 2018-07-29 DIAGNOSIS — R06.02 SHORTNESS OF BREATH: ICD-10-CM

## 2018-07-29 PROBLEM — I16.1 HYPERTENSIVE EMERGENCY: Status: ACTIVE | Noted: 2018-07-29

## 2018-07-29 PROBLEM — J96.01 ACUTE RESPIRATORY FAILURE WITH HYPOXIA: Status: ACTIVE | Noted: 2018-07-29

## 2018-07-29 LAB
ABO + RH BLD: NORMAL
ALBUMIN SERPL BCP-MCNC: 2.3 G/DL
ALLENS TEST: ABNORMAL
ALP SERPL-CCNC: 73 U/L
ALT SERPL W/O P-5'-P-CCNC: 13 U/L
AMORPH CRY UR QL COMP ASSIST: ABNORMAL
ANION GAP SERPL CALC-SCNC: 11 MMOL/L
AST SERPL-CCNC: 21 U/L
BACTERIA #/AREA URNS AUTO: ABNORMAL /HPF
BASOPHILS # BLD AUTO: 0.01 K/UL
BASOPHILS NFR BLD: 0.1 %
BILIRUB SERPL-MCNC: 0.4 MG/DL
BILIRUB UR QL STRIP: NEGATIVE
BLD GP AB SCN CELLS X3 SERPL QL: NORMAL
BNP SERPL-MCNC: 702 PG/ML
BUN SERPL-MCNC: 77 MG/DL
CALCIUM SERPL-MCNC: 8.6 MG/DL
CHLORIDE SERPL-SCNC: 112 MMOL/L
CLARITY UR REFRACT.AUTO: ABNORMAL
CO2 SERPL-SCNC: 25 MMOL/L
COLOR UR AUTO: YELLOW
CREAT SERPL-MCNC: 4.3 MG/DL
DIFFERENTIAL METHOD: ABNORMAL
EOSINOPHIL # BLD AUTO: 0.1 K/UL
EOSINOPHIL NFR BLD: 0.6 %
ERYTHROCYTE [DISTWIDTH] IN BLOOD BY AUTOMATED COUNT: 16.8 %
EST. GFR  (AFRICAN AMERICAN): 10.2 ML/MIN/1.73 M^2
EST. GFR  (NON AFRICAN AMERICAN): 8.9 ML/MIN/1.73 M^2
GLUCOSE SERPL-MCNC: 124 MG/DL
GLUCOSE UR QL STRIP: ABNORMAL
HCO3 UR-SCNC: 28.2 MMOL/L (ref 24–28)
HCT VFR BLD AUTO: 25.9 %
HGB BLD-MCNC: 8.4 G/DL
HGB UR QL STRIP: ABNORMAL
HYALINE CASTS UR QL AUTO: 0 /LPF
IMM GRANULOCYTES # BLD AUTO: 0.12 K/UL
IMM GRANULOCYTES NFR BLD AUTO: 1.2 %
KETONES UR QL STRIP: NEGATIVE
LACTATE SERPL-SCNC: 0.7 MMOL/L
LEUKOCYTE ESTERASE UR QL STRIP: NEGATIVE
LYMPHOCYTES # BLD AUTO: 0.8 K/UL
LYMPHOCYTES NFR BLD: 7.6 %
MAGNESIUM SERPL-MCNC: 1.8 MG/DL
MCH RBC QN AUTO: 30 PG
MCHC RBC AUTO-ENTMCNC: 32.4 G/DL
MCV RBC AUTO: 93 FL
MICROSCOPIC COMMENT: ABNORMAL
MONOCYTES # BLD AUTO: 0.6 K/UL
MONOCYTES NFR BLD: 6 %
NEUTROPHILS # BLD AUTO: 8.3 K/UL
NEUTROPHILS NFR BLD: 84.5 %
NITRITE UR QL STRIP: NEGATIVE
NRBC BLD-RTO: 0 /100 WBC
PCO2 BLDA: 42.6 MMHG (ref 35–45)
PH SMN: 7.43 [PH] (ref 7.35–7.45)
PH UR STRIP: 6 [PH] (ref 5–8)
PLATELET # BLD AUTO: 182 K/UL
PMV BLD AUTO: 10.4 FL
PO2 BLDA: 59 MMHG (ref 40–60)
POC BE: 4 MMOL/L
POC SATURATED O2: 91 % (ref 95–100)
POC TCO2: 29 MMOL/L (ref 24–29)
POCT GLUCOSE: 152 MG/DL (ref 70–110)
POTASSIUM SERPL-SCNC: 4 MMOL/L
PROT SERPL-MCNC: 5.5 G/DL
PROT UR QL STRIP: ABNORMAL
RBC # BLD AUTO: 2.8 M/UL
RBC #/AREA URNS AUTO: 6 /HPF (ref 0–4)
SAMPLE: ABNORMAL
SITE: ABNORMAL
SODIUM SERPL-SCNC: 148 MMOL/L
SP GR UR STRIP: 1.01 (ref 1–1.03)
SQUAMOUS #/AREA URNS AUTO: 0 /HPF
TROPONIN I SERPL DL<=0.01 NG/ML-MCNC: 0.21 NG/ML
URN SPEC COLLECT METH UR: ABNORMAL
UROBILINOGEN UR STRIP-ACNC: NEGATIVE EU/DL
WBC # BLD AUTO: 9.81 K/UL
WBC #/AREA URNS AUTO: 2 /HPF (ref 0–5)

## 2018-07-29 PROCEDURE — 84484 ASSAY OF TROPONIN QUANT: CPT

## 2018-07-29 PROCEDURE — 81001 URINALYSIS AUTO W/SCOPE: CPT

## 2018-07-29 PROCEDURE — 86920 COMPATIBILITY TEST SPIN: CPT

## 2018-07-29 PROCEDURE — 83735 ASSAY OF MAGNESIUM: CPT

## 2018-07-29 PROCEDURE — 99285 EMERGENCY DEPT VISIT HI MDM: CPT | Mod: 25

## 2018-07-29 PROCEDURE — 25000003 PHARM REV CODE 250: Performed by: HOSPITALIST

## 2018-07-29 PROCEDURE — 96365 THER/PROPH/DIAG IV INF INIT: CPT

## 2018-07-29 PROCEDURE — 99900035 HC TECH TIME PER 15 MIN (STAT)

## 2018-07-29 PROCEDURE — 86901 BLOOD TYPING SEROLOGIC RH(D): CPT

## 2018-07-29 PROCEDURE — 25000003 PHARM REV CODE 250: Performed by: NURSE PRACTITIONER

## 2018-07-29 PROCEDURE — 96375 TX/PRO/DX INJ NEW DRUG ADDON: CPT

## 2018-07-29 PROCEDURE — 27000190 HC CPAP FULL FACE MASK W/VALVE

## 2018-07-29 PROCEDURE — 25000003 PHARM REV CODE 250: Performed by: EMERGENCY MEDICINE

## 2018-07-29 PROCEDURE — 82803 BLOOD GASES ANY COMBINATION: CPT

## 2018-07-29 PROCEDURE — 85025 COMPLETE CBC W/AUTO DIFF WBC: CPT

## 2018-07-29 PROCEDURE — 93005 ELECTROCARDIOGRAM TRACING: CPT

## 2018-07-29 PROCEDURE — 94761 N-INVAS EAR/PLS OXIMETRY MLT: CPT

## 2018-07-29 PROCEDURE — 94660 CPAP INITIATION&MGMT: CPT

## 2018-07-29 PROCEDURE — 63600175 PHARM REV CODE 636 W HCPCS: Performed by: HOSPITALIST

## 2018-07-29 PROCEDURE — 93010 ELECTROCARDIOGRAM REPORT: CPT | Mod: ,,, | Performed by: INTERNAL MEDICINE

## 2018-07-29 PROCEDURE — 83605 ASSAY OF LACTIC ACID: CPT

## 2018-07-29 PROCEDURE — 83880 ASSAY OF NATRIURETIC PEPTIDE: CPT

## 2018-07-29 PROCEDURE — 63600175 PHARM REV CODE 636 W HCPCS: Performed by: EMERGENCY MEDICINE

## 2018-07-29 PROCEDURE — 80053 COMPREHEN METABOLIC PANEL: CPT

## 2018-07-29 PROCEDURE — 99291 CRITICAL CARE FIRST HOUR: CPT | Mod: ,,, | Performed by: EMERGENCY MEDICINE

## 2018-07-29 PROCEDURE — 99223 1ST HOSP IP/OBS HIGH 75: CPT | Mod: ,,, | Performed by: HOSPITALIST

## 2018-07-29 PROCEDURE — 11000001 HC ACUTE MED/SURG PRIVATE ROOM

## 2018-07-29 RX ORDER — HEPARIN SODIUM 5000 [USP'U]/ML
5000 INJECTION, SOLUTION INTRAVENOUS; SUBCUTANEOUS EVERY 8 HOURS
Status: DISCONTINUED | OUTPATIENT
Start: 2018-07-29 | End: 2018-08-02 | Stop reason: HOSPADM

## 2018-07-29 RX ORDER — FUROSEMIDE 10 MG/ML
40 INJECTION INTRAMUSCULAR; INTRAVENOUS
Status: COMPLETED | OUTPATIENT
Start: 2018-07-29 | End: 2018-07-29

## 2018-07-29 RX ORDER — AMLODIPINE BESYLATE 10 MG/1
10 TABLET ORAL DAILY
Status: DISCONTINUED | OUTPATIENT
Start: 2018-07-30 | End: 2018-08-02 | Stop reason: HOSPADM

## 2018-07-29 RX ORDER — SODIUM BICARBONATE 650 MG/1
650 TABLET ORAL 3 TIMES DAILY
Status: DISCONTINUED | OUTPATIENT
Start: 2018-07-29 | End: 2018-08-02 | Stop reason: HOSPADM

## 2018-07-29 RX ORDER — FOLIC ACID 1 MG/1
2 TABLET ORAL DAILY
Status: DISCONTINUED | OUTPATIENT
Start: 2018-07-30 | End: 2018-08-02 | Stop reason: HOSPADM

## 2018-07-29 RX ORDER — AMOXICILLIN 250 MG
1 CAPSULE ORAL 2 TIMES DAILY
Status: DISCONTINUED | OUTPATIENT
Start: 2018-07-29 | End: 2018-08-02 | Stop reason: HOSPADM

## 2018-07-29 RX ORDER — SODIUM CHLORIDE 0.9 % (FLUSH) 0.9 %
5 SYRINGE (ML) INJECTION
Status: DISCONTINUED | OUTPATIENT
Start: 2018-07-29 | End: 2018-08-02 | Stop reason: HOSPADM

## 2018-07-29 RX ORDER — LABETALOL HYDROCHLORIDE 5 MG/ML
10 INJECTION, SOLUTION INTRAVENOUS EVERY 4 HOURS PRN
Status: DISCONTINUED | OUTPATIENT
Start: 2018-07-29 | End: 2018-08-02 | Stop reason: HOSPADM

## 2018-07-29 RX ORDER — DONEPEZIL HYDROCHLORIDE 5 MG/1
5 TABLET, FILM COATED ORAL NIGHTLY
Status: DISCONTINUED | OUTPATIENT
Start: 2018-07-29 | End: 2018-08-02 | Stop reason: HOSPADM

## 2018-07-29 RX ORDER — CARVEDILOL 25 MG/1
25 TABLET ORAL 2 TIMES DAILY
Status: DISCONTINUED | OUTPATIENT
Start: 2018-07-29 | End: 2018-08-02 | Stop reason: HOSPADM

## 2018-07-29 RX ORDER — IBUPROFEN 200 MG
24 TABLET ORAL
Status: DISCONTINUED | OUTPATIENT
Start: 2018-07-29 | End: 2018-08-02 | Stop reason: HOSPADM

## 2018-07-29 RX ORDER — NAPROXEN SODIUM 220 MG/1
81 TABLET, FILM COATED ORAL DAILY
Status: DISCONTINUED | OUTPATIENT
Start: 2018-07-30 | End: 2018-08-02 | Stop reason: HOSPADM

## 2018-07-29 RX ORDER — GLUCAGON 1 MG
1 KIT INJECTION
Status: DISCONTINUED | OUTPATIENT
Start: 2018-07-29 | End: 2018-08-02 | Stop reason: HOSPADM

## 2018-07-29 RX ORDER — PREDNISONE 20 MG/1
40 TABLET ORAL DAILY
Status: DISCONTINUED | OUTPATIENT
Start: 2018-07-30 | End: 2018-08-02 | Stop reason: HOSPADM

## 2018-07-29 RX ORDER — NITROGLYCERIN 20 MG/100ML
5 INJECTION INTRAVENOUS CONTINUOUS
Status: DISCONTINUED | OUTPATIENT
Start: 2018-07-29 | End: 2018-07-29

## 2018-07-29 RX ORDER — IBUPROFEN 200 MG
16 TABLET ORAL
Status: DISCONTINUED | OUTPATIENT
Start: 2018-07-29 | End: 2018-08-02 | Stop reason: HOSPADM

## 2018-07-29 RX ORDER — FUROSEMIDE 10 MG/ML
80 INJECTION INTRAMUSCULAR; INTRAVENOUS ONCE
Status: COMPLETED | OUTPATIENT
Start: 2018-07-29 | End: 2018-07-29

## 2018-07-29 RX ORDER — INSULIN ASPART 100 [IU]/ML
1-10 INJECTION, SOLUTION INTRAVENOUS; SUBCUTANEOUS
Status: DISCONTINUED | OUTPATIENT
Start: 2018-07-29 | End: 2018-08-02 | Stop reason: HOSPADM

## 2018-07-29 RX ORDER — ATORVASTATIN CALCIUM 20 MG/1
40 TABLET, FILM COATED ORAL DAILY
Status: DISCONTINUED | OUTPATIENT
Start: 2018-07-30 | End: 2018-08-02 | Stop reason: HOSPADM

## 2018-07-29 RX ORDER — ISOSORBIDE MONONITRATE 30 MG/1
60 TABLET, EXTENDED RELEASE ORAL DAILY
Status: DISCONTINUED | OUTPATIENT
Start: 2018-07-30 | End: 2018-07-30

## 2018-07-29 RX ORDER — ACETAMINOPHEN 325 MG/1
325 TABLET ORAL EVERY 6 HOURS PRN
COMMUNITY

## 2018-07-29 RX ORDER — HYDRALAZINE HYDROCHLORIDE 50 MG/1
100 TABLET, FILM COATED ORAL EVERY 8 HOURS
Status: DISCONTINUED | OUTPATIENT
Start: 2018-07-29 | End: 2018-08-02 | Stop reason: HOSPADM

## 2018-07-29 RX ADMIN — LABETALOL HYDROCHLORIDE 10 MG: 5 INJECTION, SOLUTION INTRAVENOUS at 11:07

## 2018-07-29 RX ADMIN — NITROGLYCERIN 20 MCG/MIN: 20 INJECTION INTRAVENOUS at 02:07

## 2018-07-29 RX ADMIN — HEPARIN SODIUM 5000 UNITS: 5000 INJECTION, SOLUTION INTRAVENOUS; SUBCUTANEOUS at 10:07

## 2018-07-29 RX ADMIN — HYDRALAZINE HYDROCHLORIDE 100 MG: 50 TABLET ORAL at 05:07

## 2018-07-29 RX ADMIN — FUROSEMIDE 40 MG: 10 INJECTION, SOLUTION INTRAMUSCULAR; INTRAVENOUS at 02:07

## 2018-07-29 RX ADMIN — FUROSEMIDE 80 MG: 10 INJECTION, SOLUTION INTRAMUSCULAR; INTRAVENOUS at 10:07

## 2018-07-29 RX ADMIN — CARVEDILOL 25 MG: 25 TABLET, FILM COATED ORAL at 05:07

## 2018-07-29 NOTE — ED NOTES
Pt. Resting in bed; no acute distress noted; remains on cardiac and pulse ox monitoring; bipap in use; RR even and unlabored

## 2018-07-29 NOTE — HPI
84F with CKD4-5, DM2, HTN, HLD, CAD, combined HF, lung CA that presents to the ED via EMS for evaluation of SOB and low H&H. Patient's sister states patient has been SOB since around noon. Per sister, they were also told to come to the ED because patient's H&H was low on blood work. Patient endorses abdominal pain. Per EMS, when they arrived at the scene patient's O2 sat was in the 80's. She is a resident of [facility]. No further concerns or complaints at this time. Per patient's POA sister, she was fine yesterday.     Of note, Dr Silva in ED had goals of care discussion with sister - opted for DNR and NH with hospice on discharge.

## 2018-07-29 NOTE — ASSESSMENT & PLAN NOTE
2/2 acute pulmonary edema/acute on chronic combined HF exacerbation/HTN emergency  CXR w/ pulmonary edema, crackles on exam   Cont BIPAP overnight, wean in AM  VBG results noted - no hypercapnia  IV Lasix 40 mg x 1 given in ED  Give IV 80 mg x 1 later this evening

## 2018-07-29 NOTE — ED TRIAGE NOTES
Pt. Is a resident of [facility]; EMS was called to send pt. To ER after a lab draw showed a 22.4% hematocrit; recent diagnosis of UTI; hx of dementia. EMS reported an oxygen saturation in the 80s upon arrival.

## 2018-07-29 NOTE — ASSESSMENT & PLAN NOTE
See above.  Daily weights, strict I/os, fluid restrict when diet resumed  Lasix held on discharge per Renal recs due to renal failure  Resume home BB. Not on ACEI/ARB due to renal function

## 2018-07-29 NOTE — ED NOTES
Pt remains on continuous cardiac and pulse ox monitoring with non-invasive blood pressure to cycle every 15 minutes.  Family at bedside.  Pt. Remains on Bipap.

## 2018-07-29 NOTE — H&P
[facility] Hospital Medicine  History & Physical    Patient Name: Misa Yun  MRN: [id number]  Admission Date: 7/29/2018  Attending Physician: Ashley Silva MD   Primary Care Provider: [facility] Medicine Team: [facility] [place] [place] HOSP MED  Margarita Cason MD     Patient information was obtained from relative(s), past medical records and ER records.     Subjective:     Principal Problem:Acute respiratory failure with hypoxia    Chief Complaint:   Chief Complaint   Patient presents with    Low H and H    Shortness of Breath        HPI: 84F with CKD4-5, DM2, HTN, HLD, CAD, combined HF, lung CA that presents to the ED via EMS for evaluation of SOB and low H&H. Patient's sister states patient has been SOB since around noon. Per sister, they were also told to come to the ED because patient's H&H was low on blood work. Patient endorses abdominal pain. Per EMS, when they arrived at the scene patient's O2 sat was in the 80's. She is a resident of [facility]. No further concerns or complaints at this time. Per patient's POA sister, she was fine yesterday.     Of note, Dr Silva in ED had goals of care discussion with sister - opted for DNR and NH with hospice on discharge.     Past Medical History:   Diagnosis Date    Anemia     Anemia     CAD (coronary artery disease)     Cataract     Chemotherapy follow-up examination 11/27/2017    CHF (congestive heart failure)     Chronic kidney disease (CKD), stage IV (severe) 03/27/2017    Sees  at [facility], no dialysis    Dementia     Encounter for blood transfusion 2006    at time of CABG    Fracture of humerus, proximal, left, closed 1/28/2015    Hypercalcemia 04/10/2017    Hyperlipidemia     Hyperparathyroidism     Hypertension     Mediastinal lymphadenopathy 10/9/2017    MI (myocardial infarction) 10/2016    Nephritis 6/18/2018    Osteoporosis     Ovarian cancer     in the  past    Primary lung cancer, right 10/9/2017    Type 2 diabetes mellitus, controlled     UTI (urinary tract infection) 04/11/2017       Past Surgical History:   Procedure Laterality Date    CATARACT EXTRACTION W/  INTRAOCULAR LENS IMPLANT Left 04/17/2017    Dr. Motta    CATARACT EXTRACTION W/  INTRAOCULAR LENS IMPLANT Right 05/06/2017    Dr Motta     CORONARY ARTERY BYPASS GRAFT  2006    EYE SURGERY      HYSTERECTOMY  2002    ORIF HUMERUS FRACTURE Left 1/28/2015       Review of patient's allergies indicates:  No Known Allergies    No current facility-administered medications on file prior to encounter.      Current Outpatient Prescriptions on File Prior to Encounter   Medication Sig    amlodipine (NORVASC) 10 MG tablet Take 10 mg by mouth once daily.    atorvastatin (LIPITOR) 40 MG tablet Take 1 tablet (40 mg total) by mouth once daily.    carvedilol (COREG) 25 MG tablet Take 1 tablet (25 mg total) by mouth 2 (two) times daily. (Patient taking differently: Take 6.25 mg by mouth 2 (two) times daily. )    donepezil (ARICEPT) 5 MG tablet Take 5 mg by mouth every evening.    ergocalciferol (ERGOCALCIFEROL) 50,000 unit Cap Take 50,000 Units by mouth every 7 days.    folic acid (FOLVITE) 1 MG tablet Take 2 tablets (2 mg total) by mouth once daily.    hydrALAZINE (APRESOLINE) 50 MG tablet Take 1 tablet (50 mg total) by mouth every 8 (eight) hours.    insulin aspart U-100 (NOVOLOG) 100 unit/mL InPn pen Inject 5 Units into the skin 3 (three) times daily.    insulin detemir U-100 (LEVEMIR FLEXTOUCH) 100 unit/mL (3 mL) SubQ InPn pen Inject 10 Units into the skin every evening.    isosorbide mononitrate (IMDUR) 60 MG 24 hr tablet Take 1 tablet (60 mg total) by mouth once daily.    nitroGLYCERIN (NITROSTAT) 0.4 MG SL tablet Place 1 tablet (0.4 mg total) under the tongue every 5 (five) minutes as needed for Chest pain.    predniSONE (DELTASONE) 20 MG tablet Take 40 mg by mouth once daily.    senna-docusate  8.6-50 mg (PERICOLACE) 8.6-50 mg per tablet Take 1 tablet by mouth 2 (two) times daily.    aspirin 81 MG Chew Take 1 tablet (81 mg total) by mouth once daily. RESUME ON 2/14/15    sodium bicarbonate 650 MG tablet Take 1 tablet (650 mg total) by mouth 3 (three) times daily.     Family History     Problem Relation (Age of Onset)    Blindness Maternal Grandmother        Social History Main Topics    Smoking status: Former Smoker     Packs/day: 2.00     Years: 30.00     Quit date: 1/20/2007    Smokeless tobacco: Never Used    Alcohol use No    Drug use: No    Sexual activity: Not on file     Review of Systems   Unable to perform ROS: Dementia     Objective:     Vital Signs (Most Recent):  Temp: 98.8 °F (37.1 °C) (07/29/18 1311)  Pulse: 85 (07/29/18 1611)  Resp: (!) 23 (07/29/18 1611)  BP: (!) 174/79 (07/29/18 1611)  SpO2: 97 % (07/29/18 1611) Vital Signs (24h Range):  Temp:  [98.8 °F (37.1 °C)] 98.8 °F (37.1 °C)  Pulse:  [79-85] 85  Resp:  [20-25] 23  SpO2:  [97 %-98 %] 97 %  BP: (164-207)/(64-91) 174/79        There is no height or weight on file to calculate BMI.    Physical Exam   Constitutional: She appears well-nourished. No distress.   Obese. Sleeping comfortably. BIPAP. Easily arousable. Mental status at baseline, oriented to self.    HENT:   Head: Normocephalic and atraumatic.   Nose: Nose normal.   Mouth/Throat: Oropharynx is clear and moist. No oropharyngeal exudate.   Eyes: Conjunctivae and EOM are normal. Pupils are equal, round, and reactive to light. Right eye exhibits no discharge. Left eye exhibits no discharge. No scleral icterus.   Neck: Neck supple. No JVD (unable to assess due to body habitus) present. No tracheal deviation present. No thyromegaly present.   Cardiovascular: Normal rate, regular rhythm, normal heart sounds and intact distal pulses.  Exam reveals no friction rub.    No murmur heard.  Pulmonary/Chest: Effort normal. No stridor. No respiratory distress. She has no wheezes. She has  rales. She exhibits no tenderness.   Abdominal: Soft. Bowel sounds are normal. She exhibits no distension and no mass. There is no tenderness. There is no rebound and no guarding.   Musculoskeletal: Normal range of motion. She exhibits no edema, tenderness or deformity.   Lymphadenopathy:     She has no cervical adenopathy.   Neurological: She is alert. No cranial nerve deficit. She exhibits normal muscle tone. Coordination normal.   Skin: Skin is warm and dry. No rash noted. She is not diaphoretic. No erythema. No pallor.   Psychiatric: She has a normal mood and affect. Her behavior is normal. Judgment and thought content normal.   Vitals reviewed.        CRANIAL NERVES     CN III, IV, VI   Pupils are equal, round, and reactive to light.  Extraocular motions are normal.        Significant Labs: All pertinent labs within the past 24 hours have been reviewed.    Significant Imaging: I have reviewed and interpreted all pertinent imaging results/findings within the past 24 hours.    Assessment/Plan:     * Acute respiratory failure with hypoxia    2/2 acute pulmonary edema/acute on chronic combined HF exacerbation/HTN emergency  CXR w/ pulmonary edema, crackles on exam   Cont BIPAP overnight, wean in AM  VBG results noted - no hypercapnia  IV Lasix 40 mg x 1 given in ED  Give IV 80 mg x 1 later this evening          Hypertensive emergency    See above  Resume home meds and increase hydralazine to 100 mg tid          Acute on chronic combined systolic and diastolic heart failure    See above.  Daily weights, strict I/os, fluid restrict when diet resumed  Lasix held on discharge per Renal recs due to renal failure  Resume home BB. Not on ACEI/ARB due to renal function           Acute kidney injury superimposed on CKD IV - V    Had LESLEY last admission  Renal function improved compared to that at discharge  Monitor  Cont home Na bicarb          Anemia    Anemia of renal failure  Folate deficiency  Improved from  discharge  Monitor  Cont folate          Primary lung cancer, right    Follows with Dr Barnhart with Oncology  Immunotherapy had been discontinued due to concern for nephritis  Cont home prednisone 40 mg daily for mgmt of renal toxicity  Has appt with him with repeat PET scan. 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    Hold home insulin while NPO  SSI, Accuchecks            VTE Risk Mitigation         Ordered     heparin (porcine) injection 5,000 Units  Every 8 hours      07/29/18 1622     IP VTE HIGH RISK PATIENT  Once      07/29/18 1622             Margarita Cason MD  Department of Hospital Medicine   [facility]

## 2018-07-29 NOTE — SUBJECTIVE & OBJECTIVE
Past Medical History:   Diagnosis Date    Anemia     Anemia     CAD (coronary artery disease)     Cataract     Chemotherapy follow-up examination 11/27/2017    CHF (congestive heart failure)     Chronic kidney disease (CKD), stage IV (severe) 03/27/2017    Se[facility]  at [facility], no dialysis    Dementia     Encounter for blood transfusion 2006    at time of CABG    Fracture of humerus, proximal, left, closed 1/28/2015    Hypercalcemia 04/10/2017    Hyperlipidemia     Hyperparathyroidism     Hypertension     Mediastinal lymphadenopathy 10/9/2017    MI (myocardial infarction) 10/2016    Nephritis 6/18/2018    Osteoporosis     Ovarian cancer     in the past    Primary lung cancer, right 10/9/2017    Type 2 diabetes mellitus, controlled     UTI (urinary tract infection) 04/11/2017       Past Surgical History:   Procedure Laterality Date    CATARACT EXTRACTION W/  INTRAOCULAR LENS IMPLANT Left 04/17/2017    Dr. Motta    CATARACT EXTRACTION W/  INTRAOCULAR LENS IMPLANT Right 05/06/2017    Dr Motta     CORONARY ARTERY BYPASS GRAFT  2006    EYE SURGERY      HYSTERECTOMY  2002    ORIF HUMERUS FRACTURE Left 1/28/2015       Review of patient's allergies indicates:  No Known Allergies    No current facility-administered medications on file prior to encounter.      Current Outpatient Prescriptions on File Prior to Encounter   Medication Sig    amlodipine (NORVASC) 10 MG tablet Take 10 mg by mouth once daily.    atorvastatin (LIPITOR) 40 MG tablet Take 1 tablet (40 mg total) by mouth once daily.    carvedilol (COREG) 25 MG tablet Take 1 tablet (25 mg total) by mouth 2 (two) times daily. (Patient taking differently: Take 6.25 mg by mouth 2 (two) times daily. )    donepezil (ARICEPT) 5 MG tablet Take 5 mg by mouth every evening.    ergocalciferol (ERGOCALCIFEROL) 50,000 unit Cap Take 50,000 Units by mouth every 7 days.    folic acid (FOLVITE) 1 MG tablet Take 2 tablets (2 mg total) by  mouth once daily.    hydrALAZINE (APRESOLINE) 50 MG tablet Take 1 tablet (50 mg total) by mouth every 8 (eight) hours.    insulin aspart U-100 (NOVOLOG) 100 unit/mL InPn pen Inject 5 Units into the skin 3 (three) times daily.    insulin detemir U-100 (LEVEMIR FLEXTOUCH) 100 unit/mL (3 mL) SubQ InPn pen Inject 10 Units into the skin every evening.    isosorbide mononitrate (IMDUR) 60 MG 24 hr tablet Take 1 tablet (60 mg total) by mouth once daily.    nitroGLYCERIN (NITROSTAT) 0.4 MG SL tablet Place 1 tablet (0.4 mg total) under the tongue every 5 (five) minutes as needed for Chest pain.    predniSONE (DELTASONE) 20 MG tablet Take 40 mg by mouth once daily.    senna-docusate 8.6-50 mg (PERICOLACE) 8.6-50 mg per tablet Take 1 tablet by mouth 2 (two) times daily.    aspirin 81 MG Chew Take 1 tablet (81 mg total) by mouth once daily. RESUME ON 2/14/15    sodium bicarbonate 650 MG tablet Take 1 tablet (650 mg total) by mouth 3 (three) times daily.     Family History     Problem Relation (Age of Onset)    Blindness Maternal Grandmother        Social History Main Topics    Smoking status: Former Smoker     Packs/day: 2.00     Years: 30.00     Quit date: 1/20/2007    Smokeless tobacco: Never Used    Alcohol use No    Drug use: No    Sexual activity: Not on file     Review of Systems   Unable to perform ROS: Dementia     Objective:     Vital Signs (Most Recent):  Temp: 98.8 °F (37.1 °C) (07/29/18 1311)  Pulse: 85 (07/29/18 1611)  Resp: (!) 23 (07/29/18 1611)  BP: (!) 174/79 (07/29/18 1611)  SpO2: 97 % (07/29/18 1611) Vital Signs (24h Range):  Temp:  [98.8 °F (37.1 °C)] 98.8 °F (37.1 °C)  Pulse:  [79-85] 85  Resp:  [20-25] 23  SpO2:  [97 %-98 %] 97 %  BP: (164-207)/(64-91) 174/79        There is no height or weight on file to calculate BMI.    Physical Exam   Constitutional: She appears well-nourished. No distress.   Obese. Sleeping comfortably. BIPAP. Easily arousable. Mental status at baseline, oriented to  self.    HENT:   Head: Normocephalic and atraumatic.   Nose: Nose normal.   Mouth/Throat: Oropharynx is clear and moist. No oropharyngeal exudate.   Eyes: Conjunctivae and EOM are normal. Pupils are equal, round, and reactive to light. Right eye exhibits no discharge. Left eye exhibits no discharge. No scleral icterus.   Neck: Neck supple. No JVD (unable to assess due to body habitus) present. No tracheal deviation present. No thyromegaly present.   Cardiovascular: Normal rate, regular rhythm, normal heart sounds and intact distal pulses.  Exam reveals no friction rub.    No murmur heard.  Pulmonary/Chest: Effort normal. No stridor. No respiratory distress. She has no wheezes. She has rales. She exhibits no tenderness.   Abdominal: Soft. Bowel sounds are normal. She exhibits no distension and no mass. There is no tenderness. There is no rebound and no guarding.   Musculoskeletal: Normal range of motion. She exhibits no edema, tenderness or deformity.   Lymphadenopathy:     She has no cervical adenopathy.   Neurological: She is alert. No cranial nerve deficit. She exhibits normal muscle tone. Coordination normal.   Skin: Skin is warm and dry. No rash noted. She is not diaphoretic. No erythema. No pallor.   Psychiatric: She has a normal mood and affect. Her behavior is normal. Judgment and thought content normal.   Vitals reviewed.        CRANIAL NERVES     CN III, IV, VI   Pupils are equal, round, and reactive to light.  Extraocular motions are normal.        Significant Labs: All pertinent labs within the past 24 hours have been reviewed.    Significant Imaging: I have reviewed and interpreted all pertinent imaging results/findings within the past 24 hours.

## 2018-07-29 NOTE — ASSESSMENT & PLAN NOTE
Follows with Dr Barnhart with Oncology  Immunotherapy had been discontinued due to concern for nephritis  Cont home prednisone 40 mg daily for mgmt of renal toxicity  Has appt with him with repeat PET scan. Sister to cancel given plan for hospice services on discharge

## 2018-07-29 NOTE — ASSESSMENT & PLAN NOTE
Had LESLEY last admission  Renal function improved compared to that at discharge  Monitor  Cont home Na bicarb

## 2018-07-29 NOTE — ED PROVIDER NOTES
Encounter Date: 7/29/2018    SCRIBE #1 NOTE: I, Tessy Durand, am scribing for, and in the presence of,  Dr. Silva. I have scribed the following portions of the note - Other sections scribed: HPI,ROS,PE.       History     Chief Complaint   Patient presents with    Low H and H    Shortness of Breath     The patient is a 84 y.o. female with comorbidities including:DM, HTN, HLD, CAD, CHF, lung CA, hyperparathyroidism, and CKD Stage IV- V that presents to the ED via EMS for evaluation of SOB and low H&H. Patient's sister states patient has been SOB since around noon. Per sister, they were also told to come to the ED because patient's H&H was low on blood work. Patient endorses abdominal pain. Per EMS, when they arrived at the scene patient's O2 sat was in the 80's. She is a [facility] of [facility]. No further concerns or complaints at this time.        The history is provided by the patient, medical records and a relative.     Review of patient's allergies indicates:  No Known Allergies  Past Medical History:   Diagnosis Date    Anemia     Anemia     CAD (coronary artery disease)     Cataract     Chemotherapy follow-up examination 11/27/2017    CHF (congestive heart failure)     Chronic kidney disease (CKD), stage IV (severe) 03/27/2017    Sees  at [facility], no dialysis    Dementia     Encounter for blood transfusion 2006    at time of CABG    Fracture of humerus, proximal, left, closed 1/28/2015    Hypercalcemia 04/10/2017    Hyperlipidemia     Hyperparathyroidism     Hypertension     Mediastinal lymphadenopathy 10/9/2017    MI (myocardial infarction) 10/2016    Nephritis 6/18/2018    Osteoporosis     Ovarian cancer     in the past    Primary lung cancer, right 10/9/2017    Type 2 diabetes mellitus, controlled     UTI (urinary tract infection) 04/11/2017     Past Surgical History:   Procedure Laterality Date    CATARACT EXTRACTION W/  INTRAOCULAR LENS IMPLANT Left  04/17/2017    Dr. Motta    CATARACT EXTRACTION W/  INTRAOCULAR LENS IMPLANT Right 05/06/2017    Dr Motta     CORONARY ARTERY BYPASS GRAFT  2006    EYE SURGERY      HYSTERECTOMY  2002    ORIF HUMERUS FRACTURE Left 1/28/2015     Family History   Problem Relation Age of Onset    Adopted: Yes    Blindness Maternal Grandmother     Cancer Neg Hx      Social History   Substance Use Topics    Smoking status: Former Smoker     Packs/day: 2.00     Years: 30.00     Quit date: 1/20/2007    Smokeless tobacco: Never Used    Alcohol use No     Review of Systems   Constitutional: Negative for fever.   HENT: Negative for sore throat.    Respiratory: Positive for shortness of breath.    Cardiovascular: Negative for chest pain.   Gastrointestinal: Positive for abdominal pain.   Genitourinary: Negative for flank pain.   Musculoskeletal: Negative for neck pain.   Skin: Negative for rash.   Neurological: Negative for headaches.   Psychiatric/Behavioral: Negative for confusion.       Physical Exam     Initial Vitals [07/29/18 1311]   BP Pulse Resp Temp SpO2   (!) 164/64 84 (!) 24 98.8 °F (37.1 °C) 98 %      MAP       --         Physical Exam    Nursing note and vitals reviewed.  Constitutional: She appears ill.   HENT:   Head: Normocephalic and atraumatic.   Mouth/Throat: Mucous membranes are dry.   Eyes:   Conjunctival pallor noted.   Neck: Normal range of motion. Neck supple.   Cardiovascular: Normal rate and regular rhythm.   No murmur heard.  Pulmonary/Chest: Tachypnea noted. She is in respiratory distress (moderate).   Decreased breath sounds throughout.Tachypneic with accesory muscle use.   Abdominal: Soft. She exhibits no distension. There is no tenderness.   Musculoskeletal: She exhibits no edema.   Neurological: She is alert.   Able to follow commands though difficulty answering questions. No obvious focal weakness.    Skin: Skin is warm and dry.         ED Course   Procedures  Labs Reviewed   TROPONIN I - Abnormal;  Notable for the following:        Result Value    Troponin I 0.213 (*)     All other components within normal limits   URINALYSIS, REFLEX TO URINE CULTURE - Abnormal; Notable for the following:     Appearance, UA Hazy (*)     Protein, UA 3+ (*)     Glucose, UA 1+ (*)     Occult Blood UA 1+ (*)     All other components within normal limits    Narrative:     Preferred Collection Type->Urine, Clean Catch  cup   CBC W/ AUTO DIFFERENTIAL - Abnormal; Notable for the following:     RBC 2.80 (*)     Hemoglobin 8.4 (*)     Hematocrit 25.9 (*)     RDW 16.8 (*)     Immature Granulocytes 1.2 (*)     Gran # (ANC) 8.3 (*)     Immature Grans (Abs) 0.12 (*)     Lymph # 0.8 (*)     Gran% 84.5 (*)     Lymph% 7.6 (*)     All other components within normal limits   B-TYPE NATRIURETIC PEPTIDE - Abnormal; Notable for the following:      (*)     All other components within normal limits   COMPREHENSIVE METABOLIC PANEL - Abnormal; Notable for the following:     Sodium 148 (*)     Chloride 112 (*)     Glucose 124 (*)     BUN, Bld 77 (*)     Creatinine 4.3 (*)     Calcium 8.6 (*)     Total Protein 5.5 (*)     Albumin 2.3 (*)     eGFR if  10.2 (*)     eGFR if non  8.9 (*)     All other components within normal limits   URINALYSIS MICROSCOPIC - Abnormal; Notable for the following:     RBC, UA 6 (*)     Bacteria, UA Few (*)     All other components within normal limits    Narrative:     Preferred Collection Type->Urine, Clean Catch  cup   ISTAT PROCEDURE - Abnormal; Notable for the following:     POC HCO3 28.2 (*)     POC SATURATED O2 91 (*)     All other components within normal limits   LACTIC ACID, PLASMA   MAGNESIUM   POCT GLUCOSE   TYPE & SCREEN          Imaging Results          X-Ray Chest AP Portable (Final result)  Result time 07/29/18 14:05:21    Final result by Robinson Gillette MD (07/29/18 14:05:21)                 Impression:      1. Some interval improvement in the appearance of the chest is  compared to the previous exam, could reflect improving edema or infection, correlation recommended.      Electronically signed by: Robinson Gillette MD  Date:    07/29/2018  Time:    14:05             Narrative:    EXAMINATION:  XR CHEST AP PORTABLE    CLINICAL HISTORY:  shortness of breath;    TECHNIQUE:  Single frontal view of the chest was performed.    COMPARISON:  07/18/2018    FINDINGS:  The cardiomediastinal silhouette is prominent, similar to the previous exam noting calcification of the aorta..  There is obscuration of the costophrenic angles, suggesting effusions..  The trachea is midline.  The lungs are symmetrically expanded bilaterally with patchy increased interstitial and parenchymal attenuation bilaterally, noting there is improved aeration as compared to the previous exam, particularly on the right and right lower lung zone..  No new large focal consolidation noting there may be residual consolidation projected over the right lower lung zone.  There is no pneumothorax.  The osseous structures are remarkable for degenerative change.  Postsurgical changes are noted of the right humerus..                                 Medical Decision Making:   History:   Old Medical Records: I decided to obtain old medical records.  Old Records Summarized: other records.       <> Summary of Records: Patient is a resident of [facility]. She was just admitted here from July 16th-25th for SOB. Patient has a hx of CHF with an EF of 45% and lung CA. Followed by oncology. Immunotherapy on hold secondary to renal issues. Her CXR showed air space consolidation bilaterally. She was treated with antibiotics. She required BiPAP initially and then transitioned to oxygen. Her renal function worsened during the hospital admission. At discharge BUN and creatinine were 91/4.6 and she was still making urine. She got IV diuretics and was treated for an ESBL UTI. When she left the hospital on July 25th her H&H  was  7.4/22.9.  Initial Assessment:   83 yo f, complex PMH as above, now here for SOB, in resp distress on arrival, O2 sat high 80s, /100.  NH report says pt sent for low H/H but labs from today near pt's baseline (chronically anemic).  Sister feels that SOB just started today around 12 pm.    Exam c/w pulmonary edema/HTN emergency.  Progression of CKD could be contributing.    BIPAP started on arrival for resp distress  NTG ordered for BP management    Given multiple severe underlying health issues, no plans for HD per pt's sister who is NOK, will need to address GOC with sister  ED Management:  Labs  CXR  Admission    3:32 PM  Labs at baseline, including renal function and CBC  Pt appears improved on BIPAP - will leave her on for now  Will titrate off NTG drip   See GOC note below    GOC  -discussed pt's clinical situation with sister and CHICA Carlota Nikhil  -she has a good understanding of pt's condition and poor prognosis  -I updated her on current situation - difficulty breathing but labs stable, pt likely to improve with BIPAP but possibility she could decline  -Carlota agrees that it would not be in pt's best interest to escalate to invasive tx like mechanical ventilation, should pt's condition worsen and agrees that pt should be DNR  -she is also in agreement that when pt returns to NH, should enroll in hospice              Scribe Attestation:   Scribe #1: I performed the above scribed service and the documentation accurately describes the services I performed. I attest to the accuracy of the note.    Attending Attestation:         Attending Critical Care:   Critical Care Times:   ==============================================================  · Total Critical Care Time - exclusive of procedural time: 45 minutes.  ==============================================================  Critical care was necessary to treat or prevent imminent or life-threatening deterioration of the following conditions: respiratory  failure.   Critical care was time spent personally by me on the following activities: obtaining history from patient or relative, examination of patient, review of x-rays / CT sent with the patient, review of old charts, ordering lab, x-rays, and/or EKG, development of treatment plan with patient or relative, ordering and performing treatments and interventions, evaluation of patient's response to treatment and re-evaluation of patient's conition.                  Clinical Impression:   The primary encounter diagnosis was Acute pulmonary edema. A diagnosis of Shortness of breath was also pertinent to this visit.         I, Dr. Ashley Silva, personally performed the services described in this documentation. All medical record entries made by the scribe were at my direction and in my presence.  I have reviewed the chart and agree that the record reflects my personal performance and is accurate and complete. Ashley Silva MD.  2:08 PM 07/30/2018                        Ashley Silva MD  07/30/18 1919

## 2018-07-29 NOTE — ED NOTES
Family is present.  Sister    Pain:  Reports stomach pain.     Psychosocial:  Patient is calm and cooperative.  Patients insight and judgement are appropriate to situation.  Appears clean, well maintained, with clothing appropriate to environment.  Hx: dementia.    Neuro:  Eyes open spontaneously.  Awake, alert. Pt. Is oriented to self; pt. Is disoriented to situation, place, and time.  Speech clear.  Tolerating saliva secretions well.  Able to follow commands, demonstrating ability to actively and appropriately communicate within context of current conversation.  Symmetrical facial muscles.      Airway:  Bilateral chest rise and fall.  Respiration rate is even. Labored breathing.  Air entry patent. Coarse lung sounds and wheezing noted.     Circulatory:  Skin warm, dry.  Apical and radial pulses strong and regular.      Abdomen:  Abdomen soft and non distended.  Positive normo-active bowel sounds x 4 quadrants.    Urinary: Recent UTI; incontinent.    Extremities: Bilateral upper extremity swelling 1+    Skin:  Intact with no bruising/discolorations noted.

## 2018-07-30 PROBLEM — E87.0 HYPERNATREMIA: Status: ACTIVE | Noted: 2018-07-30

## 2018-07-30 LAB
ALBUMIN SERPL BCP-MCNC: 2.1 G/DL
ALP SERPL-CCNC: 65 U/L
ALT SERPL W/O P-5'-P-CCNC: 11 U/L
ANION GAP SERPL CALC-SCNC: 10 MMOL/L
ANION GAP SERPL CALC-SCNC: 11 MMOL/L
AST SERPL-CCNC: 19 U/L
BASOPHILS # BLD AUTO: 0 K/UL
BASOPHILS NFR BLD: 0 %
BILIRUB SERPL-MCNC: 0.4 MG/DL
BUN SERPL-MCNC: 76 MG/DL
BUN SERPL-MCNC: 81 MG/DL
CALCIUM SERPL-MCNC: 8.3 MG/DL
CALCIUM SERPL-MCNC: 8.4 MG/DL
CHLORIDE SERPL-SCNC: 110 MMOL/L
CHLORIDE SERPL-SCNC: 112 MMOL/L
CO2 SERPL-SCNC: 27 MMOL/L
CO2 SERPL-SCNC: 28 MMOL/L
CREAT SERPL-MCNC: 4.4 MG/DL
CREAT SERPL-MCNC: 4.5 MG/DL
DIFFERENTIAL METHOD: ABNORMAL
EOSINOPHIL # BLD AUTO: 0.1 K/UL
EOSINOPHIL NFR BLD: 0.6 %
ERYTHROCYTE [DISTWIDTH] IN BLOOD BY AUTOMATED COUNT: 16.9 %
EST. GFR  (AFRICAN AMERICAN): 10 ML/MIN/1.73 M^2
EST. GFR  (AFRICAN AMERICAN): 9.7 ML/MIN/1.73 M^2
EST. GFR  (NON AFRICAN AMERICAN): 8.4 ML/MIN/1.73 M^2
EST. GFR  (NON AFRICAN AMERICAN): 8.6 ML/MIN/1.73 M^2
GLUCOSE SERPL-MCNC: 101 MG/DL
GLUCOSE SERPL-MCNC: 184 MG/DL
HCT VFR BLD AUTO: 24.3 %
HGB BLD-MCNC: 7.7 G/DL
IMM GRANULOCYTES # BLD AUTO: 0.07 K/UL
IMM GRANULOCYTES NFR BLD AUTO: 0.8 %
LYMPHOCYTES # BLD AUTO: 0.7 K/UL
LYMPHOCYTES NFR BLD: 7.7 %
MAGNESIUM SERPL-MCNC: 1.8 MG/DL
MCH RBC QN AUTO: 29.6 PG
MCHC RBC AUTO-ENTMCNC: 31.7 G/DL
MCV RBC AUTO: 94 FL
MONOCYTES # BLD AUTO: 0.4 K/UL
MONOCYTES NFR BLD: 5.2 %
NEUTROPHILS # BLD AUTO: 7.2 K/UL
NEUTROPHILS NFR BLD: 85.7 %
NRBC BLD-RTO: 0 /100 WBC
PHOSPHATE SERPL-MCNC: 4.6 MG/DL
PLATELET # BLD AUTO: 148 K/UL
PMV BLD AUTO: 10.7 FL
POCT GLUCOSE: 115 MG/DL (ref 70–110)
POCT GLUCOSE: 131 MG/DL (ref 70–110)
POCT GLUCOSE: 132 MG/DL (ref 70–110)
POCT GLUCOSE: 255 MG/DL (ref 70–110)
POCT GLUCOSE: 267 MG/DL (ref 70–110)
POTASSIUM SERPL-SCNC: 4.1 MMOL/L
POTASSIUM SERPL-SCNC: 4.6 MMOL/L
PROT SERPL-MCNC: 5.1 G/DL
RBC # BLD AUTO: 2.6 M/UL
SODIUM SERPL-SCNC: 148 MMOL/L
SODIUM SERPL-SCNC: 150 MMOL/L
WBC # BLD AUTO: 8.41 K/UL

## 2018-07-30 PROCEDURE — 36415 COLL VENOUS BLD VENIPUNCTURE: CPT

## 2018-07-30 PROCEDURE — 94660 CPAP INITIATION&MGMT: CPT

## 2018-07-30 PROCEDURE — 99900035 HC TECH TIME PER 15 MIN (STAT)

## 2018-07-30 PROCEDURE — 85025 COMPLETE CBC W/AUTO DIFF WBC: CPT

## 2018-07-30 PROCEDURE — 80053 COMPREHEN METABOLIC PANEL: CPT

## 2018-07-30 PROCEDURE — 83735 ASSAY OF MAGNESIUM: CPT

## 2018-07-30 PROCEDURE — 25000003 PHARM REV CODE 250: Performed by: NURSE PRACTITIONER

## 2018-07-30 PROCEDURE — 25000003 PHARM REV CODE 250: Performed by: HOSPITALIST

## 2018-07-30 PROCEDURE — 80048 BASIC METABOLIC PNL TOTAL CA: CPT

## 2018-07-30 PROCEDURE — 11000001 HC ACUTE MED/SURG PRIVATE ROOM

## 2018-07-30 PROCEDURE — 99233 SBSQ HOSP IP/OBS HIGH 50: CPT | Mod: ,,, | Performed by: HOSPITALIST

## 2018-07-30 PROCEDURE — 63600175 PHARM REV CODE 636 W HCPCS: Performed by: HOSPITALIST

## 2018-07-30 PROCEDURE — 84100 ASSAY OF PHOSPHORUS: CPT

## 2018-07-30 RX ORDER — FUROSEMIDE 10 MG/ML
80 INJECTION INTRAMUSCULAR; INTRAVENOUS ONCE
Status: COMPLETED | OUTPATIENT
Start: 2018-07-30 | End: 2018-07-30

## 2018-07-30 RX ORDER — ISOSORBIDE DINITRATE 10 MG/1
40 TABLET ORAL EVERY 8 HOURS
Status: DISCONTINUED | OUTPATIENT
Start: 2018-07-30 | End: 2018-08-02 | Stop reason: HOSPADM

## 2018-07-30 RX ORDER — DEXTROSE MONOHYDRATE 50 MG/ML
INJECTION, SOLUTION INTRAVENOUS CONTINUOUS
Status: DISCONTINUED | OUTPATIENT
Start: 2018-07-30 | End: 2018-07-31

## 2018-07-30 RX ADMIN — PREDNISONE 40 MG: 20 TABLET ORAL at 09:07

## 2018-07-30 RX ADMIN — HEPARIN SODIUM 5000 UNITS: 5000 INJECTION, SOLUTION INTRAVENOUS; SUBCUTANEOUS at 06:07

## 2018-07-30 RX ADMIN — ATORVASTATIN CALCIUM 40 MG: 20 TABLET, FILM COATED ORAL at 09:07

## 2018-07-30 RX ADMIN — SENNOSIDES AND DOCUSATE SODIUM 1 TABLET: 8.6; 5 TABLET ORAL at 09:07

## 2018-07-30 RX ADMIN — HYDRALAZINE HYDROCHLORIDE 100 MG: 50 TABLET ORAL at 01:07

## 2018-07-30 RX ADMIN — DONEPEZIL HYDROCHLORIDE 5 MG: 5 TABLET, FILM COATED ORAL at 09:07

## 2018-07-30 RX ADMIN — ASPIRIN 81 MG CHEWABLE TABLET 81 MG: 81 TABLET CHEWABLE at 09:07

## 2018-07-30 RX ADMIN — CARVEDILOL 25 MG: 25 TABLET, FILM COATED ORAL at 09:07

## 2018-07-30 RX ADMIN — LABETALOL HYDROCHLORIDE 10 MG: 5 INJECTION, SOLUTION INTRAVENOUS at 03:07

## 2018-07-30 RX ADMIN — ISOSORBIDE DINITRATE 40 MG: 10 TABLET ORAL at 09:07

## 2018-07-30 RX ADMIN — FUROSEMIDE 80 MG: 10 INJECTION, SOLUTION INTRAMUSCULAR; INTRAVENOUS at 10:07

## 2018-07-30 RX ADMIN — SODIUM BICARBONATE 650 MG TABLET 650 MG: at 02:07

## 2018-07-30 RX ADMIN — AMLODIPINE BESYLATE 10 MG: 10 TABLET ORAL at 09:07

## 2018-07-30 RX ADMIN — ISOSORBIDE DINITRATE 40 MG: 10 TABLET ORAL at 02:07

## 2018-07-30 RX ADMIN — INSULIN ASPART 6 UNITS: 100 INJECTION, SOLUTION INTRAVENOUS; SUBCUTANEOUS at 04:07

## 2018-07-30 RX ADMIN — HEPARIN SODIUM 5000 UNITS: 5000 INJECTION, SOLUTION INTRAVENOUS; SUBCUTANEOUS at 01:07

## 2018-07-30 RX ADMIN — SODIUM BICARBONATE 650 MG TABLET 650 MG: at 09:07

## 2018-07-30 RX ADMIN — HYDRALAZINE HYDROCHLORIDE 100 MG: 50 TABLET ORAL at 09:07

## 2018-07-30 RX ADMIN — INSULIN ASPART 3 UNITS: 100 INJECTION, SOLUTION INTRAVENOUS; SUBCUTANEOUS at 09:07

## 2018-07-30 RX ADMIN — DEXTROSE: 5 SOLUTION INTRAVENOUS at 10:07

## 2018-07-30 RX ADMIN — FOLIC ACID 2 MG: 1 TABLET ORAL at 09:07

## 2018-07-30 RX ADMIN — HEPARIN SODIUM 5000 UNITS: 5000 INJECTION, SOLUTION INTRAVENOUS; SUBCUTANEOUS at 09:07

## 2018-07-30 NOTE — PLAN OF CARE
CM met with patient's sister for discharge planning.  Patient is a full time resident of [facility] and when she returns, her sister would like for her to go back with hospice.  She is wheelchair bound and can usually stand long enough to get into her chair.  Plan is to discharge back to [facility] with Hospice.    Pharmacy:    [facility] - [place], [place] - [address] [address]  [id number] [address] [place] [facility] [address]  Phone: [phone] Fax: [phone]    PCP:  [facility]    Payor: [employer] MEDICARE / Plan: [employer] HEALTH / Product Type: Medicare Advantage /      07/30/18 1317   Discharge Assessment   Assessment Type Discharge Planning Assessment   Confirmed/corrected address and phone number on facesheet? Yes   Assessment information obtained from? Caregiver   Expected Length of Stay (days) 3   Communicated expected length of stay with patient/caregiver yes   Prior to hospitilization cognitive status: Not Oriented to Place;Not Oriented to Time   Prior to hospitalization functional status: Wheelchair Bound   Current cognitive status: Not Oriented to Place;Not Oriented to Time   Current Functional Status: Wheelchair Bound   Facility Arrived From: From [facility]   Lives With facility resident   Able to Return to Prior Arrangements yes   Is patient able to care for self after discharge? No   Who are your caregiver(s) and their phone number(s)? Carlota Tejeda - sister [phone]   Patient's perception of discharge disposition nursing home;hospice/home   Readmission Within The Last 30 Days previous discharge plan unsuccessful   Patient currently being followed by outpatient case management? No   Patient currently receives any other outside agency services? No   Equipment Currently Used at Home (Nursing Home equipment)   Do you have any problems affording any of your prescribed medications? No   Is the patient taking medications as  prescribed? yes   Does the patient have transportation home? Yes   Transportation Available ambulance   Does the patient receive services at the [facility]? No   Discharge Plan A Return to nursing home;Hospice/home   Discharge Plan B Return to Nursing Home   Patient/Family In Agreement With Plan yes   Readmission Questionnaire   At the time of your discharge, did someone talk to you about what your health problems were? Yes   At the time of discharge, did someone talk to you about what to watch out for regarding worsening of your health problem? Yes   At the time of discharge, did someone talk to you about what to do if you experienced worsening of your health problem? Yes   At the time of discharge, did someone talk to you about which medication to take when you left the hospital and which ones to stop taking? Yes   At the time of discharge, did someone talk to you about when and where to follow up with a doctor after you left the hospital? Yes   What do you believe caused you to be sick enough to be re-admitted? SOB   Do you have problems taking your medications as prescribed? No   Do you have any problems affording any of  your prescribed medications? No   Do you have problems obtaining/receiving your medications? No   Living Arrangements residential facility   Are you currently feeling confused? Yes   Are you currently having problems thinking? Yes   Are you currently having memory problems? Yes

## 2018-07-30 NOTE — CONSULTS
Palliative Care Acknowledgement of Consult - .date    Consult received. Palliative Care Provider: IRENE Garcia spoke with primary doctor Dr. Cason. Consult canceled. Please re-consult if we can be of assistance            Oneyda Bahena LCSW, ACHP-

## 2018-07-30 NOTE — PLAN OF CARE
CM received phone message from patient's sister that she chose [facility].  LORRAINE Mohamud, informed to send referral.

## 2018-07-30 NOTE — ASSESSMENT & PLAN NOTE
2/2 acute pulmonary edema/acute on chronic combined HF exacerbation/HTN emergency  CXR w/ pulmonary edema, crackles on exam   Received BIPAP overnight, now on 2L NC.   VBG results noted - no hypercapnia  Received 40 mg and 80 mg IV Lasix yesterday  Lasix 80 mg IV today and reassess

## 2018-07-30 NOTE — PLAN OF CARE
LORRAINE informed by CM that pt in need of hospice placement.  SW sent a request for auth to N. LORRAINE sent a referral to [facility].  LORRAINE will f/u on this referral.              Caleb Burgos, MSW, LCSW [facility]  [id number]

## 2018-07-30 NOTE — SUBJECTIVE & OBJECTIVE
Interval History  Resp status much improved, taken off BIPAP, doing well on NC, mental status at baseline. Wants to eat Updated POA sister on plan of care at bedside. She is looking at hospice companies.       Review of Systems   Unable to perform ROS: Dementia     Objective:     Vital Signs (Most Recent):  Temp: 98.6 °F (37 °C) (07/30/18 1155)  Pulse: 75 (07/30/18 1155)  Resp: (!) 22 (07/30/18 1155)  BP: (!) 152/66 (07/30/18 1155)  SpO2: (!) 92 % (07/30/18 1155) Vital Signs (24h Range):  Temp:  [98.4 °F (36.9 °C)-99.6 °F (37.6 °C)] 98.6 °F (37 °C)  Pulse:  [69-85] 75  Resp:  [20-23] 22  SpO2:  [91 %-100 %] 92 %  BP: (151-197)/(66-93) 152/66     Weight: 70.6 kg (155 lb 10.3 oz)  Body mass index is 27.57 kg/m².    Physical Exam   Constitutional: She appears well-nourished. No distress.    Mental status at baseline, oriented to self.    HENT:   Head: Normocephalic and atraumatic.   Nose: Nose normal.   Mouth/Throat: Oropharynx is clear and moist. No oropharyngeal exudate.   Eyes: Conjunctivae and EOM are normal. Pupils are equal, round, and reactive to light. Right eye exhibits no discharge. Left eye exhibits no discharge. No scleral icterus.   Neck: Neck supple. No JVD (unable to assess due to body habitus) present. No tracheal deviation present. No thyromegaly present.   Cardiovascular: Normal rate, regular rhythm, normal heart sounds and intact distal pulses.  Exam reveals no friction rub.    No murmur heard.  Pulmonary/Chest: Effort normal. No stridor. No respiratory distress. She has no wheezes. She has rales. She exhibits no tenderness.   Abdominal: Soft. Bowel sounds are normal. She exhibits no distension and no mass. There is no tenderness. There is no rebound and no guarding.   Musculoskeletal: Normal range of motion. She exhibits no edema, tenderness or deformity.   Lymphadenopathy:     She has no cervical adenopathy.   Neurological: She is alert. No cranial nerve deficit. She exhibits normal muscle tone.  Coordination normal.   Skin: Skin is warm and dry. No rash noted. She is not diaphoretic. No erythema. No pallor.   Psychiatric: She has a normal mood and affect. Her behavior is normal. Judgment and thought content normal.   Vitals reviewed.        CRANIAL NERVES     CN III, IV, VI   Pupils are equal, round, and reactive to light.  Extraocular motions are normal.        Significant Labs: All pertinent labs within the past 24 hours have been reviewed.    Significant Imaging: I have reviewed and interpreted all pertinent imaging results/findings within the past 24 hours.

## 2018-07-30 NOTE — PROGRESS NOTES
Page and talk with Anders Macedo NP on call for Med team G. About patient on continuous BIPAP and has oral med ordered including BP medicine. Mr. Macedo stated he would take a look at her medicine and order something for BP IV

## 2018-07-30 NOTE — PROGRESS NOTES
Monitor room called stated pt had run v tach . I found event strip noted 18 beat SVt. I was in patient room at time. Patient was asleep at the time . I page Anders Macedo NP Who stated will look her labs and will probable add Mg level.

## 2018-07-30 NOTE — PROGRESS NOTES
[facility] Hospital Medicine  Progress Note    Patient Name: Misa Yun  MRN: [id number]  Patient Class: IP- Inpatient   Admission Date: 7/29/2018  Length of Stay: 1 days  Attending Physician: Margarita Cason MD  Primary Care Provider: [facility] Medicine Team: [facility] HOSP MED G Margarita Cason MD    Subjective:     Principal Problem:Acute respiratory failure with hypoxia    HPI:  84F with CKD4-5, DM2, HTN, HLD, CAD, combined HF, lung CA that presents to the ED via EMS for evaluation of SOB and low H&H. Patient's sister states patient has been SOB since around noon. Per sister, they were also told to come to the ED because patient's H&H was low on blood work. Patient endorses abdominal pain. Per EMS, when they arrived at the scene patient's O2 sat was in the 80's. She is a resident of [facility]. No further concerns or complaints at this time. Per patient's POA sister, she was fine yesterday.     Of note, Dr Silva in ED had goals of care discussion with sister - opted for DNR and NH with hospice on discharge.     Hospital Course:  No notes on file    Interval History  Resp status much improved, taken off BIPAP, doing well on NC, mental status at baseline. Wants to eat Updated POA sister on plan of care at bedside. She is looking at hospice [facility].       Review of Systems   Unable to perform ROS: Dementia     Objective:     Vital Signs (Most Recent):  Temp: 98.6 °F (37 °C) (07/30/18 1155)  Pulse: 75 (07/30/18 1155)  Resp: (!) 22 (07/30/18 1155)  BP: (!) 152/66 (07/30/18 1155)  SpO2: (!) 92 % (07/30/18 1155) Vital Signs (24h Range):  Temp:  [98.4 °F (36.9 °C)-99.6 °F (37.6 °C)] 98.6 °F (37 °C)  Pulse:  [69-85] 75  Resp:  [20-23] 22  SpO2:  [91 %-100 %] 92 %  BP: (151-197)/(66-93) 152/66     Weight: 70.6 kg (155 lb 10.3 oz)  Body mass index is 27.57 kg/m².    Physical Exam   Constitutional: She appears well-nourished. No distress.    Mental  status at baseline, oriented to self.    HENT:   Head: Normocephalic and atraumatic.   Nose: Nose normal.   Mouth/Throat: Oropharynx is clear and moist. No oropharyngeal exudate.   Eyes: Conjunctivae and EOM are normal. Pupils are equal, round, and reactive to light. Right eye exhibits no discharge. Left eye exhibits no discharge. No scleral icterus.   Neck: Neck supple. No JVD (unable to assess due to body habitus) present. No tracheal deviation present. No thyromegaly present.   Cardiovascular: Normal rate, regular rhythm, normal heart sounds and intact distal pulses.  Exam reveals no friction rub.    No murmur heard.  Pulmonary/Chest: Effort normal. No stridor. No respiratory distress. She has no wheezes. She has rales. She exhibits no tenderness.   Abdominal: Soft. Bowel sounds are normal. She exhibits no distension and no mass. There is no tenderness. There is no rebound and no guarding.   Musculoskeletal: Normal range of motion. She exhibits no edema, tenderness or deformity.   Lymphadenopathy:     She has no cervical adenopathy.   Neurological: She is alert. No cranial nerve deficit. She exhibits normal muscle tone. Coordination normal.   Skin: Skin is warm and dry. No rash noted. She is not diaphoretic. No erythema. No pallor.   Psychiatric: She has a normal mood and affect. Her behavior is normal. Judgment and thought content normal.   Vitals reviewed.        CRANIAL NERVES     CN III, IV, VI   Pupils are equal, round, and reactive to light.  Extraocular motions are normal.        Significant Labs: All pertinent labs within the past 24 hours have been reviewed.    Significant Imaging: I have reviewed and interpreted all pertinent imaging results/findings within the past 24 hours.    Assessment/Plan:      * Acute respiratory failure with hypoxia    2/2 acute pulmonary edema/acute on chronic combined HF exacerbation/HTN emergency  CXR w/ pulmonary edema, crackles on exam   Received BIPAP overnight, now on 2L  NC.   VBG results noted - no hypercapnia  Received 40 mg and 80 mg IV Lasix yesterday  Lasix 80 mg IV today and reassess          Hypertensive emergency    See above  Resumed home meds and increased hydralazine to 100 mg tid with improvement          Acute on chronic combined systolic and diastolic heart failure    See above.  Daily weights, strict I/os, fluid restrict when diet resumed  Lasix held on discharge per Renal recs due to renal failure  Resume home BB. 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          Primary lung cancer, right    Follows with Dr Barnhart with Oncology  Immunotherapy had been discontinued due to concern for nephritis  Cont home prednisone 40 mg daily for mgmt of renal toxicity  Has appt with him with repeat PET scan. 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

## 2018-07-31 PROBLEM — I16.1 HYPERTENSIVE EMERGENCY: Status: RESOLVED | Noted: 2018-07-29 | Resolved: 2018-07-31

## 2018-07-31 LAB
ANION GAP SERPL CALC-SCNC: 11 MMOL/L
BASOPHILS # BLD AUTO: 0 K/UL
BASOPHILS NFR BLD: 0 %
BUN SERPL-MCNC: 81 MG/DL
CALCIUM SERPL-MCNC: 7.9 MG/DL
CHLORIDE SERPL-SCNC: 107 MMOL/L
CO2 SERPL-SCNC: 26 MMOL/L
CREAT SERPL-MCNC: 5 MG/DL
DIFFERENTIAL METHOD: ABNORMAL
EOSINOPHIL # BLD AUTO: 0 K/UL
EOSINOPHIL NFR BLD: 0 %
ERYTHROCYTE [DISTWIDTH] IN BLOOD BY AUTOMATED COUNT: 16.3 %
EST. GFR  (AFRICAN AMERICAN): 8.5 ML/MIN/1.73 M^2
EST. GFR  (NON AFRICAN AMERICAN): 7.4 ML/MIN/1.73 M^2
GLUCOSE SERPL-MCNC: 249 MG/DL
HCT VFR BLD AUTO: 20 %
HCT VFR BLD AUTO: 21.7 %
HGB BLD-MCNC: 6.3 G/DL
HGB BLD-MCNC: 7 G/DL
IMM GRANULOCYTES # BLD AUTO: 0.03 K/UL
IMM GRANULOCYTES NFR BLD AUTO: 0.6 %
LYMPHOCYTES # BLD AUTO: 0.5 K/UL
LYMPHOCYTES NFR BLD: 9.5 %
MCH RBC QN AUTO: 29.2 PG
MCHC RBC AUTO-ENTMCNC: 31.5 G/DL
MCV RBC AUTO: 93 FL
MONOCYTES # BLD AUTO: 0.2 K/UL
MONOCYTES NFR BLD: 5 %
NEUTROPHILS # BLD AUTO: 4 K/UL
NEUTROPHILS NFR BLD: 84.9 %
NRBC BLD-RTO: 0 /100 WBC
PHOSPHATE SERPL-MCNC: 5.5 MG/DL
PLATELET # BLD AUTO: 115 K/UL
PMV BLD AUTO: 11.1 FL
POCT GLUCOSE: 177 MG/DL (ref 70–110)
POCT GLUCOSE: 192 MG/DL (ref 70–110)
POCT GLUCOSE: 259 MG/DL (ref 70–110)
POCT GLUCOSE: 312 MG/DL (ref 70–110)
POTASSIUM SERPL-SCNC: 4.8 MMOL/L
RBC # BLD AUTO: 2.16 M/UL
SODIUM SERPL-SCNC: 144 MMOL/L
WBC # BLD AUTO: 4.76 K/UL

## 2018-07-31 PROCEDURE — 80048 BASIC METABOLIC PNL TOTAL CA: CPT

## 2018-07-31 PROCEDURE — 85014 HEMATOCRIT: CPT

## 2018-07-31 PROCEDURE — 11000001 HC ACUTE MED/SURG PRIVATE ROOM

## 2018-07-31 PROCEDURE — 99232 SBSQ HOSP IP/OBS MODERATE 35: CPT | Mod: ,,, | Performed by: HOSPITALIST

## 2018-07-31 PROCEDURE — 36415 COLL VENOUS BLD VENIPUNCTURE: CPT

## 2018-07-31 PROCEDURE — 99900035 HC TECH TIME PER 15 MIN (STAT)

## 2018-07-31 PROCEDURE — 27000221 HC OXYGEN, UP TO 24 HOURS

## 2018-07-31 PROCEDURE — 25000003 PHARM REV CODE 250: Performed by: HOSPITALIST

## 2018-07-31 PROCEDURE — 85025 COMPLETE CBC W/AUTO DIFF WBC: CPT

## 2018-07-31 PROCEDURE — 85018 HEMOGLOBIN: CPT

## 2018-07-31 PROCEDURE — 63600175 PHARM REV CODE 636 W HCPCS: Performed by: HOSPITALIST

## 2018-07-31 PROCEDURE — 94660 CPAP INITIATION&MGMT: CPT

## 2018-07-31 PROCEDURE — 94761 N-INVAS EAR/PLS OXIMETRY MLT: CPT

## 2018-07-31 PROCEDURE — 84100 ASSAY OF PHOSPHORUS: CPT

## 2018-07-31 RX ADMIN — CARVEDILOL 25 MG: 25 TABLET, FILM COATED ORAL at 09:07

## 2018-07-31 RX ADMIN — SENNOSIDES AND DOCUSATE SODIUM 1 TABLET: 8.6; 5 TABLET ORAL at 09:07

## 2018-07-31 RX ADMIN — HEPARIN SODIUM 5000 UNITS: 5000 INJECTION, SOLUTION INTRAVENOUS; SUBCUTANEOUS at 06:07

## 2018-07-31 RX ADMIN — ATORVASTATIN CALCIUM 40 MG: 20 TABLET, FILM COATED ORAL at 09:07

## 2018-07-31 RX ADMIN — ISOSORBIDE DINITRATE 40 MG: 10 TABLET ORAL at 06:07

## 2018-07-31 RX ADMIN — SODIUM BICARBONATE 650 MG TABLET 650 MG: at 09:07

## 2018-07-31 RX ADMIN — HYDRALAZINE HYDROCHLORIDE 100 MG: 50 TABLET ORAL at 06:07

## 2018-07-31 RX ADMIN — INSULIN ASPART 4 UNITS: 100 INJECTION, SOLUTION INTRAVENOUS; SUBCUTANEOUS at 09:07

## 2018-07-31 RX ADMIN — FOLIC ACID 2 MG: 1 TABLET ORAL at 09:07

## 2018-07-31 RX ADMIN — ISOSORBIDE DINITRATE 40 MG: 10 TABLET ORAL at 09:07

## 2018-07-31 RX ADMIN — PREDNISONE 40 MG: 20 TABLET ORAL at 09:07

## 2018-07-31 RX ADMIN — ASPIRIN 81 MG CHEWABLE TABLET 81 MG: 81 TABLET CHEWABLE at 09:07

## 2018-07-31 RX ADMIN — DONEPEZIL HYDROCHLORIDE 5 MG: 5 TABLET, FILM COATED ORAL at 09:07

## 2018-07-31 RX ADMIN — ISOSORBIDE DINITRATE 40 MG: 10 TABLET ORAL at 02:07

## 2018-07-31 RX ADMIN — HEPARIN SODIUM 5000 UNITS: 5000 INJECTION, SOLUTION INTRAVENOUS; SUBCUTANEOUS at 09:07

## 2018-07-31 RX ADMIN — HYDRALAZINE HYDROCHLORIDE 100 MG: 50 TABLET ORAL at 02:07

## 2018-07-31 RX ADMIN — SODIUM BICARBONATE 650 MG TABLET 650 MG: at 02:07

## 2018-07-31 RX ADMIN — HYDRALAZINE HYDROCHLORIDE 100 MG: 50 TABLET ORAL at 09:07

## 2018-07-31 RX ADMIN — AMLODIPINE BESYLATE 10 MG: 10 TABLET ORAL at 09:07

## 2018-07-31 NOTE — PROGRESS NOTES
[facility] Hospital Medicine  Progress Note    Patient Name: Misa Yun  MRN: [id number]  Patient Class: IP- Inpatient   Admission Date: 7/29/2018  Length of Stay: 2 days  Attending Physician: Itz Salmeron MD  Primary Care Provider: [facility] Medicine Team: [facility]OSP MED G Itz Salmeron MD    Subjective:     Principal Problem:Acute respiratory failure with hypoxia    HPI:  84F with CKD4-5, DM2, HTN, HLD, CAD, combined HF, lung CA that presents to the ED via EMS for evaluation of SOB and low H&H. Patient's sister states patient has been SOB since around noon. Per sister, they were also told to come to the ED because patient's H&H was low on blood work. Patient endorses abdominal pain. Per EMS, when they arrived at the scene patient's O2 sat was in the 80's. She is a resident of [facility]. No further concerns or complaints at this time. Per patient's POA sister, she was fine yesterday.     Of note, Dr Silva in ED had goals of care discussion with sister - opted for DNR and NH with hospice on discharge.     Hospital Course:  No notes on file    Interval History: no acute issues     Review of Systems   Unable to perform ROS: Dementia     Objective:     Vital Signs (Most Recent):  Temp: 98.7 °F (37.1 °C) (07/31/18 1151)  Pulse: 65 (07/31/18 1151)  Resp: 18 (07/31/18 1151)  BP: (!) 124/58 (07/31/18 1151)  SpO2: 98 % (07/31/18 1151) Vital Signs (24h Range):  Temp:  [96.2 °F (35.7 °C)-98.7 °F (37.1 °C)] 98.7 °F (37.1 °C)  Pulse:  [59-77] 65  Resp:  [16-18] 18  SpO2:  [96 %-98 %] 98 %  BP: (114-140)/(55-63) 124/58     Weight: 72.5 kg (159 lb 13.3 oz)  Body mass index is 28.31 kg/m².    Intake/Output Summary (Last 24 hours) at 07/31/18 1400  Last data filed at 07/31/18 0600   Gross per 24 hour   Intake          1216.67 ml   Output                0 ml   Net          1216.67 ml      Physical Exam   Constitutional: She appears well-developed  "and well-nourished. No distress.   Lying comfortably in NAD; cousin at bedside     Cardiovascular: Normal rate and regular rhythm.    Pulmonary/Chest: Effort normal and breath sounds normal. No respiratory distress. She has no wheezes. She has no rales.   Abdominal: Soft. Bowel sounds are normal.   Musculoskeletal: She exhibits no edema.   Neurological: She is alert.   Confused but calm; states "not feeling well" but appears comfortable   Vitals reviewed.      Significant Labs: All pertinent labs within the past 24 hours have been reviewed.    Significant Imaging: I have reviewed all pertinent imaging results/findings within the past 24 hours.    Assessment/Plan:      * Acute respiratory failure with hypoxia    2/2 acute pulmonary edema/acute on chronic combined HF exacerbation/HTN emergency  CXR w/ pulmonary edema, crackles on exam   Received BIPAP overnight, now on 2L NC.   VBG results noted - no hypercapnia  Received intermittent doses of furosemide  Patient appears very comfortable now and needs O2 at times when sleeping; to be dc'd on hospice so will opt to keep O2          Hypernatremia    Slow rate D5 gtt to replace free water and encourage drinking water.  Follow up BMP from today           Anemia    Anemia of renal failure  Folate deficiency  Drop in Hb today; repeat H&H and transfuse 1 unit if needed           Acute on chronic combined systolic and diastolic heart failure    See above.  Daily weights, strict I/os, fluid restrict when diet resumed  Lasix held on discharge per Renal recs due to renal failure  Resume home BB. Not on ACEI/ARB due to renal function           Primary lung cancer, right    Follows with Dr Barnhart with Oncology  Immunotherapy had been discontinued due to concern for nephritis  Cont home prednisone 40 mg daily for mgmt of renal toxicity  Has appt with him with repeat PET scan. 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

## 2018-07-31 NOTE — ASSESSMENT & PLAN NOTE
Held home insulin   SSI, Accuchecks  Prandial spikes in the 200s but would not treat aggressively at this time given worsened renal function

## 2018-07-31 NOTE — ASSESSMENT & PLAN NOTE
Anemia of renal failure  Folate deficiency  Drop in Hb today; repeat H&H and transfuse 1 unit if needed

## 2018-07-31 NOTE — PLAN OF CARE
Problem: Pressure Ulcer Risk (Ovidio Scale) (Adult,Obstetrics,Pediatric)  Intervention: Promote/Optimize Nutrition   07/31/18 0311   Nutrition Interventions   Oral Nutrition Promotion calorie dense foods provided;rest periods promoted;safe use of adaptive equipment encouraged;social interaction promoted     Intervention: Prevent/Manage Excess Moisture   07/31/18 0311   Hygiene Care   Perineal Care absorbent pad changed;perineum cleansed;protective ointment applied;protective cream applied;skin sealant/barrier applied   Bathing/Skin Care incontinence care;linen changed   Skin Interventions   Skin Protection Incontinence pads;Tubing/devices free from skin contact;Transparent dressing     Intervention: Maintain Head of Bed Elevation Less Than 30 Degrees as Tolerated   07/31/18 0300   Positioning   Head of Bed (HOB) HOB at 20-30 degrees     Intervention: Prevent/Minimize Sheer/Friction Injuries   07/30/18 2000   Skin Interventions   Pressure Reduction Devices Pressure-redistributing mattress utilized;Foam padding utilized;Heel offloading device utilized   Pressure Reduction Techniques frequent weight shift encouraged   Positioning   Positioning/Transfer Devices pillows;wedge     Intervention: Turn/Reposition Often   07/30/18 2000 07/31/18 0300   Skin Interventions   Pressure Reduction Techniques frequent weight shift encouraged --    Positioning   Body Position --  side-lying, left       Goal: Identify Related Risk Factors and Signs and Symptoms  Related risk factors and signs and symptoms are identified upon initiation of Human Response Clinical Practice Guideline (CPG)   Outcome: Ongoing (interventions implemented as appropriate)   07/31/18 0311   Pressure Ulcer Risk (Ovidio Scale)   Related Risk Factors (Pressure Ulcer Risk (Ovidio Scale)) age extremes;cognitive impairment;mechanical forces;mobility impaired;nutritional deficiencies     Goal: Skin Integrity  Patient will demonstrate the desired outcomes by  discharge/transition of care.   Outcome: Ongoing (interventions implemented as appropriate)   07/31/18 0311   Pressure Ulcer Risk (Ovidio Scale) (Adult,Obstetrics,Pediatric)   Skin Integrity making progress toward outcome

## 2018-07-31 NOTE — ASSESSMENT & PLAN NOTE
2/2 acute pulmonary edema/acute on chronic combined HF exacerbation/HTN emergency  CXR w/ pulmonary edema, crackles on exam   Received BIPAP overnight, now on 2L NC.   VBG results noted - no hypercapnia  Received intermittent doses of furosemide  Patient appears very comfortable now and needs O2 at times when sleeping; to be dc'd on hospice so will opt to keep O2

## 2018-07-31 NOTE — PLAN OF CARE
LORRAINE spoke with Yolanda at [facility] and was told that they have a contract with [place] [facility] and that they would want pt on their service upon DC back to the facility.   LORRAINE then called Deborah at [facility] and confirmed that a liaison would be meeting with pt to sign legals to begin on their service line.  Pat stated that [facility] was there now and that they would be ordering equipment for hospice use.  LORRAINE stated that once the orders are put in for DC, they would eb sent to both [facility] and [facility].        Yamile Guardado LMSW

## 2018-07-31 NOTE — SUBJECTIVE & OBJECTIVE
"Interval History: no acute issues     Review of Systems   Unable to perform ROS: Dementia     Objective:     Vital Signs (Most Recent):  Temp: 98.7 °F (37.1 °C) (07/31/18 1151)  Pulse: 65 (07/31/18 1151)  Resp: 18 (07/31/18 1151)  BP: (!) 124/58 (07/31/18 1151)  SpO2: 98 % (07/31/18 1151) Vital Signs (24h Range):  Temp:  [96.2 °F (35.7 °C)-98.7 °F (37.1 °C)] 98.7 °F (37.1 °C)  Pulse:  [59-77] 65  Resp:  [16-18] 18  SpO2:  [96 %-98 %] 98 %  BP: (114-140)/(55-63) 124/58     Weight: 72.5 kg (159 lb 13.3 oz)  Body mass index is 28.31 kg/m².    Intake/Output Summary (Last 24 hours) at 07/31/18 1400  Last data filed at 07/31/18 0600   Gross per 24 hour   Intake          1216.67 ml   Output                0 ml   Net          1216.67 ml      Physical Exam   Constitutional: She appears well-developed and well-nourished. No distress.   Lying comfortably in NAD; cousin at bedside     Cardiovascular: Normal rate and regular rhythm.    Pulmonary/Chest: Effort normal and breath sounds normal. No respiratory distress. She has no wheezes. She has no rales.   Abdominal: Soft. Bowel sounds are normal.   Musculoskeletal: She exhibits no edema.   Neurological: She is alert.   Confused but calm; states "not feeling well" but appears comfortable   Vitals reviewed.      Significant Labs: All pertinent labs within the past 24 hours have been reviewed.    Significant Imaging: I have reviewed all pertinent imaging results/findings within the past 24 hours.  "

## 2018-08-01 PROBLEM — E87.0 HYPERNATREMIA: Status: RESOLVED | Noted: 2018-07-30 | Resolved: 2018-08-01

## 2018-08-01 LAB
ANION GAP SERPL CALC-SCNC: 10 MMOL/L
BASOPHILS # BLD AUTO: 0 K/UL
BASOPHILS NFR BLD: 0 %
BLD PROD TYP BPU: NORMAL
BLOOD UNIT EXPIRATION DATE: NORMAL
BLOOD UNIT TYPE CODE: 5100
BLOOD UNIT TYPE: NORMAL
BUN SERPL-MCNC: 83 MG/DL
CALCIUM SERPL-MCNC: 7.3 MG/DL
CHLORIDE SERPL-SCNC: 103 MMOL/L
CO2 SERPL-SCNC: 25 MMOL/L
CODING SYSTEM: NORMAL
CREAT SERPL-MCNC: 5.1 MG/DL
DIFFERENTIAL METHOD: ABNORMAL
DISPENSE STATUS: NORMAL
EOSINOPHIL # BLD AUTO: 0 K/UL
EOSINOPHIL NFR BLD: 0 %
ERYTHROCYTE [DISTWIDTH] IN BLOOD BY AUTOMATED COUNT: 15.7 %
EST. GFR  (AFRICAN AMERICAN): 8.3 ML/MIN/1.73 M^2
EST. GFR  (NON AFRICAN AMERICAN): 7.2 ML/MIN/1.73 M^2
GLUCOSE SERPL-MCNC: 213 MG/DL
HCT VFR BLD AUTO: 19.4 %
HGB BLD-MCNC: 6.1 G/DL
IMM GRANULOCYTES # BLD AUTO: 0.03 K/UL
IMM GRANULOCYTES NFR BLD AUTO: 0.6 %
LYMPHOCYTES # BLD AUTO: 0.5 K/UL
LYMPHOCYTES NFR BLD: 9.8 %
MAGNESIUM SERPL-MCNC: 1.8 MG/DL
MCH RBC QN AUTO: 29.2 PG
MCHC RBC AUTO-ENTMCNC: 31.4 G/DL
MCV RBC AUTO: 93 FL
MONOCYTES # BLD AUTO: 0.3 K/UL
MONOCYTES NFR BLD: 5.3 %
NEUTROPHILS # BLD AUTO: 4.5 K/UL
NEUTROPHILS NFR BLD: 84.3 %
NRBC BLD-RTO: 0 /100 WBC
PHOSPHATE SERPL-MCNC: 5.1 MG/DL
PLATELET # BLD AUTO: 120 K/UL
PMV BLD AUTO: 11.1 FL
POCT GLUCOSE: 211 MG/DL (ref 70–110)
POCT GLUCOSE: 237 MG/DL (ref 70–110)
POCT GLUCOSE: 308 MG/DL (ref 70–110)
POCT GLUCOSE: 326 MG/DL (ref 70–110)
POTASSIUM SERPL-SCNC: 4.1 MMOL/L
RBC # BLD AUTO: 2.09 M/UL
SODIUM SERPL-SCNC: 138 MMOL/L
TRANS ERYTHROCYTES VOL PATIENT: NORMAL ML
WBC # BLD AUTO: 5.29 K/UL

## 2018-08-01 PROCEDURE — 94660 CPAP INITIATION&MGMT: CPT

## 2018-08-01 PROCEDURE — 80048 BASIC METABOLIC PNL TOTAL CA: CPT

## 2018-08-01 PROCEDURE — 25000003 PHARM REV CODE 250: Performed by: HOSPITALIST

## 2018-08-01 PROCEDURE — P9021 RED BLOOD CELLS UNIT: HCPCS

## 2018-08-01 PROCEDURE — 99232 SBSQ HOSP IP/OBS MODERATE 35: CPT | Mod: ,,, | Performed by: HOSPITALIST

## 2018-08-01 PROCEDURE — 11000001 HC ACUTE MED/SURG PRIVATE ROOM

## 2018-08-01 PROCEDURE — 99900035 HC TECH TIME PER 15 MIN (STAT)

## 2018-08-01 PROCEDURE — 63600175 PHARM REV CODE 636 W HCPCS: Performed by: HOSPITALIST

## 2018-08-01 PROCEDURE — 36430 TRANSFUSION BLD/BLD COMPNT: CPT

## 2018-08-01 PROCEDURE — 36415 COLL VENOUS BLD VENIPUNCTURE: CPT

## 2018-08-01 PROCEDURE — 85025 COMPLETE CBC W/AUTO DIFF WBC: CPT

## 2018-08-01 PROCEDURE — 84100 ASSAY OF PHOSPHORUS: CPT

## 2018-08-01 PROCEDURE — 94761 N-INVAS EAR/PLS OXIMETRY MLT: CPT

## 2018-08-01 PROCEDURE — 83735 ASSAY OF MAGNESIUM: CPT

## 2018-08-01 PROCEDURE — 27000221 HC OXYGEN, UP TO 24 HOURS

## 2018-08-01 RX ORDER — INSULIN ASPART 100 [IU]/ML
5 INJECTION, SOLUTION INTRAVENOUS; SUBCUTANEOUS
Status: DISCONTINUED | OUTPATIENT
Start: 2018-08-02 | End: 2018-08-02 | Stop reason: HOSPADM

## 2018-08-01 RX ORDER — HYDROCODONE BITARTRATE AND ACETAMINOPHEN 500; 5 MG/1; MG/1
TABLET ORAL
Status: DISCONTINUED | OUTPATIENT
Start: 2018-08-01 | End: 2018-08-02 | Stop reason: HOSPADM

## 2018-08-01 RX ADMIN — ASPIRIN 81 MG CHEWABLE TABLET 81 MG: 81 TABLET CHEWABLE at 08:08

## 2018-08-01 RX ADMIN — ISOSORBIDE DINITRATE 40 MG: 10 TABLET ORAL at 06:08

## 2018-08-01 RX ADMIN — AMLODIPINE BESYLATE 10 MG: 10 TABLET ORAL at 08:08

## 2018-08-01 RX ADMIN — FOLIC ACID 2 MG: 1 TABLET ORAL at 08:08

## 2018-08-01 RX ADMIN — CARVEDILOL 25 MG: 25 TABLET, FILM COATED ORAL at 08:08

## 2018-08-01 RX ADMIN — SENNOSIDES AND DOCUSATE SODIUM 1 TABLET: 8.6; 5 TABLET ORAL at 08:08

## 2018-08-01 RX ADMIN — CARVEDILOL 25 MG: 25 TABLET, FILM COATED ORAL at 09:08

## 2018-08-01 RX ADMIN — PREDNISONE 40 MG: 20 TABLET ORAL at 08:08

## 2018-08-01 RX ADMIN — INSULIN DETEMIR 5 UNITS: 100 INJECTION, SOLUTION SUBCUTANEOUS at 09:08

## 2018-08-01 RX ADMIN — ISOSORBIDE DINITRATE 40 MG: 10 TABLET ORAL at 03:08

## 2018-08-01 RX ADMIN — INSULIN ASPART 4 UNITS: 100 INJECTION, SOLUTION INTRAVENOUS; SUBCUTANEOUS at 08:08

## 2018-08-01 RX ADMIN — DONEPEZIL HYDROCHLORIDE 5 MG: 5 TABLET, FILM COATED ORAL at 09:08

## 2018-08-01 RX ADMIN — HYDRALAZINE HYDROCHLORIDE 100 MG: 50 TABLET ORAL at 03:08

## 2018-08-01 RX ADMIN — HEPARIN SODIUM 5000 UNITS: 5000 INJECTION, SOLUTION INTRAVENOUS; SUBCUTANEOUS at 09:08

## 2018-08-01 RX ADMIN — INSULIN ASPART 4 UNITS: 100 INJECTION, SOLUTION INTRAVENOUS; SUBCUTANEOUS at 12:08

## 2018-08-01 RX ADMIN — HYDRALAZINE HYDROCHLORIDE 100 MG: 50 TABLET ORAL at 09:08

## 2018-08-01 RX ADMIN — SODIUM BICARBONATE 650 MG TABLET 650 MG: at 08:08

## 2018-08-01 RX ADMIN — SODIUM BICARBONATE 650 MG TABLET 650 MG: at 09:08

## 2018-08-01 RX ADMIN — HEPARIN SODIUM 5000 UNITS: 5000 INJECTION, SOLUTION INTRAVENOUS; SUBCUTANEOUS at 06:08

## 2018-08-01 RX ADMIN — ISOSORBIDE DINITRATE 40 MG: 10 TABLET ORAL at 09:08

## 2018-08-01 RX ADMIN — SENNOSIDES AND DOCUSATE SODIUM 1 TABLET: 8.6; 5 TABLET ORAL at 09:08

## 2018-08-01 RX ADMIN — HYDRALAZINE HYDROCHLORIDE 100 MG: 50 TABLET ORAL at 06:08

## 2018-08-01 RX ADMIN — HEPARIN SODIUM 5000 UNITS: 5000 INJECTION, SOLUTION INTRAVENOUS; SUBCUTANEOUS at 03:08

## 2018-08-01 RX ADMIN — SODIUM BICARBONATE 650 MG TABLET 650 MG: at 03:08

## 2018-08-01 RX ADMIN — ATORVASTATIN CALCIUM 40 MG: 20 TABLET, FILM COATED ORAL at 08:08

## 2018-08-01 NOTE — SUBJECTIVE & OBJECTIVE
Interval History: Hb < 7 today; no evidence of bleeding     Review of Systems   Unable to perform ROS: Dementia     Objective:     Vital Signs (Most Recent):  Temp: 98.4 °F (36.9 °C) (08/01/18 1700)  Pulse: 73 (08/01/18 1700)  Resp: 16 (08/01/18 1700)  BP: (!) 128/58 (08/01/18 1700)  SpO2: 96 % (08/01/18 1700) Vital Signs (24h Range):  Temp:  [96.6 °F (35.9 °C)-98.4 °F (36.9 °C)] 98.4 °F (36.9 °C)  Pulse:  [60-78] 73  Resp:  [14-25] 16  SpO2:  [92 %-99 %] 96 %  BP: (118-147)/(55-64) 128/58     Weight: 72.5 kg (159 lb 13.3 oz)  Body mass index is 28.31 kg/m².    Intake/Output Summary (Last 24 hours) at 08/01/18 1805  Last data filed at 08/01/18 0500   Gross per 24 hour   Intake              250 ml   Output                0 ml   Net              250 ml      Physical Exam   Constitutional: She appears well-developed and well-nourished. No distress.   Lying comfortably in NAD; cousin at bedside     Cardiovascular: Normal rate and regular rhythm.    Pulmonary/Chest: Effort normal and breath sounds normal. No respiratory distress. She has no wheezes. She has no rales.   Abdominal: Soft. Bowel sounds are normal.   Musculoskeletal: She exhibits no edema.   Neurological: She is alert.   Vitals reviewed.      Significant Labs: All pertinent labs within the past 24 hours have been reviewed.    Significant Imaging: I have reviewed all pertinent imaging results/findings within the past 24 hours.

## 2018-08-01 NOTE — PROGRESS NOTES
[facility] Hospital Medicine  Progress Note    Patient Name: Misa Yun  MRN: [id number]  Patient Class: IP- Inpatient   Admission Date: 7/29/2018  Length of Stay: 3 days  Attending Physician: Itz Salmeron MD  Primary Care Provider: [facility] Medicine Team: [facility] HOSP MED G Itz Salmeron MD    Subjective:     Principal Problem:Acute respiratory failure with hypoxia    HPI:  84F with CKD4-5, DM2, HTN, HLD, CAD, combined HF, lung CA that presents to the ED via EMS for evaluation of SOB and low H&H. Patient's sister states patient has been SOB since around noon. Per sister, they were also told to come to the ED because patient's H&H was low on blood work. Patient endorses abdominal pain. Per EMS, when they arrived at the scene patient's O2 sat was in the 80's. She is a resident of [facility]. No further concerns or complaints at this time. Per patient's POA sister, she was fine yesterday.     Of note, Dr Silva in ED had goals of care discussion with sister - opted for DNR and NH with hospice on discharge.     Hospital Course:  No notes on file    Interval History: Hb < 7 today; no evidence of bleeding     Review of Systems   Unable to perform ROS: Dementia     Objective:     Vital Signs (Most Recent):  Temp: 98.4 °F (36.9 °C) (08/01/18 1700)  Pulse: 73 (08/01/18 1700)  Resp: 16 (08/01/18 1700)  BP: (!) 128/58 (08/01/18 1700)  SpO2: 96 % (08/01/18 1700) Vital Signs (24h Range):  Temp:  [96.6 °F (35.9 °C)-98.4 °F (36.9 °C)] 98.4 °F (36.9 °C)  Pulse:  [60-78] 73  Resp:  [14-25] 16  SpO2:  [92 %-99 %] 96 %  BP: (118-147)/(55-64) 128/58     Weight: 72.5 kg (159 lb 13.3 oz)  Body mass index is 28.31 kg/m².    Intake/Output Summary (Last 24 hours) at 08/01/18 1805  Last data filed at 08/01/18 0500   Gross per 24 hour   Intake              250 ml   Output                0 ml   Net              250 ml      Physical Exam   Constitutional: She  appears well-developed and well-nourished. No distress.   Lying comfortably in NAD; cousin at bedside     Cardiovascular: Normal rate and regular rhythm.    Pulmonary/Chest: Effort normal and breath sounds normal. No respiratory distress. She has no wheezes. She has no rales.   Abdominal: Soft. Bowel sounds are normal.   Musculoskeletal: She exhibits no edema.   Neurological: She is alert.   Vitals reviewed.      Significant Labs: All pertinent labs within the past 24 hours have been reviewed.    Significant Imaging: I have reviewed all pertinent imaging results/findings within the past 24 hours.    Assessment/Plan:      * Acute respiratory failure with hypoxia    2/2 acute pulmonary edema/acute on chronic combined HF exacerbation/HTN emergency  CXR w/ pulmonary edema, crackles on exam   Received BIPAP overnight, now on 2L NC.   VBG results noted - no hypercapnia  Received intermittent doses of furosemide  Patient appears very comfortable now and needs O2 at times when sleeping; to be dc'd on hospice so will opt to keep O2          Anemia    Anemia of renal failure  Folate deficiency  Transfuse one unit and check FOBT as well           Acute on chronic combined systolic and diastolic heart failure    Resolved  Continue medical management           Primary lung cancer, right    Follows with Dr Barnhart with Oncology  Immunotherapy had been discontinued due to concern for nephritis  Cont home prednisone 40 mg daily for mgmt of renal toxicity  Has appt with him with repeat PET scan. 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

## 2018-08-01 NOTE — ASSESSMENT & PLAN NOTE
Had LESLEY last admission  Renal function continues to worsen but has been elevated like this before  With the exception of elevated phos, k and mag are wnl   Monitor  Cont home Na bicarb

## 2018-08-02 VITALS
WEIGHT: 159.81 LBS | SYSTOLIC BLOOD PRESSURE: 110 MMHG | HEART RATE: 70 BPM | TEMPERATURE: 98 F | RESPIRATION RATE: 18 BRPM | DIASTOLIC BLOOD PRESSURE: 54 MMHG | OXYGEN SATURATION: 97 % | BODY MASS INDEX: 28.32 KG/M2 | HEIGHT: 63 IN

## 2018-08-02 LAB
ANION GAP SERPL CALC-SCNC: 11 MMOL/L
BASOPHILS # BLD AUTO: 0 K/UL
BASOPHILS NFR BLD: 0 %
BUN SERPL-MCNC: 92 MG/DL
CALCIUM SERPL-MCNC: 7.2 MG/DL
CHLORIDE SERPL-SCNC: 102 MMOL/L
CO2 SERPL-SCNC: 24 MMOL/L
CREAT SERPL-MCNC: 5.7 MG/DL
DIFFERENTIAL METHOD: ABNORMAL
EOSINOPHIL # BLD AUTO: 0 K/UL
EOSINOPHIL NFR BLD: 0 %
ERYTHROCYTE [DISTWIDTH] IN BLOOD BY AUTOMATED COUNT: 15.3 %
EST. GFR  (AFRICAN AMERICAN): 7.3 ML/MIN/1.73 M^2
EST. GFR  (NON AFRICAN AMERICAN): 6.3 ML/MIN/1.73 M^2
GLUCOSE SERPL-MCNC: 274 MG/DL
HCT VFR BLD AUTO: 24.7 %
HGB BLD-MCNC: 7.9 G/DL
IMM GRANULOCYTES # BLD AUTO: 0.05 K/UL
IMM GRANULOCYTES NFR BLD AUTO: 0.7 %
LYMPHOCYTES # BLD AUTO: 0.5 K/UL
LYMPHOCYTES NFR BLD: 7 %
MAGNESIUM SERPL-MCNC: 1.8 MG/DL
MCH RBC QN AUTO: 29.3 PG
MCHC RBC AUTO-ENTMCNC: 32 G/DL
MCV RBC AUTO: 92 FL
MONOCYTES # BLD AUTO: 0.4 K/UL
MONOCYTES NFR BLD: 5.5 %
NEUTROPHILS # BLD AUTO: 6.4 K/UL
NEUTROPHILS NFR BLD: 86.8 %
NRBC BLD-RTO: 0 /100 WBC
PHOSPHATE SERPL-MCNC: 5.1 MG/DL
PLATELET # BLD AUTO: 105 K/UL
PMV BLD AUTO: 11 FL
POCT GLUCOSE: 162 MG/DL (ref 70–110)
POCT GLUCOSE: 215 MG/DL (ref 70–110)
POCT GLUCOSE: 366 MG/DL (ref 70–110)
POTASSIUM SERPL-SCNC: 4.3 MMOL/L
RBC # BLD AUTO: 2.7 M/UL
SODIUM SERPL-SCNC: 137 MMOL/L
WBC # BLD AUTO: 7.31 K/UL

## 2018-08-02 PROCEDURE — 80048 BASIC METABOLIC PNL TOTAL CA: CPT

## 2018-08-02 PROCEDURE — 63600175 PHARM REV CODE 636 W HCPCS: Performed by: HOSPITALIST

## 2018-08-02 PROCEDURE — 27000221 HC OXYGEN, UP TO 24 HOURS

## 2018-08-02 PROCEDURE — 84100 ASSAY OF PHOSPHORUS: CPT

## 2018-08-02 PROCEDURE — 25000003 PHARM REV CODE 250: Performed by: HOSPITALIST

## 2018-08-02 PROCEDURE — 99238 HOSP IP/OBS DSCHRG MGMT 30/<: CPT | Mod: ,,, | Performed by: HOSPITALIST

## 2018-08-02 PROCEDURE — 94761 N-INVAS EAR/PLS OXIMETRY MLT: CPT

## 2018-08-02 PROCEDURE — 36415 COLL VENOUS BLD VENIPUNCTURE: CPT

## 2018-08-02 PROCEDURE — 85025 COMPLETE CBC W/AUTO DIFF WBC: CPT

## 2018-08-02 PROCEDURE — 83735 ASSAY OF MAGNESIUM: CPT

## 2018-08-02 RX ORDER — ISOSORBIDE DINITRATE 40 MG/1
40 TABLET ORAL EVERY 8 HOURS
Qty: 90 TABLET | Refills: 11 | Status: SHIPPED | OUTPATIENT
Start: 2018-08-02 | End: 2019-08-02

## 2018-08-02 RX ORDER — INSULIN ASPART 100 [IU]/ML
1-10 INJECTION, SOLUTION INTRAVENOUS; SUBCUTANEOUS
Status: DISCONTINUED | OUTPATIENT
Start: 2018-08-02 | End: 2018-08-02

## 2018-08-02 RX ADMIN — FOLIC ACID 2 MG: 1 TABLET ORAL at 09:08

## 2018-08-02 RX ADMIN — INSULIN ASPART 5 UNITS: 100 INJECTION, SOLUTION INTRAVENOUS; SUBCUTANEOUS at 09:08

## 2018-08-02 RX ADMIN — HYDRALAZINE HYDROCHLORIDE 100 MG: 50 TABLET ORAL at 06:08

## 2018-08-02 RX ADMIN — HEPARIN SODIUM 5000 UNITS: 5000 INJECTION, SOLUTION INTRAVENOUS; SUBCUTANEOUS at 03:08

## 2018-08-02 RX ADMIN — INSULIN ASPART 5 UNITS: 100 INJECTION, SOLUTION INTRAVENOUS; SUBCUTANEOUS at 12:08

## 2018-08-02 RX ADMIN — SENNOSIDES AND DOCUSATE SODIUM 1 TABLET: 8.6; 5 TABLET ORAL at 09:08

## 2018-08-02 RX ADMIN — HEPARIN SODIUM 5000 UNITS: 5000 INJECTION, SOLUTION INTRAVENOUS; SUBCUTANEOUS at 06:08

## 2018-08-02 RX ADMIN — HYDRALAZINE HYDROCHLORIDE 100 MG: 50 TABLET ORAL at 03:08

## 2018-08-02 RX ADMIN — INSULIN ASPART 5 UNITS: 100 INJECTION, SOLUTION INTRAVENOUS; SUBCUTANEOUS at 06:08

## 2018-08-02 RX ADMIN — ISOSORBIDE DINITRATE 40 MG: 10 TABLET ORAL at 06:08

## 2018-08-02 RX ADMIN — SODIUM BICARBONATE 650 MG TABLET 650 MG: at 03:08

## 2018-08-02 RX ADMIN — INSULIN ASPART 4 UNITS: 100 INJECTION, SOLUTION INTRAVENOUS; SUBCUTANEOUS at 12:08

## 2018-08-02 RX ADMIN — PREDNISONE 40 MG: 20 TABLET ORAL at 09:08

## 2018-08-02 RX ADMIN — ISOSORBIDE DINITRATE 40 MG: 10 TABLET ORAL at 03:08

## 2018-08-02 RX ADMIN — CARVEDILOL 25 MG: 25 TABLET, FILM COATED ORAL at 09:08

## 2018-08-02 RX ADMIN — INSULIN ASPART 4 UNITS: 100 INJECTION, SOLUTION INTRAVENOUS; SUBCUTANEOUS at 09:08

## 2018-08-02 RX ADMIN — ATORVASTATIN CALCIUM 40 MG: 20 TABLET, FILM COATED ORAL at 09:08

## 2018-08-02 RX ADMIN — SODIUM BICARBONATE 650 MG TABLET 650 MG: at 09:08

## 2018-08-02 RX ADMIN — AMLODIPINE BESYLATE 10 MG: 10 TABLET ORAL at 09:08

## 2018-08-02 RX ADMIN — ASPIRIN 81 MG CHEWABLE TABLET 81 MG: 81 TABLET CHEWABLE at 09:08

## 2018-08-02 NOTE — PLAN OF CARE
LORRAINE called Bon to F/U on NH orders for DC today.  LORRAINE spoke with Mrs. Diop who stated that admissions was away from the desk but she would have Yolanda return the call.  LORRAINE will continue to F/U.        Yamile Guardado LMSW

## 2018-08-02 NOTE — PLAN OF CARE
Problem: Pressure Ulcer Risk (Ovidio Scale) (Adult,Obstetrics,Pediatric)  Intervention: Promote/Optimize Nutrition    07/31/18 0311   Nutrition Interventions   Oral Nutrition Promotion calorie dense foods provided;rest periods promoted;safe use of adaptive equipment encouraged;social interaction promoted      Intervention: Prevent/Manage Excess Moisture    07/31/18 0311   Hygiene Care   Perineal Care absorbent pad changed;perineum cleansed;protective ointment applied;protective cream applied;skin sealant/barrier applied   Bathing/Skin Care incontinence care;linen changed   Skin Interventions   Skin Protection Incontinence pads;Tubing/devices free from skin contact;Transparent dressing      Intervention: Maintain Head of Bed Elevation Less Than 30 Degrees as Tolerated    07/31/18 0300   Positioning   Head of Bed (HOB) HOB at 20-30 degrees      Intervention: Prevent/Minimize Sheer/Friction Injuries    07/30/18 2000   Skin Interventions   Pressure Reduction Devices Pressure-redistributing mattress utilized;Foam padding utilized;Heel offloading device utilized   Pressure Reduction Techniques frequent weight shift encouraged   Positioning   Positioning/Transfer Devices pillows;wedge      Intervention: Turn/Reposition Often    07/30/18 2000 07/31/18 0300   Skin Interventions   Pressure Reduction Techniques frequent weight shift encouraged --    Positioning   Body Position --  side-lying, left         Goal: Identify Related Risk Factors and Signs and Symptoms  Related risk factors and signs and symptoms are identified upon initiation of Human Response Clinical Practice Guideline (CPG)   Outcome: Ongoing (interventions implemented as appropriate)    07/31/18 0311   Pressure Ulcer Risk (Ovidio Scale)   Related Risk Factors (Pressure Ulcer Risk (Ovidio Scale)) age extremes;cognitive impairment;mechanical forces;mobility impaired;nutritional deficiencies      Goal: Skin Integrity  Patient will demonstrate the desired outcomes  by discharge/transition of care.   Outcome: Ongoing (interventions implemented as appropriate)    07/31/18 0311   Pressure Ulcer Risk (Ovidio Scale) (Adult,Obstetrics,Pediatric)   Skin Integrity making

## 2018-08-02 NOTE — PLAN OF CARE
Pt accepted back to [facility] as per Yolanda in admissions.  SW spoke with [facility] [place], Cj ([id number]) and gave him information to call report to Station 3 nurse for room 400B at [phone].  Stretcher transport arranged through Zayra at [facility] for 5pm with PHN auth of [id number] for 430pm.  [employer] envelope printed up and brought to 9th floor nurses station.        Yamile Guardado LMSW

## 2018-08-02 NOTE — PLAN OF CARE
Plan is to discharge back to [facility] with [facility] via stretcher transport with [facility].       08/02/18 1622   Final Note   Assessment Type Final Discharge Note   [facility]   Right Care Referral Info   Post Acute Recommendation Other   Referral Type NH with Hospice   Facility Name [facility] with [facility]

## 2018-08-02 NOTE — PROGRESS NOTES
Report called to Lisa at [facility] and notified Alley with the hospice agency of patient's discharge.

## 2018-08-02 NOTE — PLAN OF CARE
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 [E11.22, I12.0, N18.5]  Yes     Chronic    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, 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]  Yes     Chronic               Essential hypertension [I10]  Yes     Chronic      Resolved Hospital Problems    Diagnosis Date Resolved POA    Hypernatremia [E87.0] 08/01/2018 Yes    Acute pulmonary edema [J81.0] 07/29/2018 Yes    Hypertensive emergency [I16.1] 07/31/2018 Yes       Patient is homebound due to:  Acute respiratory failure with hypoxia    Allergies:Review of patient's allergies indicates:  No Known Allergies    Vitals: per facility protocol     Diet: diabetic diet with 1.5 L fluid restriction 2000 gabi diet    Supplement:  1 can every three times a day with meals                         Type:     Nepro        Acitivities:  As tolerated       LABS:  Per facility protocol    Nursing Precautions:    - Aspiration precautions:             - Total assistance with meals            -  Upright 90 degrees befor during and after meals             -  Suction at bedside          - Fall precautions per nursing home protocol   - Seizure precaution per detention protocol   - Decubitus precautions:        -  for positioning   - Pressure reducing foam mattress   - Turn patient every two hours. Use wedge pillows to anchor patient    CONSULTS:  n/a    MISCELLANEOUS CARE:      Routine Skin for Bedridden  Patients:  Apply moisture barrier cream to all    skin folds and wet areas in perineal area daily and after baths and                           all bowel movements.                    DIABETES CARE:        Check blood sugar:      Fingerstick blood sugar AC and HS        Report CBG < 60 or > 400 to physician.                                          Insulin Sliding Scale          Glucose  Novolog Insulin Subcutaneous        0 - 60   Orange juice or glucose tablet, hold insulin      No insulin   201-250  2 units   251-300  4 units   301-350  6 units   351-400  8 units   >400   10 units then call physician      Medications: Discontinue all previous medication orders, if any. See new list below.      Misa Yun   Home Medication Instructions TRACEY:[id number]    Printed on:08/02/18 1579   Medication Information                      acetaminophen (TYLENOL) 325 MG tablet  Take 325 mg by mouth every 6 (six) hours as needed for Pain.             amlodipine (NORVASC) 10 MG tablet  Take 10 mg by mouth once daily.             aspirin 81 MG Chew  Take 1 tablet (81 mg total) by mouth once daily. RESUME ON 2/14/15             atorvastatin (LIPITOR) 40 MG tablet  Take 1 tablet (40 mg total) by mouth once daily.             carvedilol (COREG) 25 MG tablet  Take 1 tablet (25 mg total) by mouth 2 (two) times daily.             donepezil (ARICEPT) 5 MG tablet  Take 5 mg by mouth every evening.             ergocalciferol (ERGOCALCIFEROL) 50,000 unit Cap  Take 50,000 Units by mouth every 7 days.             folic acid (FOLVITE) 1 MG tablet  Take 2 tablets (2 mg total) by mouth once daily.             hydrALAZINE (APRESOLINE) 50 MG tablet  Take 1 tablet (50 mg total) by mouth every 8 (eight) hours.             insulin aspart U-100 (NOVOLOG) 100 unit/mL InPn pen  Inject 5 Units into the skin 3 (three) times daily.             insulin detemir U-100 (LEVEMIR FLEXTOUCH) 100 unit/mL (3 mL) SubQ InPn pen  Inject 10 Units into  the skin every evening.             isosorbide dinitrate (ISORDIL) 40 MG Tab  Take 1 tablet (40 mg total) by mouth every 8 (eight) hours.             nitroGLYCERIN (NITROSTAT) 0.4 MG SL tablet  Place 1 tablet (0.4 mg total) under the tongue every 5 (five) minutes as needed for Chest pain.             predniSONE (DELTASONE) 20 MG tablet  Take 40 mg by mouth once daily.             senna-docusate 8.6-50 mg (PERICOLACE) 8.6-50 mg per tablet  Take 1 tablet by mouth 2 (two) times daily.             sodium bicarbonate 650 MG tablet  Take 1 tablet (650 mg total) by mouth 3 (three) times daily.                       _________________________________  Itz Salmeron MD  08/02/2018

## 2018-08-03 NOTE — DISCHARGE SUMMARY
[facility] Hospital Medicine  Discharge Summary      Patient Name: Misa Yun  MRN: [id number]  Admission Date: 7/29/2018  Hospital Length of Stay: 4 days  Discharge Date and Time: 8/2/2018  8:21 PM  Attending Physician: No att. providers found   Discharging Provider: Itz Salmeron MD  Primary Care Provider: [facility] Medicine Team: [facility] HOSP MED G Itz Salmeron MD    HPI:   84F with CKD4-5, DM2, HTN, HLD, CAD, combined HF, lung CA that presents to the ED via EMS for evaluation of SOB and low H&H. Patient's sister states patient has been SOB since around noon. Per sister, they were also told to come to the ED because patient's H&H was low on blood work. Patient endorses abdominal pain. Per EMS, when they arrived at the scene patient's O2 sat was in the 80's. She is a resident of [facility]. No further concerns or complaints at this time. Per patient's POA sister, she was fine yesterday.     Of note, Dr Silva in ED had goals of care discussion with sister - opted for DNR and NH with hospice on discharge.     * No surgery found *      Hospital Course:   The patient was admitted for respiratory failure which has been an ongoing issue. During this admission, her sister made the decision to transition to hospice care given the lack of improvement in her renal function which makes diuresis challenging due to dehydration risk but then she is in need at times due to hypoxemia. The patient had worsening renal function. She required 1 unit prbc transfusion during her hospitalization due to anemia attributable to her CKD status. She was comfortable at the time of dc and will continue with hospice care back at the NH.      Consults:   Consults         Status Ordering Provider     Inpatient consult to Palliative Care  Once     Provider:  (Not yet assigned)    RAMIREZ Crawford          No new Assessment & Plan notes have been filed  under this hospital service since the last note was generated.  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    Significant Diagnostic Studies: none    Pending Diagnostic Studies:     None         Medications:  Reconciled Home Medications:      Medication List      START taking these medications    isosorbide dinitrate 40 MG Tab  Commonly known as:  ISORDIL  Take 1 tablet (40 mg total) by mouth every 8 (eight) hours.        CHANGE how you take these medications    carvedilol 25 MG tablet  Commonly known as:  COREG  Take 1 tablet (25 mg total) by mouth 2 (two) times daily.  What changed:  how much to take        CONTINUE taking these medications    acetaminophen 325 MG tablet  Commonly known as:  TYLENOL  Take 325 mg by mouth every 6 (six) hours as needed for Pain.     amLODIPine 10 MG tablet  Commonly known as:  NORVASC  Take 10 mg by mouth once daily.     aspirin 81 MG Chew  Take 1 tablet (81 mg total) by mouth once daily. RESUME ON 2/14/15     atorvastatin 40 MG  tablet  Commonly known as:  LIPITOR  Take 1 tablet (40 mg total) by mouth once daily.     donepezil 5 MG tablet  Commonly known as:  ARICEPT  Take 5 mg by mouth every evening.     ergocalciferol 50,000 unit Cap  Commonly known as:  ERGOCALCIFEROL  Take 50,000 Units by mouth every 7 days.     folic acid 1 MG tablet  Commonly known as:  FOLVITE  Take 2 tablets (2 mg total) by mouth once daily.     hydrALAZINE 50 MG tablet  Commonly known as:  APRESOLINE  Take 1 tablet (50 mg total) by mouth every 8 (eight) hours.     insulin aspart U-100 100 unit/mL Inpn pen  Commonly known as:  NovoLOG  Inject 5 Units into the skin 3 (three) times daily.     insulin detemir U-100 100 unit/mL (3 mL) Inpn pen  Commonly known as:  LEVEMIR FLEXTOUCH  Inject 10 Units into the skin every evening.     nitroGLYCERIN 0.4 MG SL tablet  Commonly known as:  NITROSTAT  Place 1 tablet (0.4 mg total) under the tongue every 5 (five) minutes as needed for Chest pain.     predniSONE 20 MG tablet  Commonly known as:  DELTASONE  Take 40 mg by mouth once daily.     senna-docusate 8.6-50 mg 8.6-50 mg per tablet  Commonly known as:  PERICOLACE  Take 1 tablet by mouth 2 (two) times daily.     sodium bicarbonate 650 MG tablet  Take 1 tablet (650 mg total) by mouth 3 (three) times daily.        STOP taking these medications    isosorbide mononitrate 60 MG 24 hr tablet  Commonly known as:  IMDUR            Indwelling Lines/Drains at time of discharge:   Lines/Drains/Airways          No matching active lines, drains, or airways             Itz Salmeron MD  Department of Hospital Medicine  [facility]

## 2018-08-03 NOTE — HOSPITAL COURSE
The patient was admitted for respiratory failure which has been an ongoing issue. During this admission, her sister made the decision to transition to hospice care given the lack of improvement in her renal function which makes diuresis challenging due to dehydration risk but then she is in need at times due to hypoxemia. The patient had worsening renal function. She required 1 unit prbc transfusion during her hospitalization due to anemia attributable to her CKD status. She was comfortable at the time of dc and will continue with hospice care back at the NH.

## 2018-10-22 NOTE — ADDENDUM NOTE
Addended by: JOLYNN LOYD on: 2/1/2017 08:17 AM     Modules accepted: Orders     Quality 431: Preventive Care And Screening: Unhealthy Alcohol Use - Screening: Patient screened for unhealthy alcohol use using a single question and scores less than 2 times per year Detail Level: Detailed Quality 110: Preventive Care And Screening: Influenza Immunization: Influenza Immunization Administered during Influenza season

## 2023-01-10 NOTE — ASSESSMENT & PLAN NOTE
- esbl ecoli  - dc ceftriaxone; started ertapenem x 7 days   - afebrile and no leukocytosis      Yes

## (undated) DEVICE — GLOVE BIOGEL SKINSENSE PI 6.5

## (undated) DEVICE — Device

## (undated) DEVICE — GLASSES EYE PROTECTIVE

## (undated) DEVICE — SYR SLIP TIP 1CC

## (undated) DEVICE — SOL WATER STRL IRR 1000ML

## (undated) DEVICE — CASSETTE INFINITI

## (undated) DEVICE — GLOVE BIOGEL SKINSENSE PI 7.5

## (undated) DEVICE — SOL BETADINE 5%